# Patient Record
Sex: FEMALE | Race: BLACK OR AFRICAN AMERICAN | NOT HISPANIC OR LATINO | URBAN - METROPOLITAN AREA
[De-identification: names, ages, dates, MRNs, and addresses within clinical notes are randomized per-mention and may not be internally consistent; named-entity substitution may affect disease eponyms.]

---

## 2020-02-25 ENCOUNTER — EMERGENCY (EMERGENCY)
Facility: HOSPITAL | Age: 65
LOS: 1 days | Discharge: HOME | End: 2020-02-25
Attending: EMERGENCY MEDICINE | Admitting: EMERGENCY MEDICINE
Payer: MEDICARE

## 2020-02-25 VITALS
HEART RATE: 68 BPM | SYSTOLIC BLOOD PRESSURE: 162 MMHG | DIASTOLIC BLOOD PRESSURE: 72 MMHG | TEMPERATURE: 98 F | RESPIRATION RATE: 16 BRPM | OXYGEN SATURATION: 99 %

## 2020-02-25 VITALS
HEART RATE: 87 BPM | DIASTOLIC BLOOD PRESSURE: 78 MMHG | SYSTOLIC BLOOD PRESSURE: 148 MMHG | OXYGEN SATURATION: 100 % | RESPIRATION RATE: 18 BRPM

## 2020-02-25 LAB
ALBUMIN SERPL ELPH-MCNC: 4 G/DL — SIGNIFICANT CHANGE UP (ref 3.3–5)
ALP SERPL-CCNC: 106 U/L — SIGNIFICANT CHANGE UP (ref 40–120)
ALT FLD-CCNC: 14 U/L — SIGNIFICANT CHANGE UP (ref 4–33)
ANION GAP SERPL CALC-SCNC: 10 MMO/L — SIGNIFICANT CHANGE UP (ref 7–14)
ANION GAP SERPL CALC-SCNC: 15 MMO/L — HIGH (ref 7–14)
AST SERPL-CCNC: 15 U/L — SIGNIFICANT CHANGE UP (ref 4–32)
BASE EXCESS BLDV CALC-SCNC: 0.7 MMOL/L — SIGNIFICANT CHANGE UP
BASOPHILS # BLD AUTO: 0.02 K/UL — SIGNIFICANT CHANGE UP (ref 0–0.2)
BASOPHILS NFR BLD AUTO: 0.3 % — SIGNIFICANT CHANGE UP (ref 0–2)
BILIRUB SERPL-MCNC: 0.3 MG/DL — SIGNIFICANT CHANGE UP (ref 0.2–1.2)
BLOOD GAS VENOUS - CREATININE: 1.51 MG/DL — HIGH (ref 0.5–1.3)
BLOOD GAS VENOUS - FIO2: 21 — SIGNIFICANT CHANGE UP
BUN SERPL-MCNC: 32 MG/DL — HIGH (ref 7–23)
BUN SERPL-MCNC: 36 MG/DL — HIGH (ref 7–23)
CALCIUM SERPL-MCNC: 9 MG/DL — SIGNIFICANT CHANGE UP (ref 8.4–10.5)
CALCIUM SERPL-MCNC: 9.5 MG/DL — SIGNIFICANT CHANGE UP (ref 8.4–10.5)
CHLORIDE BLDV-SCNC: 98 MMOL/L — SIGNIFICANT CHANGE UP (ref 96–108)
CHLORIDE SERPL-SCNC: 91 MMOL/L — LOW (ref 98–107)
CHLORIDE SERPL-SCNC: 99 MMOL/L — SIGNIFICANT CHANGE UP (ref 98–107)
CO2 SERPL-SCNC: 23 MMOL/L — SIGNIFICANT CHANGE UP (ref 22–31)
CO2 SERPL-SCNC: 25 MMOL/L — SIGNIFICANT CHANGE UP (ref 22–31)
CREAT SERPL-MCNC: 1.3 MG/DL — SIGNIFICANT CHANGE UP (ref 0.5–1.3)
CREAT SERPL-MCNC: 1.45 MG/DL — HIGH (ref 0.5–1.3)
EOSINOPHIL # BLD AUTO: 0.02 K/UL — SIGNIFICANT CHANGE UP (ref 0–0.5)
EOSINOPHIL NFR BLD AUTO: 0.3 % — SIGNIFICANT CHANGE UP (ref 0–6)
GAS PNL BLDV: 132 MMOL/L — LOW (ref 136–146)
GLUCOSE BLDV-MCNC: 589 MG/DL — CRITICAL HIGH (ref 70–99)
GLUCOSE SERPL-MCNC: 496 MG/DL — CRITICAL HIGH (ref 70–99)
GLUCOSE SERPL-MCNC: 667 MG/DL — CRITICAL HIGH (ref 70–99)
HBA1C BLD-MCNC: 10.7 % — HIGH (ref 4–5.6)
HCO3 BLDV-SCNC: 24 MMOL/L — SIGNIFICANT CHANGE UP (ref 20–27)
HCT VFR BLD CALC: 37.3 % — SIGNIFICANT CHANGE UP (ref 34.5–45)
HCT VFR BLDV CALC: 38.8 % — SIGNIFICANT CHANGE UP (ref 34.5–45)
HGB BLD-MCNC: 12.3 G/DL — SIGNIFICANT CHANGE UP (ref 11.5–15.5)
HGB BLDV-MCNC: 12.6 G/DL — SIGNIFICANT CHANGE UP (ref 11.5–15.5)
IMM GRANULOCYTES NFR BLD AUTO: 0 % — SIGNIFICANT CHANGE UP (ref 0–1.5)
LACTATE BLDV-MCNC: 3.1 MMOL/L — HIGH (ref 0.5–2)
LACTATE SERPL-SCNC: 1.2 MMOL/L — SIGNIFICANT CHANGE UP (ref 0.5–2)
LYMPHOCYTES # BLD AUTO: 2.2 K/UL — SIGNIFICANT CHANGE UP (ref 1–3.3)
LYMPHOCYTES # BLD AUTO: 37.5 % — SIGNIFICANT CHANGE UP (ref 13–44)
MCHC RBC-ENTMCNC: 28.9 PG — SIGNIFICANT CHANGE UP (ref 27–34)
MCHC RBC-ENTMCNC: 33 % — SIGNIFICANT CHANGE UP (ref 32–36)
MCV RBC AUTO: 87.8 FL — SIGNIFICANT CHANGE UP (ref 80–100)
MONOCYTES # BLD AUTO: 0.5 K/UL — SIGNIFICANT CHANGE UP (ref 0–0.9)
MONOCYTES NFR BLD AUTO: 8.5 % — SIGNIFICANT CHANGE UP (ref 2–14)
NEUTROPHILS # BLD AUTO: 3.12 K/UL — SIGNIFICANT CHANGE UP (ref 1.8–7.4)
NEUTROPHILS NFR BLD AUTO: 53.4 % — SIGNIFICANT CHANGE UP (ref 43–77)
NRBC # FLD: 0 K/UL — SIGNIFICANT CHANGE UP (ref 0–0)
PCO2 BLDV: 46 MMHG — SIGNIFICANT CHANGE UP (ref 41–51)
PH BLDV: 7.36 PH — SIGNIFICANT CHANGE UP (ref 7.32–7.43)
PLATELET # BLD AUTO: 345 K/UL — SIGNIFICANT CHANGE UP (ref 150–400)
PMV BLD: 9.8 FL — SIGNIFICANT CHANGE UP (ref 7–13)
PO2 BLDV: 37 MMHG — SIGNIFICANT CHANGE UP (ref 35–40)
POTASSIUM BLDV-SCNC: 4.6 MMOL/L — HIGH (ref 3.4–4.5)
POTASSIUM SERPL-MCNC: 4.5 MMOL/L — SIGNIFICANT CHANGE UP (ref 3.5–5.3)
POTASSIUM SERPL-MCNC: 4.5 MMOL/L — SIGNIFICANT CHANGE UP (ref 3.5–5.3)
POTASSIUM SERPL-SCNC: 4.5 MMOL/L — SIGNIFICANT CHANGE UP (ref 3.5–5.3)
POTASSIUM SERPL-SCNC: 4.5 MMOL/L — SIGNIFICANT CHANGE UP (ref 3.5–5.3)
PROT SERPL-MCNC: 8.3 G/DL — SIGNIFICANT CHANGE UP (ref 6–8.3)
RBC # BLD: 4.25 M/UL — SIGNIFICANT CHANGE UP (ref 3.8–5.2)
RBC # FLD: 12.2 % — SIGNIFICANT CHANGE UP (ref 10.3–14.5)
SAO2 % BLDV: 67.7 % — SIGNIFICANT CHANGE UP (ref 60–85)
SODIUM SERPL-SCNC: 129 MMOL/L — LOW (ref 135–145)
SODIUM SERPL-SCNC: 134 MMOL/L — LOW (ref 135–145)
WBC # BLD: 5.86 K/UL — SIGNIFICANT CHANGE UP (ref 3.8–10.5)
WBC # FLD AUTO: 5.86 K/UL — SIGNIFICANT CHANGE UP (ref 3.8–10.5)

## 2020-02-25 PROCEDURE — 93971 EXTREMITY STUDY: CPT | Mod: 26,LT

## 2020-02-25 PROCEDURE — 99284 EMERGENCY DEPT VISIT MOD MDM: CPT

## 2020-02-25 PROCEDURE — 73610 X-RAY EXAM OF ANKLE: CPT | Mod: 26,LT

## 2020-02-25 RX ORDER — SODIUM CHLORIDE 9 MG/ML
2000 INJECTION INTRAMUSCULAR; INTRAVENOUS; SUBCUTANEOUS ONCE
Refills: 0 | Status: COMPLETED | OUTPATIENT
Start: 2020-02-25 | End: 2020-02-25

## 2020-02-25 RX ADMIN — SODIUM CHLORIDE 2000 MILLILITER(S): 9 INJECTION INTRAMUSCULAR; INTRAVENOUS; SUBCUTANEOUS at 16:16

## 2020-02-25 NOTE — ED PROVIDER NOTE - PROGRESS NOTE DETAILS
Pt feeling well.  All results and imaging reviewed.  States she expected her A1C to be worse.  Upon further discussion  pt states she has her insulin at home, and has been taking "my own thing".  I also called in Rx for Lantus to pt's pharmacy (30 units nightly, per prior [facility]) and theystated she should have enough for another few weeks, but would try to fill rx.  I d/w pt and she stated she takes a higher dose of Lantus than rx'd.  She insists she will be seeing her new PMD soon since he switched insurance. She declines CDU, though a bed was offered to her.  She understands she has a chronic condition which requires careful eval and expressed a desire to continue taking medications as prescribed.  Will seek f/u. brendan that we offered CDu fo endo eval in AM.  Since she is alert and oriented and appeas to possess decisional capacity, blood glucose trending down, and no AGMA, will dc for PMD f/u.

## 2020-02-25 NOTE — ED ADULT NURSE NOTE - OBJECTIVE STATEMENT
64 yr old F c DM, currently off meds d/t insurance and PMD transitions who p./w left ankle swelling and calf pain x weeks. No trauma. No fevers/chills.  No SOB or CP.  States she hasn't been able ot monitor her sugars. No recent travel. IV placed, labs sent, VS as documented, will continue to monitor.

## 2020-02-25 NOTE — ED PROVIDER NOTE - MUSCULOSKELETAL, MLM
Spine appears normal, range of motion is not limited, no muscle or joint tenderness except for mild swelling and tenbderness at proximal mnedial aspect of left ankle. Zlso with calf tenderness to plap

## 2020-02-25 NOTE — ED PROVIDER NOTE - PATIENT PORTAL LINK FT
You can access the FollowMyHealth Patient Portal offered by [facility] by registering at the following website: [email or web address]. By joining [employer]’s FollowMyHealth portal, you will also be able to view your health information using other applications (apps) compatible with our system.

## 2020-02-25 NOTE — ED PROVIDER NOTE - NSFOLLOWUPINSTRUCTIONS_ED_ALL_ED_FT
Your blood sugar was very high today and you declined Endocrinology evaluation in the Observation Unit.  Please see your doctor as soon as possible and take all of your medications as prescribed.     Return to the ER if you're feeling weak, dehydrated, or have any other concerning signs.     Be sure to discuss your medical conditions with your doctor as soon as possible.

## 2020-02-25 NOTE — ED PROVIDER NOTE - OBJECTIVE STATEMENT
64 yr old F c DM, currently off meds d/t insurance and PMD transitions who p./w left ankle swelling and calf pain x weeks. No trauma. No fevers/chills.  No SOB or CP.  States she hasn't been able ot monitor her sugars. No recent travel.

## 2020-02-25 NOTE — ED PROVIDER NOTE - CLINICAL SUMMARY MEDICAL DECISION MAKING FREE TEXT BOX
Pt c uncontrolled DM who p/w LLE swelling, calf tenderness.  Will r/o DVT.  Also given lack of medical care or access to meds will obtain A1C and assess for possible Endo eval.

## 2020-11-15 ENCOUNTER — EMERGENCY (EMERGENCY)
Facility: HOSPITAL | Age: 65
LOS: 1 days | Discharge: HOME | End: 2020-11-15
Attending: EMERGENCY MEDICINE | Admitting: EMERGENCY MEDICINE
Payer: MEDICARE

## 2020-11-15 VITALS
OXYGEN SATURATION: 100 % | TEMPERATURE: 98 F | SYSTOLIC BLOOD PRESSURE: 140 MMHG | HEART RATE: 76 BPM | DIASTOLIC BLOOD PRESSURE: 65 MMHG | RESPIRATION RATE: 15 BRPM

## 2020-11-15 VITALS
SYSTOLIC BLOOD PRESSURE: 149 MMHG | OXYGEN SATURATION: 99 % | HEIGHT: 68 IN | HEART RATE: 92 BPM | RESPIRATION RATE: 16 BRPM | DIASTOLIC BLOOD PRESSURE: 72 MMHG

## 2020-11-15 PROBLEM — E11.9 TYPE 2 DIABETES MELLITUS WITHOUT COMPLICATIONS: Chronic | Status: ACTIVE | Noted: 2020-02-25

## 2020-11-15 LAB
APPEARANCE UR: CLEAR — SIGNIFICANT CHANGE UP
BACTERIA # UR AUTO: NEGATIVE — SIGNIFICANT CHANGE UP
BILIRUB UR-MCNC: NEGATIVE — SIGNIFICANT CHANGE UP
BLOOD UR QL VISUAL: NEGATIVE — SIGNIFICANT CHANGE UP
COLOR SPEC: SIGNIFICANT CHANGE UP
GLUCOSE UR-MCNC: NEGATIVE — SIGNIFICANT CHANGE UP
HYALINE CASTS # UR AUTO: NEGATIVE — SIGNIFICANT CHANGE UP
KETONES UR-MCNC: NEGATIVE — SIGNIFICANT CHANGE UP
LEUKOCYTE ESTERASE UR-ACNC: SIGNIFICANT CHANGE UP
NITRITE UR-MCNC: NEGATIVE — SIGNIFICANT CHANGE UP
PH UR: 5.5 — SIGNIFICANT CHANGE UP (ref 5–8)
PROT UR-MCNC: NEGATIVE — SIGNIFICANT CHANGE UP
RBC CASTS # UR COMP ASSIST: SIGNIFICANT CHANGE UP (ref 0–?)
SP GR SPEC: 1.02 — SIGNIFICANT CHANGE UP (ref 1–1.04)
SQUAMOUS # UR AUTO: SIGNIFICANT CHANGE UP
UROBILINOGEN FLD QL: NORMAL — SIGNIFICANT CHANGE UP
WBC UR QL: SIGNIFICANT CHANGE UP (ref 0–?)

## 2020-11-15 PROCEDURE — 99283 EMERGENCY DEPT VISIT LOW MDM: CPT | Mod: GC

## 2020-11-15 PROCEDURE — 73562 X-RAY EXAM OF KNEE 3: CPT | Mod: 26,LT

## 2020-11-15 PROCEDURE — 99053 MED SERV 10PM-8AM 24 HR FAC: CPT

## 2020-11-15 PROCEDURE — 73590 X-RAY EXAM OF LOWER LEG: CPT | Mod: 26,LT

## 2020-11-15 RX ORDER — IBUPROFEN 200 MG
400 TABLET ORAL ONCE
Refills: 0 | Status: COMPLETED | OUTPATIENT
Start: 2020-11-15 | End: 2020-11-15

## 2020-11-15 RX ORDER — ACETAMINOPHEN 500 MG
975 TABLET ORAL ONCE
Refills: 0 | Status: COMPLETED | OUTPATIENT
Start: 2020-11-15 | End: 2020-11-15

## 2020-11-15 RX ADMIN — Medication 400 MILLIGRAM(S): at 10:20

## 2020-11-15 RX ADMIN — Medication 975 MILLIGRAM(S): at 10:20

## 2020-11-15 NOTE — ED ADULT NURSE NOTE - NSIMPLEMENTINTERV_GEN_ALL_ED
Implemented All Fall Risk Interventions:  [place] to call system. Call bell, personal items and telephone within reach. Instruct patient to call for assistance. Room bathroom lighting operational. Non-slip footwear when patient is off stretcher. Physically safe environment: no spills, clutter or unnecessary equipment. Stretcher in lowest position, wheels locked, appropriate side rails in place. Provide visual cue, wrist band, yellow gown, etc. Monitor gait and stability. Monitor for mental status changes and reorient to person, place, and time. Review medications for side effects contributing to fall risk. Reinforce activity limits and safety measures with patient and family.

## 2020-11-15 NOTE — ED PROVIDER NOTE - PMH
Diabetes mellitus type 2, noninsulin dependent    DM (diabetes mellitus)    HTN (hypertension)    Hyperlipemia

## 2020-11-15 NOTE — ED ADULT NURSE NOTE - OBJECTIVE STATEMENT
Pt presents to the ED a&ox4 and ambulatory c/o of left knee pain s/p mechanical fall this morning at work. Pt states she misstepped and tripped, landing on the right side. Pt states she did not land on her left arm, elbow, ankle or foot, just her left knee. Pt denies hitting her head. Pt denies LOC. No swelling or erythema noted to the left knee. Pt in no acute distress at this time. Pt denies headache. Pt denies previous falls. Pt denies dizziness, lightheadedness, chest pain, SOB, fevers, cough. Breathing even and unlabored. Sao2 100% on room air. Abd soft and nontender. Pt denies N/V/D. Will continue to monitor.

## 2020-11-15 NOTE — ED PROVIDER NOTE - NS ED ROS FT
CONSTITUTIONAL: No fevers, no chills, no lightheadedness, no dizziness  Eyes: no visual changes  Ears: no ear drainage, no ear pain  Nose: no nasal congestion  Mouth/Throat: no sore throat  CV: No chest pain, no palpitations  PULM: No SOB, no cough  GI: No n/v/d, no abd pain  : no dysuria, no hematuria  SKIN: no rashes.  NEURO: no headache, no focal weakness or numbness  LYMPH/VASC: no LE swelling CONSTITUTIONAL: No fevers, no chills, no lightheadedness, no dizziness  Eyes: no visual changes  Ears: no ear drainage, no ear pain  Nose: no nasal congestion  Mouth/Throat: no sore throat  CV: No chest pain, no palpitations  PULM: No SOB, no cough  GI: No n/v/d, no abd pain  : no dysuria, no hematuria  SKIN: no rashes.  MSK- pain to left anterior leg  NEURO: no headache, no focal weakness or numbness  LYMPH/VASC: no LE swelling

## 2020-11-15 NOTE — ED PROVIDER NOTE - NSFOLLOWUPINSTRUCTIONS_ED_ALL_ED_FT
You were evaluated in the Emergency Department for left knee pain.  You were evaluated and examined by a physician, and you had labs, xrays.      Based on your evaluation: leg pain    There are no signs of emergency conditions requiring admission to the hospital on today's workup.  Based on the evaluation, a presumptive diagnosis was made, however, further evaluation may be required by your primary care physician or a specialist for a more definitive diagnosis.  Therefore, please follow-up as directed or return to the Emergency Department if your symptoms change or worsen.    We recommend that you:  1. See your primary care physician within the next 72 hours for follow up.  Bring a copy of your discharge paperwork (including any test results) to your doctor.  2. Take tylenol or motrin as needed for pain.  3. Use your cane as shown here as needed.      *** Return immediately if you have worsening pain, inability to bear weight, or any other new/concerning symptoms. ***

## 2020-11-15 NOTE — ED ADULT NURSE REASSESSMENT NOTE - NS ED NURSE REASSESS COMMENT FT1
Pt given cane by MD and instructed on how to use by MD Mcarthur and MD Gordillo. Pt ambulated with cane with steady gait at this time with improved pain. Awaiting UA results. Will continue to monitor.

## 2020-11-15 NOTE — ED ADULT NURSE NOTE - CHIEF COMPLAINT QUOTE
Patient c/o mechanical fall today "tripped on air". Patient reports hitting below left knee to floor. Denies hitting head or LOC. Patient takes aspirin but denies any other blood thinners. Hx. DM Type 2, HTN. Patient refusing to take temperature in triage currently.

## 2020-11-15 NOTE — ED PROVIDER NOTE - ATTENDING CONTRIBUTION TO CARE
I performed a face to face evaluation of this patient and performed a full history and physical examination on the patient.  I agree with the resident's history, physical examination, and plan of the patient.  64 y/o F presenting s/p fall, no preceding symptoms no with left shin pain low concern for fx but will obtain plain films to evaluate. UA to eval for possible infectious etiology, EKG to eval for cardiac. Likely DC home with supportive care   Pt was working at the time and felt fine, she thinks she tripped on her shoe.  Did not hit head, head and neck and back normal, heart and lung, abdomen and neuro wnl, skin wnl  No step off left leg, full range of motion. pt declines ekg and labs, is aware sugar is slightly high and will followup.-

## 2020-11-15 NOTE — ED PROVIDER NOTE - CLINICAL SUMMARY MEDICAL DECISION MAKING FREE TEXT BOX
66 y/o F presenting s/p fall, no preceding symptoms no with left shin pain low concern for fx but will obtain plain films to eval. UA to eval for possible infectious etiology, EKG to eval for cardiac. Likely DC home with supportive care - eG Gordillo, DO PGY-2 64 y/o F presenting s/p fall, no preceding symptoms no with left shin pain low concern for fx but will obtain plain films to evaluate. UA to eval for possible infectious etiology, EKG to eval for cardiac. Likely DC home with supportive care   Pt was working at the time and felt fine, she thinks she tripped on her shoe.  Did not hit head, head and neck and back normal, heart and lung, abdomen and neuro wnl, skin wnl  No step off left leg, full range of motion. pt declines ekg and labs, is aware sugar is slightly high and will followup.- Ge Gordillo, DO PGY-2

## 2020-11-15 NOTE — ED PROVIDER NOTE - OBJECTIVE STATEMENT
64 y/o F with PMH of HTN, HLD, DM BIBEMS for mechanical fall. Patient states she fell while walking at work. Fell on left leg, denies head trauma. No preceding symptoms, chest pain, lightheadedness, syncope. Patient has been ambulatory since then although has slight pain since then. Has not taking anything for pain. No numbness, tingling, back pain, hip pain, HA, vision changes

## 2020-11-15 NOTE — ED ADULT TRIAGE NOTE - CHIEF COMPLAINT QUOTE
Patient c/o mechanical fall today "tripped on air". Patient reports hitting below left knee to floor. Denies hitting head or LOC. Patient takes aspirin but denies any other blood thinners. Hx. DM Type 2, HTN. Patient refusing to take oral temperature in triage currently. Patient c/o mechanical fall today "tripped on air". Patient reports hitting below left knee to floor. Denies hitting head or LOC. Patient takes aspirin but denies any other blood thinners. Hx. DM Type 2, HTN. Patient refusing to take temperature in triage currently.

## 2020-11-15 NOTE — ED ADULT NURSE REASSESSMENT NOTE - NS ED NURSE REASSESS COMMENT FT1
Pt denying EKG at this time, MD Mcarthur made aware. Pt refusing to give urine sample. Pt awaiting xray results at this time. Will continue to monitor.

## 2020-11-15 NOTE — ED PROVIDER NOTE - PHYSICAL EXAMINATION
gen: well appearing  Mentation: AAO x 3  psych: mood appropriate  ENT: airway patent  Eyes: conjunctivae clear bilaterally  Cardio: RRR, no m/r/g  Resp: normal BS b/l  GI: s/nt/nd  : no CVA tenderness  Neuro: sensation and motor function intact, CN 2-12 intact  Skin: No evidence of rash  MSK: normal movement of all extremities, no midline tenderness or step off deformities, no localized/point tenderness, LE neurovascularly intact, no joint effusions  Lymph/Vasc: no LE edema

## 2020-11-15 NOTE — ED PROVIDER NOTE - PATIENT PORTAL LINK FT
You can access the FollowMyHealth Patient Portal offered by [facility] by registering at the following website: [email or web address]. By joining [employer]’s FollowMyHealth portal, you will also be able to view your health information using other applications (apps) compatible with our system.

## 2020-11-16 LAB
CULTURE RESULTS: SIGNIFICANT CHANGE UP
SPECIMEN SOURCE: SIGNIFICANT CHANGE UP

## 2020-12-08 ENCOUNTER — APPOINTMENT (OUTPATIENT)
Dept: ORTHOPEDIC SURGERY | Facility: CLINIC | Age: 65
End: 2020-12-08

## 2021-05-06 ENCOUNTER — EMERGENCY (EMERGENCY)
Facility: HOSPITAL | Age: 66
LOS: 1 days | Discharge: HOME | End: 2021-05-06
Admitting: EMERGENCY MEDICINE
Payer: MEDICARE

## 2021-05-06 VITALS
RESPIRATION RATE: 16 BRPM | TEMPERATURE: 99 F | SYSTOLIC BLOOD PRESSURE: 149 MMHG | OXYGEN SATURATION: 100 % | HEART RATE: 86 BPM | DIASTOLIC BLOOD PRESSURE: 63 MMHG

## 2021-05-06 VITALS
HEIGHT: 68 IN | TEMPERATURE: 98 F | SYSTOLIC BLOOD PRESSURE: 156 MMHG | HEART RATE: 90 BPM | OXYGEN SATURATION: 99 % | RESPIRATION RATE: 16 BRPM | DIASTOLIC BLOOD PRESSURE: 58 MMHG

## 2021-05-06 PROCEDURE — 73562 X-RAY EXAM OF KNEE 3: CPT | Mod: 26,LT

## 2021-05-06 PROCEDURE — 73610 X-RAY EXAM OF ANKLE: CPT | Mod: 26,LT

## 2021-05-06 PROCEDURE — 99284 EMERGENCY DEPT VISIT MOD MDM: CPT

## 2021-05-06 RX ORDER — ACETAMINOPHEN 500 MG
975 TABLET ORAL ONCE
Refills: 0 | Status: COMPLETED | OUTPATIENT
Start: 2021-05-06 | End: 2021-05-06

## 2021-05-06 NOTE — ED PROVIDER NOTE - CLINICAL SUMMARY MEDICAL DECISION MAKING FREE TEXT BOX
65 y/o female with pmhx of DM on insulin, presents to ED c/o mechanical fall today. Pt states she had a fall in November 2020 and left knee xray in ER was negative, followed up with Ortho and MRI confirmed nondisplaced fracture of left knee. Pt states she has had chronic left knee pain since, was treated with a cast for a month, no surgery. Pt states she had knee pain today which caused her left knee to give out. Pt denies any head trauma or LOC. Pt c/o left knee pain and left ankle pain. No fever, cp, sob, dizziness, weakness, numbness, tingling. Pt has appointment with ortho this week. exam limited due to pt being uncooperative. plan to check knee and ankle XR, provide tylenol and refer to ortho outpatient. pt refusing finger stick for diabetes.

## 2021-05-06 NOTE — ED PROVIDER NOTE - MUSCULOSKELETAL NECK EXAM
left ankle diffuse swelling, no skin changes. left knee full ROM. no bony tenderness. neurovascularly intact

## 2021-05-06 NOTE — ED PROVIDER NOTE - PATIENT PORTAL LINK FT
You can access the FollowMyHealth Patient Portal offered by [facility] by registering at the following website: [email or web address]. By joining [employer]’s FollowMyHealth portal, you will also be able to view your health information using other applications (apps) compatible with our system.

## 2021-05-06 NOTE — ED PROVIDER NOTE - NSFOLLOWUPINSTRUCTIONS_ED_ALL_ED_FT
Follow up within 1 week with Dr. Waterhouse Podiatry, call office   [phone]  Partially bear weight to left heel with surgical shoe   Take Tylenol 650mg (Two 325 mg pills) every 4-6 hours as needed for pain.     Worsening, continued or new concerning symptoms return to the emergency department.

## 2021-05-06 NOTE — ED PROVIDER NOTE - OBJECTIVE STATEMENT
65 y/o female with pmhx of DM on insulin, presents to ED c/o mechanical fall today. Pt states she had a fall in November 2020 and left knee xray in ER was negative, followed up with Ortho and MRI confirmed nondisplaced fracture of left knee. Pt states she has had chronic left knee pain since, was treated with a cast for a month, no surgery. Pt states she had knee pain today which caused her left knee to give out. Pt denies any head trauma or LOC. Pt c/o left knee pain and left ankle pain. No fever, cp, sob, dizziness, weakness, numbness, tingling. Pt has appointment with ortho this week.

## 2021-05-06 NOTE — ED ADULT NURSE NOTE - OBJECTIVE STATEMENT
patient came to Er with complaints of left knee pain after a fall today at home. as per patient she had a nondisplaced  fracture of her left knee in november 2020 after a fall.  patient stated "her knee gave away today ". denies any other complaints. awaiting for xray. pt refused  to take tylenol at this time.

## 2021-05-06 NOTE — ED ADULT TRIAGE NOTE - CHIEF COMPLAINT QUOTE
Pt s/p non displaced fracture L knee Nov 2020, c/o fall at home.  Ptt stated "her knee gave way" c/o L knee and ankle pain.  Denies head injury, loc use of blood thinner.

## 2021-05-06 NOTE — ED ADULT NURSE NOTE - NSIMPLEMENTINTERV_GEN_ALL_ED
Implemented All Fall with Harm Risk Interventions:  [place] to call system. Call bell, personal items and telephone within reach. Instruct patient to call for assistance. Room bathroom lighting operational. Non-slip footwear when patient is off stretcher. Physically safe environment: no spills, clutter or unnecessary equipment. Stretcher in lowest position, wheels locked, appropriate side rails in place. Provide visual cue, wrist band, yellow gown, etc. Monitor gait and stability. Monitor for mental status changes and reorient to person, place, and time. Review medications for side effects contributing to fall risk. Reinforce activity limits and safety measures with patient and family. Provide visual clues: red socks.

## 2021-05-06 NOTE — ED ADULT NURSE NOTE - SUICIDE SCREENING QUESTION 1
fever, chills, diaphoresis, activity change, appetite change and fatigue. HENT: Negative for hearing loss, ear pain, congestion, sore throat, rhinorrhea, postnasal drip and ear discharge. Eyes: Negative for photophobia and visual disturbance. Respiratory: Negative for cough, chest tightness, shortness of breath and wheezing. Cardiovascular: Negative for chest pain and leg swelling. Gastrointestinal: Negative for nausea, vomiting, abdominal pain, diarrhea and constipation. Genitourinary: Negative for dysuria, urgency and frequency. Neurological: Negative for weakness, light-headedness and headaches. Psychiatric/Behavioral: Negative for sleep disturbance. Objective:     Vitals:    12/03/18 1309   BP: 118/72   Site: Right Upper Arm   Position: Sitting   Cuff Size: Medium Adult   Pulse: 64   Resp: 16   Temp: 97.8 °F (36.6 °C)   TempSrc: Oral     Wt Readings from Last 3 Encounters:   11/18/18 200 lb 4.8 oz (90.9 kg)   09/27/18 174 lb (78.9 kg)   03/20/18 182 lb (82.6 kg)       Physical Exam  Constitutional: Vital signs are normal. She appears well-developed and well-nourished. She is wheelchair dependent. HENT:   Head: Normocephalic and atraumatic. Right Ear: Tympanic membrane, external ear and ear canal normal. No drainage or tenderness. Left Ear: Tympanic membrane, external ear and ear canal normal. No drainage or tenderness. Nose: Nose normal. No mucosal edema or rhinorrhea. Mouth/Throat: Uvula is midline, oropharynx is clear and moist and mucous membranes are normal. Mucous membranes are not pale. Normal dentition. No posterior oropharyngeal edema or posterior oropharyngeal erythema. Eyes: Lids are normal. Right eye exhibits no chemosis and no discharge. Left eye exhibits no chemosis and no drainage. Right conjunctiva has no hemorrhage. Left conjunctiva has no hemorrhage. Right eye exhibits normal extraocular motion. Left eye exhibits normal extraocular motion.  Right pupil is round
No

## 2021-05-06 NOTE — ED PROVIDER NOTE - PHYSICAL EXAMINATION
Pt uncooperative with exam and getting undressed, aware of findings that may be missed  Exam limited

## 2021-05-07 NOTE — CONSULT NOTE ADULT - SUBJECTIVE AND OBJECTIVE BOX
Podiatry pager #: [phone] ([place])/ [phone] ([facility])    Patient is a 66y old  Female who presents with a chief complaint of left foot pain    HPI: 67 y/o female with pmhx of DM on insulin, presents to ED c/o mechanical fall today. Pt states she had a fall in November 2020 and left knee xray in ER was negative, followed up with Ortho and MRI confirmed nondisplaced fracture of left knee. Pt states she has had chronic left knee pain since, was treated with a cast for a month, no surgery. Pt states she had knee pain today which caused her left knee to give out. Pt denies any head trauma or LOC. Pt c/o left knee pain and left ankle pain. No fever, cp, sob, dizziness, weakness, numbness, tingling. Pt has appointment with ortho this week.      PAST MEDICAL & SURGICAL HISTORY:  Diabetes mellitus type 2, noninsulin dependent    HTN (hypertension)    Hyperlipemia    DM (diabetes mellitus)    S/P hysterectomy        MEDICATIONS  (STANDING):    MEDICATIONS  (PRN):      Allergies    No Known Allergies    Intolerances        VITALS:    Vital Signs Last 24 Hrs  T(C): 37 (06 May 2021 23:36), Max: 37 (06 May 2021 23:36)  T(F): 98.6 (06 May 2021 23:36), Max: 98.6 (06 May 2021 23:36)  HR: 86 (06 May 2021 23:36) (86 - 90)  BP: 149/63 (06 May 2021 23:36) (149/63 - 156/58)  BP(mean): --  RR: 16 (06 May 2021 23:36) (16 - 16)  SpO2: 100% (06 May 2021 23:36) (99% - 100%)    LABS:                CAPILLARY BLOOD GLUCOSE      POCT Blood Glucose.: 90 mg/dL (07 May 2021 00:26)          LOWER EXTREMITY PHYSICAL EXAM:    Vascular: DP/PT 1/4 B/L, CFT <3 seconds B/L, Temperature gradient warm to cool B/L, mild edema to left anterior ankle  Neuro: Epicritic sensation intact to the level of ankle B/L  Musculoskeletal/Ortho: mild tenderness to palpation to left talar head, muscle strength 5/5 all compartments  Skin: no open lesions, no open fracture, neurovascular intact, no sign of infection    RADIOLOGY & ADDITIONAL STUDIES:    < from: Xray Ankle Complete 3 Views, Left (05.06.21 @ 22:00) >    ******PRELIMINARY REPORT******    ******PRELIMINARY REPORT******            EXAM:  XR ANKLE COMP MIN 3 VIEWS LT        PROCEDURE DATE:  May  6 2021     ******PRELIMINARY REPORT******    ******PRELIMINARY REPORT******            INTERPRETATION:  Acute avulsion fracture involving the anterior superior talus.          ******PRELIMINARY REPORT******    ******PRELIMINARY REPORT******          JSOE OCHOA MD; Resident Radiology    < end of copied text >

## 2021-05-07 NOTE — CONSULT NOTE ADULT - ASSESSMENT
65 yo f with left talar head avulsion fracture  -pt seen and evaluated  -afebrile, neurovascular intact  -mild edema to left anterior ankle, mild tenderness to palpation to left talar head, muscle strength 5/5 all compartments, no open lesions, no open fracture, neurovascular intact, no sign of infection  -applied posterior splint to LLE  -instructed pt to partial weight bearing to the left heel in a surgical shoe  -pod stable for discharge, follow up within 1 week with Dr. Waterhouse office: [phone]  -discussed w attending

## 2021-05-17 ENCOUNTER — EMERGENCY (EMERGENCY)
Facility: HOSPITAL | Age: 66
LOS: 1 days | Discharge: HOME | End: 2021-05-17
Admitting: EMERGENCY MEDICINE
Payer: MEDICARE

## 2021-05-17 VITALS
OXYGEN SATURATION: 100 % | TEMPERATURE: 98 F | RESPIRATION RATE: 18 BRPM | DIASTOLIC BLOOD PRESSURE: 71 MMHG | HEART RATE: 60 BPM | SYSTOLIC BLOOD PRESSURE: 138 MMHG

## 2021-05-17 VITALS
HEIGHT: 68 IN | RESPIRATION RATE: 18 BRPM | TEMPERATURE: 97 F | OXYGEN SATURATION: 99 % | DIASTOLIC BLOOD PRESSURE: 72 MMHG | SYSTOLIC BLOOD PRESSURE: 160 MMHG | HEART RATE: 82 BPM

## 2021-05-17 PROCEDURE — 93971 EXTREMITY STUDY: CPT | Mod: 26,LT

## 2021-05-17 PROCEDURE — 99284 EMERGENCY DEPT VISIT MOD MDM: CPT

## 2021-05-17 NOTE — ED PROVIDER NOTE - OBJECTIVE STATEMENT
65 y/o female with a PMHx of DM, HLD and HTN presents to the ED with c/o left ankle pain s/p recent left ankle fx. Pt state she noticed a lump and swelling to area of calf. Pt denies any chest pain, SOB, fever, chills, or prior Hx DVT/PE.

## 2021-05-17 NOTE — ED ADULT TRIAGE NOTE - CHIEF COMPLAINT QUOTE
Pt c/o left calf swelling/pain x1 day. Pt states she had a fall 1 week ago and diagnosed with left ankle fracture. Denies having history of DVT. Hx of neuropathy, diabetes

## 2021-05-17 NOTE — ED PROVIDER NOTE - PATIENT PORTAL LINK FT
You can access the FollowMyHealth Patient Portal offered by [facility] by registering at the following website: [email or web address]. By joining [employer]’s FollowMyHealth portal, you will also be able to view your health information using other applications (apps) compatible with our system.

## 2021-05-17 NOTE — ED PROVIDER NOTE - NSFOLLOWUPINSTRUCTIONS_ED_ALL_ED_FT
Advance activity as tolerated.  Continue all previously prescribed medications as directed unless otherwise instructed.  Follow up with your primary care physician in 48-72 hours- bring copies of your results.  Return to the ER for worsening or persistent symptoms, and/or ANY NEW OR CONCERNING SYMPTOMS. If you have issues obtaining follow up, please call: [phone] to obtain a doctor or specialist who takes your insurance in your area.  You may call [phone] to make an appointment with the internal medicine clinic.     Follow up with an orthopedic doctor within one week  Take Motrin 600mg every 8 hours as needed for pain, take with food  Return to the emergency department with any worsening or concerning symptoms, swelling, numbness/tingling, inability to bear weight or any other concerns.

## 2021-07-23 NOTE — ED PROVIDER NOTE - PSH
Photo Preface (Leave Blank If You Do Not Want): Photographs were obtained today Detail Level: Zone S/P hysterectomy

## 2022-11-08 ENCOUNTER — NON-APPOINTMENT (OUTPATIENT)
Age: 67
End: 2022-11-08

## 2022-11-08 ENCOUNTER — APPOINTMENT (OUTPATIENT)
Dept: OPHTHALMOLOGY | Facility: CLINIC | Age: 67
End: 2022-11-08

## 2022-11-08 PROCEDURE — 99205 OFFICE O/P NEW HI 60 MIN: CPT

## 2022-11-08 PROCEDURE — 92020 GONIOSCOPY: CPT

## 2023-03-20 NOTE — ED ADULT NURSE NOTE - NS ED NOTE ABUSE SUSPICION NEGLECT YN
Refill request for:  Mesalamine 800 MG Tablet Enteric Coated 180 tablet 3 3/16/2022 4/15/2022    Sig - Route: Take 1 tablet by mouth 2 times daily. - Oral    Sent to pharmacy as: Mesalamine 800 MG Oral Tablet Delayed Release    Class: Eprescribe      omeprazole (PrilOSEC) 20 MG capsule 90 capsule 3 3/16/2022     Sig - Route: Take 1 capsule by mouth daily. - Oral    Sent to pharmacy as: Omeprazole 20 MG Oral Capsule Delayed Release (PrilOSEC)    Class: Eprescribe      Last office visit: 03/16/2022  Next office visit: Not scheduled     Patient overdue for follow up with Solange Galeano. Staff message sent to PSR to assist in contacting patient to schedule follow up.     No

## 2024-11-28 ENCOUNTER — EMERGENCY (EMERGENCY)
Facility: HOSPITAL | Age: 69
LOS: 1 days | Discharge: HOME | End: 2024-11-28
Attending: EMERGENCY MEDICINE | Admitting: EMERGENCY MEDICINE
Payer: MEDICARE

## 2024-11-28 VITALS
DIASTOLIC BLOOD PRESSURE: 53 MMHG | TEMPERATURE: 100 F | OXYGEN SATURATION: 98 % | SYSTOLIC BLOOD PRESSURE: 145 MMHG | RESPIRATION RATE: 18 BRPM | HEART RATE: 85 BPM

## 2024-11-28 VITALS
RESPIRATION RATE: 18 BRPM | DIASTOLIC BLOOD PRESSURE: 56 MMHG | OXYGEN SATURATION: 97 % | HEART RATE: 92 BPM | HEIGHT: 68 IN | SYSTOLIC BLOOD PRESSURE: 109 MMHG | WEIGHT: 190.04 LBS | TEMPERATURE: 100 F

## 2024-11-28 LAB
FLUAV AG NPH QL: SIGNIFICANT CHANGE UP
FLUBV AG NPH QL: SIGNIFICANT CHANGE UP
RSV RNA NPH QL NAA+NON-PROBE: SIGNIFICANT CHANGE UP
SARS-COV-2 RNA SPEC QL NAA+PROBE: SIGNIFICANT CHANGE UP

## 2024-11-28 PROCEDURE — 99284 EMERGENCY DEPT VISIT MOD MDM: CPT

## 2024-11-28 NOTE — ED PROVIDER NOTE - CLINICAL SUMMARY MEDICAL DECISION MAKING FREE TEXT BOX
- Chief Complaint: Hypoglycemic episode  - History of Presenting Illness: Blood sugar 73 at home, feeling weak and dizzy. Lives alone, had nothing to eat/drink at home. Called 911. Given glucose, sugar improved to 110. Recent nausea past few days with 2 episodes of emesis, improved after vomiting. no further episodes of n/v. no CP or SOB. no polyuria, no abd ttp.  Compliant and is able to perform all ADLs at home, is compliant and has had no issues administering her insulin and has no issues with the equipment to administer her insulin  - Past Medical History: DM on insulin    Review of Systems:  - Constitutional symptoms: Weak, dizzy  - Gastrointestinal: N/V x 2/7, resolved  - Neurological: No LOC    Objective:  - General: NAD, speaking in full sentences  - Neuro: Moving all extremities, normal  - Resp: Clear lungs, no crackles/bronchi  - Abdo: Soft, NT  - Extremities: No edema    Assessment:  - Primary Diagnosis: Symptomatic hypoglycemia, non-DKA    Plan:  - Flu/COVID testing ordered  - PO intake with sugar content  - Repeat blood sugar check  - Safe for d/c after sugar stabilization  - Transport arrangement needed (pt without phone) Angel att: Hypoglycemic episode  : Blood sugar 73 at home, feeling weak and dizzy. Lives alone, had nothing to eat/drink at home. Called 911. Given glucose, sugar improved to 110. Recent nausea past few days with 2 episodes of emesis, improved after vomiting. no further episodes of n/v. no CP or SOB. no polyuria, no abd ttp.  Compliant and is able to perform all ADLs at home, is compliant and has had no issues administering her insulin and has no issues with the equipment to administer her insulin  - Past Medical History: DM on insulin    Review of Systems:  - Constitutional symptoms: Weak, dizzy  - Gastrointestinal: N/V x 2/7, resolved  - Neurological: No LOC    Objective:  - General: NAD, speaking in full sentences  - Neuro: Moving all extremities, normal  - Resp: Clear lungs, no crackles/bronchi  - Abdo: Soft, NT  - Extremities: No edema    Assessment:  - Primary Diagnosis: Symptomatic hypoglycemia, non-DKA    Plan:  - Flu/COVID testing ordered  - PO intake with sugar content  - Repeat blood sugar check  - Safe for d/c after sugar stabilization  - Transport arrangement needed (pt without phone)

## 2024-11-28 NOTE — PROVIDER CONTACT NOTE (OTHER) - BACKGROUND
Writer outreached [facility]: [phone] and scheduled billback pending auth BLS transport for 11pm p/u. Trip # [id number]. Rep: Jodie

## 2024-11-28 NOTE — ED PROVIDER NOTE - NSICDXPASTMEDICALHX_GEN_ALL_CORE_FT
PAST MEDICAL HISTORY:  Diabetes mellitus type 2, noninsulin dependent     DM (diabetes mellitus)     HTN (hypertension)     Hyperlipemia

## 2024-11-28 NOTE — ED ADULT NURSE NOTE - NSSEPSISNEWALTERMENTAL_ED_A_ED
Render Post-Care Instructions In Note?: yes Consent: The patient's consent was obtained including but not limited to risks of crusting, scabbing, blistering, scarring, darker or lighter pigmentary change, recurrence, incomplete removal and infection. Duration Of Freeze Thaw-Cycle (Seconds): 0 Render In Bullet Format When Appropriate: No Post-Care Instructions: I reviewed with the patient in detail post-care instructions. Patient is to wear sunprotection, and avoid picking at any of the treated lesions. Pt may apply Vaseline to crusted or scabbing areas. Total Number Of Aks Treated: 7 Detail Level: Zone No

## 2024-11-28 NOTE — ED ADULT TRIAGE NOTE - TEMPERATURE IN CELSIUS (DEGREES C)
Musculoskeletal injury  Use ice for 10-15 minutes per sitting. This should ideally be used 2-3 times a day during the first 2 days of acute injury and after muscular irritation. Icing the injured area at the end of the day or before bed is very effective to decrease inflammation and allow good bedtime healing. Heat is helpful for muscle stiffness and loosening tight muscles. Use heat in the morning or after sitting for more than an hour. You may also use heat before stretching the muscles. Heat should be warm to the touch, not hot, and applied for 10-15 minutes. Prolonged application of heat will actually encourage swelling in a muscle due to increased blood flow. Increased blood flow initially helps soften the muscle for stretching however too much blood flow leads to swelling. Swelling, once cooled down actually leads to increased stiffness. Oral medications for acute symptoms include Ibuprofen 600-800 mg every 8 hours for 3-7 days. This can be obtained over-the-counter in the form of Advil liquid gels. These are 200 mg each capsule. Therefore the patient should take 3-4 capsules every 8 hours. Once again, keep the dosing to 3-7 days in length. If medication is needed past that the patient should seek attention from healthcare provider.
37.7

## 2024-11-28 NOTE — ED PROVIDER NOTE - NSFOLLOWUPINSTRUCTIONS_ED_ALL_ED_FT
Reasons to come back to the emergency room include but are not limited to chest pain shortness of breath, confusion, lethargy, persistent nausea or vomiting.    You came to the hospital because you are worried about your sugar being low, this is likely due to you not eating and is not likely due to something nefarious like you taking the wrong amount of insulin etc.  I am glad you are feeling better.      We are going to give you drinks and food with sugar in it to take home.  I would call your doctor in the morning as able. Reasons to come back to the emergency room include but are not limited to chest pain shortness of breath, confusion, lethargy, persistent nausea or vomiting.    You came to the hospital because you are worried about your sugar being low, this is likely due to you not eating and is not likely due to something nefarious like you taking the wrong amount of insulin etc.  I am glad you are feeling better.      We are going to give you drinks and food with sugar in it to take home.  I would call your doctor in the morning as able.      We have tested you for flu and COVID and RSV, these results are not back yet but you are able to call for these results tomorrow and the numbers on the front page.  Thank you.

## 2024-11-28 NOTE — ED PROVIDER NOTE - PROGRESS NOTE DETAILS
YAHIR Barrios PGY-3: Patient asymptomatic at this time.  The patient will be discharged home.  The patient is amenable to this.  We will give sugary treats for the patient to take home and patient is amenable to this.

## 2024-11-28 NOTE — ED ADULT TRIAGE NOTE - CHIEF COMPLAINT QUOTE
Pt was feeling lightheaded at home, EMS got a FSBS of 73 and patient took half the bottle of glucose with effect. Pt reports feeling nauseous the past couple of days.  phx DM, HLD, HTN    FSBS in triage 77, pt finishing glucose bottle on stretcher.

## 2024-11-28 NOTE — ED ADULT NURSE REASSESSMENT NOTE - NS ED NURSE REASSESS COMMENT FT1
Received report from JALIL mccormick. Pt received in spot 11. A&O4. ambulatory. pt remains at baseline mental status, RR even unlabored completing full sentences. pt resting in stretcher comfortably at this time, no new complaints offered. stretcher lowest position siderails up safety measures in place. Pt given food and juice. Repeat finger stick to be repeated at 9pm. Comfort and safety maintained. Call bell within reach.

## 2024-11-28 NOTE — ED PROVIDER NOTE - PATIENT PORTAL LINK FT
You can access the FollowMyHealth Patient Portal offered by [facility] by registering at the following website: [email or web address]. By joining [facility]’s FollowMyHealth portal, you will also be able to view your health information using other applications (apps) compatible with our system.

## 2024-12-03 ENCOUNTER — INPATIENT (INPATIENT)
Facility: HOSPITAL | Age: 69
LOS: 19 days | Discharge: HOME | End: 2024-12-23
Attending: SURGERY | Admitting: SURGERY
Payer: MEDICARE

## 2024-12-03 VITALS
TEMPERATURE: 99 F | HEART RATE: 98 BPM | SYSTOLIC BLOOD PRESSURE: 136 MMHG | HEIGHT: 68 IN | DIASTOLIC BLOOD PRESSURE: 68 MMHG | RESPIRATION RATE: 16 BRPM | WEIGHT: 179.9 LBS

## 2024-12-03 LAB
ALBUMIN SERPL ELPH-MCNC: 3.6 G/DL — SIGNIFICANT CHANGE UP (ref 3.3–5)
ALP SERPL-CCNC: 101 U/L — SIGNIFICANT CHANGE UP (ref 40–120)
ALT FLD-CCNC: 10 U/L — SIGNIFICANT CHANGE UP (ref 4–33)
ANION GAP SERPL CALC-SCNC: 12 MMOL/L — SIGNIFICANT CHANGE UP (ref 7–14)
APTT BLD: 29.6 SEC — SIGNIFICANT CHANGE UP (ref 24.5–35.6)
AST SERPL-CCNC: 17 U/L — SIGNIFICANT CHANGE UP (ref 4–32)
BASOPHILS # BLD AUTO: 0.02 K/UL — SIGNIFICANT CHANGE UP (ref 0–0.2)
BASOPHILS NFR BLD AUTO: 0.1 % — SIGNIFICANT CHANGE UP (ref 0–2)
BILIRUB SERPL-MCNC: 0.4 MG/DL — SIGNIFICANT CHANGE UP (ref 0.2–1.2)
BLOOD GAS VENOUS COMPREHENSIVE RESULT: SIGNIFICANT CHANGE UP
BUN SERPL-MCNC: 19 MG/DL — SIGNIFICANT CHANGE UP (ref 7–23)
CALCIUM SERPL-MCNC: 9.2 MG/DL — SIGNIFICANT CHANGE UP (ref 8.4–10.5)
CHLORIDE SERPL-SCNC: 97 MMOL/L — LOW (ref 98–107)
CO2 SERPL-SCNC: 24 MMOL/L — SIGNIFICANT CHANGE UP (ref 22–31)
CREAT SERPL-MCNC: 1.41 MG/DL — HIGH (ref 0.5–1.3)
CRP SERPL-MCNC: 88.8 MG/L — HIGH
EGFR: 40 ML/MIN/1.73M2 — LOW
EOSINOPHIL # BLD AUTO: 0.01 K/UL — SIGNIFICANT CHANGE UP (ref 0–0.5)
EOSINOPHIL NFR BLD AUTO: 0.1 % — SIGNIFICANT CHANGE UP (ref 0–6)
ERYTHROCYTE [SEDIMENTATION RATE] IN BLOOD: 106 MM/HR — HIGH (ref 4–25)
GLUCOSE SERPL-MCNC: 273 MG/DL — HIGH (ref 70–99)
HCT VFR BLD CALC: 35 % — SIGNIFICANT CHANGE UP (ref 34.5–45)
HGB BLD-MCNC: 11.2 G/DL — LOW (ref 11.5–15.5)
IANC: 10.34 K/UL — HIGH (ref 1.8–7.4)
IMM GRANULOCYTES NFR BLD AUTO: 0.3 % — SIGNIFICANT CHANGE UP (ref 0–0.9)
INR BLD: 1.15 RATIO — SIGNIFICANT CHANGE UP (ref 0.85–1.16)
LYMPHOCYTES # BLD AUTO: 16.7 % — SIGNIFICANT CHANGE UP (ref 13–44)
LYMPHOCYTES # BLD AUTO: 2.25 K/UL — SIGNIFICANT CHANGE UP (ref 1–3.3)
MCHC RBC-ENTMCNC: 28.7 PG — SIGNIFICANT CHANGE UP (ref 27–34)
MCHC RBC-ENTMCNC: 32 G/DL — SIGNIFICANT CHANGE UP (ref 32–36)
MCV RBC AUTO: 89.7 FL — SIGNIFICANT CHANGE UP (ref 80–100)
MONOCYTES # BLD AUTO: 0.81 K/UL — SIGNIFICANT CHANGE UP (ref 0–0.9)
MONOCYTES NFR BLD AUTO: 6 % — SIGNIFICANT CHANGE UP (ref 2–14)
NEUTROPHILS # BLD AUTO: 10.34 K/UL — HIGH (ref 1.8–7.4)
NEUTROPHILS NFR BLD AUTO: 76.8 % — SIGNIFICANT CHANGE UP (ref 43–77)
NRBC # BLD: 0 /100 WBCS — SIGNIFICANT CHANGE UP (ref 0–0)
NRBC # FLD: 0 K/UL — SIGNIFICANT CHANGE UP (ref 0–0)
PLATELET # BLD AUTO: 483 K/UL — HIGH (ref 150–400)
POTASSIUM SERPL-MCNC: 3.7 MMOL/L — SIGNIFICANT CHANGE UP (ref 3.5–5.3)
POTASSIUM SERPL-SCNC: 3.7 MMOL/L — SIGNIFICANT CHANGE UP (ref 3.5–5.3)
PROT SERPL-MCNC: 8.4 G/DL — HIGH (ref 6–8.3)
PROTHROM AB SERPL-ACNC: 13.3 SEC — SIGNIFICANT CHANGE UP (ref 9.9–13.4)
RBC # BLD: 3.9 M/UL — SIGNIFICANT CHANGE UP (ref 3.8–5.2)
RBC # FLD: 12 % — SIGNIFICANT CHANGE UP (ref 10.3–14.5)
SODIUM SERPL-SCNC: 133 MMOL/L — LOW (ref 135–145)
WBC # BLD: 13.47 K/UL — HIGH (ref 3.8–10.5)
WBC # FLD AUTO: 13.47 K/UL — HIGH (ref 3.8–10.5)

## 2024-12-03 PROCEDURE — 73610 X-RAY EXAM OF ANKLE: CPT | Mod: 26,LT

## 2024-12-03 PROCEDURE — 73620 X-RAY EXAM OF FOOT: CPT | Mod: 26,LT

## 2024-12-03 PROCEDURE — 99285 EMERGENCY DEPT VISIT HI MDM: CPT

## 2024-12-03 PROCEDURE — 73590 X-RAY EXAM OF LOWER LEG: CPT | Mod: 26,LT

## 2024-12-03 RX ORDER — VANCOMYCIN HYDROCHLORIDE 5 G/100ML
1000 INJECTION, POWDER, LYOPHILIZED, FOR SOLUTION INTRAVENOUS ONCE
Refills: 0 | Status: COMPLETED | OUTPATIENT
Start: 2024-12-03 | End: 2024-12-03

## 2024-12-03 RX ORDER — PIPERACILLIN AND TAZOBACTAM 3; .375 G/15ML; G/15ML
3.38 INJECTION, POWDER, LYOPHILIZED, FOR SOLUTION INTRAVENOUS ONCE
Refills: 0 | Status: COMPLETED | OUTPATIENT
Start: 2024-12-03 | End: 2024-12-03

## 2024-12-03 RX ADMIN — VANCOMYCIN HYDROCHLORIDE 250 MILLIGRAM(S): 5 INJECTION, POWDER, LYOPHILIZED, FOR SOLUTION INTRAVENOUS at 23:31

## 2024-12-03 RX ADMIN — PIPERACILLIN AND TAZOBACTAM 200 GRAM(S): 3; .375 INJECTION, POWDER, LYOPHILIZED, FOR SOLUTION INTRAVENOUS at 22:44

## 2024-12-03 NOTE — ED PROVIDER NOTE - PHYSICAL EXAMINATION
skin: + ulceration with + spontaneous drainage to left great toe, + overlying erythema extending to L shin, + warmth, compartments soft, sensation intact

## 2024-12-03 NOTE — ED ADULT TRIAGE NOTE - CHIEF COMPLAINT QUOTE
sent in by podiatrist for right foot/toe wound, worsening since October. Endorses fevers, last took tylenol 4pm. History of DM2 on insulin, HLD, HTN,

## 2024-12-03 NOTE — ED PROVIDER NOTE - PROGRESS NOTE DETAILS
X-ray concerning for first toe osteomyelitis.  Podiatry in agreement.  Will admit for IV antibiotics. NBA Kee: pt with elevated inflammatory markers, concern for osteo on XR   IV abx were given, discussed with podiatry, recommending admission, discussed with dr boone for admisson  pt aware and in agreement with plan

## 2024-12-03 NOTE — ED PROVIDER NOTE - OBJECTIVE STATEMENT
Patient is a 69-year-old female past medical history DM2, hypertension, hyperlipidemia who presented to ED with left great toe infection.  Patient reports she was clipping her nail toenails in October and accidentally cut her skin.  Patient reports this week she noticed bleeding through her sock which prompted her to check and noticed an open wound on her toe.  Patient reports she followed up with her podiatrist yesterday who instructed she come to the ED for IV antibiotics and further workup.  Patient reports she was having subjective fever at home.  Otherwise denies chest pain, palpitations, shortness of breath, nausea, vomiting.

## 2024-12-03 NOTE — ED ADULT NURSE NOTE - OBJECTIVE STATEMENT
Pt. presents to intake 14 c/o left big toe pain redness & wound. Does endorse subjective fever and chills. Pt. states she cut her toenail in october and noticed it bleeding and redness and pain went to her podiatrist 2 days ago who told her to come to ER. PMHx T2DM on insulin. Denies CP SOB abd pain n/v/d dysuria. LAC18G labs sent medicated per order. pending results.

## 2024-12-03 NOTE — ED PROVIDER NOTE - ATTENDING APP SHARED VISIT CONTRIBUTION OF CARE
69-year-old female diabetes type 2 on Lantus/NovoLog/Farxiga, hypertension, erythema with left first toe pain, swelling, purulent drainage.  Patient reports did clip her own house approximately month ago.  Started noticing pain and swelling passable days.  Saw her podiatrist who recommended coming to emergency department for antibiotics and further management.  Patient reports episode of subjective fever.  Denies chills, chest pain, shortness of breath, nausea vomiting, abdominal pain.  Patient reports pain radiates from toe up to left ankle.  Patient denies any calf pain or swelling.  No prior history of infections.  Patient does note to have relative decrease sensation in feet chronically.  Exam as above, purulent discharge from distal tip of left toe, + erythema + induration + edema throughout left first toe.  + Medial foot and ankle tenderness.  No crepitus present.  Sensation equal intact throughout bilateral lower extremities.  Normal cap refill lower extremities.  Patient with cellulitis and likely abscess of first toe.  Will assess for possible necrotizing infection, osteomyelitis.  Plan: Labs, empiric antibiotics, symptom leaf as needed, x-ray, podiatry consult, reassess.

## 2024-12-03 NOTE — ED ADULT NURSE NOTE - PAIN RATING/NUMBER SCALE (0-10): ACTIVITY
Pt called. She is stating that she has bumps on her vagina. I made her an appt with Elise Branch. She wanted Thursday.
5 (moderate pain)

## 2024-12-03 NOTE — ED PROVIDER NOTE - CLINICAL SUMMARY MEDICAL DECISION MAKING FREE TEXT BOX
Patient is a 69-year-old female past medical history DM2, hypertension, hyperlipidemia who presented to ED with left great toe infection. Reports clipping toenail and injuring skin ~ 2 months ago, noticed infection this week, sent in by her outpatient podiatrist for IV abx, further work up. Plan for labs, XR, IV abx and frequent reassessments.

## 2024-12-03 NOTE — ED ADULT NURSE NOTE - HISTORY OF COVID-19 VACCINATION
"-- DO NOT REPLY / DO NOT REPLY ALL --  -- Message is from the [facility]--    Result Request  Type of Test / Lab: Stress test David Godoy HOOKUP      Date Test / Lab: 06/17/2021  Location: [employer]   Test / Lab ordered/authorized by: Charl Gottron    Other comments:heart monitor      Preferred Delivery Method   Call back patient with results. Phone number:  [phone]    Caller Information       Type Contact Phone    06/28/2021 11:08 AM CDT Phone (Incoming) Siobhan Vera (Self) [phone] (M)          Alternative phone number: n/a    Turnaround time given to caller: ""This message will be sent to [facility] Provider's name]. The clinical team will fulfill your request as soon as they review your message. \""  "
Inform patient normal stress test    Holter monitor is still pending
Lvm to notify Nl results requested call if questions also left on vm still pending on holter
Vaccine status unknown

## 2024-12-04 ENCOUNTER — RESULT REVIEW (OUTPATIENT)
Age: 69
End: 2024-12-04

## 2024-12-04 DIAGNOSIS — Z29.9 ENCOUNTER FOR PROPHYLACTIC MEASURES, UNSPECIFIED: ICD-10-CM

## 2024-12-04 DIAGNOSIS — E11.65 TYPE 2 DIABETES MELLITUS WITH HYPERGLYCEMIA: ICD-10-CM

## 2024-12-04 DIAGNOSIS — I10 ESSENTIAL (PRIMARY) HYPERTENSION: ICD-10-CM

## 2024-12-04 DIAGNOSIS — L08.9 LOCAL INFECTION OF THE SKIN AND SUBCUTANEOUS TISSUE, UNSPECIFIED: ICD-10-CM

## 2024-12-04 DIAGNOSIS — E11.628 TYPE 2 DIABETES MELLITUS WITH OTHER SKIN COMPLICATIONS: ICD-10-CM

## 2024-12-04 DIAGNOSIS — N18.30 CHRONIC KIDNEY DISEASE, STAGE 3 UNSPECIFIED: ICD-10-CM

## 2024-12-04 LAB
A1C WITH ESTIMATED AVERAGE GLUCOSE RESULT: 7.7 % — HIGH (ref 4–5.6)
ANION GAP SERPL CALC-SCNC: 12 MMOL/L — SIGNIFICANT CHANGE UP (ref 7–14)
BASOPHILS # BLD AUTO: 0.03 K/UL — SIGNIFICANT CHANGE UP (ref 0–0.2)
BASOPHILS NFR BLD AUTO: 0.2 % — SIGNIFICANT CHANGE UP (ref 0–2)
BLD GP AB SCN SERPL QL: NEGATIVE — SIGNIFICANT CHANGE UP
BUN SERPL-MCNC: 18 MG/DL — SIGNIFICANT CHANGE UP (ref 7–23)
CALCIUM SERPL-MCNC: 9.3 MG/DL — SIGNIFICANT CHANGE UP (ref 8.4–10.5)
CHLORIDE SERPL-SCNC: 98 MMOL/L — SIGNIFICANT CHANGE UP (ref 98–107)
CO2 SERPL-SCNC: 24 MMOL/L — SIGNIFICANT CHANGE UP (ref 22–31)
CREAT SERPL-MCNC: 1.5 MG/DL — HIGH (ref 0.5–1.3)
EGFR: 37 ML/MIN/1.73M2 — LOW
EOSINOPHIL # BLD AUTO: 0.02 K/UL — SIGNIFICANT CHANGE UP (ref 0–0.5)
EOSINOPHIL NFR BLD AUTO: 0.1 % — SIGNIFICANT CHANGE UP (ref 0–6)
ESTIMATED AVERAGE GLUCOSE: 174 — SIGNIFICANT CHANGE UP
GLUCOSE BLDC GLUCOMTR-MCNC: 156 MG/DL — HIGH (ref 70–99)
GLUCOSE BLDC GLUCOMTR-MCNC: 181 MG/DL — HIGH (ref 70–99)
GLUCOSE BLDC GLUCOMTR-MCNC: 195 MG/DL — HIGH (ref 70–99)
GLUCOSE BLDC GLUCOMTR-MCNC: 197 MG/DL — HIGH (ref 70–99)
GLUCOSE BLDC GLUCOMTR-MCNC: 201 MG/DL — HIGH (ref 70–99)
GLUCOSE BLDC GLUCOMTR-MCNC: 206 MG/DL — HIGH (ref 70–99)
GLUCOSE BLDC GLUCOMTR-MCNC: 223 MG/DL — HIGH (ref 70–99)
GLUCOSE BLDC GLUCOMTR-MCNC: 255 MG/DL — HIGH (ref 70–99)
GLUCOSE SERPL-MCNC: 204 MG/DL — HIGH (ref 70–99)
GRAM STN FLD: ABNORMAL
HCT VFR BLD CALC: 35.8 % — SIGNIFICANT CHANGE UP (ref 34.5–45)
HGB BLD-MCNC: 11.6 G/DL — SIGNIFICANT CHANGE UP (ref 11.5–15.5)
IANC: 9.28 K/UL — HIGH (ref 1.8–7.4)
IMM GRANULOCYTES NFR BLD AUTO: 0.4 % — SIGNIFICANT CHANGE UP (ref 0–0.9)
LYMPHOCYTES # BLD AUTO: 2.8 K/UL — SIGNIFICANT CHANGE UP (ref 1–3.3)
LYMPHOCYTES # BLD AUTO: 20.9 % — SIGNIFICANT CHANGE UP (ref 13–44)
MAGNESIUM SERPL-MCNC: 2.6 MG/DL — SIGNIFICANT CHANGE UP (ref 1.6–2.6)
MCHC RBC-ENTMCNC: 28.8 PG — SIGNIFICANT CHANGE UP (ref 27–34)
MCHC RBC-ENTMCNC: 32.4 G/DL — SIGNIFICANT CHANGE UP (ref 32–36)
MCV RBC AUTO: 88.8 FL — SIGNIFICANT CHANGE UP (ref 80–100)
MONOCYTES # BLD AUTO: 1.23 K/UL — HIGH (ref 0–0.9)
MONOCYTES NFR BLD AUTO: 9.2 % — SIGNIFICANT CHANGE UP (ref 2–14)
NEUTROPHILS # BLD AUTO: 9.28 K/UL — HIGH (ref 1.8–7.4)
NEUTROPHILS NFR BLD AUTO: 69.2 % — SIGNIFICANT CHANGE UP (ref 43–77)
NRBC # BLD: 0 /100 WBCS — SIGNIFICANT CHANGE UP (ref 0–0)
NRBC # FLD: 0 K/UL — SIGNIFICANT CHANGE UP (ref 0–0)
PHOSPHATE SERPL-MCNC: 3.9 MG/DL — SIGNIFICANT CHANGE UP (ref 2.5–4.5)
PLATELET # BLD AUTO: 465 K/UL — HIGH (ref 150–400)
POTASSIUM SERPL-MCNC: 3.8 MMOL/L — SIGNIFICANT CHANGE UP (ref 3.5–5.3)
POTASSIUM SERPL-SCNC: 3.8 MMOL/L — SIGNIFICANT CHANGE UP (ref 3.5–5.3)
RBC # BLD: 4.03 M/UL — SIGNIFICANT CHANGE UP (ref 3.8–5.2)
RBC # FLD: 12 % — SIGNIFICANT CHANGE UP (ref 10.3–14.5)
RH IG SCN BLD-IMP: POSITIVE — SIGNIFICANT CHANGE UP
SODIUM SERPL-SCNC: 134 MMOL/L — LOW (ref 135–145)
SPECIMEN SOURCE: SIGNIFICANT CHANGE UP
TSH SERPL-MCNC: 1 UIU/ML — SIGNIFICANT CHANGE UP (ref 0.27–4.2)
WBC # BLD: 13.41 K/UL — HIGH (ref 3.8–10.5)
WBC # FLD AUTO: 13.41 K/UL — HIGH (ref 3.8–10.5)

## 2024-12-04 PROCEDURE — 93010 ELECTROCARDIOGRAM REPORT: CPT

## 2024-12-04 PROCEDURE — 99222 1ST HOSP IP/OBS MODERATE 55: CPT | Mod: GC

## 2024-12-04 PROCEDURE — 93923 UPR/LXTR ART STDY 3+ LVLS: CPT | Mod: 26

## 2024-12-04 PROCEDURE — 99223 1ST HOSP IP/OBS HIGH 75: CPT

## 2024-12-04 RX ORDER — INSULIN GLARGINE-YFGN 100 [IU]/ML
20 INJECTION, SOLUTION SUBCUTANEOUS EVERY MORNING
Refills: 0 | Status: DISCONTINUED | OUTPATIENT
Start: 2024-12-04 | End: 2024-12-11

## 2024-12-04 RX ORDER — SODIUM CHLORIDE 9 MG/ML
1000 INJECTION, SOLUTION INTRAMUSCULAR; INTRAVENOUS; SUBCUTANEOUS
Refills: 0 | Status: DISCONTINUED | OUTPATIENT
Start: 2024-12-04 | End: 2024-12-10

## 2024-12-04 RX ORDER — PIPERACILLIN AND TAZOBACTAM 3; .375 G/15ML; G/15ML
3.38 INJECTION, POWDER, LYOPHILIZED, FOR SOLUTION INTRAVENOUS EVERY 8 HOURS
Refills: 0 | Status: DISCONTINUED | OUTPATIENT
Start: 2024-12-04 | End: 2024-12-07

## 2024-12-04 RX ORDER — ASPIRIN 81 MG
1 TABLET, DELAYED RELEASE (ENTERIC COATED) ORAL
Refills: 0 | DISCHARGE

## 2024-12-04 RX ORDER — DAPAGLIFLOZIN 5 MG/1
1 TABLET, FILM COATED ORAL
Refills: 0 | DISCHARGE

## 2024-12-04 RX ORDER — LOSARTAN POTASSIUM 100 MG/1
100 TABLET, FILM COATED ORAL DAILY
Refills: 0 | Status: DISCONTINUED | OUTPATIENT
Start: 2024-12-04 | End: 2024-12-05

## 2024-12-04 RX ORDER — INFLUENZA A VIRUS A/WISCONSIN/588/2019 (H1N1) RECOMBINANT HEMAGGLUTININ ANTIGEN, INFLUENZA A VIRUS A/DARWIN/6/2021 (H3N2) RECOMBINANT HEMAGGLUTININ ANTIGEN, INFLUENZA B VIRUS B/AUSTRIA/1359417/2021 RECOMBINANT HEMAGGLUTININ ANTIGEN, AND INFLUENZA B VIRUS B/PHUKET/3073/2013 RECOMBINANT HEMAGGLUTININ ANTIGEN 45; 45; 45; 45 UG/.5ML; UG/.5ML; UG/.5ML; UG/.5ML
0.5 INJECTION INTRAMUSCULAR ONCE
Refills: 0 | Status: DISCONTINUED | OUTPATIENT
Start: 2024-12-04 | End: 2024-12-23

## 2024-12-04 RX ORDER — ACETAMINOPHEN 80 MG/.8ML
650 SOLUTION/ DROPS ORAL EVERY 6 HOURS
Refills: 0 | Status: DISCONTINUED | OUTPATIENT
Start: 2024-12-04 | End: 2024-12-18

## 2024-12-04 RX ORDER — PIPERACILLIN AND TAZOBACTAM 3; .375 G/15ML; G/15ML
3.38 INJECTION, POWDER, LYOPHILIZED, FOR SOLUTION INTRAVENOUS EVERY 8 HOURS
Refills: 0 | Status: DISCONTINUED | OUTPATIENT
Start: 2024-12-04 | End: 2024-12-04

## 2024-12-04 RX ORDER — ATORVASTATIN CALCIUM 40 MG/1
20 TABLET, FILM COATED ORAL AT BEDTIME
Refills: 0 | Status: DISCONTINUED | OUTPATIENT
Start: 2024-12-04 | End: 2024-12-23

## 2024-12-04 RX ORDER — ONDANSETRON 4 MG/1
4 TABLET ORAL EVERY 8 HOURS
Refills: 0 | Status: DISCONTINUED | OUTPATIENT
Start: 2024-12-04 | End: 2024-12-23

## 2024-12-04 RX ORDER — SODIUM CHLORIDE 9 MG/ML
1000 INJECTION, SOLUTION INTRAVENOUS
Refills: 0 | Status: DISCONTINUED | OUTPATIENT
Start: 2024-12-04 | End: 2024-12-12

## 2024-12-04 RX ORDER — DEXTROSE MONOHYDRATE 25 G/50ML
12.5 INJECTION, SOLUTION INTRAVENOUS ONCE
Refills: 0 | Status: DISCONTINUED | OUTPATIENT
Start: 2024-12-04 | End: 2024-12-12

## 2024-12-04 RX ORDER — VANCOMYCIN HYDROCHLORIDE 5 G/100ML
1250 INJECTION, POWDER, LYOPHILIZED, FOR SOLUTION INTRAVENOUS EVERY 24 HOURS
Refills: 0 | Status: DISCONTINUED | OUTPATIENT
Start: 2024-12-04 | End: 2024-12-06

## 2024-12-04 RX ORDER — HEPARIN SODIUM 1000 [USP'U]/ML
5000 INJECTION, SOLUTION INTRAVENOUS; SUBCUTANEOUS EVERY 8 HOURS
Refills: 0 | Status: DISCONTINUED | OUTPATIENT
Start: 2024-12-04 | End: 2024-12-23

## 2024-12-04 RX ORDER — DEXTROSE MONOHYDRATE 25 G/50ML
25 INJECTION, SOLUTION INTRAVENOUS ONCE
Refills: 0 | Status: DISCONTINUED | OUTPATIENT
Start: 2024-12-04 | End: 2024-12-12

## 2024-12-04 RX ORDER — INSULIN LISPRO 100/ML
VIAL (ML) SUBCUTANEOUS AT BEDTIME
Refills: 0 | Status: DISCONTINUED | OUTPATIENT
Start: 2024-12-04 | End: 2024-12-11

## 2024-12-04 RX ORDER — INSULIN LISPRO 100/ML
6 VIAL (ML) SUBCUTANEOUS
Refills: 0 | Status: DISCONTINUED | OUTPATIENT
Start: 2024-12-04 | End: 2024-12-15

## 2024-12-04 RX ORDER — GINKGO BILOBA 40 MG
3 CAPSULE ORAL AT BEDTIME
Refills: 0 | Status: DISCONTINUED | OUTPATIENT
Start: 2024-12-04 | End: 2024-12-23

## 2024-12-04 RX ORDER — DEXTROSE MONOHYDRATE 25 G/50ML
15 INJECTION, SOLUTION INTRAVENOUS ONCE
Refills: 0 | Status: DISCONTINUED | OUTPATIENT
Start: 2024-12-04 | End: 2024-12-12

## 2024-12-04 RX ORDER — INSULIN LISPRO 100/ML
VIAL (ML) SUBCUTANEOUS
Refills: 0 | Status: DISCONTINUED | OUTPATIENT
Start: 2024-12-04 | End: 2024-12-11

## 2024-12-04 RX ORDER — GLUCAGON INJECTION, SOLUTION 0.5 MG/.1ML
1 INJECTION, SOLUTION SUBCUTANEOUS ONCE
Refills: 0 | Status: DISCONTINUED | OUTPATIENT
Start: 2024-12-04 | End: 2024-12-23

## 2024-12-04 RX ORDER — ATORVASTATIN CALCIUM 40 MG/1
1 TABLET, FILM COATED ORAL
Refills: 0 | DISCHARGE

## 2024-12-04 RX ADMIN — Medication 1: at 09:20

## 2024-12-04 RX ADMIN — PIPERACILLIN AND TAZOBACTAM 25 GRAM(S): 3; .375 INJECTION, POWDER, LYOPHILIZED, FOR SOLUTION INTRAVENOUS at 17:03

## 2024-12-04 RX ADMIN — HEPARIN SODIUM 5000 UNIT(S): 1000 INJECTION, SOLUTION INTRAVENOUS; SUBCUTANEOUS at 05:49

## 2024-12-04 RX ADMIN — SODIUM CHLORIDE 75 MILLILITER(S): 9 INJECTION, SOLUTION INTRAMUSCULAR; INTRAVENOUS; SUBCUTANEOUS at 06:37

## 2024-12-04 RX ADMIN — Medication 6 UNIT(S): at 09:20

## 2024-12-04 RX ADMIN — Medication 1: at 12:33

## 2024-12-04 RX ADMIN — Medication 6 UNIT(S): at 12:33

## 2024-12-04 RX ADMIN — HEPARIN SODIUM 5000 UNIT(S): 1000 INJECTION, SOLUTION INTRAVENOUS; SUBCUTANEOUS at 21:42

## 2024-12-04 RX ADMIN — ACETAMINOPHEN 650 MILLIGRAM(S): 80 SOLUTION/ DROPS ORAL at 15:14

## 2024-12-04 RX ADMIN — VANCOMYCIN HYDROCHLORIDE 166.67 MILLIGRAM(S): 5 INJECTION, POWDER, LYOPHILIZED, FOR SOLUTION INTRAVENOUS at 21:42

## 2024-12-04 RX ADMIN — Medication 10 MILLIGRAM(S): at 05:46

## 2024-12-04 RX ADMIN — ATORVASTATIN CALCIUM 20 MILLIGRAM(S): 40 TABLET, FILM COATED ORAL at 21:41

## 2024-12-04 RX ADMIN — PIPERACILLIN AND TAZOBACTAM 25 GRAM(S): 3; .375 INJECTION, POWDER, LYOPHILIZED, FOR SOLUTION INTRAVENOUS at 02:39

## 2024-12-04 RX ADMIN — Medication 1: at 17:36

## 2024-12-04 RX ADMIN — Medication 6 UNIT(S): at 17:35

## 2024-12-04 RX ADMIN — PIPERACILLIN AND TAZOBACTAM 25 GRAM(S): 3; .375 INJECTION, POWDER, LYOPHILIZED, FOR SOLUTION INTRAVENOUS at 10:30

## 2024-12-04 RX ADMIN — INSULIN GLARGINE-YFGN 20 UNIT(S): 100 INJECTION, SOLUTION SUBCUTANEOUS at 09:21

## 2024-12-04 RX ADMIN — HEPARIN SODIUM 5000 UNIT(S): 1000 INJECTION, SOLUTION INTRAVENOUS; SUBCUTANEOUS at 13:19

## 2024-12-04 RX ADMIN — ACETAMINOPHEN 650 MILLIGRAM(S): 80 SOLUTION/ DROPS ORAL at 16:14

## 2024-12-04 NOTE — CONSULT NOTE ADULT - ASSESSMENT
69F presents for left foot hallux wound to bone  -Pt was seen and evaluated  -Afebrile, WBC 13.47, , CRP 8.88  -Left foot hallux distal tuft wound to bone with surrounding hyperkeratotic tissue, mild malodor, scant serosanguinous drainage. Right foot no open wounds, no acute signs of infection.  -Left foot X-ray read: no gas, OM of distal phalanx of the hallux(resident read)  -After obtaining verbal consent, using a sterile #15 blade and fine suturing set, incision and drainage on left foot hallux wound was performed to the level of and not beyond bone. Wound was flushed with copious amounts of sterile saline solution. Wound dressed with betadine, 4x4 gauze and alejandrina. Pt tolerated procedure well.  -Left foot wound culture obtained  -Recommend admit to medicine to Dr. Pizano  -Recommend vanco/zosyn  -Ordered JACK/PVR  -Recommend ID consult  -Pod Plan: Local wound care vs Left foot partial 1st ray resection pending JACK/PVR  -Please document medial clearance for podiatric surgery under anesthesia  -Discussed with attending

## 2024-12-04 NOTE — PROGRESS NOTE ADULT - SUBJECTIVE AND OBJECTIVE BOX
Patient is a 69y old  Female who presents with a chief complaint of Sent in by podiatrist for right foot/toe wound, worsening since October. (04 Dec 2024 03:42)       INTERVAL HPI/OVERNIGHT EVENTS:  Patient seen and evaluated at bedside.  Pt is resting comfortable in NAD. Denies N/V/F/C.      Allergies    No Known Allergies    Intolerances        Vital Signs Last 24 Hrs  T(C): 37.4 (04 Dec 2024 05:40), Max: 37.7 (04 Dec 2024 03:07)  T(F): 99.4 (04 Dec 2024 05:40), Max: 99.9 (04 Dec 2024 03:07)  HR: 81 (04 Dec 2024 05:40) (77 - 98)  BP: 119/51 (04 Dec 2024 05:40) (119/51 - 136/68)  BP(mean): --  RR: 18 (04 Dec 2024 05:40) (16 - 18)  SpO2: 98% (04 Dec 2024 05:40) (98% - 100%)    Parameters below as of 04 Dec 2024 05:40  Patient On (Oxygen Delivery Method): room air        LABS:                        11.6   13.41 )-----------( 465      ( 04 Dec 2024 06:20 )             35.8     12-04    134[L]  |  98  |  18  ----------------------------<  204[H]  3.8   |  24  |  1.50[H]    Ca    9.3      04 Dec 2024 06:20  Phos  3.9     12-04  Mg     2.60     12-04    TPro  8.4[H]  /  Alb  3.6  /  TBili  0.4  /  DBili  x   /  AST  17  /  ALT  10  /  AlkPhos  101  12-03    PT/INR - ( 03 Dec 2024 22:30 )   PT: 13.3 sec;   INR: 1.15 ratio         PTT - ( 03 Dec 2024 22:30 )  PTT:29.6 sec  Urinalysis Basic - ( 04 Dec 2024 06:20 )    Color: x / Appearance: x / SG: x / pH: x  Gluc: 204 mg/dL / Ketone: x  / Bili: x / Urobili: x   Blood: x / Protein: x / Nitrite: x   Leuk Esterase: x / RBC: x / WBC x   Sq Epi: x / Non Sq Epi: x / Bacteria: x      CAPILLARY BLOOD GLUCOSE      POCT Blood Glucose.: 201 mg/dL (04 Dec 2024 07:46)  POCT Blood Glucose.: 206 mg/dL (04 Dec 2024 05:44)  POCT Blood Glucose.: 223 mg/dL (04 Dec 2024 02:25)  POCT Blood Glucose.: 297 mg/dL (03 Dec 2024 21:23)      Lower Extremity Physical Exam:  Vascular: DP/PT 0/4, B/L, CFT <3 seconds B/L, Temperature gradient warm to cool, B/L.   Neuro: Epicritic sensation absent to the level of digits, B/L.  Musculoskeletal/Ortho: unremarkable  Skin: 12/4 s/p b/s  incision and drainage on left foot hallux wound was performed to the level of and not beyond bone,  mild malodor, scant serosanguinous drainage. Right foot no open wounds, no acute signs of infection.      RADIOLOGY & ADDITIONAL TESTS:      ACC: [id number] EXAM:  XR FOOT 2 VIEWS LT   ORDERED BY: SHONNA WELLER     ACC: [id number] EXAM:  XR TIB FIB AP LAT 2 VIEWS LT   ORDERED BY: SHONNA WELLER     ACC: [id number] EXAM:  XR ANKLE COMP MIN 3 VIEWS LT   ORDERED BY: SHONNA WELLER     PROCEDURE DATE:  12/03/2024          INTERPRETATION:  CLINICAL INDICATION: Foot wound    TECHNIQUE: 2 views of left tibia fibula, 3 views left ankle, 3 views of   the left foot.    COMPARISON: X-ray ankle 5/6/2021    FINDINGS:  There is no acute fracture or dislocation.  There is a fracture of the distal phalanx of the great toe which is   swollen and has soft tissue air around it.  No bone resorption.    IMPRESSION:  Fracture distal phalanx great toe.  Soft tissue swelling of this digit as well as subcutaneous air.    These findings represent a change from the preliminary report and were   communicated via i3 membrane on December 4 at 6:45 AM.    --- End of Report ---          FELIPA WALLER DO; Resident Radiologist  This document has been electronically signed.  DRISS RODRIGUEZ MD; Attending Radiologist  This document has been electronically signed. Dec  4 2024  6:49AM

## 2024-12-04 NOTE — H&P ADULT - MUSCULOSKELETAL
no joint swelling/no joint erythema/strength 5/5 bilateral upper extremities/strength 5/5 bilateral lower extremities details…

## 2024-12-04 NOTE — DISCHARGE NOTE PROVIDER - HOSPITAL COURSE
69 year old female with PMH type 2 DM, CKD stage 3, HTN, HLD presents with osteomyelitis at left hallux. Patient initially admitted to medicine service and later transferred to vascular surgery under the care of Dr Dougherty. Patient previously cancelled OR x2 for cath lab technical issues; rescheduled for OR 12/13, patient refused the morning of surgery. 12/16 S/p LLE angiogram and AT angioplasty with dopplerable PT and DP at end of case. 12/18 S/p L foot partial 1st ray resection, closed.     Patient tolerated operation well and there were no post-operative complications identified. Patient remained hemodynamically stable in the PACU and transferred to the surgical floor. Diet was restarted and advanced as tolerated. Pain control was transitioned from IV to PO pain meds. At this time, patient is currently ambulating, voiding, tolerating a regular diet. Patient has been deemed stable for discharge _______ with follow up as an outpatient. 69 year old female with PMH type 2 DM, CKD stage 3, HTN, HLD presents with osteomyelitis at left hallux. Patient initially admitted to medicine service and later transferred to vascular surgery under the care of Dr Dougherty. Patient previously cancelled OR x2 for cath lab technical issues; rescheduled for OR 12/13, patient refused the morning of surgery. 12/16 S/p LLE angiogram and AT angioplasty with dopplerable PT and DP at end of case. 12/18 S/p L foot partial 1st ray resection, closed.     Patient tolerated operation well and there were no post-operative complications identified. Patient remained hemodynamically stable in the PACU and transferred to the surgical floor. Diet was restarted and advanced as tolerated. Pain control was transitioned from IV to PO pain meds. At this time, patient is currently ambulating, voiding, tolerating a regular diet. Patient has been deemed stable for discharge with follow up as an outpatient. 69 year old female with PMH type 2 DM, CKD stage 3, HTN, HLD presents with osteomyelitis at left hallux. Patient initially admitted to medicine service and later transferred to vascular surgery under the care of Dr Vann.     12/4 JCAK/PVRs performed and showed  1. Right: Right JACK was not accurately assessed due to non-compressible (calcific) vessels. Right metatarsal waveforms are severely diminished in amplitude, and digit waveforms are flat. Findings suggest the presence of distal infrapopliteal and significant small vessel arterial disease in the right foot.   2. Left: Left JACK is moderately abnormal (0.76). Left metatarsal waveforms are severely diminished in amplitude, and digit waveforms are flat. Findings suggest the presence of distal infrapopliteal and significant small vessel arterial disease in the left foot.    Pt cardiac and medically optimized for OR.    Echo performed and showed  1. Left ventricular cavity is normal in size. Left ventricular wall thickness is normal. Left ventricular systolic function is normal with an ejection fraction of 74 % by Roberto's method of disks. There are no regional wall motion abnormalities seen.   2. There is mild (grade 1) left ventricular diastolic dysfunction, with normal left ventricular filling pressure.   3. Normal right ventricular cavity size, with normal wall thickness, and normal right ventricular systolic function.   4. Left atrium is normal in size.   5. No significant valvular disease.   6. No pericardial effusion seen.      OR cancelled x2 for cath lab technical issues; rescheduled for OR 12/13, patient refused the morning of surgery.     12/16 S/p LLE angiogram and AT angioplasty with dopplerable PT and DP at end of case.   Patient tolerated operation well and there were no post-operative complications identified. Patient remained hemodynamically stable in the PACU and transferred to the surgical floor. Diet was restarted and advanced as tolerated. Pain control was transitioned from IV to PO pain meds.     12/17 JACK/PVR performed and showed    Right Lower Extremity Arteries:  Resting right ankle-brachial index indicates non-compressible right lower extremity arteries. The right toe-brachial index is moderatly reduced. Right JACK was not accurately assessed due to non-compressible (calcific) vessels. Right TBI is moderately abnormal (0.41) with a toe pressure of 69 mmHg. Findings suggest the presence of distal infrapopliteal and small vessel arterial disease in the right foot.  Left Lower Extremity Arteries:  Resting left ankle-brachial index indicates non-compressible left lower extremity arteries. The left toe-brachial index is severly reduced. Left JACK is moderately abnormal (0.74). Left TBI is severely abnormal (0.21) with a toe pressure of 35 mmHg. Findings suggest the presence of distal infrapopliteal and small vessel arterial disease in the left foot.    12/18 S/p L foot partial 1st ray resection, closed.     ID consulted and pt completed full course abx per their recs.     Endocrine consulted and assisted with glucose management while in hospital. Basal bolus (dose to be determined) and would continue farxiga for ongoing renal benefits despite active foot wound. Pt declined GLp1 agonist. Requires outpatient endocrinology follow up - follow up with Dr. Hairston     Nephrology consulted for JENARO on CKD. Home losartan and htcz on hold.      69 year old female with PMH type 2 DM, CKD stage 3, HTN, HLD presents with osteomyelitis at left hallux. Patient initially admitted to medicine service and later transferred to vascular surgery under the care of Dr Vann.     12/4 JACK/PVRs performed and showed  1. Right: Right JACK was not accurately assessed due to non-compressible (calcific) vessels. Right metatarsal waveforms are severely diminished in amplitude, and digit waveforms are flat. Findings suggest the presence of distal infrapopliteal and significant small vessel arterial disease in the right foot.   2. Left: Left JACK is moderately abnormal (0.76). Left metatarsal waveforms are severely diminished in amplitude, and digit waveforms are flat. Findings suggest the presence of distal infrapopliteal and significant small vessel arterial disease in the left foot.    Pt cardiac and medically optimized for OR.    Echo performed and showed  1. Left ventricular cavity is normal in size. Left ventricular wall thickness is normal. Left ventricular systolic function is normal with an ejection fraction of 74 % by Roberto's method of disks. There are no regional wall motion abnormalities seen.   2. There is mild (grade 1) left ventricular diastolic dysfunction, with normal left ventricular filling pressure.   3. Normal right ventricular cavity size, with normal wall thickness, and normal right ventricular systolic function.   4. Left atrium is normal in size.   5. No significant valvular disease.   6. No pericardial effusion seen.      OR cancelled x2 for cath lab technical issues; rescheduled for OR 12/13, patient refused the morning of surgery.     12/16 S/p LLE angiogram and AT angioplasty with dopplerable PT and DP at end of case.   Patient tolerated operation well and there were no post-operative complications identified. Patient remained hemodynamically stable in the PACU and transferred to the surgical floor. Diet was restarted and advanced as tolerated. Pain control was transitioned from IV to PO pain meds.     12/17 JACK/PVR performed and showed    Right Lower Extremity Arteries:  Resting right ankle-brachial index indicates non-compressible right lower extremity arteries. The right toe-brachial index is moderatly reduced. Right JACK was not accurately assessed due to non-compressible (calcific) vessels. Right TBI is moderately abnormal (0.41) with a toe pressure of 69 mmHg. Findings suggest the presence of distal infrapopliteal and small vessel arterial disease in the right foot.  Left Lower Extremity Arteries:  Resting left ankle-brachial index indicates non-compressible left lower extremity arteries. The left toe-brachial index is severly reduced. Left JACK is moderately abnormal (0.74). Left TBI is severely abnormal (0.21) with a toe pressure of 35 mmHg. Findings suggest the presence of distal infrapopliteal and small vessel arterial disease in the left foot.    12/18 S/p L foot partial 1st ray resection, closed.     ID consulted and pt completed full course abx per their recs.     Endocrine consulted and assisted with glucose management while in hospital. Basal bolus (dose to be determined) and would continue farxiga for ongoing renal benefits despite active foot wound. Pt declined GLp1 agonist. Requires outpatient endocrinology follow up - follow up with Dr. Hairston     Nephrology consulted for JENARO on CKD. Home losartan and htcz on hold.     Per Attending pt now stable for discharge home. Pt hemodynamically stable and pain well controlled. Pt to follow up with surgeon, PMD, endo, and ID as an outpatient, instructed to call to schedule appointments

## 2024-12-04 NOTE — CONSULT NOTE ADULT - ATTENDING COMMENTS
She has significant PAD and a left toe wound.  She will need an angiogram and then either an angioplasty/stent or a bypass.  The results of the angiogram will determine the neck step.  There is currently not enough blood supply to heal the toe.  She will eventually need surgery on the toe.  That would be done by podiatry.

## 2024-12-04 NOTE — PROVIDER CONTACT NOTE (CRITICAL VALUE NOTIFICATION) - BACKGROUND
70 yo female h/o DM type2, HTN, hyperlipidemia a/w infected L foot wound iso Type 2 DM presents with L.foot hallux wound to bone.

## 2024-12-04 NOTE — CONSULT NOTE ADULT - ASSESSMENT
68 y/o F PMhx DM II, HTN who presented w/ left great toe wound    L hallux infection, leukocytosis, DM II  CRP 88.8,   s/p podiatry I&D L hallux wound to the level of and not beyond bone,  mild malodor, scant serosanguinous drainage. Right foot no open wounds, no acute signs of infection.  foot x-ray- fracture distal phalanx great toe. soft tissue swelling of this digit as well as subcutaneous air.    Recommendations  c/w zosyn for now  c/w vanc 1250 every 24 hours for now  MRSA PCR  vascular eval  will f/u podiatry recs    Albino Rea M.D.  [facility], Division of Infectious Diseases  [phone]  After 5pm on weekdays and all day on weekends - please call [phone]  68 y/o F PMhx DM II, HTN who presented w/ left great toe wound    L hallux infection, c/f osteo  leukocytosis, DM II  CRP 88.8,   s/p podiatry I&D L hallux wound to the level of and not beyond bone,  mild malodor, scant serosanguinous drainage. Right foot no open wounds, no acute signs of infection.  foot x-ray- fracture distal phalanx great toe. soft tissue swelling of this digit as well as subcutaneous air.    Recommendations  c/w zosyn for now  c/w vanc 1250 every 24 hours for now  f/u wound culture  vascular eval  will f/u podiatry recs    Albino Rea M.D.  [facility], Division of Infectious Diseases  [phone]  After 5pm on weekdays and all day on weekends - please call [phone]  68 y/o F PMhx DM II, HTN who presented w/ left great toe wound    L hallux infection, c/f osteo  leukocytosis, DM II  CRP 88.8,   s/p podiatry I&D L hallux wound to the level of and not beyond bone,  mild malodor, scant serosanguinous drainage. Right foot no open wounds, no acute signs of infection.  foot x-ray- fracture distal phalanx great toe. soft tissue swelling of this digit as well as subcutaneous air.    Recommendations  c/w zosyn for now  c/w vanc 1250 every 24 hours for now  - goal trough 15-20, trough prior to 3rd dose (tomorrow's dose, ordered)  f/u wound culture  vascular eval  will f/u podiatry recs    Albino Rea M.D.  [facility], Division of Infectious Diseases  [phone]  After 5pm on weekdays and all day on weekends - please call [phone]

## 2024-12-04 NOTE — DISCHARGE NOTE PROVIDER - NSDCFUADDAPPT_GEN_ALL_CORE_FT
Podiatry Discharge Instructions:  Follow up: Please follow up with Dr. Waterhouse within 1 week of discharge from the hospital, please call [phone] for appointment and discuss that you recently were seen in the hospital.  Wound Care: Please apply betadine, 4x4 gauze and alejandrina to left foot wound.  Weight bearing: Please weight bear as tolerated in a surgical shoe on left foot.  Antibiotics: Please continue as instructed. Podiatry Discharge Instructions:  Follow up: Please follow up with Dr. Waterhouse within 1 week of discharge from the hospital, please call [phone] for appointment and discuss that you recently were seen in the hospital.  Wound Care: Please leave dressing clean, dry, and intact until follow-up appointment  Weight bearing: Please weight bear as tolerated in a surgical shoe on left foot.  Antibiotics: Please continue as instructed. Podiatry Discharge Instructions:  Follow up: Please follow up with Dr. Waterhouse within 1 week of discharge from the hospital, please call [phone] for appointment and discuss that you recently were seen in the hospital.  Wound Care: Please leave dressing clean, dry, and intact until follow-up appointment  Weight bearing: Please weight bear as tolerated in a surgical shoe on left foot.  Antibiotics: Please continue as instructed.    Infectious Disease:   Please  follow up with [facility] ID office next week to review bone path. Please call  [phone] for appointment.  Podiatry Discharge Instructions:  Follow up: Please follow up with Dr. Waterhouse within 1 week of discharge from the hospital, please call [phone] for appointment and discuss that you recently were seen in the hospital.  Wound Care: Please leave dressing clean, dry, and intact until follow-up appointment  Weight bearing: Please weight bear as tolerated in a surgical shoe on left foot.  Antibiotics: Please continue as instructed.    Infectious Disease:   Please  follow up with [facility] ID office next week to review bone path. Please call  [phone] for appointment.     Endocrine:  Please follow up with Dr Hairston as an outpatient, please call to schedule appointment

## 2024-12-04 NOTE — H&P ADULT - PROBLEM SELECTOR PLAN 4
On Lantus 30-30u at home qhs and Novolog 10-15u TID premeal   BG = 297    -c/w conservative dose fo Lantus 20u in AM   -c/w Admelog 6u TID pre-meal   -ISS and QHS   -A1c  -Hold ASA ppx pending potential OR

## 2024-12-04 NOTE — H&P ADULT - PROBLEM SELECTOR PLAN 1
Evaluated by Pod Sx  -Pod Plan: Local wound care vs Left foot partial 1st ray resection pending JACK/PVR  -Left foot wound culture obtained  -Vancomycin IV renally dosed and Zosyn  -f/u JACK/PVR  -Formal ID consult  -Pod Plan: Local wound care; Pending decision regarding Left foot partial 1st ray resection pending JACK/PVR  -May need medial clearance for podiatric surgery under anesthesia, deferred to primary attg Dr. Pizano Elevated ESR, CRP  Evaluated by Pod Sx  -Pod Plan: Local wound care vs Left foot partial 1st ray resection pending JACK/PVR  -f/u formal report X-ray L foot  (prelim: no radiopaque foreign body is identified. no soft tissue tracking gas.)  -Left foot wound culture obtained  -Vancomycin IV renally dosed and Zosyn  -f/u JACK/PVR  -Formal ID consult  -Pod Plan: Local wound care; Pending decision regarding Left foot partial 1st ray resection pending JACK/PVR  -May need medial clearance for podiatric surgery under anesthesia, deferred to primary attg Dr. Pizano  -Screening EKG ordered.

## 2024-12-04 NOTE — DISCHARGE NOTE PROVIDER - NSDCMRMEDTOKEN_GEN_ALL_CORE_FT
glucometer: Apply topically to affected area once a day   lancets: Apply topically to affected area once a day    aspirin 81 mg oral delayed release tablet: 1 tab(s) orally once a day  atorvastatin 20 mg oral tablet: 1 tab(s) orally once a day (at bedtime)  Farxiga 5 mg oral tablet: 1 tab(s) orally once a day  glucometer: Apply topically to affected area once a day   hydroCHLOROthiazide 25 mg oral tablet: 1 tab(s) orally once a day  lancets: Apply topically to affected area once a day   Lantus Solostar Pen 100 units/mL subcutaneous solution: 30 unit(s) subcutaneous once a day (at bedtime)  losartan 100 mg oral tablet: 1 tab(s) orally once a day  Norvasc 10 mg oral tablet: 1 tab(s) orally once a day  NovoLOG 100 units/mL subcutaneous solution: 10 unit(s) subcutaneous 3 times a day (before meals)   aspirin 81 mg oral delayed release tablet: 1 tab(s) orally once a day  atorvastatin 20 mg oral tablet: 1 tab(s) orally once a day (at bedtime)  Farxiga 5 mg oral tablet: 1 tab(s) orally once a day  glucometer: Apply topically to affected area once a day   hydroCHLOROthiazide 25 mg oral tablet: 1 tab(s) orally once a day  lancets: Apply topically to affected area once a day   Lantus Solostar Pen 100 units/mL subcutaneous solution: 30 unit(s) subcutaneous once a day (at bedtime)  losartan 100 mg oral tablet: 1 tab(s) orally once a day  Norvasc 10 mg oral tablet: 1 tab(s) orally once a day  NovoLOG 100 units/mL subcutaneous solution: 10 unit(s) subcutaneous 3 times a day (before meals)  Physical Therapy: 2-3x/ week  Rolling Walker: for ERICKSONs   acetaminophen 325 mg oral tablet: 3 tab(s) orally every 6 hours As needed Temp greater or equal to 38C (100.4F), Moderate Pain (4 - 6)  amoxicillin-clavulanate 875 mg-125 mg oral tablet: 1 tab(s) orally once a day (at bedtime)  aspirin 81 mg oral delayed release tablet: 1 tab(s) orally once a day  atorvastatin 20 mg oral tablet: 1 tab(s) orally once a day (at bedtime)  clopidogrel 75 mg oral tablet: 1 tab(s) orally once a day  Farxiga 5 mg oral tablet: 1 tab(s) orally once a day  gabapentin 100 mg oral capsule: 1 cap(s) orally 3 times a day  glucometer: Apply topically to affected area once a day   lancets: Apply topically to affected area once a day   Lantus Solostar Pen 100 units/mL subcutaneous solution: 30 unit(s) subcutaneous once a day (at bedtime)  Norvasc 10 mg oral tablet: 1 tab(s) orally once a day  NovoLOG 100 units/mL subcutaneous solution: 10 unit(s) subcutaneous 3 times a day (before meals)  Physical Therapy: 2-3x/ week  polyethylene glycol 3350 oral powder for reconstitution: 17 gram(s) orally once a day  Andrew Bowen: for MRADLs   acetaminophen 325 mg oral tablet: 3 tab(s) orally every 6 hours As needed Temp greater or equal to 38C (100.4F), Moderate Pain (4 - 6)  amoxicillin-clavulanate 875 mg-125 mg oral tablet: 1 tab(s) orally once a day (at bedtime)  aspirin 81 mg oral delayed release tablet: 1 tab(s) orally once a day  atorvastatin 20 mg oral tablet: 1 tab(s) orally once a day (at bedtime)  clopidogrel 75 mg oral tablet: 1 tab(s) orally once a day  Farxiga 5 mg oral tablet: 1 tab(s) orally once a day  gabapentin 100 mg oral capsule: 1 cap(s) orally 3 times a day  Lantus Solostar Pen 100 units/mL subcutaneous solution: 24 unit(s) subcutaneous once a day (at bedtime) You received a dose of lantus today, 12/23, please resume tomorrow night 12/24  Norvasc 10 mg oral tablet: 1 tab(s) orally once a day  NovoLOG 100 units/mL subcutaneous solution: 7 unit(s) subcutaneous 3 times a day (before meals)  Physical Therapy: 2-3x/ week  polyethylene glycol 3350 oral powder for reconstitution: 17 gram(s) orally once a day  Andrew Bowen: for BALDOMERODLs

## 2024-12-04 NOTE — CONSULT NOTE ADULT - SUBJECTIVE AND OBJECTIVE BOX
Consult received. Full note to follow.    Albino Rea M.D.  [facility], Division of Infectious Diseases  [phone]  After 5pm on weekdays and all day on weekends - please call [phone]  [facility], Division of Infectious Diseases  TOBY Szymanski S. Shah, Y. Patel, G. Álvaro  [phone]  ([phone] - weekdays after 5pm and weekends)    REJI MEADE  69y, Female  [id number]    HPI--  HPI:  70 y/o F PMhx DM II, HTN who presented w/ left great toe wound. Reports she was clipping her nail toenails in October and accidentally cut her skin. This week she noticed bleeding through her sock which prompted her to check and noticed an open wound on her toe. She followed up with her podiatrist who instructed her to present to the ED. Endorsed subjective fever and chills at home. Did not check her temperature. Has limited sensation in her foot.  Denies chest pain, SOB, abd pain, n/v, diarrhea, dysuria.     ROS: 10 point review of systems completed, pertinent positives and negatives as per HPI.    Allergies: No Known Allergies    PMH -- Diabetes mellitus type 2, noninsulin dependent    HTN (hypertension)    Hyperlipemia    DM (diabetes mellitus)      PSH -- S/P hysterectomy      FH -- No pertinent family history in first degree relatives    FH: diabetes mellitus (Mother)      Social History -- denies tobacco use    Physical Exam--  Vital Signs Last 24 Hrs  T(F): 98.2 (04 Dec 2024 11:39), Max: 99.9 (04 Dec 2024 03:07)  HR: 92 (04 Dec 2024 11:39) (77 - 98)  BP: 131/59 (04 Dec 2024 11:39) (114/44 - 136/68)  RR: 17 (04 Dec 2024 11:39) (16 - 18)  SpO2: 100% (04 Dec 2024 11:39) (98% - 100%)  General: nontoxic-appearing, no acute distress  HEENT: anicteric  Lungs: Clear bilaterally without rales  Heart: S1, S2, normal rate.   Abdomen: Soft. Nondistended. Nontender.   Neuro: AAOx3, no obvious focal deficits   Extremities: L foot wrapped w/ dressing  Skin: Warm. Dry.   Psychiatric: Appropriate affect and mood for situation.     Laboratory & Imaging Data--  CBC:                       11.6   13.41 )-----------( 465      ( 04 Dec 2024 06:20 )             35.8     WBC Count: 13.41 K/uL (12-04-24 @ 06:20)  WBC Count: 13.47 K/uL (12-03-24 @ 22:30)    CMP: 12-04    134[L]  |  98  |  18  ----------------------------<  204[H]  3.8   |  24  |  1.50[H]    Ca    9.3      04 Dec 2024 06:20  Phos  3.9     12-04  Mg     2.60     12-04    TPro  8.4[H]  /  Alb  3.6  /  TBili  0.4  /  DBili  x   /  AST  17  /  ALT  10  /  AlkPhos  101  12-03    LIVER FUNCTIONS - ( 03 Dec 2024 22:30 )  Alb: 3.6 g/dL / Pro: 8.4 g/dL / ALK PHOS: 101 U/L / ALT: 10 U/L / AST: 17 U/L / GGT: x           Urinalysis Basic - ( 04 Dec 2024 06:20 )    Color: x / Appearance: x / SG: x / pH: x  Gluc: 204 mg/dL / Ketone: x  / Bili: x / Urobili: x   Blood: x / Protein: x / Nitrite: x   Leuk Esterase: x / RBC: x / WBC x   Sq Epi: x / Non Sq Epi: x / Bacteria: x      Microbiology:       Radiology--  ***  Active Medications--  acetaminophen     Tablet .. 650 milliGRAM(s) Oral every 6 hours PRN  amLODIPine   Tablet 10 milliGRAM(s) Oral daily  atorvastatin 20 milliGRAM(s) Oral at bedtime  dextrose 5%. 1000 milliLiter(s) IV Continuous <Continuous>  dextrose 5%. 1000 milliLiter(s) IV Continuous <Continuous>  dextrose 50% Injectable 25 Gram(s) IV Push once  dextrose 50% Injectable 12.5 Gram(s) IV Push once  dextrose 50% Injectable 25 Gram(s) IV Push once  dextrose Oral Gel 15 Gram(s) Oral once PRN  glucagon  Injectable 1 milliGRAM(s) IntraMuscular once  heparin   Injectable 5000 Unit(s) SubCutaneous every 8 hours  hydrochlorothiazide 25 milliGRAM(s) Oral daily  influenza  Vaccine (HIGH DOSE) 0.5 milliLiter(s) IntraMuscular once  insulin glargine Injectable (LANTUS) 20 Unit(s) SubCutaneous every morning  insulin lispro (ADMELOG) corrective regimen sliding scale   SubCutaneous three times a day before meals  insulin lispro (ADMELOG) corrective regimen sliding scale   SubCutaneous at bedtime  insulin lispro Injectable (ADMELOG) 6 Unit(s) SubCutaneous three times a day before meals  losartan 100 milliGRAM(s) Oral daily  melatonin 3 milliGRAM(s) Oral at bedtime PRN  ondansetron Injectable 4 milliGRAM(s) IV Push every 8 hours PRN  piperacillin/tazobactam IVPB.. 3.375 Gram(s) IV Intermittent every 8 hours  sodium chloride 0.9%. 1000 milliLiter(s) IV Continuous <Continuous>  vancomycin  IVPB 1250 milliGRAM(s) IV Intermittent every 24 hours    Antimicrobials:   piperacillin/tazobactam IVPB.. 3.375 Gram(s) IV Intermittent every 8 hours  vancomycin  IVPB 1250 milliGRAM(s) IV Intermittent every 24 hours    Immunologic: influenza  Vaccine (HIGH DOSE) 0.5 milliLiter(s) IntraMuscular once

## 2024-12-04 NOTE — H&P ADULT - ADDITIONAL PE
Exam per Pod Sx; Left foot hallux distal tuft wound to bone with surrounding hyperkeratotic tissue, mild malodor, scant serosanguinous drainage   Right foot no open wounds, no acute signs of infection.

## 2024-12-04 NOTE — DISCHARGE NOTE PROVIDER - NSDCCPCAREPLAN_GEN_ALL_CORE_FT
PRINCIPAL DISCHARGE DIAGNOSIS  Diagnosis: Left foot infection  Assessment and Plan of Treatment:      PRINCIPAL DISCHARGE DIAGNOSIS  Diagnosis: Left foot infection  Assessment and Plan of Treatment: WOUND CARE:  Please keep incisions clean and dry. Please do not Scrub or rub incisions. Do not use lotion or powder on incisions.   BATHING: You may shower and/or sponge bathe. You may use warm soapy water in the shower and rinse, pat dry.  ACTIVITY: No heavy lifting or straining. Otherwise, you may return to your usual level of physical activity. If you are taking narcotic pain medication DO NOT drive a car, operate machinery or make important decisions.  DIET: Return to your usual diet.  NOTIFY YOUR SURGEON IF YOU HAVE: any bleeding that does not stop, any pus draining from your wound(s), any fever (over 100.4 F) persistent nausea/vomiting, or if your pain is not controlled on your discharge pain medications, unable to urinate.  FOLLOW UP:  1. Please follow up with your primary care physician in one week regarding your hospitalization, bring copies of your discharge paperwork.  2. Please follow up with your surgeon, Dr. Kennedy Erwin as an outpatient, please call to schedule appointment. Please continue to take asppirin and plavix (prescription sent)  While in the hospital you were treated with antibiotics, your course of Augmentin will be completed tonight, you were prescribed x1 dose for this evening. Please take last dose and follow up with ID as an outpatient in 1 week        SECONDARY DISCHARGE DIAGNOSES  Diagnosis: HTN (hypertension)  Assessment and Plan of Treatment: while in the hospital your home doses of losartan and hydroCHLOROthiazide  were stopped, please follow up with your PMD to discuss further management of your blood pressure    Diagnosis: Type 2 diabetes mellitus with diabetic foot infection  Assessment and Plan of Treatment: Please follow up with endocrinologist Dr Hairston as an outpatient, please call to schedule appointment     PRINCIPAL DISCHARGE DIAGNOSIS  Diagnosis: Left foot infection  Assessment and Plan of Treatment: WOUND CARE:  Please keep incisions clean and dry. Please do not Scrub or rub incisions. Do not use lotion or powder on incisions.   BATHING: You may shower and/or sponge bathe. You may use warm soapy water in the shower and rinse, pat dry.  ACTIVITY: No heavy lifting or straining. Otherwise, you may return to your usual level of physical activity. If you are taking narcotic pain medication DO NOT drive a car, operate machinery or make important decisions.  DIET: Return to your usual diet.  NOTIFY YOUR SURGEON IF YOU HAVE: any bleeding that does not stop, any pus draining from your wound(s), any fever (over 100.4 F) persistent nausea/vomiting, or if your pain is not controlled on your discharge pain medications, unable to urinate.  FOLLOW UP:  1. Please follow up with your primary care physician in one week regarding your hospitalization, bring copies of your discharge paperwork.  2. Please follow up with your surgeon, Dr. Kennedy Erwin as an outpatient, please call to schedule appointment. Please continue to take aspirin and plavix (prescription sent) and your statin  While in the hospital you were treated with antibiotics, your course of Augmentin will be completed tonight, you were prescribed x1 dose for this evening. Please take last dose and follow up with ID as an outpatient in 1 week        SECONDARY DISCHARGE DIAGNOSES  Diagnosis: HTN (hypertension)  Assessment and Plan of Treatment: while in the hospital your home doses of losartan and hydroCHLOROthiazide  were stopped, please follow up with your PMD to discuss further management of your blood pressure    Diagnosis: Type 2 diabetes mellitus with diabetic foot infection  Assessment and Plan of Treatment: Please follow up with endocrinologist Dr Hairston as an outpatient, please call to schedule appointment. While in the hospital your doses of lantus and novolog were adjusted. Please continue the current regimen of 24 units lantus, 7 units novolg three times a day before meals, and resume your farxiga. Please closely monitor your finger sticks. If fingersticks consistently greater than 200 prior to meals then go back to your home doses of 30 units lantus and 10 units novolog. You took a dose of lantus today 12/23, please resume lantus tomorrrow night 12/24

## 2024-12-04 NOTE — CONSULT NOTE ADULT - SUBJECTIVE AND OBJECTIVE BOX
Patient is a 69y old  Female who presents with a chief complaint of left hallux wound    HPI: Patient is a 69-year-old female past medical history DM2, hypertension, hyperlipidemia who presented to ED with left great toe infection.  Patient reports she was clipping her nail toenails in October and accidentally cut her skin.  Patient reports this week she noticed bleeding through her sock which prompted her to check and noticed an open wound on her toe.  Patient reports she followed up with her podiatrist yesterday who instructed she come to the ED for IV antibiotics and further workup.  Patient reports she was having subjective fever at home.  Otherwise denies chest pain, palpitations, shortness of breath, nausea, vomiting.      PAST MEDICAL & SURGICAL HISTORY:  Diabetes mellitus type 2, noninsulin dependent      HTN (hypertension)      Hyperlipemia      DM (diabetes mellitus)      S/P hysterectomy          MEDICATIONS  (STANDING):    MEDICATIONS  (PRN):      Allergies    No Known Allergies    Intolerances        VITALS:    Vital Signs Last 24 Hrs  T(C): 37.1 (03 Dec 2024 21:22), Max: 37.1 (03 Dec 2024 21:22)  T(F): 98.8 (03 Dec 2024 21:22), Max: 98.8 (03 Dec 2024 21:22)  HR: 98 (03 Dec 2024 21:22) (98 - 98)  BP: 136/68 (03 Dec 2024 21:22) (136/68 - 136/68)  BP(mean): --  RR: 16 (03 Dec 2024 21:22) (16 - 16)  SpO2: --        LABS:                          11.2   13.47 )-----------( 483      ( 03 Dec 2024 22:30 )             35.0       12-03    133[L]  |  97[L]  |  19  ----------------------------<  273[H]  3.7   |  24  |  1.41[H]    Ca    9.2      03 Dec 2024 22:30    TPro  8.4[H]  /  Alb  3.6  /  TBili  0.4  /  DBili  x   /  AST  17  /  ALT  10  /  AlkPhos  101  12-03      CAPILLARY BLOOD GLUCOSE      POCT Blood Glucose.: 297 mg/dL (03 Dec 2024 21:23)      PT/INR - ( 03 Dec 2024 22:30 )   PT: 13.3 sec;   INR: 1.15 ratio         PTT - ( 03 Dec 2024 22:30 )  PTT:29.6 sec    LOWER EXTREMITY PHYSICAL EXAM:    Vascular: DP/PT 0/4, B/L, CFT <3 seconds B/L, Temperature gradient warm to cool, B/L.   Neuro: Epicritic sensation absent to the level of digits, B/L.  Musculoskeletal/Ortho: unremarkable  Skin: Left foot hallux distal tuft wound to bone with surrounding hyperkeratotic tissue, mild malodor, scant serosanguinous drainage. Right foot no open wounds, no acute signs of infection.      RADIOLOGY & ADDITIONAL STUDIES:  < from: Xray Foot AP + Lateral, Left (12.03.24 @ 23:27) >    ******PRELIMINARY REPORT******      ******PRELIMINARY REPORT******         ACC: [id number] EXAM:  XR FOOT 2 VIEWS LT   ORDERED BY: SHONNA WELLER     ACC: [id number] EXAM:  XR TIB FIB AP LAT 2 VIEWS LT   ORDERED BY: SHONNA WELLER     ACC: [id number] EXAM:  XR ANKLE COMP MIN 3 VIEWS LT   ORDERED BY: SHONNA WELLER     PROCEDURE DATE:  12/03/2024    ******PRELIMINARY REPORT******      ******PRELIMINARY REPORT******           INTERPRETATION:  CLINICAL INDICATION: Foot wound    TECHNIQUE: 2 views ofleft tibia fibula, 3 views left ankle, 3 views of   the left foot.    COMPARISON: X-ray ankle 5/6/2021    FINDINGS:  There is no acute fracture or dislocation. The soft tissues are normal.   No radiopaque foreign body is identified. No soft tissue tracking gas.    IMPRESSION:  No soft tissue tracking gas.        ******PRELIMINARY REPORT******      ******PRELIMINARY REPORT******       FELIPA WALLER DO; Resident Radiologist  This document is a PRELIMINARY interpretation and is pending final   attending approval. Dec  4 2024 12:52AM    < end of copied text >

## 2024-12-04 NOTE — CONSULT NOTE ADULT - ASSESSMENT
68 y/o F PMhx DM II, HTN who presented w/ left great toe wound. Pt reports she was clipping her toenails in October and accidentally cut her skin. She noticed some bleeding in her skin, which is what prompted her to present to the ED based on her podiatrist's recommendation. She underwent a bedside debridement with podiatry on 12/4/24. Vascular surgery consulted given LLE toe wound and JACK/PVR findings.     Recommendations   - Diminished JACK/PVR on LLE with L toe wound and diminished waveforms  - pt would benefit from LLE diagnostic angiogram given L toe wound  - please document medicine/cardiac optimization   - OR plan pending clearances  - appreciate podiatry recs    Plan discussed with fellow on behalf of attending    Surgery C Team  [id number]

## 2024-12-04 NOTE — PROGRESS NOTE ADULT - ASSESSMENT
69F presents for left foot hallux wound to bone  -Pt was seen and evaluated  -Afebrile, WBC 13.47, , CRP 8.88  -12/4 s/p b/s  incision and drainage on left foot hallux wound was performed to the level of and not beyond bone,  mild malodor, scant serosanguinous drainage. Right foot no open wounds, no acute signs of infection.  -Left foot X-ray read: Fracture distal phalanx great toe. Soft tissue swelling of this digit as well as subcutaneous air.  -Left foot wound culture pending  -JACK/PVR: RAB NC, LABI 0.76  -Continue vanco/zosyn  -Recommend ID consult  -Pod Plan: Local wound care vs Left foot partial 1st ray resection pending pt decision  -Please document medial clearance for podiatric surgery under anesthesia  -Discussed with attending   69F presents for left foot hallux wound to bone  -Pt was seen and evaluated  -Afebrile, WBC 13.47, , CRP 8.88  -12/4 s/p b/s  incision and drainage on left foot hallux wound was performed to the level of and not beyond bone,  mild malodor, scant serosanguinous drainage. Right foot no open wounds, no acute signs of infection.  -Left foot X-ray read: Fracture distal phalanx great toe. Soft tissue swelling of this digit as well as subcutaneous air.  -Left foot wound culture pending  -JACK/PVR: RAB NC, LABI 0.76  -Continue vanco/zosyn  -Recommend ID consult  -Recommend vasc consult  -Pod Plan: Local wound care vs Left foot partial 1st ray resection pending vascular recs  -Please document medial clearance for podiatric surgery under anesthesia  -Seen with attending

## 2024-12-04 NOTE — H&P ADULT - PROBLEM SELECTOR PLAN 2
Baseline Src= 1.3-1.4 in Feb, 2020  Currently 1.4, at baseline     -Monitor renal function daily  -Renally dose medications   -Avoid nephrotoxins

## 2024-12-04 NOTE — DISCHARGE NOTE PROVIDER - CARE PROVIDER_API CALL
Manvar-Singh, Pallavi Buddhadev  Vascular Surgery  [address], [address]  [place] [address]  Phone: [phone]  Fax: [phone]  Follow Up Time: 1 week   Manvar-Singh, Pallavi Buddhadev  Vascular Surgery  [address], [address]  [place] [address]  Phone: [phone]  Fax: [phone]  Follow Up Time: 1 week    Krishna Hairston  Endocrinology/Metab/Diabetes  [address]  [place] [address]  Phone: [phone]  Fax: [phone]  Follow Up Time:     Waterhouse, Joseph Cameron  Podiatric Medicine and Surgery  [address] [place] [address]  Phone: [phone]  Fax: [phone]  Follow Up Time:     Albino Rea  Infectious Disease  [address], [address]  [place] [address]  Phone: [phone]  Fax: [phone]  Follow Up Time:

## 2024-12-04 NOTE — H&P ADULT - HISTORY OF PRESENT ILLNESS
57yo female h/o DM type2, HTN, hyperlipidemia; Pt c/o left great toe infection.  Patient reports she was clipping her nail toenails in October and accidentally cut her skin.  Patient reports this week she noticed bleeding through her sock which prompted her to check and noticed an open wound on her toe.  Patient reports she followed up with her podiatrist yesterday who instructed she come to the ED for IV antibiotics and further workup.  Patient reports she was having subjective fever at home.  Otherwise reports no chest pain, palpitations, shortness of breath, nausea, vomiting.    ED course: Abx, Pod Sx c/s

## 2024-12-04 NOTE — DISCHARGE NOTE PROVIDER - PROVIDER TOKENS
PROVIDER:[TOKEN:[[id number]],FOLLOWUP:[1 week]] PROVIDER:[TOKEN:[[id number]],FOLLOWUP:[1 week]],PROVIDER:[TOKEN:[[id number]]],PROVIDER:[TOKEN:[[id number]]],PROVIDER:[TOKEN:[[id number]]]

## 2024-12-04 NOTE — H&P ADULT - NSHPPHYSICALEXAM_GEN_ALL_CORE
Vital Signs Last 24 Hrs  T(C): 37.7 (04 Dec 2024 03:07), Max: 37.7 (04 Dec 2024 03:07)  T(F): 99.9 (04 Dec 2024 03:07), Max: 99.9 (04 Dec 2024 03:07)  HR: 80 (04 Dec 2024 03:07) (77 - 98)  BP: 126/60 (04 Dec 2024 03:07) (126/60 - 136/68)  BP(mean): --  RR: 17 (04 Dec 2024 03:07) (16 - 17)  SpO2: 100% (04 Dec 2024 03:07) (99% - 100%)    Parameters below as of 04 Dec 2024 03:07  Patient On (Oxygen Delivery Method): room air

## 2024-12-04 NOTE — DISCHARGE NOTE PROVIDER - NSDCFUADDINST_GEN_ALL_CORE_FT
WOUND CARE:  Please keep incisions clean and dry. Please do not Scrub or rub incisions. Do not use lotion or powder on incisions.   BATHING: You may shower and/or sponge bathe. You may use warm soapy water in the shower and rinse, pat dry.  ACTIVITY: No heavy lifting or straining. Otherwise, you may return to your usual level of physical activity. If you are taking narcotic pain medication DO NOT drive a car, operate machinery or make important decisions.  DIET: Return to your usual diet.  NOTIFY YOUR SURGEON IF YOU HAVE: any bleeding that does not stop, any pus draining from your wound(s), any fever (over 100.4 F) persistent nausea/vomiting, or if your pain is not controlled on your discharge pain medications, unable to urinate.  Please follow up with your primary care physician in one week regarding your hospitalization, bring copies of your discharge paperwork.  Please follow up with your surgeon, Dr. Sy WOUND CARE:  Please keep incisions clean and dry. Please do not Scrub or rub incisions. Do not use lotion or powder on incisions.   BATHING: You may shower and/or sponge bathe. You may use warm soapy water in the shower and rinse, pat dry.  ACTIVITY: No heavy lifting or straining. Otherwise, you may return to your usual level of physical activity. If you are taking narcotic pain medication DO NOT drive a car, operate machinery or make important decisions.  DIET: Return to your usual diet.  NOTIFY YOUR SURGEON IF YOU HAVE: any bleeding that does not stop, any pus draining from your wound(s), any fever (over 100.4 F) persistent nausea/vomiting, or if your pain is not controlled on your discharge pain medications, unable to urinate.  Please follow up with your primary care physician in one week regarding your hospitalization, bring copies of your discharge paperwork.  Please follow up with your surgeon, Dr. Kennedy Erwin

## 2024-12-04 NOTE — PROGRESS NOTE ADULT - SUBJECTIVE AND OBJECTIVE BOX
SUBJECTIVE/ OVERNIGHT EVENTS:  --- Coverage for Dr. Pizano ---  Pt seen and examined at bedside.   No overnight event.  Feeling better.  no cp, no sob, no n/v/d.   pain controlled.  -------------------------------------------------------------    REVIEW OF SYSTEMS:  CONSTITUTIONAL: No weakness, fevers or chills  EYES/ENT: No visual changes;  No vertigo or throat pain   NECK: No pain or stiffness  RESPIRATORY: No cough, wheezing, hemoptysis; No shortness of breath  CARDIOVASCULAR: No chest pain or palpitations  GASTROINTESTINAL: No abdominal or epigastric pain. No nausea, vomiting, or hematemesis; No diarrhea or constipation. No melena or hematochezia.  GENITOURINARY: No dysuria, frequency or hematuria  NEUROLOGICAL: No numbness or weakness  SKIN: No itching, burning, rashes, or lesions   All other review of systems is negative unless indicated above.    LABS:                        11.6   13.41 )-----------( 465      ( 04 Dec 2024 06:20 )             35.8     12-04    134[L]  |  98  |  18  ----------------------------<  204[H]  3.8   |  24  |  1.50[H]    Ca    9.3      04 Dec 2024 06:20  Phos  3.9     12-04  Mg     2.60     12-04    TPro  8.4[H]  /  Alb  3.6  /  TBili  0.4  /  DBili  x   /  AST  17  /  ALT  10  /  AlkPhos  101  12-03    PT/INR - ( 03 Dec 2024 22:30 )   PT: 13.3 sec;   INR: 1.15 ratio         PTT - ( 03 Dec 2024 22:30 )  PTT:29.6 sec  CAPILLARY BLOOD GLUCOSE      POCT Blood Glucose.: 181 mg/dL (04 Dec 2024 17:28)  POCT Blood Glucose.: 195 mg/dL (04 Dec 2024 12:27)  POCT Blood Glucose.: 255 mg/dL (04 Dec 2024 11:03)  POCT Blood Glucose.: 197 mg/dL (04 Dec 2024 09:18)  POCT Blood Glucose.: 201 mg/dL (04 Dec 2024 07:46)  POCT Blood Glucose.: 206 mg/dL (04 Dec 2024 05:44)  POCT Blood Glucose.: 223 mg/dL (04 Dec 2024 02:25)  POCT Blood Glucose.: 297 mg/dL (03 Dec 2024 21:23)        Urinalysis Basic - ( 04 Dec 2024 06:20 )    Color: x / Appearance: x / SG: x / pH: x  Gluc: 204 mg/dL / Ketone: x  / Bili: x / Urobili: x   Blood: x / Protein: x / Nitrite: x   Leuk Esterase: x / RBC: x / WBC x   Sq Epi: x / Non Sq Epi: x / Bacteria: x        RADIOLOGY & ADDITIONAL TESTS:    Imaging Personally Reviewed:  [x] YES  [ ] NO    Consultant(s) Notes Reviewed:  [x] YES  [ ] NO    MEDICATIONS  (STANDING):  amLODIPine   Tablet 10 milliGRAM(s) Oral daily  atorvastatin 20 milliGRAM(s) Oral at bedtime  dextrose 5%. 1000 milliLiter(s) (50 mL/Hr) IV Continuous <Continuous>  dextrose 5%. 1000 milliLiter(s) (100 mL/Hr) IV Continuous <Continuous>  dextrose 50% Injectable 25 Gram(s) IV Push once  dextrose 50% Injectable 12.5 Gram(s) IV Push once  dextrose 50% Injectable 25 Gram(s) IV Push once  glucagon  Injectable 1 milliGRAM(s) IntraMuscular once  heparin   Injectable 5000 Unit(s) SubCutaneous every 8 hours  hydrochlorothiazide 25 milliGRAM(s) Oral daily  influenza  Vaccine (HIGH DOSE) 0.5 milliLiter(s) IntraMuscular once  insulin glargine Injectable (LANTUS) 20 Unit(s) SubCutaneous every morning  insulin lispro (ADMELOG) corrective regimen sliding scale   SubCutaneous three times a day before meals  insulin lispro (ADMELOG) corrective regimen sliding scale   SubCutaneous at bedtime  insulin lispro Injectable (ADMELOG) 6 Unit(s) SubCutaneous three times a day before meals  losartan 100 milliGRAM(s) Oral daily  piperacillin/tazobactam IVPB.. 3.375 Gram(s) IV Intermittent every 8 hours  sodium chloride 0.9%. 1000 milliLiter(s) (75 mL/Hr) IV Continuous <Continuous>  vancomycin  IVPB 1250 milliGRAM(s) IV Intermittent every 24 hours    MEDICATIONS  (PRN):  acetaminophen     Tablet .. 650 milliGRAM(s) Oral every 6 hours PRN Temp greater or equal to 38C (100.4F), Mild Pain (1 - 3)  dextrose Oral Gel 15 Gram(s) Oral once PRN Blood Glucose LESS THAN 70 milliGRAM(s)/deciliter  melatonin 3 milliGRAM(s) Oral at bedtime PRN Insomnia  ondansetron Injectable 4 milliGRAM(s) IV Push every 8 hours PRN Nausea and/or Vomiting      Care Discussed with Consultants/Other Providers [x] YES  [ ] NO    Vital Signs Last 24 Hrs  T(C): 36.8 (04 Dec 2024 11:39), Max: 37.7 (04 Dec 2024 03:07)  T(F): 98.2 (04 Dec 2024 11:39), Max: 99.9 (04 Dec 2024 03:07)  HR: 92 (04 Dec 2024 11:39) (77 - 98)  BP: 131/59 (04 Dec 2024 11:39) (114/44 - 136/68)  BP(mean): --  RR: 17 (04 Dec 2024 11:39) (16 - 18)  SpO2: 100% (04 Dec 2024 11:39) (98% - 100%)    Parameters below as of 04 Dec 2024 11:39  Patient On (Oxygen Delivery Method): room air      I&O's Summary      PHYSICAL EXAM:  GENERAL: NAD, well-developed, comfortable  HEAD:  Atraumatic, Normocephalic  EYES: EOMI, PERRLA, conjunctiva and sclera clear  NECK: Supple, No JVD  CHEST/LUNG: Clear to auscultation bilaterally; No wheeze  HEART: Regular rate and rhythm; No murmurs, rubs, or gallops  ABDOMEN: Soft, Nontender, Nondistended; Bowel sounds present  Neuro: AAOx3, no focal weakness, 5/5 b/l extremity strength  EXTREMITIES:  2+ Peripheral Pulses, No clubbing, cyanosis, or edema, left foot dressing d/c/i  SKIN: No rashes or lesions

## 2024-12-04 NOTE — H&P ADULT - NSICDXPASTMEDICALHX_GEN_ALL_CORE_FT
PAST MEDICAL HISTORY:  Diabetes mellitus type 2, noninsulin dependent     HTN (hypertension)     Hyperlipemia

## 2024-12-04 NOTE — DISCHARGE NOTE PROVIDER - NSDCCPTREATMENT_GEN_ALL_CORE_FT
PRINCIPAL PROCEDURE  Procedure: Angiogram, lower extremity, left  Findings and Treatment:       SECONDARY PROCEDURE  Procedure: Partial amputation of first ray of left foot by open approach  Findings and Treatment:

## 2024-12-04 NOTE — DISCHARGE NOTE PROVIDER - CARE PROVIDERS DIRECT ADDRESSES
,[email or web address],DirectAddress_Unknown,DirectAddress_Unknown,DirectAddress_Unknown

## 2024-12-04 NOTE — PATIENT PROFILE ADULT - FALL HARM RISK - HARM RISK INTERVENTIONS
Assistance with ambulation/Assistance OOB with selected safe patient handling equipment/Communicate Risk of Fall with Harm to all staff/Discuss with provider need for PT consult/Monitor gait and stability/Reinforce activity limits and safety measures with patient and family/Tailored Fall Risk Interventions/Visual Cue: Yellow wristband and red socks/Bed in lowest position, wheels locked, appropriate side rails in place/Call bell, personal items and telephone in reach/Instruct patient to call for assistance before getting out of bed or chair/Non-slip footwear when patient is out of bed/[place] to call system/Physically safe environment - no spills, clutter or unnecessary equipment/Purposeful Proactive Rounding/Room/bathroom lighting operational, light cord in reach

## 2024-12-04 NOTE — CONSULT NOTE ADULT - SUBJECTIVE AND OBJECTIVE BOX
Surgery Consult Note  Attending: Soo  Service: Vascular Surgery    HPI:   70 y/o F PMhx DM II, HTN who presented w/ left great toe wound. Pt reports she was clipping her toenails in October and accidentally cut her skin. She noticed some bleeding in her skin, which is what prompted her to present to the ED based on her podiatrist's recommendation. She underwent a bedside debridement with podiatry on 12/4/24. Vascular surgery consulted for further recommendations.    PAST MEDICAL HISTORY:  PAST MEDICAL & SURGICAL HISTORY:  Diabetes mellitus type 2, noninsulin dependent      HTN (hypertension)      Hyperlipemia      S/P hysterectomy          ALLERGIES:  Allergies    No Known Allergies    Intolerances        SOCIAL HISTORY: Negative for tobacco, etoh, or drug use    FAMILY HISTORY:  FAMILY HISTORY:  FH: diabetes mellitus (Mother)        PHYSICAL EXAM:  General: NAD, resting comfortably  Pulmonary: nonlabored respirations   Cardiovascular: NSR, no murmurs  Extremities: appears warm on well perfused, LLE big toe wound strikethrough, b/l femoral, popliteal pulses, b/l DP PT signals    VITAL SIGNS:  Vital Signs Last 24 Hrs  T(C): 36.8 (04 Dec 2024 11:39), Max: 37.7 (04 Dec 2024 03:07)  T(F): 98.2 (04 Dec 2024 11:39), Max: 99.9 (04 Dec 2024 03:07)  HR: 86 (04 Dec 2024 18:09) (77 - 98)  BP: 120/65 (04 Dec 2024 18:09) (114/44 - 136/68)  RR: 18 (04 Dec 2024 18:09) (16 - 18)  SpO2: 100% (04 Dec 2024 18:09) (98% - 100%)    Parameters below as of 04 Dec 2024 18:09  Patient On (Oxygen Delivery Method): room air        I&O's Summary      LABS:                        11.6   13.41 )-----------( 465      ( 04 Dec 2024 06:20 )             35.8     12-04    134[L]  |  98  |  18  ----------------------------<  204[H]  3.8   |  24  |  1.50[H]    Ca    9.3      04 Dec 2024 06:20  Phos  3.9     12-04  Mg     2.60     12-04    TPro  8.4[H]  /  Alb  3.6  /  TBili  0.4  /  DBili  x   /  AST  17  /  ALT  10  /  AlkPhos  101  12-03    PT/INR - ( 03 Dec 2024 22:30 )   PT: 13.3 sec;   INR: 1.15 ratio         PTT - ( 03 Dec 2024 22:30 )  PTT:29.6 sec  Urinalysis Basic - ( 04 Dec 2024 06:20 )    Color: x / Appearance: x / SG: x / pH: x  Gluc: 204 mg/dL / Ketone: x  / Bili: x / Urobili: x   Blood: x / Protein: x / Nitrite: x   Leuk Esterase: x / RBC: x / WBC x   Sq Epi: x / Non Sq Epi: x / Bacteria: x      CAPILLARY BLOOD GLUCOSE      POCT Blood Glucose.: 181 mg/dL (04 Dec 2024 17:28)  POCT Blood Glucose.: 195 mg/dL (04 Dec 2024 12:27)  POCT Blood Glucose.: 255 mg/dL (04 Dec 2024 11:03)  POCT Blood Glucose.: 197 mg/dL (04 Dec 2024 09:18)  POCT Blood Glucose.: 201 mg/dL (04 Dec 2024 07:46)  POCT Blood Glucose.: 206 mg/dL (04 Dec 2024 05:44)  POCT Blood Glucose.: 223 mg/dL (04 Dec 2024 02:25)  POCT Blood Glucose.: 297 mg/dL (03 Dec 2024 21:23)    LIVER FUNCTIONS - ( 03 Dec 2024 22:30 )  Alb: 3.6 g/dL / Pro: 8.4 g/dL / ALK PHOS: 101 U/L / ALT: 10 U/L / AST: 17 U/L / GGT: x             CULTURES:      RADIOLOGY & ADDITIONAL STUDIES:    JACK/PVR  1. Right: Right JACK was not accurately assessed due to non-compressible (calcific) vessels. Right metatarsal waveforms are severely diminished in amplitude, and digit waveforms are flat. Findings suggest the presence of distal infrapopliteal and significant small vessel arterial disease in the right foot.   2. Left: Left JACK is moderately abnormal (0.76). Left metatarsal waveforms are severely diminished in amplitude, and digit waveforms are flat. Findings suggest the presence of distal infrapopliteal and significant small vessel arterial disease in the left foot.

## 2024-12-04 NOTE — H&P ADULT - NSHPLABSRESULTS_GEN_ALL_CORE
.    -Personally INTERPRETED EKG:     -Personally reviewed the following labs below:                        11.2   13.47 )-----------( 483      ( 03 Dec 2024 22:30 )             35.0   12-03    133[L]  |  97[L]  |  19  ----------------------------<  273[H]  3.7   |  24  |  1.41[H]    Ca    9.2      03 Dec 2024 22:30    TPro  8.4[H]  /  Alb  3.6  /  TBili  0.4  /  DBili  x   /  AST  17  /  ALT  10  /  AlkPhos  101  12-03    22:30 - VBG - pH: 7.37  | pCO2: 50    | pO2: 29    | Lactate: 1.4 .    -Personally INTERPRETED EKG: pending    XR FOOT 2 VIEWS LT   ORDERED BY: SHONNA WELLER   XR TIB FIB AP LAT 2 VIEWS LT   ORDERED BY: SHONNA WELLER   XR ANKLE COMP MIN 3 VIEWS LT   ORDERED BY: SHONNA WELLER   PROCEDURE DATE:  12/03/2024    ******PRELIMINARY REPORT******    INTERPRETATION:  CLINICAL INDICATION: Foot wound  ==========================================================================================================================================  TECHNIQUE: 2 views of left tibia fibula, 3 views left ankle, 3 views of   the left foot.    COMPARISON: X-ray ankle 5/6/2021  FINDINGS:  There is no acute fracture or dislocation. The soft tissues are normal.   No radiopaque foreign body is identified. No soft tissue tracking gas.  IMPRESSION:  No soft tissue tracking gas.      -Personally reviewed the following labs below:                        11.2   13.47 )-----------( 483      ( 03 Dec 2024 22:30 )             35.0   12-03    133[L]  |  97[L]  |  19  ----------------------------<  273[H]  3.7   |  24  |  1.41[H]    Ca    9.2      03 Dec 2024 22:30    TPro  8.4[H]  /  Alb  3.6  /  TBili  0.4  /  DBili  x   /  AST  17  /  ALT  10  /  AlkPhos  101  12-03    22:30 - VBG - pH: 7.37  | pCO2: 50    | pO2: 29    | Lactate: 1.4

## 2024-12-04 NOTE — DISCHARGE NOTE PROVIDER - REASON FOR ADMISSION
Sent in by podiatrist for right foot/toe wound, worsening since October. Abnormal VS & WBC Abnormal VS & WBC/Abnormal Lactate

## 2024-12-04 NOTE — H&P ADULT - TIME BILLING
Reviewed admission imaging reports, and admission labs prior to the encounter with  the patient   Reviewed Triage and ED course/ documentation   Reviewed consultants notes, including::: Pod SX  Performed full physical exam and ROS  Obtained list of home medications and performed home medications reconciliation on EMR  Discussed home insulin regimen   Discussed prognosis, treatment and further potential OR plan with the patient   Ordered or reviewed multiple new admission medications and placed or reviewed all hospitalization admission orders  Managed (continued, held or adjusted doses) of home medications   Ordered  or reviewed orders of  new imaging and new lab w/up

## 2024-12-05 LAB
A1C WITH ESTIMATED AVERAGE GLUCOSE RESULT: 7.8 % — HIGH (ref 4–5.6)
ANION GAP SERPL CALC-SCNC: 14 MMOL/L — SIGNIFICANT CHANGE UP (ref 7–14)
APPEARANCE UR: CLEAR — SIGNIFICANT CHANGE UP
BASOPHILS # BLD AUTO: 0.02 K/UL — SIGNIFICANT CHANGE UP (ref 0–0.2)
BASOPHILS NFR BLD AUTO: 0.2 % — SIGNIFICANT CHANGE UP (ref 0–2)
BILIRUB UR-MCNC: NEGATIVE — SIGNIFICANT CHANGE UP
BUN SERPL-MCNC: 18 MG/DL — SIGNIFICANT CHANGE UP (ref 7–23)
CALCIUM SERPL-MCNC: 9 MG/DL — SIGNIFICANT CHANGE UP (ref 8.4–10.5)
CHLORIDE SERPL-SCNC: 99 MMOL/L — SIGNIFICANT CHANGE UP (ref 98–107)
CO2 SERPL-SCNC: 24 MMOL/L — SIGNIFICANT CHANGE UP (ref 22–31)
COLOR SPEC: YELLOW — SIGNIFICANT CHANGE UP
CREAT ?TM UR-MCNC: 134 MG/DL — SIGNIFICANT CHANGE UP
CREAT SERPL-MCNC: 1.6 MG/DL — HIGH (ref 0.5–1.3)
DIFF PNL FLD: NEGATIVE — SIGNIFICANT CHANGE UP
EGFR: 34 ML/MIN/1.73M2 — LOW
EOSINOPHIL # BLD AUTO: 0.04 K/UL — SIGNIFICANT CHANGE UP (ref 0–0.5)
EOSINOPHIL NFR BLD AUTO: 0.4 % — SIGNIFICANT CHANGE UP (ref 0–6)
ESTIMATED AVERAGE GLUCOSE: 177 — SIGNIFICANT CHANGE UP
GLUCOSE BLDC GLUCOMTR-MCNC: 147 MG/DL — HIGH (ref 70–99)
GLUCOSE BLDC GLUCOMTR-MCNC: 154 MG/DL — HIGH (ref 70–99)
GLUCOSE BLDC GLUCOMTR-MCNC: 163 MG/DL — HIGH (ref 70–99)
GLUCOSE BLDC GLUCOMTR-MCNC: 207 MG/DL — HIGH (ref 70–99)
GLUCOSE SERPL-MCNC: 152 MG/DL — HIGH (ref 70–99)
GLUCOSE UR QL: >=1000 MG/DL
HCT VFR BLD CALC: 30.8 % — LOW (ref 34.5–45)
HGB BLD-MCNC: 10 G/DL — LOW (ref 11.5–15.5)
IANC: 6.92 K/UL — SIGNIFICANT CHANGE UP (ref 1.8–7.4)
IMM GRANULOCYTES NFR BLD AUTO: 0.4 % — SIGNIFICANT CHANGE UP (ref 0–0.9)
KETONES UR-MCNC: ABNORMAL MG/DL
LEUKOCYTE ESTERASE UR-ACNC: ABNORMAL
LYMPHOCYTES # BLD AUTO: 28.5 % — SIGNIFICANT CHANGE UP (ref 13–44)
LYMPHOCYTES # BLD AUTO: 3.18 K/UL — SIGNIFICANT CHANGE UP (ref 1–3.3)
MAGNESIUM SERPL-MCNC: 2.5 MG/DL — SIGNIFICANT CHANGE UP (ref 1.6–2.6)
MCHC RBC-ENTMCNC: 28.9 PG — SIGNIFICANT CHANGE UP (ref 27–34)
MCHC RBC-ENTMCNC: 32.5 G/DL — SIGNIFICANT CHANGE UP (ref 32–36)
MCV RBC AUTO: 89 FL — SIGNIFICANT CHANGE UP (ref 80–100)
MONOCYTES # BLD AUTO: 0.97 K/UL — HIGH (ref 0–0.9)
MONOCYTES NFR BLD AUTO: 8.7 % — SIGNIFICANT CHANGE UP (ref 2–14)
NEUTROPHILS # BLD AUTO: 6.92 K/UL — SIGNIFICANT CHANGE UP (ref 1.8–7.4)
NEUTROPHILS NFR BLD AUTO: 61.8 % — SIGNIFICANT CHANGE UP (ref 43–77)
NITRITE UR-MCNC: NEGATIVE — SIGNIFICANT CHANGE UP
NRBC # BLD: 0 /100 WBCS — SIGNIFICANT CHANGE UP (ref 0–0)
NRBC # FLD: 0 K/UL — SIGNIFICANT CHANGE UP (ref 0–0)
PH UR: 6 — SIGNIFICANT CHANGE UP (ref 5–8)
PHOSPHATE SERPL-MCNC: 3.1 MG/DL — SIGNIFICANT CHANGE UP (ref 2.5–4.5)
PLATELET # BLD AUTO: 448 K/UL — HIGH (ref 150–400)
POTASSIUM SERPL-MCNC: 3.9 MMOL/L — SIGNIFICANT CHANGE UP (ref 3.5–5.3)
POTASSIUM SERPL-SCNC: 3.9 MMOL/L — SIGNIFICANT CHANGE UP (ref 3.5–5.3)
PROT ?TM UR-MCNC: 26 MG/DL — SIGNIFICANT CHANGE UP
PROT UR-MCNC: SIGNIFICANT CHANGE UP MG/DL
PROT/CREAT UR-RTO: 0.2 RATIO — SIGNIFICANT CHANGE UP (ref 0–0.2)
RBC # BLD: 3.46 M/UL — LOW (ref 3.8–5.2)
RBC # FLD: 12.1 % — SIGNIFICANT CHANGE UP (ref 10.3–14.5)
SODIUM SERPL-SCNC: 137 MMOL/L — SIGNIFICANT CHANGE UP (ref 135–145)
SP GR SPEC: 1.03 — HIGH (ref 1–1.03)
UROBILINOGEN FLD QL: 0.2 MG/DL — SIGNIFICANT CHANGE UP (ref 0.2–1)
UUN UR-MCNC: 541.6 MG/DL — SIGNIFICANT CHANGE UP
WBC # BLD: 11.17 K/UL — HIGH (ref 3.8–10.5)
WBC # FLD AUTO: 11.17 K/UL — HIGH (ref 3.8–10.5)

## 2024-12-05 PROCEDURE — 76770 US EXAM ABDO BACK WALL COMP: CPT | Mod: 26

## 2024-12-05 RX ORDER — POLYETHYLENE GLYCOL 3350 17 G/DOSE
17 POWDER (GRAM) ORAL ONCE
Refills: 0 | Status: COMPLETED | OUTPATIENT
Start: 2024-12-05 | End: 2024-12-05

## 2024-12-05 RX ADMIN — HEPARIN SODIUM 5000 UNIT(S): 1000 INJECTION, SOLUTION INTRAVENOUS; SUBCUTANEOUS at 13:03

## 2024-12-05 RX ADMIN — ATORVASTATIN CALCIUM 20 MILLIGRAM(S): 40 TABLET, FILM COATED ORAL at 22:07

## 2024-12-05 RX ADMIN — PIPERACILLIN AND TAZOBACTAM 25 GRAM(S): 3; .375 INJECTION, POWDER, LYOPHILIZED, FOR SOLUTION INTRAVENOUS at 02:00

## 2024-12-05 RX ADMIN — Medication 6 UNIT(S): at 12:45

## 2024-12-05 RX ADMIN — Medication 6 UNIT(S): at 08:49

## 2024-12-05 RX ADMIN — VANCOMYCIN HYDROCHLORIDE 166.67 MILLIGRAM(S): 5 INJECTION, POWDER, LYOPHILIZED, FOR SOLUTION INTRAVENOUS at 22:48

## 2024-12-05 RX ADMIN — Medication 10 MILLIGRAM(S): at 10:12

## 2024-12-05 RX ADMIN — Medication 6 UNIT(S): at 17:42

## 2024-12-05 RX ADMIN — PIPERACILLIN AND TAZOBACTAM 25 GRAM(S): 3; .375 INJECTION, POWDER, LYOPHILIZED, FOR SOLUTION INTRAVENOUS at 17:45

## 2024-12-05 RX ADMIN — PIPERACILLIN AND TAZOBACTAM 25 GRAM(S): 3; .375 INJECTION, POWDER, LYOPHILIZED, FOR SOLUTION INTRAVENOUS at 10:11

## 2024-12-05 RX ADMIN — LOSARTAN POTASSIUM 100 MILLIGRAM(S): 100 TABLET, FILM COATED ORAL at 05:21

## 2024-12-05 RX ADMIN — Medication 17 GRAM(S): at 18:10

## 2024-12-05 RX ADMIN — Medication 1: at 17:42

## 2024-12-05 RX ADMIN — INSULIN GLARGINE-YFGN 20 UNIT(S): 100 INJECTION, SOLUTION SUBCUTANEOUS at 08:48

## 2024-12-05 RX ADMIN — Medication 2: at 12:45

## 2024-12-05 RX ADMIN — HEPARIN SODIUM 5000 UNIT(S): 1000 INJECTION, SOLUTION INTRAVENOUS; SUBCUTANEOUS at 05:21

## 2024-12-05 RX ADMIN — HEPARIN SODIUM 5000 UNIT(S): 1000 INJECTION, SOLUTION INTRAVENOUS; SUBCUTANEOUS at 22:07

## 2024-12-05 NOTE — CONSULT NOTE ADULT - ASSESSMENT
57yo female h/o DM type2, HTN, hyperlipidemia; Pt c/o left great toe infection.    #diabetic foot infection.   -sp bedside debridement with podiatry on 12/4/24  -VA JACK WITH PVR noted   -vascular planning  possible LE angio   -ecg with no ischemic changes, no chf or significant valvular disease on exam   -would check echo at minimum   -otherwise CV stable and proceed to LE angio from a cv standpoint     #HTN   -c/w meds    #barbie   -outpt hctz, arb on hold   -med fu     dvt ppx

## 2024-12-05 NOTE — PROGRESS NOTE ADULT - ASSESSMENT
68 y/o F PMhx DM II, HTN who presented w/ left great toe wound    L hallux infection, c/f osteo  leukocytosis, DM II  CRP 88.8,   s/p podiatry I&D L hallux wound to the level of and not beyond bone,  mild malodor, scant serosanguinous drainage. Right foot no open wounds, no acute signs of infection.  foot x-ray- fracture distal phalanx great toe. soft tissue swelling of this digit as well as subcutaneous air.  per vascular possible plans for angiogram    Recommendations  c/w zosyn for now  c/w vanc 1250 every 24 hours for now  - goal trough 15-20, trough prior to 3rd dose (tonight's dose, ordered)  f/u wound culture- gram stain w/ gram negative rods thus far    Albino Rea M.D.  [facility], Division of Infectious Diseases  [phone]  After 5pm on weekdays and all day on weekends - please call [phone]

## 2024-12-05 NOTE — CONSULT NOTE ADULT - SUBJECTIVE AND OBJECTIVE BOX
CARDIOLOGY CONSULT - Dr. Oliva         HPI:  59yo female h/o DM type2, HTN, hyperlipidemia; Pt c/o left great toe infection.  Patient reports she was clipping her nail toenails in October and accidentally cut her skin.  Patient reports this week she noticed bleeding through her sock which prompted her to check and noticed an open wound on her toe.  Patient reports she followed up with her podiatrist who instructed she come to the ED for IV antibiotics and further workup.  Patient reports she was having subjective fever at home.  Otherwise reports no chest pain, palpitations, shortness of breath, nausea, vomiting.  cards eval for clearance for potential  possible angio planning per vascular  pt denies any cardaic hx , no recent testing, no cp or sob   ROS otherwise negative           PAST MEDICAL & SURGICAL HISTORY:  Diabetes mellitus type 2, noninsulin dependent      HTN (hypertension)      Hyperlipemia      S/P hysterectomy              PREVIOUS DIAGNOSTIC TESTING:    [ ] Echocardiogram:  [ ]  Catheterization:  [ ] Stress Test:  	    MEDICATIONS:  Home Medications:  aspirin 81 mg oral delayed release tablet: 1 tab(s) orally once a day (04 Dec 2024 04:28)  atorvastatin 20 mg oral tablet: 1 tab(s) orally once a day (at bedtime) (04 Dec 2024 04:28)  Farxiga 5 mg oral tablet: 1 tab(s) orally once a day (04 Dec 2024 04:32)  hydroCHLOROthiazide 25 mg oral tablet: 1 tab(s) orally once a day (04 Dec 2024 04:28)  Lantus Solostar Pen 100 units/mL subcutaneous solution: 30 unit(s) subcutaneous once a day (at bedtime) (04 Dec 2024 04:28)  losartan 100 mg oral tablet: 1 tab(s) orally once a day (04 Dec 2024 04:28)  Norvasc 10 mg oral tablet: 1 tab(s) orally once a day (04 Dec 2024 04:28)  NovoLOG 100 units/mL subcutaneous solution: 10 unit(s) subcutaneous 3 times a day (before meals) (04 Dec 2024 04:28)      MEDICATIONS  (STANDING):  amLODIPine   Tablet 10 milliGRAM(s) Oral daily  atorvastatin 20 milliGRAM(s) Oral at bedtime  dextrose 5%. 1000 milliLiter(s) (50 mL/Hr) IV Continuous <Continuous>  dextrose 5%. 1000 milliLiter(s) (100 mL/Hr) IV Continuous <Continuous>  dextrose 50% Injectable 25 Gram(s) IV Push once  dextrose 50% Injectable 12.5 Gram(s) IV Push once  dextrose 50% Injectable 25 Gram(s) IV Push once  glucagon  Injectable 1 milliGRAM(s) IntraMuscular once  heparin   Injectable 5000 Unit(s) SubCutaneous every 8 hours  influenza  Vaccine (HIGH DOSE) 0.5 milliLiter(s) IntraMuscular once  insulin glargine Injectable (LANTUS) 20 Unit(s) SubCutaneous every morning  insulin lispro (ADMELOG) corrective regimen sliding scale   SubCutaneous three times a day before meals  insulin lispro (ADMELOG) corrective regimen sliding scale   SubCutaneous at bedtime  insulin lispro Injectable (ADMELOG) 6 Unit(s) SubCutaneous three times a day before meals  piperacillin/tazobactam IVPB.. 3.375 Gram(s) IV Intermittent every 8 hours  sodium chloride 0.9%. 1000 milliLiter(s) (75 mL/Hr) IV Continuous <Continuous>  vancomycin  IVPB 1250 milliGRAM(s) IV Intermittent every 24 hours      FAMILY HISTORY:  FH: diabetes mellitus (Mother)        SOCIAL HISTORY:    [ x] Non-smoker  [ ] Smoker  [ ] Alcohol    Allergies    No Known Allergies    Intolerances    	    REVIEW OF SYSTEMS:  CONSTITUTIONAL: No fever, weight loss, or fatigue  EYES: No eye pain, visual disturbances, or discharge  ENMT:  No difficulty hearing, tinnitus, vertigo; No sinus or throat pain  NECK: No pain or stiffness  RESPIRATORY: No cough, wheezing, chills or hemoptysis; No Shortness of Breath  CARDIOVASCULAR: No chest pain, palpitations, passing out, dizziness, or leg swelling  GASTROINTESTINAL: No abdominal or epigastric pain. No nausea, vomiting, or hematemesis; No diarrhea or constipation. No melena or hematochezia.  GENITOURINARY: No dysuria, frequency, hematuria, or incontinence  NEUROLOGICAL: No headaches, memory loss, loss of strength, numbness, or tremors  SKIN: No itching, burning, rashes, or lesions   	    [x ] All others negative	  [ ] Unable to obtain    PHYSICAL EXAM:  T(C): 37.4 (12-05-24 @ 10:10), Max: 37.4 (12-05-24 @ 10:10)  HR: 88 (12-05-24 @ 10:10) (86 - 92)  BP: 130/50 (12-05-24 @ 10:10) (117/60 - 131/59)  RR: 18 (12-05-24 @ 10:10) (17 - 18)  SpO2: 99% (12-05-24 @ 10:10) (99% - 100%)  Wt(kg): --  I&O's Summary      Appearance: Normal	  Psychiatry: A & O x 3, Mood & affect appropriate  HEENT:   Normal oral mucosa, PERRL, EOMI	  Lymphatic: No lymphadenopathy  Cardiovascular: Normal S1 S2,RRR, No JVD, No murmurs  Respiratory: Lungs clear to auscultation	  Gastrointestinal:  Soft, Non-tender, + BS	  Skin: No rashes, No ecchymoses, No cyanosis	  Neurologic: Non-focal  Extremities: Normal range of motion, No clubbing, cyanosis or edema  Vascular: Peripheral pulses palpable 2+ bilaterally    TELEMETRY: 	    ECG:  nsr 	  RADIOLOGY:  OTHER: < from: VA JACK WITH PVR (12.04.24 @ 08:00) >  CONCLUSIONS:      1. Right: Right JACK was not accurately assessed due to non-compressible (calcific) vessels. Right metatarsal waveforms are severely diminished in amplitude, and digit waveforms are flat. Findings suggest the presence of distal infrapopliteal and significant small vessel arterial disease in the right foot.   2. Left: Left JACK is moderately abnormal (0.76). Left metatarsal waveforms are severely diminished in amplitude, and digit waveforms are flat. Findings suggest the presenceof distal infrapopliteal and significant small vessel arterial disease in the left foot.    < end of copied text >  	  	  LABS:	 	    CARDIAC MARKERS:                                  10.0   11.17 )-----------( 448      ( 05 Dec 2024 06:02 )             30.8     12-05    137  |  99  |  18  ----------------------------<  152[H]  3.9   |  24  |  1.60[H]    Ca    9.0      05 Dec 2024 06:02  Phos  3.1     12-05  Mg     2.50     12-05    TPro  8.4[H]  /  Alb  3.6  /  TBili  0.4  /  DBili  x   /  AST  17  /  ALT  10  /  AlkPhos  101  12-03    PT/INR - ( 03 Dec 2024 22:30 )   PT: 13.3 sec;   INR: 1.15 ratio         PTT - ( 03 Dec 2024 22:30 )  PTT:29.6 sec  proBNP:   Lipid Profile:   HgA1c:   TSH:

## 2024-12-05 NOTE — PROGRESS NOTE ADULT - SUBJECTIVE AND OBJECTIVE BOX
[facility], Division of Infectious Diseases  TOBY Szymanski Y. Patel, S. Shah, G. Álvaro  [phone]  ([phone] - weekdays after 5pm and weekends)    Name: REJI MEADE  Age/Gender: 69y Female  MRN: [id number]    Interval History:  Notes reviewed.   No concerning overnight events.  Afebrile.   denies diarrhea    Allergies: No Known Allergies      Objective:  Vitals:   T(F): 99.4 (24 @ 10:10), Max: 99.4 (24 @ 10:10)  HR: 88 (24 @ 10:10) (86 - 89)  BP: 130/50 (24 @ 10:10) (117/60 - 130/50)  RR: 18 (24 @ 10:10) (18 - 18)  SpO2: 99% (24 @ 10:10) (99% - 100%)  Physical Examination:  General: no acute distress  HEENT: anicteric  Cardio: S1, S2, normal rate  Resp: clear to auscultation bilaterally  Abd: soft, NT, ND  Ext: L foot wrapped w/ dressing  Skin: warm, dry    Laboratory Studies:  CBC:                       10.0   11.17 )-----------( 448      ( 05 Dec 2024 06:02 )             30.8     WBC Trend:  11.17 24 @ 06:02  13.41 24 @ 06:20  13.47 24 @ 22:30    CMP:     137  |  99  |  18  ----------------------------<  152[H]  3.9   |  24  |  1.60[H]    Ca    9.0      05 Dec 2024 06:02  Phos  3.1       Mg     2.50         TPro  8.4[H]  /  Alb  3.6  /  TBili  0.4  /  DBili  x   /  AST  17  /  ALT  10  /  AlkPhos  101        LIVER FUNCTIONS - ( 03 Dec 2024 22:30 )  Alb: 3.6 g/dL / Pro: 8.4 g/dL / ALK PHOS: 101 U/L / ALT: 10 U/L / AST: 17 U/L / GGT: x             Urinalysis Basic - ( 05 Dec 2024 11:49 )    Color: Yellow / Appearance: Clear / S.031 / pH: x  Gluc: x / Ketone: Trace mg/dL  / Bili: Negative / Urobili: 0.2 mg/dL   Blood: x / Protein: Trace mg/dL / Nitrite: Negative   Leuk Esterase: Small / RBC: 3 /HPF / WBC 15 /HPF   Sq Epi: x / Non Sq Epi: 2 /HPF / Bacteria: Negative /HPF      Microbiology: reviewed     Culture - Abscess with Gram Stain (collected 24 @ 01:07)  Source: .Abscess  Gram Stain (24 @ 16:17):    No polymorphonuclear cells seen per low power field    Few Gram Negative Rods per oil power field    Culture - Blood (collected 24 @ 22:30)  Source: .Blood BLOOD  Preliminary Report (24 @ 03:01):    No growth at 24 hours    Culture - Blood (collected 24 @ 22:20)  Source: .Blood BLOOD  Preliminary Report (24 @ 03:01):    No growth at 24 hours        Radiology: reviewed     Medications:  acetaminophen     Tablet .. 650 milliGRAM(s) Oral every 6 hours PRN  amLODIPine   Tablet 10 milliGRAM(s) Oral daily  atorvastatin 20 milliGRAM(s) Oral at bedtime  dextrose 5%. 1000 milliLiter(s) IV Continuous <Continuous>  dextrose 5%. 1000 milliLiter(s) IV Continuous <Continuous>  dextrose 50% Injectable 25 Gram(s) IV Push once  dextrose 50% Injectable 12.5 Gram(s) IV Push once  dextrose 50% Injectable 25 Gram(s) IV Push once  dextrose Oral Gel 15 Gram(s) Oral once PRN  glucagon  Injectable 1 milliGRAM(s) IntraMuscular once  heparin   Injectable 5000 Unit(s) SubCutaneous every 8 hours  influenza  Vaccine (HIGH DOSE) 0.5 milliLiter(s) IntraMuscular once  insulin glargine Injectable (LANTUS) 20 Unit(s) SubCutaneous every morning  insulin lispro (ADMELOG) corrective regimen sliding scale   SubCutaneous three times a day before meals  insulin lispro (ADMELOG) corrective regimen sliding scale   SubCutaneous at bedtime  insulin lispro Injectable (ADMELOG) 6 Unit(s) SubCutaneous three times a day before meals  melatonin 3 milliGRAM(s) Oral at bedtime PRN  ondansetron Injectable 4 milliGRAM(s) IV Push every 8 hours PRN  piperacillin/tazobactam IVPB.. 3.375 Gram(s) IV Intermittent every 8 hours  sodium chloride 0.9%. 1000 milliLiter(s) IV Continuous <Continuous>  vancomycin  IVPB 1250 milliGRAM(s) IV Intermittent every 24 hours    Antimicrobials:  piperacillin/tazobactam IVPB.. 3.375 Gram(s) IV Intermittent every 8 hours  vancomycin  IVPB 1250 milliGRAM(s) IV Intermittent every 24 hours

## 2024-12-05 NOTE — PROGRESS NOTE ADULT - SUBJECTIVE AND OBJECTIVE BOX
SUBJECTIVE/ OVERNIGHT EVENTS:  --- Coverage for Dr. Pizano ---  No events.  Feel okay  no complaints.   no cp, no sob, no n/v/d.  no abd pain.   pain stable   -------------------------------------------------------------    REVIEW OF SYSTEMS:  CONSTITUTIONAL: No weakness, fevers or chills  EYES/ENT: No visual changes;  No vertigo or throat pain   NECK: No pain or stiffness  RESPIRATORY: No cough, wheezing, hemoptysis; No shortness of breath  CARDIOVASCULAR: No chest pain or palpitations  GASTROINTESTINAL: No abdominal or epigastric pain. No nausea, vomiting, or hematemesis; No diarrhea or constipation. No melena or hematochezia.  GENITOURINARY: No dysuria, frequency or hematuria  NEUROLOGICAL: No numbness or weakness  SKIN: No itching, burning, rashes, or lesions   All other review of systems is negative unless indicated above.    LABS:                        10.0   11.17 )-----------( 448      ( 05 Dec 2024 06:02 )             30.8     12-    137  |  99  |  18  ----------------------------<  152[H]  3.9   |  24  |  1.60[H]    Ca    9.0      05 Dec 2024 06:02  Phos  3.1     12-  Mg     2.50     12-    TPro  8.4[H]  /  Alb  3.6  /  TBili  0.4  /  DBili  x   /  AST  17  /  ALT  10  /  AlkPhos  101  12-03    PT/INR - ( 03 Dec 2024 22:30 )   PT: 13.3 sec;   INR: 1.15 ratio         PTT - ( 03 Dec 2024 22:30 )  PTT:29.6 sec  CAPILLARY BLOOD GLUCOSE      POCT Blood Glucose.: 207 mg/dL (05 Dec 2024 12:01)  POCT Blood Glucose.: 147 mg/dL (05 Dec 2024 08:14)  POCT Blood Glucose.: 156 mg/dL (04 Dec 2024 22:09)  POCT Blood Glucose.: 181 mg/dL (04 Dec 2024 17:28)        Urinalysis Basic - ( 05 Dec 2024 11:49 )    Color: Yellow / Appearance: Clear / S.031 / pH: x  Gluc: x / Ketone: Trace mg/dL  / Bili: Negative / Urobili: 0.2 mg/dL   Blood: x / Protein: Trace mg/dL / Nitrite: Negative   Leuk Esterase: Small / RBC: 3 /HPF / WBC 15 /HPF   Sq Epi: x / Non Sq Epi: 2 /HPF / Bacteria: Negative /HPF        RADIOLOGY & ADDITIONAL TESTS:    Imaging Personally Reviewed:  [x] YES  [ ] NO    Consultant(s) Notes Reviewed:  [x] YES  [ ] NO    MEDICATIONS  (STANDING):  amLODIPine   Tablet 10 milliGRAM(s) Oral daily  atorvastatin 20 milliGRAM(s) Oral at bedtime  dextrose 5%. 1000 milliLiter(s) (50 mL/Hr) IV Continuous <Continuous>  dextrose 5%. 1000 milliLiter(s) (100 mL/Hr) IV Continuous <Continuous>  dextrose 50% Injectable 25 Gram(s) IV Push once  dextrose 50% Injectable 12.5 Gram(s) IV Push once  dextrose 50% Injectable 25 Gram(s) IV Push once  glucagon  Injectable 1 milliGRAM(s) IntraMuscular once  heparin   Injectable 5000 Unit(s) SubCutaneous every 8 hours  influenza  Vaccine (HIGH DOSE) 0.5 milliLiter(s) IntraMuscular once  insulin glargine Injectable (LANTUS) 20 Unit(s) SubCutaneous every morning  insulin lispro (ADMELOG) corrective regimen sliding scale   SubCutaneous three times a day before meals  insulin lispro (ADMELOG) corrective regimen sliding scale   SubCutaneous at bedtime  insulin lispro Injectable (ADMELOG) 6 Unit(s) SubCutaneous three times a day before meals  piperacillin/tazobactam IVPB.. 3.375 Gram(s) IV Intermittent every 8 hours  sodium chloride 0.9%. 1000 milliLiter(s) (75 mL/Hr) IV Continuous <Continuous>  vancomycin  IVPB 1250 milliGRAM(s) IV Intermittent every 24 hours    MEDICATIONS  (PRN):  acetaminophen     Tablet .. 650 milliGRAM(s) Oral every 6 hours PRN Temp greater or equal to 38C (100.4F), Mild Pain (1 - 3)  dextrose Oral Gel 15 Gram(s) Oral once PRN Blood Glucose LESS THAN 70 milliGRAM(s)/deciliter  melatonin 3 milliGRAM(s) Oral at bedtime PRN Insomnia  ondansetron Injectable 4 milliGRAM(s) IV Push every 8 hours PRN Nausea and/or Vomiting      Care Discussed with Consultants/Other Providers [x] YES  [ ] NO    Vital Signs Last 24 Hrs  T(C): 37.4 (05 Dec 2024 10:10), Max: 37.4 (05 Dec 2024 10:10)  T(F): 99.4 (05 Dec 2024 10:10), Max: 99.4 (05 Dec 2024 10:10)  HR: 88 (05 Dec 2024 10:10) (86 - 89)  BP: 130/50 (05 Dec 2024 10:10) (117/60 - 130/50)  BP(mean): --  RR: 18 (05 Dec 2024 10:10) (18 - 18)  SpO2: 99% (05 Dec 2024 10:10) (99% - 100%)    Parameters below as of 05 Dec 2024 05:00  Patient On (Oxygen Delivery Method): room air      I&O's Summary      PHYSICAL EXAM:  GENERAL: NAD, well-developed, comfortable  HEAD:  Atraumatic, Normocephalic  EYES: EOMI, PERRLA, conjunctiva and sclera clear  NECK: Supple, No JVD  CHEST/LUNG: Clear to auscultation bilaterally; No wheeze  HEART: Regular rate and rhythm; No murmurs, rubs, or gallops  ABDOMEN: Soft, Nontender, Nondistended; Bowel sounds present  Neuro: AAOx3, no focal weakness, 5/5 b/l extremity strength  EXTREMITIES:  2+ Peripheral Pulses, No clubbing, cyanosis, or edema, left foot dressing d/c/i  SKIN: No rashes or lesions

## 2024-12-05 NOTE — CONSULT NOTE ADULT - ASSESSMENT
59yo female h/o DM type2, HTN, hyperlipidemia; Pt c/o left great toe infection. vascular on board planning for angiogram. nephrology consulted for elevated scr    renal insuffiencey  unclear baseline pt does have referral to see nephrologist   long standing hx of dm and htn  admitted with scr  now up trending  barbie vs acute on ckd vs ckd  on losartan and htcz. will hold for now given worsen scr (last dose 12/5)  check UA, urine cr, urine urea  check renal us  will give a trial of gentle hydration  plans for angiogram with vascular. ok to proceed once scr better  recommending hydration with ns @ 75cc/hr 6 hrs before and 6 hrs after angio    htn  controlled  holding losartan and hctz sec to worsen scr  start hydralazine 25 tid if sbp>150  monitor    toe wound  f/u vascular  angiogram once scr stable  risk vs benefit explained to patient she expressed understanding 59yo female h/o DM type2, HTN, hyperlipidemia; Pt c/o left great toe infection. vascular on board planning for angiogram. nephrology consulted for elevated scr    renal insuffiencey  unclear baseline pt does have referral to see nephrologist   long standing hx of dm and htn  admitted with scr  now up trending  barbie vs acute on ckd vs ckd  on losartan and htcz. will hold for now given worsen scr (last dose 12/5)  check UA, urine cr, urine urea  check renal us  will give a trial of gentle hydration  plans for angiogram with vascular. ok to proceed once scr better  recommending hydration with ns @ 75cc/hr 6 hrs before and 6 hrs after angio    htn  controlled  holding losartan and hctz sec to worsen scr  start hydralazine 25 tid if sbp>150  monitor    LE  wound  f/u vascular  angiogram once scr stable  risk vs benefit explained to patient she expressed understanding

## 2024-12-05 NOTE — PROGRESS NOTE ADULT - SUBJECTIVE AND OBJECTIVE BOX
Vascular Progress Note    S: Patient seen and examined. No acute events overnight. Reports some pain at left first toe. Denies numbness, tingling.    O:  Physical Exam:  Gen: Laying in bed, NAD  Resp: Unlabored breathing  Vascular: Left first toe wound at distal tip to bone, no drainage, no purulence, no foul odor; Bilateral lower extremities with DP/PT signals, palpable popliteal and femoral pulses      Vital Signs Last 24 Hrs  T(C): 36.6 (05 Dec 2024 05:00), Max: 37.3 (04 Dec 2024 10:30)  T(F): 97.8 (05 Dec 2024 05:00), Max: 99.2 (04 Dec 2024 10:30)  HR: 86 (05 Dec 2024 05:00) (85 - 92)  BP: 123/65 (05 Dec 2024 05:00) (114/44 - 131/59)  BP(mean): --  RR: 18 (05 Dec 2024 05:00) (17 - 18)  SpO2: 100% (05 Dec 2024 05:00) (98% - 100%)    Parameters below as of 05 Dec 2024 05:00  Patient On (Oxygen Delivery Method): room air        I&O's Detail                            11.6   13.41 )-----------( 465      ( 04 Dec 2024 06:20 )             35.8       12-04    134[L]  |  98  |  18  ----------------------------<  204[H]  3.8   |  24  |  1.50[H]    Ca    9.3      04 Dec 2024 06:20  Phos  3.9     12-04  Mg     2.60     12-04    TPro  8.4[H]  /  Alb  3.6  /  TBili  0.4  /  DBili  x   /  AST  17  /  ALT  10  /  AlkPhos  101  12-03

## 2024-12-05 NOTE — CONSULT NOTE ADULT - SUBJECTIVE AND OBJECTIVE BOX
[facility]   MD LI WADE MD ANGELA WONG, PA        TEL:  OFFICE: [phone]  From 5pm-7am answering service [phone]    --- INITIAL RENAL CONSULT NOTE ---date of service 12-05-24 @ 09:50    HPI:  59yo female h/o DM type2, HTN, hyperlipidemia; Pt c/o left great toe infection.  Patient reports she was clipping her nail toenails in October and accidentally cut her skin.  Patient reports this week she noticed bleeding through her sock which prompted her to check and noticed an open wound on her toe.  Patient reports she followed up with her podiatrist yesterday who instructed she come to the ED for IV antibiotics and further workup.  Patient reports she was having subjective fever at home.  Otherwise reports no chest pain, palpitations, shortness of breath, nausea, vomiting.  pt does not know baseline renal function however had been referred by pcp to see nephrologist     Allergies:  No Known Allergies      PAST MEDICAL & SURGICAL HISTORY:  Diabetes mellitus type 2, noninsulin dependent      HTN (hypertension)      Hyperlipemia      S/P hysterectomy          Home Medications Reviewed    Hospital Medications:   MEDICATIONS  (STANDING):  amLODIPine   Tablet 10 milliGRAM(s) Oral daily  atorvastatin 20 milliGRAM(s) Oral at bedtime  dextrose 5%. 1000 milliLiter(s) (50 mL/Hr) IV Continuous <Continuous>  dextrose 5%. 1000 milliLiter(s) (100 mL/Hr) IV Continuous <Continuous>  dextrose 50% Injectable 25 Gram(s) IV Push once  dextrose 50% Injectable 12.5 Gram(s) IV Push once  dextrose 50% Injectable 25 Gram(s) IV Push once  glucagon  Injectable 1 milliGRAM(s) IntraMuscular once  heparin   Injectable 5000 Unit(s) SubCutaneous every 8 hours  hydrochlorothiazide 25 milliGRAM(s) Oral daily  influenza  Vaccine (HIGH DOSE) 0.5 milliLiter(s) IntraMuscular once  insulin glargine Injectable (LANTUS) 20 Unit(s) SubCutaneous every morning  insulin lispro (ADMELOG) corrective regimen sliding scale   SubCutaneous three times a day before meals  insulin lispro (ADMELOG) corrective regimen sliding scale   SubCutaneous at bedtime  insulin lispro Injectable (ADMELOG) 6 Unit(s) SubCutaneous three times a day before meals  losartan 100 milliGRAM(s) Oral daily  piperacillin/tazobactam IVPB.. 3.375 Gram(s) IV Intermittent every 8 hours  sodium chloride 0.9%. 1000 milliLiter(s) (75 mL/Hr) IV Continuous <Continuous>  vancomycin  IVPB 1250 milliGRAM(s) IV Intermittent every 24 hours      SOCIAL HISTORY:  Denies ETOh, Smoking,     FAMILY HISTORY:  FH: diabetes mellitus (Mother)        REVIEW OF SYSTEMS:  CONSTITUTIONAL: No weakness, fevers or chills  EYES/ENT: No visual changes;  No vertigo or throat pain   NECK: No pain or stiffness  RESPIRATORY: No cough, wheezing, hemoptysis; No shortness of breath  CARDIOVASCULAR: No chest pain or palpitations.  GASTROINTESTINAL: No abdominal or epigastric pain. No nausea, vomiting, or hematemesis; No diarrhea or constipation. No melena or hematochezia.  GENITOURINARY: No dysuria, frequency, foamy urine, urinary urgency, incontinence or hematuria  NEUROLOGICAL: No numbness or weakness  SKIN: see hpi  VASCULAR: see hpi  All other review of systems is negative unless indicated above.    VITALS:  T(F): 97.8 (12-05-24 @ 05:00), Max: 99.2 (12-04-24 @ 10:30)  HR: 86 (12-05-24 @ 05:00)  BP: 123/65 (12-05-24 @ 05:00)  RR: 18 (12-05-24 @ 05:00)  SpO2: 100% (12-05-24 @ 05:00)  Wt(kg): --        PHYSICAL EXAM:  General: NAD  HEENT: anicteric sclera, oropharynx clear, MMM  Neck: No JVD  Respiratory: CTAB, no wheezes, rales or rhonchi  Cardiovascular: S1, S2, RRR  Gastrointestinal: BS+, soft, NT/ND  Extremities: left toe dressing d/c/i. No peripheral edema  Neurological: A/O x 3, no focal deficits  Psychiatric: Normal mood, normal affect  : No CVA tenderness. No miranda.   Skin: No rashes  Vascular Access: none    LABS:  12-05    137  |  99  |  18  ----------------------------<  152[H]  3.9   |  24  |  1.60[H]    Ca    9.0      05 Dec 2024 06:02  Phos  3.1     12-05  Mg     2.50     12-05    TPro  8.4[H]  /  Alb  3.6  /  TBili  0.4  /  DBili      /  AST  17  /  ALT  10  /  AlkPhos  101  12-03    Creatinine Trend: 1.60 <--, 1.50 <--, 1.41 <--                        10.0   11.17 )-----------( 448      ( 05 Dec 2024 06:02 )             30.8     Urine Studies:  Urinalysis Basic - ( 05 Dec 2024 06:02 )    Color:  / Appearance:  / SG:  / pH:   Gluc: 152 mg/dL / Ketone:   / Bili:  / Urobili:    Blood:  / Protein:  / Nitrite:    Leuk Esterase:  / RBC:  / WBC    Sq Epi:  / Non Sq Epi:  / Bacteria:           RADIOLOGY & ADDITIONAL STUDIES:

## 2024-12-05 NOTE — PROGRESS NOTE ADULT - ASSESSMENT
70 y/o F PMhx DM II, HTN who presented w/ left great toe wound. Pt reports she was clipping her toenails in October and accidentally cut her skin. She noticed some bleeding in her skin, which is what prompted her to present to the ED based on her podiatrist's recommendation. She underwent a bedside debridement with podiatry on 12/4/24. Vascular surgery consulted given LLE toe wound and JCAK/PVR findings.     Recommendations   - Please document medicine and cardiac risk stratification/optimization for possible angio planning  - JACK/PVRs reviewed  - Appreciate podiatry recommendations  - Remainder of care per primary    Surgery C Team  [id number]

## 2024-12-05 NOTE — CONSULT NOTE ADULT - TIME BILLING
Agree with above ACP note.  57yo female h/o DM type2, HTN, hyperlipidemia; Pt c/o left great toe infection.    #diabetic foot infection.   -s/p debridement with podiatry on 12/4/24  -vascular planning  possible LE angio   -ecg with no ischemic changes, no chf or significant valvular disease on exam   -f/u echo  -pending echo, remains CV stable to proceed for LE angio wioth acceptable cv risk   #HTN   -c/w meds  dvt ppx

## 2024-12-05 NOTE — PROGRESS NOTE ADULT - ASSESSMENT
69F presents for left foot hallux wound to bone  -Pt was seen and evaluated  -Afebrile, WBC 13.47, , CRP 8.88  -12/4 s/p b/s  incision and drainage on left foot hallux wound was performed to the level of and not beyond bone,  mild malodor, scant serosanguinous drainage. Right foot no open wounds, no acute signs of infection.  -Left foot X-ray read: Fracture distal phalanx great toe. Soft tissue swelling of this digit as well as subcutaneous air.  -Left foot wound culture pending  -JACK/PVR: RAB NC, LABI 0.76  -Continue vanco/zosyn  -ID recs, appreciated  -Vasc recs, appreciated  -Pod Plan: Local wound care vs Left foot partial 1st ray resection pending vascular recs  -Please document medial clearance for podiatric surgery under anesthesia  -Discussed with attending   69F presents for left foot hallux wound to bone  -Pt was seen and evaluated  -Afebrile, WBC 13.47, , CRP 8.88  -12/4 s/p b/s  incision and drainage on left foot hallux wound was performed to the level of and not beyond bone,  mild malodor, scant serosanguinous drainage. Right foot no open wounds, no acute signs of infection.  -Left foot X-ray read: Fracture distal phalanx great toe. Soft tissue swelling of this digit as well as subcutaneous air.  -Left foot wound culture pending  -JACK/PVR: RABI NC, LABI 0.76  -ID recs, appreciated  -Vasc recs, appreciated  -Pod Plan: Local wound care vs Left foot partial 1st ray resection pending vascular recs  -Please document medial clearance for podiatric surgery under anesthesia  -Discussed with attending

## 2024-12-05 NOTE — PROGRESS NOTE ADULT - SUBJECTIVE AND OBJECTIVE BOX
Patient is a 69y old  Female who presents with a chief complaint of Sent in by podiatrist for right foot/toe wound, worsening since October. (05 Dec 2024 13:06)       INTERVAL HPI/OVERNIGHT EVENTS:  Patient seen and evaluated at bedside.  Pt is resting comfortable in NAD. Denies N/V/F/C.      Allergies    No Known Allergies    Intolerances        Vital Signs Last 24 Hrs  T(C): 37.4 (05 Dec 2024 10:10), Max: 37.4 (05 Dec 2024 10:10)  T(F): 99.4 (05 Dec 2024 10:10), Max: 99.4 (05 Dec 2024 10:10)  HR: 88 (05 Dec 2024 10:10) (86 - 89)  BP: 130/50 (05 Dec 2024 10:10) (117/60 - 130/50)  BP(mean): --  RR: 18 (05 Dec 2024 10:10) (18 - 18)  SpO2: 99% (05 Dec 2024 10:10) (99% - 100%)    Parameters below as of 05 Dec 2024 05:00  Patient On (Oxygen Delivery Method): room air        LABS:                        10.0   11.17 )-----------( 448      ( 05 Dec 2024 06:02 )             30.8     12-05    137  |  99  |  18  ----------------------------<  152[H]  3.9   |  24  |  1.60[H]    Ca    9.0      05 Dec 2024 06:02  Phos  3.1     12-05  Mg     2.50     12-05    TPro  8.4[H]  /  Alb  3.6  /  TBili  0.4  /  DBili  x   /  AST  17  /  ALT  10  /  AlkPhos  101  12-03    PT/INR - ( 03 Dec 2024 22:30 )   PT: 13.3 sec;   INR: 1.15 ratio         PTT - ( 03 Dec 2024 22:30 )  PTT:29.6 sec  Urinalysis Basic - ( 05 Dec 2024 11:49 )    Color: Yellow / Appearance: Clear / S.031 / pH: x  Gluc: x / Ketone: Trace mg/dL  / Bili: Negative / Urobili: 0.2 mg/dL   Blood: x / Protein: Trace mg/dL / Nitrite: Negative   Leuk Esterase: Small / RBC: 3 /HPF / WBC 15 /HPF   Sq Epi: x / Non Sq Epi: 2 /HPF / Bacteria: Negative /HPF      CAPILLARY BLOOD GLUCOSE      POCT Blood Glucose.: 207 mg/dL (05 Dec 2024 12:01)  POCT Blood Glucose.: 147 mg/dL (05 Dec 2024 08:14)  POCT Blood Glucose.: 156 mg/dL (04 Dec 2024 22:09)  POCT Blood Glucose.: 181 mg/dL (04 Dec 2024 17:28)      Lower Extremity Physical Exam:  Vascular: DP/PT 0/4, B/L, CFT <3 seconds B/L, Temperature gradient warm to cool, B/L.   Neuro: Epicritic sensation absent to the level of digits, B/L.  Musculoskeletal/Ortho: unremarkable  Skin: 12/ s/p b/s  incision and drainage on left foot hallux wound was performed to the level of and not beyond bone,  mild malodor, scant serosanguinous drainage. Right foot no open wounds, no acute signs of infection.      RADIOLOGY & ADDITIONAL TESTS:

## 2024-12-06 ENCOUNTER — RESULT REVIEW (OUTPATIENT)
Age: 69
End: 2024-12-06

## 2024-12-06 LAB
-  AMOXICILLIN/CLAVULANIC ACID: SIGNIFICANT CHANGE UP
-  AMOXICILLIN/CLAVULANIC ACID: SIGNIFICANT CHANGE UP
-  AMPICILLIN/SULBACTAM: SIGNIFICANT CHANGE UP
-  AMPICILLIN/SULBACTAM: SIGNIFICANT CHANGE UP
-  AMPICILLIN: SIGNIFICANT CHANGE UP
-  AMPICILLIN: SIGNIFICANT CHANGE UP
-  AZTREONAM: SIGNIFICANT CHANGE UP
-  AZTREONAM: SIGNIFICANT CHANGE UP
-  CEFAZOLIN: SIGNIFICANT CHANGE UP
-  CEFAZOLIN: SIGNIFICANT CHANGE UP
-  CEFEPIME: SIGNIFICANT CHANGE UP
-  CEFEPIME: SIGNIFICANT CHANGE UP
-  CEFOXITIN: SIGNIFICANT CHANGE UP
-  CEFOXITIN: SIGNIFICANT CHANGE UP
-  CEFTRIAXONE: SIGNIFICANT CHANGE UP
-  CEFTRIAXONE: SIGNIFICANT CHANGE UP
-  CIPROFLOXACIN: SIGNIFICANT CHANGE UP
-  CIPROFLOXACIN: SIGNIFICANT CHANGE UP
-  ERTAPENEM: SIGNIFICANT CHANGE UP
-  ERTAPENEM: SIGNIFICANT CHANGE UP
-  GENTAMICIN: SIGNIFICANT CHANGE UP
-  GENTAMICIN: SIGNIFICANT CHANGE UP
-  IMIPENEM: SIGNIFICANT CHANGE UP
-  LEVOFLOXACIN: SIGNIFICANT CHANGE UP
-  LEVOFLOXACIN: SIGNIFICANT CHANGE UP
-  MEROPENEM: SIGNIFICANT CHANGE UP
-  MEROPENEM: SIGNIFICANT CHANGE UP
-  PIPERACILLIN/TAZOBACTAM: SIGNIFICANT CHANGE UP
-  PIPERACILLIN/TAZOBACTAM: SIGNIFICANT CHANGE UP
-  TOBRAMYCIN: SIGNIFICANT CHANGE UP
-  TOBRAMYCIN: SIGNIFICANT CHANGE UP
-  TRIMETHOPRIM/SULFAMETHOXAZOLE: SIGNIFICANT CHANGE UP
-  TRIMETHOPRIM/SULFAMETHOXAZOLE: SIGNIFICANT CHANGE UP
ANION GAP SERPL CALC-SCNC: 13 MMOL/L — SIGNIFICANT CHANGE UP (ref 7–14)
BUN SERPL-MCNC: 18 MG/DL — SIGNIFICANT CHANGE UP (ref 7–23)
CALCIUM SERPL-MCNC: 8.7 MG/DL — SIGNIFICANT CHANGE UP (ref 8.4–10.5)
CHLORIDE SERPL-SCNC: 99 MMOL/L — SIGNIFICANT CHANGE UP (ref 98–107)
CO2 SERPL-SCNC: 22 MMOL/L — SIGNIFICANT CHANGE UP (ref 22–31)
CREAT SERPL-MCNC: 1.52 MG/DL — HIGH (ref 0.5–1.3)
EGFR: 36 ML/MIN/1.73M2 — LOW
GLUCOSE BLDC GLUCOMTR-MCNC: 147 MG/DL — HIGH (ref 70–99)
GLUCOSE BLDC GLUCOMTR-MCNC: 163 MG/DL — HIGH (ref 70–99)
GLUCOSE SERPL-MCNC: 146 MG/DL — HIGH (ref 70–99)
MAGNESIUM SERPL-MCNC: 2.4 MG/DL — SIGNIFICANT CHANGE UP (ref 1.6–2.6)
METHOD TYPE: SIGNIFICANT CHANGE UP
METHOD TYPE: SIGNIFICANT CHANGE UP
PHOSPHATE SERPL-MCNC: 3.4 MG/DL — SIGNIFICANT CHANGE UP (ref 2.5–4.5)
POTASSIUM SERPL-MCNC: 3.9 MMOL/L — SIGNIFICANT CHANGE UP (ref 3.5–5.3)
POTASSIUM SERPL-SCNC: 3.9 MMOL/L — SIGNIFICANT CHANGE UP (ref 3.5–5.3)
SODIUM SERPL-SCNC: 134 MMOL/L — LOW (ref 135–145)

## 2024-12-06 PROCEDURE — 93306 TTE W/DOPPLER COMPLETE: CPT | Mod: 26

## 2024-12-06 RX ORDER — GABAPENTIN 300 MG/1
100 CAPSULE ORAL THREE TIMES A DAY
Refills: 0 | Status: DISCONTINUED | OUTPATIENT
Start: 2024-12-06 | End: 2024-12-23

## 2024-12-06 RX ORDER — OXYCODONE HCL 15 MG
5 TABLET ORAL ONCE
Refills: 0 | Status: DISCONTINUED | OUTPATIENT
Start: 2024-12-06 | End: 2024-12-06

## 2024-12-06 RX ORDER — POLYETHYLENE GLYCOL 3350 17 G/DOSE
17 POWDER (GRAM) ORAL DAILY
Refills: 0 | Status: DISCONTINUED | OUTPATIENT
Start: 2024-12-06 | End: 2024-12-23

## 2024-12-06 RX ADMIN — Medication 17 GRAM(S): at 12:38

## 2024-12-06 RX ADMIN — Medication 6 UNIT(S): at 17:45

## 2024-12-06 RX ADMIN — Medication 5 MILLIGRAM(S): at 23:28

## 2024-12-06 RX ADMIN — PIPERACILLIN AND TAZOBACTAM 25 GRAM(S): 3; .375 INJECTION, POWDER, LYOPHILIZED, FOR SOLUTION INTRAVENOUS at 17:46

## 2024-12-06 RX ADMIN — GABAPENTIN 100 MILLIGRAM(S): 300 CAPSULE ORAL at 21:05

## 2024-12-06 RX ADMIN — INSULIN GLARGINE-YFGN 20 UNIT(S): 100 INJECTION, SOLUTION SUBCUTANEOUS at 08:51

## 2024-12-06 RX ADMIN — Medication 6 UNIT(S): at 12:37

## 2024-12-06 RX ADMIN — Medication 10 MILLIGRAM(S): at 05:36

## 2024-12-06 RX ADMIN — Medication 1: at 08:51

## 2024-12-06 RX ADMIN — Medication 5 MILLIGRAM(S): at 15:38

## 2024-12-06 RX ADMIN — HEPARIN SODIUM 5000 UNIT(S): 1000 INJECTION, SOLUTION INTRAVENOUS; SUBCUTANEOUS at 05:12

## 2024-12-06 RX ADMIN — HEPARIN SODIUM 5000 UNIT(S): 1000 INJECTION, SOLUTION INTRAVENOUS; SUBCUTANEOUS at 21:05

## 2024-12-06 RX ADMIN — Medication 5 MILLIGRAM(S): at 22:57

## 2024-12-06 RX ADMIN — ATORVASTATIN CALCIUM 20 MILLIGRAM(S): 40 TABLET, FILM COATED ORAL at 21:05

## 2024-12-06 RX ADMIN — ACETAMINOPHEN 650 MILLIGRAM(S): 80 SOLUTION/ DROPS ORAL at 14:19

## 2024-12-06 RX ADMIN — ACETAMINOPHEN 650 MILLIGRAM(S): 80 SOLUTION/ DROPS ORAL at 13:19

## 2024-12-06 RX ADMIN — PIPERACILLIN AND TAZOBACTAM 25 GRAM(S): 3; .375 INJECTION, POWDER, LYOPHILIZED, FOR SOLUTION INTRAVENOUS at 10:55

## 2024-12-06 RX ADMIN — PIPERACILLIN AND TAZOBACTAM 25 GRAM(S): 3; .375 INJECTION, POWDER, LYOPHILIZED, FOR SOLUTION INTRAVENOUS at 01:04

## 2024-12-06 RX ADMIN — HEPARIN SODIUM 5000 UNIT(S): 1000 INJECTION, SOLUTION INTRAVENOUS; SUBCUTANEOUS at 13:20

## 2024-12-06 RX ADMIN — Medication 5 MILLIGRAM(S): at 16:37

## 2024-12-06 RX ADMIN — Medication 6 UNIT(S): at 08:50

## 2024-12-06 NOTE — PROGRESS NOTE ADULT - SUBJECTIVE AND OBJECTIVE BOX
SUBJECTIVE/ OVERNIGHT EVENTS:  --- Coverage for Dr. Pizano ---  TTE today  no cp, no sob, no n/v/d. no abdominal pain.  no headache, no dizziness.   pain controlled   -------------------------------------------------------------    REVIEW OF SYSTEMS:  CONSTITUTIONAL: No weakness, fevers or chills  EYES/ENT: No visual changes;  No vertigo or throat pain   NECK: No pain or stiffness  RESPIRATORY: No cough, wheezing, hemoptysis; No shortness of breath  CARDIOVASCULAR: No chest pain or palpitations  GASTROINTESTINAL: No abdominal or epigastric pain. No nausea, vomiting, or hematemesis; No diarrhea or constipation. No melena or hematochezia.  GENITOURINARY: No dysuria, frequency or hematuria  NEUROLOGICAL: No numbness or weakness  SKIN: No itching, burning, rashes, or lesions   All other review of systems is negative unless indicated above.    LABS:                        10.0   11.17 )-----------( 448      ( 05 Dec 2024 06:02 )             30.8     12-06    134[L]  |  99  |  18  ----------------------------<  146[H]  3.9   |  22  |  1.52[H]    Ca    8.7      06 Dec 2024 07:05  Phos  3.4     12-06  Mg     2.40     12-06        CAPILLARY BLOOD GLUCOSE      POCT Blood Glucose.: 163 mg/dL (06 Dec 2024 08:20)  POCT Blood Glucose.: 154 mg/dL (05 Dec 2024 22:05)  POCT Blood Glucose.: 163 mg/dL (05 Dec 2024 17:14)  POCT Blood Glucose.: 207 mg/dL (05 Dec 2024 12:01)        Urinalysis Basic - ( 06 Dec 2024 07:05 )    Color: x / Appearance: x / SG: x / pH: x  Gluc: 146 mg/dL / Ketone: x  / Bili: x / Urobili: x   Blood: x / Protein: x / Nitrite: x   Leuk Esterase: x / RBC: x / WBC x   Sq Epi: x / Non Sq Epi: x / Bacteria: x        RADIOLOGY & ADDITIONAL TESTS:    Imaging Personally Reviewed:  [x] YES  [ ] NO    Consultant(s) Notes Reviewed:  [x] YES  [ ] NO    MEDICATIONS  (STANDING):  amLODIPine   Tablet 10 milliGRAM(s) Oral daily  atorvastatin 20 milliGRAM(s) Oral at bedtime  dextrose 5%. 1000 milliLiter(s) (50 mL/Hr) IV Continuous <Continuous>  dextrose 5%. 1000 milliLiter(s) (100 mL/Hr) IV Continuous <Continuous>  dextrose 50% Injectable 25 Gram(s) IV Push once  dextrose 50% Injectable 12.5 Gram(s) IV Push once  dextrose 50% Injectable 25 Gram(s) IV Push once  glucagon  Injectable 1 milliGRAM(s) IntraMuscular once  heparin   Injectable 5000 Unit(s) SubCutaneous every 8 hours  influenza  Vaccine (HIGH DOSE) 0.5 milliLiter(s) IntraMuscular once  insulin glargine Injectable (LANTUS) 20 Unit(s) SubCutaneous every morning  insulin lispro (ADMELOG) corrective regimen sliding scale   SubCutaneous three times a day before meals  insulin lispro (ADMELOG) corrective regimen sliding scale   SubCutaneous at bedtime  insulin lispro Injectable (ADMELOG) 6 Unit(s) SubCutaneous three times a day before meals  piperacillin/tazobactam IVPB.. 3.375 Gram(s) IV Intermittent every 8 hours  polyethylene glycol 3350 17 Gram(s) Oral daily  sodium chloride 0.9%. 1000 milliLiter(s) (75 mL/Hr) IV Continuous <Continuous>    MEDICATIONS  (PRN):  acetaminophen     Tablet .. 650 milliGRAM(s) Oral every 6 hours PRN Temp greater or equal to 38C (100.4F), Mild Pain (1 - 3)  dextrose Oral Gel 15 Gram(s) Oral once PRN Blood Glucose LESS THAN 70 milliGRAM(s)/deciliter  melatonin 3 milliGRAM(s) Oral at bedtime PRN Insomnia  ondansetron Injectable 4 milliGRAM(s) IV Push every 8 hours PRN Nausea and/or Vomiting      Care Discussed with Consultants/Other Providers [x] YES  [ ] NO    Vital Signs Last 24 Hrs  T(C): 36.3 (06 Dec 2024 05:00), Max: 37.6 (05 Dec 2024 18:22)  T(F): 97.4 (06 Dec 2024 05:00), Max: 99.7 (05 Dec 2024 18:22)  HR: 67 (06 Dec 2024 05:00) (67 - 89)  BP: 112/51 (06 Dec 2024 05:00) (112/51 - 120/52)  BP(mean): --  RR: 16 (06 Dec 2024 05:00) (16 - 18)  SpO2: 97% (06 Dec 2024 05:00) (97% - 99%)    Parameters below as of 06 Dec 2024 05:00  Patient On (Oxygen Delivery Method): room air      I&O's Summary      PHYSICAL EXAM:  GENERAL: NAD, well-developed, comfortable  HEAD:  Atraumatic, Normocephalic  EYES: EOMI, PERRLA, conjunctiva and sclera clear  NECK: Supple, No JVD  CHEST/LUNG: Clear to auscultation bilaterally; No wheeze  HEART: Regular rate and rhythm; No murmurs, rubs, or gallops  ABDOMEN: Soft, Nontender, Nondistended; Bowel sounds present  Neuro: AAOx3, no focal weakness, 5/5 b/l extremity strength  EXTREMITIES:  2+ Peripheral Pulses, No clubbing, cyanosis, or edema, left foot dressing d/c/i  SKIN: No rashes or lesions

## 2024-12-06 NOTE — PROGRESS NOTE ADULT - ASSESSMENT
59yo female h/o DM type2, HTN, hyperlipidemia; Pt c/o left great toe infection. vascular on board planning for angiogram. nephrology consulted for elevated scr    renal insuffiencey  unclear baseline pt does have referral to see nephrologist   long standing hx of dm and htn  admitted with scr  now up trending  barbie vs acute on ckd vs ckd  on losartan and htcz. will hold for now given worsen scr (last dose 12/5)  FEurea 35%  renal us no hydro   s/p  trial of gentle hydration 12/5  renal function relatively stable  plans for angiogram with vascular likely monday  recommending hydration with ns @ 75cc/hr 6 hrs before and 6 hrs after angio    htn  controlled  holding losartan and hctz sec to worsen scr  monitor    LE  wound  f/u vascular  angiogram once scr stable  risk vs benefit explained to patient she expressed understanding

## 2024-12-06 NOTE — PROGRESS NOTE ADULT - SUBJECTIVE AND OBJECTIVE BOX
Patient is a 69y old  Female who presents with a chief complaint of Sent in by podiatrist for right foot/toe wound, worsening since October. (06 Dec 2024 09:25)       INTERVAL HPI/OVERNIGHT EVENTS:  Patient seen and evaluated at bedside.  Pt is resting comfortable in NAD. Denies N/V/F/C.  Pain rated at X/10    Allergies    No Known Allergies    Intolerances        Vital Signs Last 24 Hrs  T(C): 36.3 (06 Dec 2024 05:00), Max: 37.6 (05 Dec 2024 18:22)  T(F): 97.4 (06 Dec 2024 05:00), Max: 99.7 (05 Dec 2024 18:22)  HR: 67 (06 Dec 2024 05:00) (67 - 89)  BP: 112/51 (06 Dec 2024 05:00) (112/51 - 120/52)  BP(mean): --  RR: 16 (06 Dec 2024 05:00) (16 - 18)  SpO2: 97% (06 Dec 2024 05:00) (97% - 99%)    Parameters below as of 06 Dec 2024 05:00  Patient On (Oxygen Delivery Method): room air        LABS:                        10.0   11.17 )-----------( 448      ( 05 Dec 2024 06:02 )             30.8     12-06    134[L]  |  99  |  18  ----------------------------<  146[H]  3.9   |  22  |  1.52[H]    Ca    8.7      06 Dec 2024 07:05  Phos  3.4     12-06  Mg     2.40     12-06        Urinalysis Basic - ( 06 Dec 2024 07:05 )    Color: x / Appearance: x / SG: x / pH: x  Gluc: 146 mg/dL / Ketone: x  / Bili: x / Urobili: x   Blood: x / Protein: x / Nitrite: x   Leuk Esterase: x / RBC: x / WBC x   Sq Epi: x / Non Sq Epi: x / Bacteria: x      CAPILLARY BLOOD GLUCOSE      POCT Blood Glucose.: 163 mg/dL (06 Dec 2024 08:20)  POCT Blood Glucose.: 154 mg/dL (05 Dec 2024 22:05)  POCT Blood Glucose.: 163 mg/dL (05 Dec 2024 17:14)  POCT Blood Glucose.: 207 mg/dL (05 Dec 2024 12:01)      Lower Extremity Physical Exam:  Vascular: DP/PT 0/4, B/L, CFT <3 seconds B/L, Temperature gradient warm to cool, B/L.   Neuro: Epicritic sensation absent to the level of digits, B/L.  Musculoskeletal/Ortho: unremarkable  Skin: 12/4 s/p b/s  incision and drainage on left foot hallux wound was performed to the level of and not beyond bone,  mild malodor, scant serosanguinous drainage. Right foot no open wounds, no acute signs of infection.    RADIOLOGY & ADDITIONAL TESTS:

## 2024-12-06 NOTE — PROGRESS NOTE ADULT - ASSESSMENT
69F presents for left foot hallux wound to bone  -Pt was seen and evaluated  -Afebrile, WBC 11.1788  -12/4 s/p b/s  incision and drainage on left foot hallux wound was performed to the level of and not beyond bone,  no malodor, scant serosanguinous drainage. Right foot no open wounds, no acute signs of infection.  -Left foot X-ray read: Fracture distal phalanx great toe. Soft tissue swelling of this digit as well as subcutaneous air.  -Left foot wound culture: Gram negative rods, Strep constellatus  -JACK/PVR: RABI NC, LABI 0.76  -ID recs, appreciated  -Vasc recs, appreciated - planning LLE angio Monday  -Pod Plan: Local wound care vs Left foot partial 1st ray resection pending vascular recs  -Please document medial clearance for podiatric surgery under anesthesia  -Discussed with attending     69F presents for left foot hallux wound to bone  -Pt was seen and evaluated  -Afebrile, WBC 11.1788  -12/4 s/p b/s  incision and drainage on left foot hallux wound was performed to the level of and not beyond bone,  no malodor, scant serosanguinous drainage. Right foot no open wounds, no acute signs of infection.  -Left foot X-ray read: Fracture distal phalanx great toe. Soft tissue swelling of this digit as well as subcutaneous air.  -Left foot wound culture: Gram negative rods, Strep constellatus  -JACK/PVR: RABI NC, LABI 0.76  -ID recs, appreciated  -Vasc recs, appreciated - planning LLE angio Monday  -Pod Plan: Local wound care vs Left foot partial 1st ray resection pending vascular recs  -Please document medial and cardiac clearance for podiatric surgery under anesthesia  -Discussed with attending

## 2024-12-06 NOTE — PROGRESS NOTE ADULT - SUBJECTIVE AND OBJECTIVE BOX
CARDIOLOGY FOLLOW UP - Dr. Oliva  DATE OF SERVICE: 12/6/24    CC no CP or SOB      REVIEW OF SYSTEMS:  CONSTITUTIONAL: No fever, weight loss, or fatigue  RESPIRATORY: No cough, wheezing, chills or hemoptysis; No Shortness of Breath  CARDIOVASCULAR: No chest pain, palpitations, passing out, dizziness  GASTROINTESTINAL: No abdominal or epigastric pain. No nausea, vomiting, or hematemesis; No diarrhea or constipation. No melena or hematochezia.      PHYSICAL EXAM:  T(C): 36.3 (12-06-24 @ 05:00), Max: 37.6 (12-05-24 @ 18:22)  HR: 67 (12-06-24 @ 05:00) (67 - 89)  BP: 112/51 (12-06-24 @ 05:00) (112/51 - 130/50)  RR: 16 (12-06-24 @ 05:00) (16 - 18)  SpO2: 97% (12-06-24 @ 05:00) (97% - 99%)  Wt(kg): --  I&O's Summary      Appearance: Normal	  Cardiovascular: Normal S1 S2,RRR, No JVD, No murmurs  Respiratory: Lungs clear to auscultation b/l  Gastrointestinal:  Soft, Non-tender, + BS	  Extremities: Normal range of motion, No clubbing, cyanosis or edema      Home Medications:  aspirin 81 mg oral delayed release tablet: 1 tab(s) orally once a day (04 Dec 2024 04:28)  atorvastatin 20 mg oral tablet: 1 tab(s) orally once a day (at bedtime) (04 Dec 2024 04:28)  Farxiga 5 mg oral tablet: 1 tab(s) orally once a day (04 Dec 2024 04:32)  hydroCHLOROthiazide 25 mg oral tablet: 1 tab(s) orally once a day (04 Dec 2024 04:28)  Lantus Solostar Pen 100 units/mL subcutaneous solution: 30 unit(s) subcutaneous once a day (at bedtime) (04 Dec 2024 04:28)  losartan 100 mg oral tablet: 1 tab(s) orally once a day (04 Dec 2024 04:28)  Norvasc 10 mg oral tablet: 1 tab(s) orally once a day (04 Dec 2024 04:28)  NovoLOG 100 units/mL subcutaneous solution: 10 unit(s) subcutaneous 3 times a day (before meals) (04 Dec 2024 04:28)      MEDICATIONS  (STANDING):  amLODIPine   Tablet 10 milliGRAM(s) Oral daily  atorvastatin 20 milliGRAM(s) Oral at bedtime  dextrose 5%. 1000 milliLiter(s) (50 mL/Hr) IV Continuous <Continuous>  dextrose 5%. 1000 milliLiter(s) (100 mL/Hr) IV Continuous <Continuous>  dextrose 50% Injectable 25 Gram(s) IV Push once  dextrose 50% Injectable 12.5 Gram(s) IV Push once  dextrose 50% Injectable 25 Gram(s) IV Push once  glucagon  Injectable 1 milliGRAM(s) IntraMuscular once  heparin   Injectable 5000 Unit(s) SubCutaneous every 8 hours  influenza  Vaccine (HIGH DOSE) 0.5 milliLiter(s) IntraMuscular once  insulin glargine Injectable (LANTUS) 20 Unit(s) SubCutaneous every morning  insulin lispro (ADMELOG) corrective regimen sliding scale   SubCutaneous three times a day before meals  insulin lispro (ADMELOG) corrective regimen sliding scale   SubCutaneous at bedtime  insulin lispro Injectable (ADMELOG) 6 Unit(s) SubCutaneous three times a day before meals  piperacillin/tazobactam IVPB.. 3.375 Gram(s) IV Intermittent every 8 hours  sodium chloride 0.9%. 1000 milliLiter(s) (75 mL/Hr) IV Continuous <Continuous>      TELEMETRY: 	    ECG:  	  RADIOLOGY:   DIAGNOSTIC TESTING:  [ ] Echocardiogram:  [ ]  Catheterization:  [ ] Stress Test:    OTHER: 	  < from: US Kidney and Bladder (12.05.24 @ 14:44) >  IMPRESSION:  No hydronephrosis.      < end of copied text >    LABS:	 	                            10.0   11.17 )-----------( 448      ( 05 Dec 2024 06:02 )             30.8     12-05    137  |  99  |  18  ----------------------------<  152[H]  3.9   |  24  |  1.60[H]    Ca    9.0      05 Dec 2024 06:02  Phos  3.1     12-05  Mg     2.50     12-05

## 2024-12-06 NOTE — PROGRESS NOTE ADULT - SUBJECTIVE AND OBJECTIVE BOX
[facility] [place] [facility]   MD LI WADE MD ANGELA WONG, PA    TEL:  OFFICE: [phone]  From 5pm-7am Answering Service [phone]    -- RENAL FOLLOW UP NOTE ---Date of Service 12-06-24 @ 13:40    Patient is a 69y old  Female who presents with a chief complaint of Sent in by podiatrist for right foot/toe wound, worsening since October. (06 Dec 2024 11:36)      Patient seen and examined at bedside. No chest pain/sob    VITALS:  T(F): 97.2 (12-06-24 @ 10:15), Max: 99.7 (12-05-24 @ 18:22)  HR: 82 (12-06-24 @ 10:15)  BP: 115/67 (12-06-24 @ 10:15)  RR: 18 (12-06-24 @ 10:15)  SpO2: 98% (12-06-24 @ 10:15)  Wt(kg): --        PHYSICAL EXAM:  General: NAD  Neck: No JVD  Respiratory: CTAB, no wheezes, rales or rhonchi  Cardiovascular: S1, S2, RRR  Gastrointestinal: BS+, soft, NT/ND  Extremities: No peripheral edema    Hospital Medications:   MEDICATIONS  (STANDING):  amLODIPine   Tablet 10 milliGRAM(s) Oral daily  atorvastatin 20 milliGRAM(s) Oral at bedtime  dextrose 5%. 1000 milliLiter(s) (50 mL/Hr) IV Continuous <Continuous>  dextrose 5%. 1000 milliLiter(s) (100 mL/Hr) IV Continuous <Continuous>  dextrose 50% Injectable 25 Gram(s) IV Push once  dextrose 50% Injectable 12.5 Gram(s) IV Push once  dextrose 50% Injectable 25 Gram(s) IV Push once  glucagon  Injectable 1 milliGRAM(s) IntraMuscular once  heparin   Injectable 5000 Unit(s) SubCutaneous every 8 hours  influenza  Vaccine (HIGH DOSE) 0.5 milliLiter(s) IntraMuscular once  insulin glargine Injectable (LANTUS) 20 Unit(s) SubCutaneous every morning  insulin lispro (ADMELOG) corrective regimen sliding scale   SubCutaneous three times a day before meals  insulin lispro (ADMELOG) corrective regimen sliding scale   SubCutaneous at bedtime  insulin lispro Injectable (ADMELOG) 6 Unit(s) SubCutaneous three times a day before meals  piperacillin/tazobactam IVPB.. 3.375 Gram(s) IV Intermittent every 8 hours  polyethylene glycol 3350 17 Gram(s) Oral daily  sodium chloride 0.9%. 1000 milliLiter(s) (75 mL/Hr) IV Continuous <Continuous>      LABS:  12-06    134[L]  |  99  |  18  ----------------------------<  146[H]  3.9   |  22  |  1.52[H]    Ca    8.7      06 Dec 2024 07:05  Phos  3.4     12-06  Mg     2.40     12-06      Creatinine Trend: 1.52 <--, 1.60 <--, 1.50 <--, 1.41 <--    Phosphorus: 3.4 mg/dL (12-06 @ 07:05)                              10.0   11.17 )-----------( 448      ( 05 Dec 2024 06:02 )             30.8     Urine Studies:  Urinalysis - [12-06-24 @ 07:05]      Color  / Appearance  / SG  / pH       Gluc 146 / Ketone   / Bili  / Urobili        Blood  / Protein  / Leuk Est  / Nitrite       RBC  / WBC  / Hyaline  / Gran  / Sq Epi  / Non Sq Epi  / Bacteria     Urine Creatinine 134      [12-05-24 @ 11:49]  Urine Protein 26      [12-05-24 @ 11:49]  Urine Urea Nitrogen 541.6      [12-05-24 @ 11:49]    HbA1c 10.7      [02-25-20 @ 14:10]  TSH 1.00      [12-04-24 @ 06:20]        RADIOLOGY & ADDITIONAL STUDIES:

## 2024-12-06 NOTE — PROGRESS NOTE ADULT - SUBJECTIVE AND OBJECTIVE BOX
[facility], Division of Infectious Diseases  TOBY Szymanski Y. Patel, S. Shah, G. Álvaro  [phone]  ([phone] - weekdays after 5pm and weekends)    Name: REJI MEADE  Age/Gender: 69y Female  MRN: [id number]    Interval History:  Notes reviewed.   No concerning overnight events.  Afebrile.   denies diarrhea    Allergies: No Known Allergies      Objective:  Vitals:   T(F): 97.4 (24 @ 05:00), Max: 99.7 (24 @ 18:22)  HR: 67 (24 @ 05:00) (67 - 89)  BP: 112/51 (24 @ 05:00) (112/51 - 130/50)  RR: 16 (24 @ 05:00) (16 - 18)  SpO2: 97% (24 @ 05:00) (97% - 99%)  Physical Examination:  General: no acute distress  HEENT: anicteric  Cardio: S1, S2, normal rate  Resp: breathing comfortably on RA   Abd: soft, NT, ND  Ext: L foot wrapped w/ dressing  Skin: warm, dry    Laboratory Studies:  CBC:                       10.0   11.17 )-----------( 448      ( 05 Dec 2024 06:02 )             30.8     WBC Trend:  11.17 24 @ 06:02  13.41 24 @ 06:20  13.47 24 @ 22:30    CMP:     137  |  99  |  18  ----------------------------<  152[H]  3.9   |  24  |  1.60[H]    Ca    9.0      05 Dec 2024 06:02  Phos  3.1       Mg     2.50                 Urinalysis Basic - ( 05 Dec 2024 11:49 )    Color: Yellow / Appearance: Clear / S.031 / pH: x  Gluc: x / Ketone: Trace mg/dL  / Bili: Negative / Urobili: 0.2 mg/dL   Blood: x / Protein: Trace mg/dL / Nitrite: Negative   Leuk Esterase: Small / RBC: 3 /HPF / WBC 15 /HPF   Sq Epi: x / Non Sq Epi: 2 /HPF / Bacteria: Negative /HPF      Microbiology: reviewed     Culture - Abscess with Gram Stain (collected 24 @ 01:07)  Source: .Abscess  Gram Stain (24 @ 16:17):    No polymorphonuclear cells seen per low power field    Few Gram Negative Rods per oil power field  Preliminary Report (24 @ 14:49):    Numerous Gram Negative Rods Identification to follow.    Few Streptococcus constellatus "Susceptibilities not performed"    Culture - Blood (collected 24 @ 22:30)  Source: .Blood BLOOD  Preliminary Report (24 @ 03:01):    No growth at 48 Hours    Culture - Blood (collected 24 @ 22:20)  Source: .Blood BLOOD  Preliminary Report (24 @ 03:01):    No growth at 48 Hours        Radiology: reviewed     Medications:  acetaminophen     Tablet .. 650 milliGRAM(s) Oral every 6 hours PRN  amLODIPine   Tablet 10 milliGRAM(s) Oral daily  atorvastatin 20 milliGRAM(s) Oral at bedtime  dextrose 5%. 1000 milliLiter(s) IV Continuous <Continuous>  dextrose 5%. 1000 milliLiter(s) IV Continuous <Continuous>  dextrose 50% Injectable 25 Gram(s) IV Push once  dextrose 50% Injectable 12.5 Gram(s) IV Push once  dextrose 50% Injectable 25 Gram(s) IV Push once  dextrose Oral Gel 15 Gram(s) Oral once PRN  glucagon  Injectable 1 milliGRAM(s) IntraMuscular once  heparin   Injectable 5000 Unit(s) SubCutaneous every 8 hours  influenza  Vaccine (HIGH DOSE) 0.5 milliLiter(s) IntraMuscular once  insulin glargine Injectable (LANTUS) 20 Unit(s) SubCutaneous every morning  insulin lispro (ADMELOG) corrective regimen sliding scale   SubCutaneous three times a day before meals  insulin lispro (ADMELOG) corrective regimen sliding scale   SubCutaneous at bedtime  insulin lispro Injectable (ADMELOG) 6 Unit(s) SubCutaneous three times a day before meals  melatonin 3 milliGRAM(s) Oral at bedtime PRN  ondansetron Injectable 4 milliGRAM(s) IV Push every 8 hours PRN  piperacillin/tazobactam IVPB.. 3.375 Gram(s) IV Intermittent every 8 hours  sodium chloride 0.9%. 1000 milliLiter(s) IV Continuous <Continuous>    Antimicrobials:  piperacillin/tazobactam IVPB.. 3.375 Gram(s) IV Intermittent every 8 hours

## 2024-12-06 NOTE — CHART NOTE - NSCHARTNOTEFT_GEN_A_CORE
Left lower extremity angiogram planned for Monday 12/9.  Appreciate medical risk stratification/optimization documented 12/4.  Please obtain TTE today to complete cardiac risk stratification/optimization.    NPO at midnight on Sunday, 1AM pre-op labs.  Consent to be obtained.    Vascular Surgery [id number]

## 2024-12-06 NOTE — PROGRESS NOTE ADULT - ASSESSMENT
A/P  59yo female h/o DM type2, HTN, hyperlipidemia; Pt c/o left great toe infection.    #diabetic foot infection.   -sp bedside debridement with podiatry on 12/4/24  -VA JACK WITH PVR noted   -vascular planning for LLE angio 12/9  -ecg with no ischemic changes, no chf or significant valvular disease on exam   -f/u echo  -pending echo, remains CV stable to proceed for LE angio with acceptable cv risk     #HTN   -c/w meds    #JENARO  -outpt hctz, arb on hold   -US Kidney and Bladder 12/5 shows No hydronephrosis  -renal fu     dvt ppx

## 2024-12-06 NOTE — PROGRESS NOTE ADULT - ASSESSMENT
70 y/o F PMhx DM II, HTN who presented w/ left great toe wound    L hallux infection, c/f osteo  leukocytosis, DM II  CRP 88.8,   s/p podiatry I&D L hallux wound to the level of and not beyond bone,  mild malodor, scant serosanguinous drainage. Right foot no open wounds, no acute signs of infection.  foot x-ray- fracture distal phalanx great toe. soft tissue swelling of this digit as well as subcutaneous air.  per vascular possible plans for angiogram  wound cx w/ strep constellatus and GNR    Recommendations  c/w zosyn   discontinue vancomycin  f/u wound culture GNR ID and sensitivities    Albino Rea M.D.  [facility], Division of Infectious Diseases  [phone]  After 5pm on weekdays and all day on weekends - please call [phone]

## 2024-12-07 LAB
-  CLINDAMYCIN: SIGNIFICANT CHANGE UP
-  ERYTHROMYCIN: SIGNIFICANT CHANGE UP
-  GENTAMICIN: SIGNIFICANT CHANGE UP
-  OXACILLIN: SIGNIFICANT CHANGE UP
-  PENICILLIN: SIGNIFICANT CHANGE UP
-  RIFAMPIN: SIGNIFICANT CHANGE UP
-  TETRACYCLINE: SIGNIFICANT CHANGE UP
-  TRIMETHOPRIM/SULFAMETHOXAZOLE: SIGNIFICANT CHANGE UP
-  VANCOMYCIN: SIGNIFICANT CHANGE UP
ANION GAP SERPL CALC-SCNC: 13 MMOL/L — SIGNIFICANT CHANGE UP (ref 7–14)
APPEARANCE UR: CLEAR — SIGNIFICANT CHANGE UP
BACTERIA # UR AUTO: NEGATIVE /HPF — SIGNIFICANT CHANGE UP
BILIRUB UR-MCNC: NEGATIVE — SIGNIFICANT CHANGE UP
BUN SERPL-MCNC: 18 MG/DL — SIGNIFICANT CHANGE UP (ref 7–23)
CALCIUM SERPL-MCNC: 9.2 MG/DL — SIGNIFICANT CHANGE UP (ref 8.4–10.5)
CAST: 2 /LPF — SIGNIFICANT CHANGE UP (ref 0–4)
CHLORIDE SERPL-SCNC: 100 MMOL/L — SIGNIFICANT CHANGE UP (ref 98–107)
CO2 SERPL-SCNC: 22 MMOL/L — SIGNIFICANT CHANGE UP (ref 22–31)
COLOR SPEC: YELLOW — SIGNIFICANT CHANGE UP
CREAT SERPL-MCNC: 1.42 MG/DL — HIGH (ref 0.5–1.3)
DIFF PNL FLD: NEGATIVE — SIGNIFICANT CHANGE UP
EGFR: 40 ML/MIN/1.73M2 — LOW
FLUAV AG NPH QL: SIGNIFICANT CHANGE UP
FLUBV AG NPH QL: SIGNIFICANT CHANGE UP
GLUCOSE SERPL-MCNC: 141 MG/DL — HIGH (ref 70–99)
GLUCOSE UR QL: >=1000 MG/DL
HCT VFR BLD CALC: 31.1 % — LOW (ref 34.5–45)
HGB BLD-MCNC: 9.7 G/DL — LOW (ref 11.5–15.5)
KETONES UR-MCNC: NEGATIVE MG/DL — SIGNIFICANT CHANGE UP
LACTATE SERPL-SCNC: 1.2 MMOL/L — SIGNIFICANT CHANGE UP (ref 0.5–2)
LEUKOCYTE ESTERASE UR-ACNC: NEGATIVE — SIGNIFICANT CHANGE UP
MAGNESIUM SERPL-MCNC: 2.3 MG/DL — SIGNIFICANT CHANGE UP (ref 1.6–2.6)
MCHC RBC-ENTMCNC: 27.6 PG — SIGNIFICANT CHANGE UP (ref 27–34)
MCHC RBC-ENTMCNC: 31.2 G/DL — LOW (ref 32–36)
MCV RBC AUTO: 88.6 FL — SIGNIFICANT CHANGE UP (ref 80–100)
METHOD TYPE: SIGNIFICANT CHANGE UP
NITRITE UR-MCNC: NEGATIVE — SIGNIFICANT CHANGE UP
NRBC # BLD: 0 /100 WBCS — SIGNIFICANT CHANGE UP (ref 0–0)
NRBC # FLD: 0 K/UL — SIGNIFICANT CHANGE UP (ref 0–0)
PH UR: 6 — SIGNIFICANT CHANGE UP (ref 5–8)
PHOSPHATE SERPL-MCNC: 2.9 MG/DL — SIGNIFICANT CHANGE UP (ref 2.5–4.5)
PLATELET # BLD AUTO: 560 K/UL — HIGH (ref 150–400)
POTASSIUM SERPL-MCNC: 3.6 MMOL/L — SIGNIFICANT CHANGE UP (ref 3.5–5.3)
POTASSIUM SERPL-SCNC: 3.6 MMOL/L — SIGNIFICANT CHANGE UP (ref 3.5–5.3)
PROT UR-MCNC: 30 MG/DL
RBC # BLD: 3.51 M/UL — LOW (ref 3.8–5.2)
RBC # FLD: 12.2 % — SIGNIFICANT CHANGE UP (ref 10.3–14.5)
RBC CASTS # UR COMP ASSIST: 2 /HPF — SIGNIFICANT CHANGE UP (ref 0–4)
RSV RNA NPH QL NAA+NON-PROBE: SIGNIFICANT CHANGE UP
SARS-COV-2 RNA SPEC QL NAA+PROBE: SIGNIFICANT CHANGE UP
SODIUM SERPL-SCNC: 135 MMOL/L — SIGNIFICANT CHANGE UP (ref 135–145)
SP GR SPEC: 1.02 — SIGNIFICANT CHANGE UP (ref 1–1.03)
SQUAMOUS # UR AUTO: 2 /HPF — SIGNIFICANT CHANGE UP (ref 0–5)
UROBILINOGEN FLD QL: 0.2 MG/DL — SIGNIFICANT CHANGE UP (ref 0.2–1)
WBC # BLD: 13.53 K/UL — HIGH (ref 3.8–10.5)
WBC # FLD AUTO: 13.53 K/UL — HIGH (ref 3.8–10.5)
WBC UR QL: 6 /HPF — HIGH (ref 0–5)

## 2024-12-07 PROCEDURE — 71045 X-RAY EXAM CHEST 1 VIEW: CPT | Mod: 26

## 2024-12-07 RX ORDER — CEFTRIAXONE SODIUM 1 G/1
2000 INJECTION, POWDER, FOR SOLUTION INTRAMUSCULAR; INTRAVENOUS EVERY 24 HOURS
Refills: 0 | Status: DISCONTINUED | OUTPATIENT
Start: 2024-12-07 | End: 2024-12-08

## 2024-12-07 RX ORDER — OXYCODONE HCL 15 MG
5 TABLET ORAL ONCE
Refills: 0 | Status: DISCONTINUED | OUTPATIENT
Start: 2024-12-07 | End: 2024-12-07

## 2024-12-07 RX ORDER — IBUPROFEN 200 MG
400 TABLET ORAL ONCE
Refills: 0 | Status: COMPLETED | OUTPATIENT
Start: 2024-12-07 | End: 2024-12-07

## 2024-12-07 RX ADMIN — Medication 10 MILLIGRAM(S): at 05:00

## 2024-12-07 RX ADMIN — INSULIN GLARGINE-YFGN 20 UNIT(S): 100 INJECTION, SOLUTION SUBCUTANEOUS at 08:42

## 2024-12-07 RX ADMIN — GABAPENTIN 100 MILLIGRAM(S): 300 CAPSULE ORAL at 21:44

## 2024-12-07 RX ADMIN — Medication 6 UNIT(S): at 17:05

## 2024-12-07 RX ADMIN — Medication 5 MILLIGRAM(S): at 19:30

## 2024-12-07 RX ADMIN — Medication 400 MILLIGRAM(S): at 18:44

## 2024-12-07 RX ADMIN — CEFTRIAXONE SODIUM 100 MILLIGRAM(S): 1 INJECTION, POWDER, FOR SOLUTION INTRAMUSCULAR; INTRAVENOUS at 09:24

## 2024-12-07 RX ADMIN — HEPARIN SODIUM 5000 UNIT(S): 1000 INJECTION, SOLUTION INTRAVENOUS; SUBCUTANEOUS at 05:02

## 2024-12-07 RX ADMIN — Medication 5 MILLIGRAM(S): at 18:44

## 2024-12-07 RX ADMIN — ATORVASTATIN CALCIUM 20 MILLIGRAM(S): 40 TABLET, FILM COATED ORAL at 21:45

## 2024-12-07 RX ADMIN — HEPARIN SODIUM 5000 UNIT(S): 1000 INJECTION, SOLUTION INTRAVENOUS; SUBCUTANEOUS at 13:24

## 2024-12-07 RX ADMIN — Medication 1: at 08:43

## 2024-12-07 RX ADMIN — PIPERACILLIN AND TAZOBACTAM 25 GRAM(S): 3; .375 INJECTION, POWDER, LYOPHILIZED, FOR SOLUTION INTRAVENOUS at 01:49

## 2024-12-07 RX ADMIN — Medication 6 UNIT(S): at 12:34

## 2024-12-07 RX ADMIN — HEPARIN SODIUM 5000 UNIT(S): 1000 INJECTION, SOLUTION INTRAVENOUS; SUBCUTANEOUS at 21:43

## 2024-12-07 RX ADMIN — Medication 0: at 12:35

## 2024-12-07 RX ADMIN — Medication 6 UNIT(S): at 08:43

## 2024-12-07 RX ADMIN — GABAPENTIN 100 MILLIGRAM(S): 300 CAPSULE ORAL at 13:23

## 2024-12-07 RX ADMIN — Medication 17 GRAM(S): at 12:34

## 2024-12-07 RX ADMIN — Medication 400 MILLIGRAM(S): at 19:42

## 2024-12-07 RX ADMIN — GABAPENTIN 100 MILLIGRAM(S): 300 CAPSULE ORAL at 05:00

## 2024-12-07 NOTE — PROGRESS NOTE ADULT - ASSESSMENT
57yo female h/o DM type2, HTN, hyperlipidemia; Pt c/o left great toe infection. vascular on board planning for angiogram. nephrology consulted for elevated S.Cr.    renal insuffiencey  unclear baseline pt does have referral to see nephrologist   long standing hx of dm and htn  admitted with scr now up trending  barbie vs acute on ckd vs ckd  on losartan and htcz.  will hold for now given worsen scr (last dose 12/5)  FEurea 35%  renal us no hydro   s/p  trial of gentle hydration 12/5  renal function relatively stable  plans for angiogram with vascular likely monday  recommending hydration with ns @ 75cc/hr 6 hrs before and 6 hrs after angio  Monitor BMP and UO - no labs today.  Avoid further nephrotoxins if possible.    htn  controlled  holding losartan and hctz sec to worsen scr  C/W amlodipine at current dose.  Low salt diet.  monitor BP.    Hyponatremia  Marginal.  Optimize glycemic control.  Monitor Na.    LE  wound  f/u vascular  angiogram once scr stable  risk vs benefit explained to patient; she expressed understanding

## 2024-12-07 NOTE — PROGRESS NOTE ADULT - SUBJECTIVE AND OBJECTIVE BOX
[facility] [place] [facility]   MD LI WADE MD ANGELA WONG, PA QIAN CHEN, SHAYY    TEL:  OFFICE: [phone]  From 5pm-7am Answering Service [phone]    -- RENAL FOLLOW UP NOTE ---Date of Service 12-07-24 @ 16:21    Patient is a 69y old  Female who presents with a chief complaint of Sent in by podiatrist for right foot/toe wound, worsening since October.    Patient seen and examined at bedside. No chest pain/SOB.    VITALS:  T(F): 98.4 (12-07-24 @ 14:05), Max: 98.9 (12-06-24 @ 21:00)  HR: 84 (12-07-24 @ 14:05)  BP: 135/49 (12-07-24 @ 14:05)  RR: 16 (12-07-24 @ 14:05)  SpO2: 100% (12-07-24 @ 14:05)  Wt(kg): --      PHYSICAL EXAM:  General: NAD  Neck: No JVD  Respiratory: CTAB, no wheezes, rales or rhonchi  Cardiovascular: S1, S2, RRR  Gastrointestinal: BS+, soft, NT/ND  Extremities: No peripheral edema    Hospital Medications:   MEDICATIONS  (STANDING):  amLODIPine   Tablet 10 milliGRAM(s) Oral daily  atorvastatin 20 milliGRAM(s) Oral at bedtime  cefTRIAXone   IVPB 2000 milliGRAM(s) IV Intermittent every 24 hours  dextrose 5%. 1000 milliLiter(s) (50 mL/Hr) IV Continuous <Continuous>  dextrose 5%. 1000 milliLiter(s) (100 mL/Hr) IV Continuous <Continuous>  dextrose 50% Injectable 25 Gram(s) IV Push once  dextrose 50% Injectable 12.5 Gram(s) IV Push once  dextrose 50% Injectable 25 Gram(s) IV Push once  gabapentin 100 milliGRAM(s) Oral three times a day  glucagon  Injectable 1 milliGRAM(s) IntraMuscular once  heparin   Injectable 5000 Unit(s) SubCutaneous every 8 hours  influenza  Vaccine (HIGH DOSE) 0.5 milliLiter(s) IntraMuscular once  insulin glargine Injectable (LANTUS) 20 Unit(s) SubCutaneous every morning  insulin lispro (ADMELOG) corrective regimen sliding scale   SubCutaneous three times a day before meals  insulin lispro (ADMELOG) corrective regimen sliding scale   SubCutaneous at bedtime  insulin lispro Injectable (ADMELOG) 6 Unit(s) SubCutaneous three times a day before meals  polyethylene glycol 3350 17 Gram(s) Oral daily  sodium chloride 0.9%. 1000 milliLiter(s) (75 mL/Hr) IV Continuous <Continuous>      LABS:  12-06    134[L]  |  99  |  18  ----------------------------<  146[H]  3.9   |  22  |  1.52[H]    Ca    8.7      06 Dec 2024 07:05  Phos  3.4     12-06  Mg     2.40     12-06      Creatinine Trend: 1.52 <--, 1.60 <--, 1.50 <--, 1.41 <--        Urine Studies:  Urinalysis - [12-06-24 @ 07:05]      Color  / Appearance  / SG  / pH       Gluc 146 / Ketone   / Bili  / Urobili        Blood  / Protein  / Leuk Est  / Nitrite       RBC  / WBC  / Hyaline  / Gran  / Sq Epi  / Non Sq Epi  / Bacteria     Urine Creatinine 134      [12-05-24 @ 11:49]  Urine Protein 26      [12-05-24 @ 11:49]  Urine Urea Nitrogen 541.6      [12-05-24 @ 11:49]    HbA1c 10.7      [02-25-20 @ 14:10]  TSH 1.00      [12-04-24 @ 06:20]

## 2024-12-07 NOTE — PROGRESS NOTE ADULT - ASSESSMENT
68 y/o F PMhx DM II, HTN who presented w/ left great toe wound    L hallux infection, c/f osteo  leukocytosis, DM II  CRP 88.8,   s/p podiatry I&D L hallux wound to the level of and not beyond bone,  mild malodor, scant serosanguinous drainage. Right foot no open wounds, no acute signs of infection.  foot x-ray- fracture distal phalanx great toe. soft tissue swelling of this digit as well as subcutaneous air.  per vascular possible plans for angiogram  wound cx w/ strep constellatus, citrobacter & morganella    Recommendations  switch zosyn to ceftriaxone 2g daily  f/u vascular and podiatry recs    Albino Rea M.D.  [facility], Division of Infectious Diseases  [phone]  After 5pm on weekdays and all day on weekends - please call [phone]

## 2024-12-07 NOTE — PROGRESS NOTE ADULT - SUBJECTIVE AND OBJECTIVE BOX
[facility], Division of Infectious Diseases  TOBY Szymanski Y. Patel, S. Shah, G. Álvaro  [phone]  ([phone] - weekdays after 5pm and weekends)    Name: REJI MEADE  Age/Gender: 69y Female  MRN: [id number]    Interval History:  Notes reviewed.   No concerning overnight events.  Afebrile.     Allergies: No Known Allergies      Objective:  Vitals:   T(F): 98.8 (12-07-24 @ 05:00), Max: 98.9 (12-06-24 @ 21:00)  HR: 79 (12-07-24 @ 05:00) (76 - 82)  BP: 117/52 (12-07-24 @ 05:00) (115/67 - 131/55)  RR: 18 (12-07-24 @ 05:00) (16 - 18)  SpO2: 98% (12-07-24 @ 05:00) (96% - 100%)  Physical Examination:  General: no acute distress  HEENT: anicteric  Cardio: S1, S2, normal rate  Resp: breathing comfortably on RA   Abd: soft, NT, ND  Ext: L foot wrapped w/ dressing  Skin: warm, dry    Laboratory Studies:  CBC:   WBC Trend:  11.17 12-05-24 @ 06:02  13.41 12-04-24 @ 06:20  13.47 12-03-24 @ 22:30    CMP: 12-06    134[L]  |  99  |  18  ----------------------------<  146[H]  3.9   |  22  |  1.52[H]    Ca    8.7      06 Dec 2024 07:05  Phos  3.4     12-06  Mg     2.40     12-06            Urinalysis Basic - ( 06 Dec 2024 07:05 )    Color: x / Appearance: x / SG: x / pH: x  Gluc: 146 mg/dL / Ketone: x  / Bili: x / Urobili: x   Blood: x / Protein: x / Nitrite: x   Leuk Esterase: x / RBC: x / WBC x   Sq Epi: x / Non Sq Epi: x / Bacteria: x      Microbiology: reviewed     Culture - Abscess with Gram Stain (collected 12-04-24 @ 01:07)  Source: .Abscess  Gram Stain (12-04-24 @ 16:17):    No polymorphonuclear cells seen per low power field    Few Gram Negative Rods per oil power field  Preliminary Report (12-06-24 @ 16:10):    Numerous Morganella morganii    Moderate Citrobacter amalonaticus complex    Commensal aura consistent with body site  Organism: Morganella morganii  Citrobacter amalonaticus complex (12-06-24 @ 16:09)  Organism: Morganella morganii (12-06-24 @ 16:09)      Method Type: MARIE      -  Amoxicillin/Clavulanic Acid: R >16/8      -  Ampicillin: R >16 These ampicillin results predict results for amoxicillin      -  Ampicillin/Sulbactam: R >16/8      -  Aztreonam: R >16      -  Cefazolin: R >16      -  Cefepime: S <=2      -  Cefoxitin: S <=8      -  Ceftriaxone: S <=1      -  Ciprofloxacin: S <=0.25      -  Ertapenem: S <=0.5      -  Gentamicin: S <=2      -  Levofloxacin: S <=0.5      -  Meropenem: S <=1      -  Piperacillin/Tazobactam: S <=8      -  Tobramycin: S <=2      -  Trimethoprim/Sulfamethoxazole: S <=0.5/9.5  Organism: Citrobacter amalonaticus complex (12-06-24 @ 16:06)      Method Type: MARIE      -  Amoxicillin/Clavulanic Acid: S <=8/4      -  Ampicillin: R <=8 These ampicillin results predict results for amoxicillin      -  Ampicillin/Sulbactam: S <=4/2      -  Aztreonam: S <=4      -  Cefazolin: R >16      -  Cefepime: S <=2      -  Cefoxitin: S <=8      -  Ceftriaxone: S <=1      -  Ciprofloxacin: S <=0.25      -  Ertapenem: S <=0.5      -  Gentamicin: S <=2      -  Imipenem: S <=1      -  Levofloxacin: S <=0.5      -  Meropenem: S <=1      -  Piperacillin/Tazobactam: S <=8      -  Tobramycin: S <=2      -  Trimethoprim/Sulfamethoxazole: S <=0.5/9.5    Culture - Blood (collected 12-03-24 @ 22:30)  Source: .Blood BLOOD  Preliminary Report (12-07-24 @ 03:01):    No growth at 72 Hours    Culture - Blood (collected 12-03-24 @ 22:20)  Source: .Blood BLOOD  Preliminary Report (12-07-24 @ 03:01):    No growth at 72 Hours        Radiology: reviewed     Medications:  acetaminophen     Tablet .. 650 milliGRAM(s) Oral every 6 hours PRN  amLODIPine   Tablet 10 milliGRAM(s) Oral daily  atorvastatin 20 milliGRAM(s) Oral at bedtime  dextrose 5%. 1000 milliLiter(s) IV Continuous <Continuous>  dextrose 5%. 1000 milliLiter(s) IV Continuous <Continuous>  dextrose 50% Injectable 25 Gram(s) IV Push once  dextrose 50% Injectable 12.5 Gram(s) IV Push once  dextrose 50% Injectable 25 Gram(s) IV Push once  dextrose Oral Gel 15 Gram(s) Oral once PRN  gabapentin 100 milliGRAM(s) Oral three times a day  glucagon  Injectable 1 milliGRAM(s) IntraMuscular once  heparin   Injectable 5000 Unit(s) SubCutaneous every 8 hours  influenza  Vaccine (HIGH DOSE) 0.5 milliLiter(s) IntraMuscular once  insulin glargine Injectable (LANTUS) 20 Unit(s) SubCutaneous every morning  insulin lispro (ADMELOG) corrective regimen sliding scale   SubCutaneous three times a day before meals  insulin lispro (ADMELOG) corrective regimen sliding scale   SubCutaneous at bedtime  insulin lispro Injectable (ADMELOG) 6 Unit(s) SubCutaneous three times a day before meals  melatonin 3 milliGRAM(s) Oral at bedtime PRN  ondansetron Injectable 4 milliGRAM(s) IV Push every 8 hours PRN  piperacillin/tazobactam IVPB.. 3.375 Gram(s) IV Intermittent every 8 hours  polyethylene glycol 3350 17 Gram(s) Oral daily  sodium chloride 0.9%. 1000 milliLiter(s) IV Continuous <Continuous>    Antimicrobials:  piperacillin/tazobactam IVPB.. 3.375 Gram(s) IV Intermittent every 8 hours

## 2024-12-07 NOTE — PROGRESS NOTE ADULT - SUBJECTIVE AND OBJECTIVE BOX
Subjective:       Patient ID: Kiara Johnson is a 54 y.o. female.    Chief Complaint: Asthma    HPI  Recheck.  Pt stopped Norvasc due to edema.  Stress test pending.  Still c/o anxiety, insomnia.  Now also with coryza, facial pain, cough, wheezing.  No f/C, sl SOB.  Review of Systems   All other systems reviewed and are negative.      Objective:      Physical Exam   Constitutional: She appears well-developed. No distress.   obese   HENT:   Head: Normocephalic.   Mouth/Throat: Oropharynx is clear and moist.   Eyes: EOM are normal.   Neck: Normal range of motion. No tracheal deviation present.   Cardiovascular: Normal rate, regular rhythm, normal heart sounds and intact distal pulses.   Pulmonary/Chest: Effort normal. No respiratory distress. She has wheezes (scattered).   Abdominal: She exhibits no distension.   Musculoskeletal: Normal range of motion. She exhibits no edema.   Neurological: She is alert. No cranial nerve deficit. She exhibits normal muscle tone. Coordination normal.   Skin: Skin is warm and dry. No rash noted. She is not diaphoretic. No erythema.   Psychiatric: She has a normal mood and affect. Her behavior is normal.   Vitals reviewed.      Assessment:       1. Upper respiratory tract infection, unspecified type    2. Essential hypertension    3. Mild intermittent asthma, unspecified whether complicated    4. Anxiety        Plan:       Kiara was seen today for asthma.    Diagnoses and all orders for this visit:    Upper respiratory tract infection, unspecified type  -     methylPREDNISolone (MEDROL DOSEPACK) 4 mg tablet; use as directed  -     benzonatate (TESSALON) 200 MG capsule; Take 1 capsule (200 mg total) by mouth 3 (three) times daily as needed for Cough.    Essential hypertension   Well-cont    Mild intermittent asthma, unspecified whether complicated  -     albuterol (PROVENTIL) 2.5 mg /3 mL (0.083 %) nebulizer solution; Take 3 mLs (2.5 mg total) by nebulization every 6 (six) hours as  needed for Wheezing.    Anxiety  -     busPIRone (BUSPAR) 10 MG tablet; Take 1 tablet (10 mg total) by mouth 3 (three) times daily.      Follow-up in about 6 months (around 8/20/2019).       SUBJECTIVE/ OVERNIGHT EVENTS:  --- Coverage for Dr. Pizano ---  +foot neuropathic pain improving   states that gabapentin helps  no cp, no sob, no n/v/d. no abdominal pain.  no headache, no dizziness.     -------------------------------------------------------------    REVIEW OF SYSTEMS:  CONSTITUTIONAL: No weakness, fevers or chills  EYES/ENT: No visual changes;  No vertigo or throat pain   NECK: No pain or stiffness  RESPIRATORY: No cough, wheezing, hemoptysis; No shortness of breath  CARDIOVASCULAR: No chest pain or palpitations  GASTROINTESTINAL: No abdominal or epigastric pain. No nausea, vomiting, or hematemesis; No diarrhea or constipation. No melena or hematochezia.  GENITOURINARY: No dysuria, frequency or hematuria  NEUROLOGICAL: No numbness or weakness  SKIN: No itching, burning, rashes, or lesions   All other review of systems is negative unless indicated above.    LABS:    12-06    134[L]  |  99  |  18  ----------------------------<  146[H]  3.9   |  22  |  1.52[H]    Ca    8.7      06 Dec 2024 07:05  Phos  3.4     12-06  Mg     2.40     12-06        CAPILLARY BLOOD GLUCOSE      POCT Blood Glucose.: 155 mg/dL (07 Dec 2024 08:18)  POCT Blood Glucose.: 155 mg/dL (06 Dec 2024 21:33)  POCT Blood Glucose.: 105 mg/dL (06 Dec 2024 17:18)  POCT Blood Glucose.: 147 mg/dL (06 Dec 2024 12:03)        Urinalysis Basic - ( 06 Dec 2024 07:05 )    Color: x / Appearance: x / SG: x / pH: x  Gluc: 146 mg/dL / Ketone: x  / Bili: x / Urobili: x   Blood: x / Protein: x / Nitrite: x   Leuk Esterase: x / RBC: x / WBC x   Sq Epi: x / Non Sq Epi: x / Bacteria: x        RADIOLOGY & ADDITIONAL TESTS:    Imaging Personally Reviewed:  [x] YES  [ ] NO    Consultant(s) Notes Reviewed:  [x] YES  [ ] NO    MEDICATIONS  (STANDING):  amLODIPine   Tablet 10 milliGRAM(s) Oral daily  atorvastatin 20 milliGRAM(s) Oral at bedtime  cefTRIAXone   IVPB 2000 milliGRAM(s) IV Intermittent every 24 hours  dextrose 5%. 1000 milliLiter(s) (50 mL/Hr) IV Continuous <Continuous>  dextrose 5%. 1000 milliLiter(s) (100 mL/Hr) IV Continuous <Continuous>  dextrose 50% Injectable 25 Gram(s) IV Push once  dextrose 50% Injectable 12.5 Gram(s) IV Push once  dextrose 50% Injectable 25 Gram(s) IV Push once  gabapentin 100 milliGRAM(s) Oral three times a day  glucagon  Injectable 1 milliGRAM(s) IntraMuscular once  heparin   Injectable 5000 Unit(s) SubCutaneous every 8 hours  influenza  Vaccine (HIGH DOSE) 0.5 milliLiter(s) IntraMuscular once  insulin glargine Injectable (LANTUS) 20 Unit(s) SubCutaneous every morning  insulin lispro (ADMELOG) corrective regimen sliding scale   SubCutaneous three times a day before meals  insulin lispro (ADMELOG) corrective regimen sliding scale   SubCutaneous at bedtime  insulin lispro Injectable (ADMELOG) 6 Unit(s) SubCutaneous three times a day before meals  polyethylene glycol 3350 17 Gram(s) Oral daily  sodium chloride 0.9%. 1000 milliLiter(s) (75 mL/Hr) IV Continuous <Continuous>    MEDICATIONS  (PRN):  acetaminophen     Tablet .. 650 milliGRAM(s) Oral every 6 hours PRN Temp greater or equal to 38C (100.4F), Mild Pain (1 - 3)  dextrose Oral Gel 15 Gram(s) Oral once PRN Blood Glucose LESS THAN 70 milliGRAM(s)/deciliter  melatonin 3 milliGRAM(s) Oral at bedtime PRN Insomnia  ondansetron Injectable 4 milliGRAM(s) IV Push every 8 hours PRN Nausea and/or Vomiting      Care Discussed with Consultants/Other Providers [x] YES  [ ] NO    Vital Signs Last 24 Hrs  T(C): 37.1 (07 Dec 2024 05:00), Max: 37.2 (06 Dec 2024 21:00)  T(F): 98.8 (07 Dec 2024 05:00), Max: 98.9 (06 Dec 2024 21:00)  HR: 79 (07 Dec 2024 05:00) (76 - 82)  BP: 117/52 (07 Dec 2024 05:00) (115/67 - 131/55)  BP(mean): --  RR: 18 (07 Dec 2024 05:00) (16 - 18)  SpO2: 98% (07 Dec 2024 05:00) (96% - 100%)    Parameters below as of 07 Dec 2024 05:00  Patient On (Oxygen Delivery Method): room air      I&O's Summary                PHYSICAL EXAM:  GENERAL: NAD, well-developed, comfortable  HEAD:  Atraumatic, Normocephalic  EYES: EOMI, PERRLA, conjunctiva and sclera clear  NECK: Supple, No JVD  CHEST/LUNG: Clear to auscultation bilaterally; No wheeze  HEART: Regular rate and rhythm; No murmurs, rubs, or gallops  ABDOMEN: Soft, Nontender, Nondistended; Bowel sounds present  Neuro: AAOx3, no focal weakness, 5/5 b/l extremity strength  EXTREMITIES:  2+ Peripheral Pulses, No clubbing, cyanosis, or edema, left foot dressing d/c/i  SKIN: No rashes or lesions

## 2024-12-07 NOTE — PROVIDER CONTACT NOTE (OTHER) - ACTION/TREATMENT ORDERED:
Repeat temperature rectally, Pt refused. Pain medication, blood cultures, urine cultures, FLU/COVID, MRSA. Chest x-ray, Doppler of left foot ordered.

## 2024-12-07 NOTE — PROGRESS NOTE ADULT - SUBJECTIVE AND OBJECTIVE BOX
CARDIOLOGY FOLLOW UP - Dr. Oliva  DATE OF SERVICE: 12/7/24    CC no cp or sob      REVIEW OF SYSTEMS:  CONSTITUTIONAL: No fever, weight loss, or fatigue  RESPIRATORY: No cough, wheezing, chills or hemoptysis; No Shortness of Breath  CARDIOVASCULAR: No chest pain, palpitations, passing out, dizziness  GASTROINTESTINAL: No abdominal or epigastric pain. No nausea, vomiting, or hematemesis; No diarrhea or constipation. No melena or hematochezia.      PHYSICAL EXAM:  T(C): 36.7 (12-07-24 @ 10:00), Max: 37.2 (12-06-24 @ 21:00)  HR: 61 (12-07-24 @ 10:00) (61 - 81)  BP: 125/55 (12-07-24 @ 10:00) (117/52 - 131/55)  RR: 18 (12-07-24 @ 10:00) (16 - 18)  SpO2: 96% (12-07-24 @ 10:00) (96% - 100%)  Wt(kg): --  I&O's Summary      Appearance: Normal	  Cardiovascular: Normal S1 S2,RRR, No JVD, No murmurs  Respiratory: Lungs clear to auscultation	  Gastrointestinal:  Soft, Non-tender, + BS	  Extremities: Normal range of motion, No clubbing, cyanosis or edema      Home Medications:  aspirin 81 mg oral delayed release tablet: 1 tab(s) orally once a day (04 Dec 2024 04:28)  atorvastatin 20 mg oral tablet: 1 tab(s) orally once a day (at bedtime) (04 Dec 2024 04:28)  Farxiga 5 mg oral tablet: 1 tab(s) orally once a day (04 Dec 2024 04:32)  hydroCHLOROthiazide 25 mg oral tablet: 1 tab(s) orally once a day (04 Dec 2024 04:28)  Lantus Solostar Pen 100 units/mL subcutaneous solution: 30 unit(s) subcutaneous once a day (at bedtime) (04 Dec 2024 04:28)  losartan 100 mg oral tablet: 1 tab(s) orally once a day (04 Dec 2024 04:28)  Norvasc 10 mg oral tablet: 1 tab(s) orally once a day (04 Dec 2024 04:28)  NovoLOG 100 units/mL subcutaneous solution: 10 unit(s) subcutaneous 3 times a day (before meals) (04 Dec 2024 04:28)      MEDICATIONS  (STANDING):  amLODIPine   Tablet 10 milliGRAM(s) Oral daily  atorvastatin 20 milliGRAM(s) Oral at bedtime  cefTRIAXone   IVPB 2000 milliGRAM(s) IV Intermittent every 24 hours  dextrose 5%. 1000 milliLiter(s) (50 mL/Hr) IV Continuous <Continuous>  dextrose 5%. 1000 milliLiter(s) (100 mL/Hr) IV Continuous <Continuous>  dextrose 50% Injectable 25 Gram(s) IV Push once  dextrose 50% Injectable 12.5 Gram(s) IV Push once  dextrose 50% Injectable 25 Gram(s) IV Push once  gabapentin 100 milliGRAM(s) Oral three times a day  glucagon  Injectable 1 milliGRAM(s) IntraMuscular once  heparin   Injectable 5000 Unit(s) SubCutaneous every 8 hours  influenza  Vaccine (HIGH DOSE) 0.5 milliLiter(s) IntraMuscular once  insulin glargine Injectable (LANTUS) 20 Unit(s) SubCutaneous every morning  insulin lispro (ADMELOG) corrective regimen sliding scale   SubCutaneous three times a day before meals  insulin lispro (ADMELOG) corrective regimen sliding scale   SubCutaneous at bedtime  insulin lispro Injectable (ADMELOG) 6 Unit(s) SubCutaneous three times a day before meals  polyethylene glycol 3350 17 Gram(s) Oral daily  sodium chloride 0.9%. 1000 milliLiter(s) (75 mL/Hr) IV Continuous <Continuous>      TELEMETRY: 	    ECG:  	  RADIOLOGY:   DIAGNOSTIC TESTING:  [ ] Echocardiogram: < from: TTE W or WO Ultrasound Enhancing Agent (12.06.24 @ 09:47) >  _______________________________________________________________________________________     CONCLUSIONS:      1. Left ventricular cavity is normal in size. Left ventricular wall thickness is normal. Left ventricular systolic function is normal with an ejection fraction of 74 % by Roberto's method of disks. There are no regional wall motion abnormalities seen.   2. There is mild (grade 1) left ventricular diastolic dysfunction, with normal left ventricular filling pressure.   3. Normal right ventricular cavity size, with normal wall thickness, and normal right ventricular systolic function.   4. Left atrium is normal in size.   5. No significant valvular disease.   6. No pericardial effusion seen.    ________________________________________________________________________________________    < end of copied text >    [ ]  Catheterization:  [ ] Stress Test:    OTHER: 	    LABS:	 	        12-06    134[L]  |  99  |  18  ----------------------------<  146[H]  3.9   |  22  |  1.52[H]    Ca    8.7      06 Dec 2024 07:05  Phos  3.4     12-06  Mg     2.40     12-06

## 2024-12-07 NOTE — PROGRESS NOTE ADULT - ASSESSMENT
A/P  59yo female h/o DM type2, HTN, hyperlipidemia; Pt c/o left great toe infection.    #diabetic foot infection.   -sp bedside debridement with podiatry on 12/4/24  -VA JACK WITH PVR noted   -vascular planning for LLE angio 12/9  -ecg with no ischemic changes, no chf or significant valvular disease on exam   -echo with nl LV sys FX No significant valvular disease.  -remains CV stable to proceed for LE angio with acceptable cv risk     #HTN   -c/w meds    #JENARO  -outpt hctz, arb on hold   -US Kidney and Bladder 12/5 shows No hydronephrosis  -renal fu     dvt ppx

## 2024-12-08 LAB
ANION GAP SERPL CALC-SCNC: 12 MMOL/L — SIGNIFICANT CHANGE UP (ref 7–14)
BUN SERPL-MCNC: 16 MG/DL — SIGNIFICANT CHANGE UP (ref 7–23)
CALCIUM SERPL-MCNC: 9.2 MG/DL — SIGNIFICANT CHANGE UP (ref 8.4–10.5)
CHLORIDE SERPL-SCNC: 101 MMOL/L — SIGNIFICANT CHANGE UP (ref 98–107)
CO2 SERPL-SCNC: 23 MMOL/L — SIGNIFICANT CHANGE UP (ref 22–31)
CREAT SERPL-MCNC: 1.21 MG/DL — SIGNIFICANT CHANGE UP (ref 0.5–1.3)
EGFR: 48 ML/MIN/1.73M2 — LOW
GLUCOSE SERPL-MCNC: 127 MG/DL — HIGH (ref 70–99)
MAGNESIUM SERPL-MCNC: 2.4 MG/DL — SIGNIFICANT CHANGE UP (ref 1.6–2.6)
MRSA PCR RESULT.: SIGNIFICANT CHANGE UP
PHOSPHATE SERPL-MCNC: 3.4 MG/DL — SIGNIFICANT CHANGE UP (ref 2.5–4.5)
POTASSIUM SERPL-MCNC: 4 MMOL/L — SIGNIFICANT CHANGE UP (ref 3.5–5.3)
POTASSIUM SERPL-SCNC: 4 MMOL/L — SIGNIFICANT CHANGE UP (ref 3.5–5.3)
S AUREUS DNA NOSE QL NAA+PROBE: SIGNIFICANT CHANGE UP
SODIUM SERPL-SCNC: 136 MMOL/L — SIGNIFICANT CHANGE UP (ref 135–145)

## 2024-12-08 RX ORDER — PIPERACILLIN AND TAZOBACTAM 3; .375 G/15ML; G/15ML
3.38 INJECTION, POWDER, LYOPHILIZED, FOR SOLUTION INTRAVENOUS EVERY 8 HOURS
Refills: 0 | Status: DISCONTINUED | OUTPATIENT
Start: 2024-12-08 | End: 2024-12-22

## 2024-12-08 RX ADMIN — HEPARIN SODIUM 5000 UNIT(S): 1000 INJECTION, SOLUTION INTRAVENOUS; SUBCUTANEOUS at 05:29

## 2024-12-08 RX ADMIN — Medication 17 GRAM(S): at 12:43

## 2024-12-08 RX ADMIN — PIPERACILLIN AND TAZOBACTAM 25 GRAM(S): 3; .375 INJECTION, POWDER, LYOPHILIZED, FOR SOLUTION INTRAVENOUS at 22:37

## 2024-12-08 RX ADMIN — HEPARIN SODIUM 5000 UNIT(S): 1000 INJECTION, SOLUTION INTRAVENOUS; SUBCUTANEOUS at 22:03

## 2024-12-08 RX ADMIN — HEPARIN SODIUM 5000 UNIT(S): 1000 INJECTION, SOLUTION INTRAVENOUS; SUBCUTANEOUS at 14:13

## 2024-12-08 RX ADMIN — Medication 6 UNIT(S): at 17:54

## 2024-12-08 RX ADMIN — PIPERACILLIN AND TAZOBACTAM 25 GRAM(S): 3; .375 INJECTION, POWDER, LYOPHILIZED, FOR SOLUTION INTRAVENOUS at 06:14

## 2024-12-08 RX ADMIN — Medication 10 MILLIGRAM(S): at 05:30

## 2024-12-08 RX ADMIN — Medication 6 UNIT(S): at 08:50

## 2024-12-08 RX ADMIN — GABAPENTIN 100 MILLIGRAM(S): 300 CAPSULE ORAL at 05:30

## 2024-12-08 RX ADMIN — PIPERACILLIN AND TAZOBACTAM 25 GRAM(S): 3; .375 INJECTION, POWDER, LYOPHILIZED, FOR SOLUTION INTRAVENOUS at 14:12

## 2024-12-08 RX ADMIN — INSULIN GLARGINE-YFGN 20 UNIT(S): 100 INJECTION, SOLUTION SUBCUTANEOUS at 08:50

## 2024-12-08 RX ADMIN — Medication 1: at 17:54

## 2024-12-08 RX ADMIN — GABAPENTIN 100 MILLIGRAM(S): 300 CAPSULE ORAL at 14:12

## 2024-12-08 RX ADMIN — Medication 6 UNIT(S): at 12:49

## 2024-12-08 RX ADMIN — GABAPENTIN 100 MILLIGRAM(S): 300 CAPSULE ORAL at 22:00

## 2024-12-08 RX ADMIN — ATORVASTATIN CALCIUM 20 MILLIGRAM(S): 40 TABLET, FILM COATED ORAL at 22:00

## 2024-12-08 NOTE — PROGRESS NOTE ADULT - SUBJECTIVE AND OBJECTIVE BOX
CARDIOLOGY FOLLOW UP - Dr. Oliva  DATE OF SERVICE: 12/8/24    CC no cp or sob   events noted- cp dizziness last night after getting up to go the bathroom  bp stable, no dizziness today after walking     REVIEW OF SYSTEMS:  CONSTITUTIONAL: No fever, weight loss, or fatigue  RESPIRATORY: No cough, wheezing, chills or hemoptysis; No Shortness of Breath  CARDIOVASCULAR: No chest pain, palpitations, passing out, dizziness  GASTROINTESTINAL: No abdominal or epigastric pain. No nausea, vomiting, or hematemesis; No diarrhea or constipation. No melena or hematochezia.      PHYSICAL EXAM:  T(C): 36.8 (12-08-24 @ 05:26), Max: 38.4 (12-07-24 @ 18:12)  HR: 76 (12-08-24 @ 05:26) (71 - 84)  BP: 127/53 (12-08-24 @ 05:26) (105/47 - 135/49)  RR: 17 (12-08-24 @ 05:26) (16 - 17)  SpO2: 100% (12-08-24 @ 05:26) (96% - 100%)  Wt(kg): --  I&O's Summary      Appearance: Normal	  Cardiovascular: Normal S1 S2,RRR, No JVD, No murmurs  Respiratory: Lungs clear to auscultation	  Gastrointestinal:  Soft, Non-tender, + BS	  Extremities: Normal range of motion, No clubbing, cyanosis or edema      Home Medications:  aspirin 81 mg oral delayed release tablet: 1 tab(s) orally once a day (04 Dec 2024 04:28)  atorvastatin 20 mg oral tablet: 1 tab(s) orally once a day (at bedtime) (04 Dec 2024 04:28)  Farxiga 5 mg oral tablet: 1 tab(s) orally once a day (04 Dec 2024 04:32)  hydroCHLOROthiazide 25 mg oral tablet: 1 tab(s) orally once a day (04 Dec 2024 04:28)  Lantus Solostar Pen 100 units/mL subcutaneous solution: 30 unit(s) subcutaneous once a day (at bedtime) (04 Dec 2024 04:28)  losartan 100 mg oral tablet: 1 tab(s) orally once a day (04 Dec 2024 04:28)  Norvasc 10 mg oral tablet: 1 tab(s) orally once a day (04 Dec 2024 04:28)  NovoLOG 100 units/mL subcutaneous solution: 10 unit(s) subcutaneous 3 times a day (before meals) (04 Dec 2024 04:28)      MEDICATIONS  (STANDING):  amLODIPine   Tablet 10 milliGRAM(s) Oral daily  atorvastatin 20 milliGRAM(s) Oral at bedtime  dextrose 5%. 1000 milliLiter(s) (100 mL/Hr) IV Continuous <Continuous>  dextrose 5%. 1000 milliLiter(s) (50 mL/Hr) IV Continuous <Continuous>  dextrose 50% Injectable 25 Gram(s) IV Push once  dextrose 50% Injectable 12.5 Gram(s) IV Push once  dextrose 50% Injectable 25 Gram(s) IV Push once  gabapentin 100 milliGRAM(s) Oral three times a day  glucagon  Injectable 1 milliGRAM(s) IntraMuscular once  heparin   Injectable 5000 Unit(s) SubCutaneous every 8 hours  influenza  Vaccine (HIGH DOSE) 0.5 milliLiter(s) IntraMuscular once  insulin glargine Injectable (LANTUS) 20 Unit(s) SubCutaneous every morning  insulin lispro (ADMELOG) corrective regimen sliding scale   SubCutaneous three times a day before meals  insulin lispro (ADMELOG) corrective regimen sliding scale   SubCutaneous at bedtime  insulin lispro Injectable (ADMELOG) 6 Unit(s) SubCutaneous three times a day before meals  piperacillin/tazobactam IVPB.. 3.375 Gram(s) IV Intermittent every 8 hours  polyethylene glycol 3350 17 Gram(s) Oral daily  sodium chloride 0.9%. 1000 milliLiter(s) (75 mL/Hr) IV Continuous <Continuous>      TELEMETRY: 	    ECG:  	  RADIOLOGY:   DIAGNOSTIC TESTING:  [ ] Echocardiogram:  [ ]  Catheterization:  [ ] Stress Test:    OTHER: 	    LABS:	 	                            9.7    13.53 )-----------( 560      ( 07 Dec 2024 18:46 )             31.1     12-08    136  |  101  |  16  ----------------------------<  127[H]  4.0   |  23  |  1.21    Ca    9.2      08 Dec 2024 07:16  Phos  3.4     12-08  Mg     2.40     12-08

## 2024-12-08 NOTE — PROVIDER CONTACT NOTE (OTHER) - ACTION/TREATMENT ORDERED:
provider made aware, suggested a carbohydrate and reassess blood sugar in 15 minutes and orthostatic vitals ordered

## 2024-12-08 NOTE — PROGRESS NOTE ADULT - SUBJECTIVE AND OBJECTIVE BOX
[facility], Division of Infectious Diseases  [facility] [address]zym[address] Y. Patel, S. Shah, G. Álvaro  [phone]  ([phone] - weekdays after 5pm and weekends)    Name: REJI MEADE  Age/Gender: 69y Female  MRN: [id number]    Interval History:  Notes reviewed.   No concerning overnight events.  fever yesterday evening     Allergies: No Known Allergies      Objective:  Vitals:   T(F): 98.3 (24 @ 05:26), Max: 101.2 (24 @ 18:12)  HR: 76 (24 @ 05:26) (61 - 84)  BP: 127/53 (24 @ 05:26) (105/47 - 135/49)  RR: 17 (24 @ 05:26) (16 - 18)  SpO2: 100% (24 @ 05:26) (96% - 100%)  Physical Examination:  General: no acute distress  HEENT: anicteric  Cardio: S1, S2, normal rate  Resp: breathing comfortably on RA   Abd: soft, NT, ND  Ext: L foot wrapped w/ dressing  Skin: warm, dry    Laboratory Studies:  CBC:                       9.7    13.53 )-----------( 560      ( 07 Dec 2024 18:46 )             31.1     WBC Trend:  13.53 24 @ 18:46  11.17 24 @ 06:02  13.41 24 @ 06:20  13.47 24 @ 22:30    CMP:     135  |  100  |  18  ----------------------------<  141[H]  3.6   |  22  |  1.42[H]    Ca    9.2      07 Dec 2024 18:46  Phos  2.9       Mg     2.30                 Urinalysis Basic - ( 07 Dec 2024 21:46 )    Color: Yellow / Appearance: Clear / S.022 / pH: x  Gluc: x / Ketone: Negative mg/dL  / Bili: Negative / Urobili: 0.2 mg/dL   Blood: x / Protein: 30 mg/dL / Nitrite: Negative   Leuk Esterase: Negative / RBC: 2 /HPF / WBC 6 /HPF   Sq Epi: x / Non Sq Epi: 2 /HPF / Bacteria: Negative /HPF      Microbiology: reviewed     Culture - Abscess with Gram Stain (collected 24 @ 01:07)  Source: .Abscess  Gram Stain (24 @ 16:17):    No polymorphonuclear cells seen per low power field    Few Gram Negative Rods per oil power field  Preliminary Report (24 @ 14:13):    Numerous Morganella morganii    Moderate Citrobacter amalonaticus complex    Few Staphylococcus lugdunensis    Commensal aura consistent with body site  Organism: Morganella morganii  Citrobacter amalonaticus complex  Staphylococcus lugdunensis (24 @ 14:13)  Organism: Staphylococcus lugdunensis (24 @ 14:13)      Method Type: MARIE      -  Clindamycin: S <=0.25      -  Erythromycin: S <=0.25      -  Gentamicin: S <=4 Should not be used as monotherapy      -  Oxacillin: S 0.5      -  Penicillin: R >2      -  Rifampin: S <=1 Should not be used as monotherapy      -  Tetracycline: S <=4      -  Trimethoprim/Sulfamethoxazole: S <=0.5/9.5      -  Vancomycin: S 1  Organism: Morganella morganii (24 @ 16:09)      Method Type: MARIE      -  Amoxicillin/Clavulanic Acid: R >16/8      -  Ampicillin: R >16 These ampicillin results predict results for amoxicillin      -  Ampicillin/Sulbactam: R >16/8      -  Aztreonam: R >16      -  Cefazolin: R >16      -  Cefepime: S <=2      -  Cefoxitin: S <=8      -  Ceftriaxone: S <=1      -  Ciprofloxacin: S <=0.25      -  Ertapenem: S <=0.5      -  Gentamicin: S <=2      -  Levofloxacin: S <=0.5      -  Meropenem: S <=1      -  Piperacillin/Tazobactam: S <=8      -  Tobramycin: S <=2      -  Trimethoprim/Sulfamethoxazole: S <=0.5/9.5  Organism: Citrobacter amalonaticus complex (24 @ 16:06)      Method Type: MARIE      -  Amoxicillin/Clavulanic Acid: S <=8/4      -  Ampicillin: R <=8 These ampicillin results predict results for amoxicillin      -  Ampicillin/Sulbactam: S <=4/2      -  Aztreonam: S <=4      -  Cefazolin: R >16      -  Cefepime: S <=2      -  Cefoxitin: S <=8      -  Ceftriaxone: S <=1      -  Ciprofloxacin: S <=0.25      -  Ertapenem: S <=0.5      -  Gentamicin: S <=2      -  Imipenem: S <=1      -  Levofloxacin: S <=0.5      -  Meropenem: S <=1      -  Piperacillin/Tazobactam: S <=8      -  Tobramycin: S <=2      -  Trimethoprim/Sulfamethoxazole: S <=0.5/9.5    Culture - Blood (collected 24 @ 22:30)  Source: .Blood BLOOD  Preliminary Report (24 @ 03:01):    No growth at 4 days    Culture - Blood (collected 24 @ 22:20)  Source: .Blood BLOOD  Preliminary Report (24 @ 03:01):    No growth at 4 days        Radiology: reviewed     Medications:  acetaminophen     Tablet .. 650 milliGRAM(s) Oral every 6 hours PRN  amLODIPine   Tablet 10 milliGRAM(s) Oral daily  atorvastatin 20 milliGRAM(s) Oral at bedtime  dextrose 5%. 1000 milliLiter(s) IV Continuous <Continuous>  dextrose 5%. 1000 milliLiter(s) IV Continuous <Continuous>  dextrose 50% Injectable 25 Gram(s) IV Push once  dextrose 50% Injectable 12.5 Gram(s) IV Push once  dextrose 50% Injectable 25 Gram(s) IV Push once  dextrose Oral Gel 15 Gram(s) Oral once PRN  gabapentin 100 milliGRAM(s) Oral three times a day  glucagon  Injectable 1 milliGRAM(s) IntraMuscular once  heparin   Injectable 5000 Unit(s) SubCutaneous every 8 hours  influenza  Vaccine (HIGH DOSE) 0.5 milliLiter(s) IntraMuscular once  insulin glargine Injectable (LANTUS) 20 Unit(s) SubCutaneous every morning  insulin lispro (ADMELOG) corrective regimen sliding scale   SubCutaneous three times a day before meals  insulin lispro (ADMELOG) corrective regimen sliding scale   SubCutaneous at bedtime  insulin lispro Injectable (ADMELOG) 6 Unit(s) SubCutaneous three times a day before meals  melatonin 3 milliGRAM(s) Oral at bedtime PRN  ondansetron Injectable 4 milliGRAM(s) IV Push every 8 hours PRN  piperacillin/tazobactam IVPB.. 3.375 Gram(s) IV Intermittent every 8 hours  polyethylene glycol 3350 17 Gram(s) Oral daily  sodium chloride 0.9%. 1000 milliLiter(s) IV Continuous <Continuous>    Antimicrobials:  piperacillin/tazobactam IVPB.. 3.375 Gram(s) IV Intermittent every 8 hours

## 2024-12-08 NOTE — PROGRESS NOTE ADULT - ASSESSMENT
68 y/o F PMhx DM II, HTN who presented w/ left great toe wound    L hallux infection, c/f osteo  leukocytosis, DM II  CRP 88.8,   s/p podiatry I&D L hallux wound to the level of and not beyond bone,  mild malodor, scant serosanguinous drainage. Right foot no open wounds, no acute signs of infection.  foot x-ray- fracture distal phalanx great toe. soft tissue swelling of this digit as well as subcutaneous air.  per vascular possible plans for angiogram  wound cx w/ strep constellatus, citrobacter, morganella, staph lugdunensis (oxacillin sensitive)    fever yesterday evening and increased leukocytosis today    Recommendations  will switch back to zosyn today  f/u vascular and podiatry recs    Albino Rea M.D.  [facility], Division of Infectious Diseases  [phone]  After 5pm on weekdays and all day on weekends - please call [phone]

## 2024-12-08 NOTE — PROGRESS NOTE ADULT - ASSESSMENT
A/P  57yo female h/o DM type2, HTN, hyperlipidemia; Pt c/o left great toe infection.    #diabetic foot infection.   -sp bedside debridement with podiatry on 12/4/24  -VA JACK WITH PVR noted   -vascular planning for LLE angio 12/9  -ecg with no ischemic changes, no chf or significant valvular disease on exam   -echo with nl LV sys FX No significant valvular disease.  -remains CV stable to proceed for LE angio with acceptable cv risk     #HTN   -c/w meds    #JENARO  -outpt hctz, arb on hold   -US Kidney and Bladder 12/5 shows No hydronephrosis  -renal fu     #dizziness   -after getting up to use the bathroom last night   -orthostatic bp neg   -bp stable, continue to monitor     dvt ppx

## 2024-12-08 NOTE — PROGRESS NOTE ADULT - SUBJECTIVE AND OBJECTIVE BOX
SUBJECTIVE/ OVERNIGHT EVENTS:  --- Coverage for Dr. Pizano ---  febrile so abx switched back to Zosyn  no cp, no sob, no n/v/d. no abdominal pain.  no headache, no dizziness.   pain is better.    --------------------------------------------------------------------------------------------  LABS:                        9.7    13.53 )-----------( 560      ( 07 Dec 2024 18:46 )             31.1     12-08    136  |  101  |  16  ----------------------------<  127[H]  4.0   |  23  |  1.21    Ca    9.2      08 Dec 2024 07:16  Phos  3.4     12-08  Mg     2.40     12-08        CAPILLARY BLOOD GLUCOSE      POCT Blood Glucose.: 146 mg/dL (08 Dec 2024 12:15)  POCT Blood Glucose.: 118 mg/dL (08 Dec 2024 08:29)  POCT Blood Glucose.: 116 mg/dL (08 Dec 2024 03:25)  POCT Blood Glucose.: 114 mg/dL (08 Dec 2024 02:40)  POCT Blood Glucose.: 114 mg/dL (07 Dec 2024 22:56)  POCT Blood Glucose.: 150 mg/dL (07 Dec 2024 17:04)        Urinalysis Basic - ( 08 Dec 2024 07:16 )    Color: x / Appearance: x / SG: x / pH: x  Gluc: 127 mg/dL / Ketone: x  / Bili: x / Urobili: x   Blood: x / Protein: x / Nitrite: x   Leuk Esterase: x / RBC: x / WBC x   Sq Epi: x / Non Sq Epi: x / Bacteria: x        RADIOLOGY & ADDITIONAL TESTS:    Imaging Personally Reviewed:  [x] YES  [ ] NO    Consultant(s) Notes Reviewed:  [x] YES  [ ] NO    MEDICATIONS  (STANDING):  amLODIPine   Tablet 10 milliGRAM(s) Oral daily  atorvastatin 20 milliGRAM(s) Oral at bedtime  dextrose 5%. 1000 milliLiter(s) (100 mL/Hr) IV Continuous <Continuous>  dextrose 5%. 1000 milliLiter(s) (50 mL/Hr) IV Continuous <Continuous>  dextrose 50% Injectable 25 Gram(s) IV Push once  dextrose 50% Injectable 12.5 Gram(s) IV Push once  dextrose 50% Injectable 25 Gram(s) IV Push once  gabapentin 100 milliGRAM(s) Oral three times a day  glucagon  Injectable 1 milliGRAM(s) IntraMuscular once  heparin   Injectable 5000 Unit(s) SubCutaneous every 8 hours  influenza  Vaccine (HIGH DOSE) 0.5 milliLiter(s) IntraMuscular once  insulin glargine Injectable (LANTUS) 20 Unit(s) SubCutaneous every morning  insulin lispro (ADMELOG) corrective regimen sliding scale   SubCutaneous three times a day before meals  insulin lispro (ADMELOG) corrective regimen sliding scale   SubCutaneous at bedtime  insulin lispro Injectable (ADMELOG) 6 Unit(s) SubCutaneous three times a day before meals  piperacillin/tazobactam IVPB.. 3.375 Gram(s) IV Intermittent every 8 hours  polyethylene glycol 3350 17 Gram(s) Oral daily  sodium chloride 0.9%. 1000 milliLiter(s) (75 mL/Hr) IV Continuous <Continuous>    MEDICATIONS  (PRN):  acetaminophen     Tablet .. 650 milliGRAM(s) Oral every 6 hours PRN Temp greater or equal to 38C (100.4F), Mild Pain (1 - 3)  dextrose Oral Gel 15 Gram(s) Oral once PRN Blood Glucose LESS THAN 70 milliGRAM(s)/deciliter  melatonin 3 milliGRAM(s) Oral at bedtime PRN Insomnia  ondansetron Injectable 4 milliGRAM(s) IV Push every 8 hours PRN Nausea and/or Vomiting      Care Discussed with Consultants/Other Providers [x] YES  [ ] NO    Vital Signs Last 24 Hrs  T(C): 36.8 (08 Dec 2024 13:00), Max: 38.4 (07 Dec 2024 18:12)  T(F): 98.2 (08 Dec 2024 13:00), Max: 101.2 (07 Dec 2024 18:12)  HR: 89 (08 Dec 2024 13:00) (71 - 89)  BP: 146/84 (08 Dec 2024 13:00) (105/47 - 146/84)  BP(mean): --  RR: 18 (08 Dec 2024 13:00) (16 - 18)  SpO2: 100% (08 Dec 2024 13:00) (96% - 100%)    Parameters below as of 08 Dec 2024 13:00  Patient On (Oxygen Delivery Method): room air      I&O's Summary        PHYSICAL EXAM:  GENERAL: NAD, well-developed, comfortable  HEAD:  Atraumatic, Normocephalic  EYES: EOMI, PERRLA, conjunctiva and sclera clear  NECK: Supple, No JVD  CHEST/LUNG: Clear to auscultation bilaterally; No wheeze  HEART: Regular rate and rhythm; No murmurs, rubs, or gallops  ABDOMEN: Soft, Nontender, Nondistended; Bowel sounds present  Neuro: AAOx3, no focal weakness, 5/5 b/l extremity strength  EXTREMITIES:  2+ Peripheral Pulses, No clubbing, cyanosis, or edema, left foot dressing d/c/i  SKIN: No rashes or lesions

## 2024-12-08 NOTE — PROGRESS NOTE ADULT - SUBJECTIVE AND OBJECTIVE BOX
[facility] [place] [facility]   MD LI WADE MD ANGELA WONG, PA QIAN CHEN, SHAYY    TEL:  OFFICE: [phone]  From 5pm-7am Answering Service [phone]    -- RENAL FOLLOW UP NOTE ---Date of Service 12-08-24 @ 16:56    Patient is a 69y old  Female who presents with a chief complaint of Sent in by podiatrist for right foot/toe wound, worsening since October.     Patient seen and examined at bedside. No chest pain/SOB.    VITALS:  T(F): 98.2 (12-08-24 @ 13:00), Max: 101.2 (12-07-24 @ 18:12)  HR: 89 (12-08-24 @ 13:00)  BP: 146/84 (12-08-24 @ 13:00)  RR: 18 (12-08-24 @ 13:00)  SpO2: 100% (12-08-24 @ 13:00)  Wt(kg): --      PHYSICAL EXAM:  General: NAD  Neck: No JVD  Respiratory: CTAB, no wheezes, rales or rhonchi  Cardiovascular: S1, S2, RRR  Gastrointestinal: BS+, soft, NT/ND  Extremities: No peripheral edema    Hospital Medications:   MEDICATIONS  (STANDING):  amLODIPine   Tablet 10 milliGRAM(s) Oral daily  atorvastatin 20 milliGRAM(s) Oral at bedtime  dextrose 5%. 1000 milliLiter(s) (100 mL/Hr) IV Continuous <Continuous>  dextrose 5%. 1000 milliLiter(s) (50 mL/Hr) IV Continuous <Continuous>  dextrose 50% Injectable 25 Gram(s) IV Push once  dextrose 50% Injectable 12.5 Gram(s) IV Push once  dextrose 50% Injectable 25 Gram(s) IV Push once  gabapentin 100 milliGRAM(s) Oral three times a day  glucagon  Injectable 1 milliGRAM(s) IntraMuscular once  heparin   Injectable 5000 Unit(s) SubCutaneous every 8 hours  influenza  Vaccine (HIGH DOSE) 0.5 milliLiter(s) IntraMuscular once  insulin glargine Injectable (LANTUS) 20 Unit(s) SubCutaneous every morning  insulin lispro (ADMELOG) corrective regimen sliding scale   SubCutaneous three times a day before meals  insulin lispro (ADMELOG) corrective regimen sliding scale   SubCutaneous at bedtime  insulin lispro Injectable (ADMELOG) 6 Unit(s) SubCutaneous three times a day before meals  piperacillin/tazobactam IVPB.. 3.375 Gram(s) IV Intermittent every 8 hours  polyethylene glycol 3350 17 Gram(s) Oral daily  sodium chloride 0.9%. 1000 milliLiter(s) (75 mL/Hr) IV Continuous <Continuous>      LABS:  12-08    136  |  101  |  16  ----------------------------<  127[H]  4.0   |  23  |  1.21    Ca    9.2      08 Dec 2024 07:16  Phos  3.4     12-08  Mg     2.40     12-08      Creatinine Trend: 1.21 <--, 1.42 <--, 1.52 <--, 1.60 <--, 1.50 <--, 1.41 <--    Phosphorus: 3.4 mg/dL (12-08 @ 07:16)  Phosphorus: 2.9 mg/dL (12-07 @ 18:46)                          9.7    13.53 )-----------( 560      ( 07 Dec 2024 18:46 )             31.1     Urine Studies:  Urinalysis - [12-08-24 @ 07:16]      Color  / Appearance  / SG  / pH       Gluc 127 / Ketone   / Bili  / Urobili        Blood  / Protein  / Leuk Est  / Nitrite       RBC  / WBC  / Hyaline  / Gran  / Sq Epi  / Non Sq Epi  / Bacteria     Urine Creatinine 134      [12-05-24 @ 11:49]  Urine Protein 26      [12-05-24 @ 11:49]  Urine Urea Nitrogen 541.6      [12-05-24 @ 11:49]    HbA1c 10.7      [02-25-20 @ 14:10]  TSH 1.00      [12-04-24 @ 06:20]

## 2024-12-08 NOTE — CHART NOTE - NSCHARTNOTEFT_GEN_A_CORE
Vascular Surgery Team Update    Patient febrile overnight with fever workup sent.   Vascular surgery still planning for LLE angiogram planning this week, potentially Wednesday 12/11. Medical and cardiology clearances documented. Will follow Bcxs.     Vascular Surgery  #[id number]

## 2024-12-08 NOTE — PROVIDER CONTACT NOTE (OTHER) - ASSESSMENT
pt reports being dizzy after standing up to use the bathroom. reports she think it is her blood sugar, blood sugar was taken with a result of 114, vital signs 97.2 temp, 72 HR, 123/51 BP, 98 o2 sat

## 2024-12-08 NOTE — PROGRESS NOTE ADULT - ASSESSMENT
57yo female h/o DM type2, HTN, hyperlipidemia; Pt c/o left great toe infection. vascular on board planning for angiogram. nephrology consulted for elevated S.Cr.    renal insufficiency  unclear baseline pt does have referral to see nephrologist   long standing hx of dm and htn  admitted with scr now up trending  barbie vs acute on ckd vs ckd  on losartan and htcz.  will hold for now given worsen scr (last dose 12/5)  FEurea 35%  renal us no hydro   s/p  trial of gentle hydration 12/5  Renal function improving.  plans for angiogram with vascular likely monday  recommending hydration with ns @ 75cc/hr 6 hrs before and 6 hrs after angio  Monitor BMP and UO.  Avoid further nephrotoxins if possible.    htn  controlled  holding losartan and hctz sec to worsen scr  C/W amlodipine at current dose.  Low salt diet.  monitor BP.    Hyponatremia  Marginal.  Optimize glycemic control.  Monitor Na.    LE  wound  f/u vascular  angiogram once scr stable  risk vs benefit explained to patient; she expressed understanding

## 2024-12-09 LAB
ANION GAP SERPL CALC-SCNC: 10 MMOL/L — SIGNIFICANT CHANGE UP (ref 7–14)
APTT BLD: 36.1 SEC — HIGH (ref 24.5–35.6)
BLD GP AB SCN SERPL QL: NEGATIVE — SIGNIFICANT CHANGE UP
BUN SERPL-MCNC: 16 MG/DL — SIGNIFICANT CHANGE UP (ref 7–23)
CALCIUM SERPL-MCNC: 9.2 MG/DL — SIGNIFICANT CHANGE UP (ref 8.4–10.5)
CHLORIDE SERPL-SCNC: 100 MMOL/L — SIGNIFICANT CHANGE UP (ref 98–107)
CO2 SERPL-SCNC: 24 MMOL/L — SIGNIFICANT CHANGE UP (ref 22–31)
CREAT SERPL-MCNC: 1.42 MG/DL — HIGH (ref 0.5–1.3)
CULTURE RESULTS: ABNORMAL
CULTURE RESULTS: SIGNIFICANT CHANGE UP
CULTURE RESULTS: SIGNIFICANT CHANGE UP
EGFR: 40 ML/MIN/1.73M2 — LOW
GLUCOSE BLDC GLUCOMTR-MCNC: 157 MG/DL — HIGH (ref 70–99)
GLUCOSE SERPL-MCNC: 159 MG/DL — HIGH (ref 70–99)
HCT VFR BLD CALC: 29.3 % — LOW (ref 34.5–45)
HGB BLD-MCNC: 9.5 G/DL — LOW (ref 11.5–15.5)
INR BLD: 1.07 RATIO — SIGNIFICANT CHANGE UP (ref 0.85–1.16)
MAGNESIUM SERPL-MCNC: 2.3 MG/DL — SIGNIFICANT CHANGE UP (ref 1.6–2.6)
MCHC RBC-ENTMCNC: 28.8 PG — SIGNIFICANT CHANGE UP (ref 27–34)
MCHC RBC-ENTMCNC: 32.4 G/DL — SIGNIFICANT CHANGE UP (ref 32–36)
MCV RBC AUTO: 88.8 FL — SIGNIFICANT CHANGE UP (ref 80–100)
NRBC # BLD: 0 /100 WBCS — SIGNIFICANT CHANGE UP (ref 0–0)
NRBC # FLD: 0 K/UL — SIGNIFICANT CHANGE UP (ref 0–0)
ORGANISM # SPEC MICROSCOPIC CNT: ABNORMAL
PHOSPHATE SERPL-MCNC: 3 MG/DL — SIGNIFICANT CHANGE UP (ref 2.5–4.5)
PLATELET # BLD AUTO: 515 K/UL — HIGH (ref 150–400)
POTASSIUM SERPL-MCNC: 4.2 MMOL/L — SIGNIFICANT CHANGE UP (ref 3.5–5.3)
POTASSIUM SERPL-SCNC: 4.2 MMOL/L — SIGNIFICANT CHANGE UP (ref 3.5–5.3)
PROTHROM AB SERPL-ACNC: 12.4 SEC — SIGNIFICANT CHANGE UP (ref 9.9–13.4)
RBC # BLD: 3.3 M/UL — LOW (ref 3.8–5.2)
RBC # FLD: 12.1 % — SIGNIFICANT CHANGE UP (ref 10.3–14.5)
RH IG SCN BLD-IMP: POSITIVE — SIGNIFICANT CHANGE UP
SODIUM SERPL-SCNC: 134 MMOL/L — LOW (ref 135–145)
SPECIMEN SOURCE: SIGNIFICANT CHANGE UP
WBC # BLD: 11.6 K/UL — HIGH (ref 3.8–10.5)
WBC # FLD AUTO: 11.6 K/UL — HIGH (ref 3.8–10.5)

## 2024-12-09 RX ADMIN — Medication 6 UNIT(S): at 12:28

## 2024-12-09 RX ADMIN — ATORVASTATIN CALCIUM 20 MILLIGRAM(S): 40 TABLET, FILM COATED ORAL at 23:15

## 2024-12-09 RX ADMIN — Medication 10 MILLIGRAM(S): at 06:11

## 2024-12-09 RX ADMIN — GABAPENTIN 100 MILLIGRAM(S): 300 CAPSULE ORAL at 23:15

## 2024-12-09 RX ADMIN — Medication 6 UNIT(S): at 17:42

## 2024-12-09 RX ADMIN — INSULIN GLARGINE-YFGN 20 UNIT(S): 100 INJECTION, SOLUTION SUBCUTANEOUS at 09:51

## 2024-12-09 RX ADMIN — Medication 6 UNIT(S): at 09:51

## 2024-12-09 RX ADMIN — PIPERACILLIN AND TAZOBACTAM 25 GRAM(S): 3; .375 INJECTION, POWDER, LYOPHILIZED, FOR SOLUTION INTRAVENOUS at 06:14

## 2024-12-09 RX ADMIN — GABAPENTIN 100 MILLIGRAM(S): 300 CAPSULE ORAL at 06:10

## 2024-12-09 RX ADMIN — HEPARIN SODIUM 5000 UNIT(S): 1000 INJECTION, SOLUTION INTRAVENOUS; SUBCUTANEOUS at 14:23

## 2024-12-09 RX ADMIN — HEPARIN SODIUM 5000 UNIT(S): 1000 INJECTION, SOLUTION INTRAVENOUS; SUBCUTANEOUS at 23:15

## 2024-12-09 RX ADMIN — HEPARIN SODIUM 5000 UNIT(S): 1000 INJECTION, SOLUTION INTRAVENOUS; SUBCUTANEOUS at 06:11

## 2024-12-09 RX ADMIN — PIPERACILLIN AND TAZOBACTAM 25 GRAM(S): 3; .375 INJECTION, POWDER, LYOPHILIZED, FOR SOLUTION INTRAVENOUS at 23:16

## 2024-12-09 RX ADMIN — Medication 3: at 09:51

## 2024-12-09 RX ADMIN — GABAPENTIN 100 MILLIGRAM(S): 300 CAPSULE ORAL at 14:23

## 2024-12-09 RX ADMIN — PIPERACILLIN AND TAZOBACTAM 25 GRAM(S): 3; .375 INJECTION, POWDER, LYOPHILIZED, FOR SOLUTION INTRAVENOUS at 14:23

## 2024-12-09 NOTE — PROGRESS NOTE ADULT - ASSESSMENT
69F 12/4 s/p L foot I&D to bone  -Pt was seen and evaluated  -Afebrile, WBC 11.6  -12/4 s/p b/s  incision and drainage on left foot hallux wound was performed to the level of and not beyond bone,  no malodor, scant Purulence drainage. Right foot no open wounds, no acute signs of infection.  -Left foot X-ray read: Fracture distal phalanx great toe. Soft tissue swelling of this digit as well as subcutaneous air.  -Left foot wound culture: Gram negative rods, Strep constellatus  -JACK/PVR: RABI NC, LABI 0.76  -ID recs, appreciated  -Vasc recs, appreciated - planning LLE angio Wed  -Pod Plan: Local wound care vs Left foot partial 1st ray resection pending vascular recs  -Please document medial and cardiac clearance for podiatric surgery under anesthesia  -Discussed with attending

## 2024-12-09 NOTE — PROGRESS NOTE ADULT - SUBJECTIVE AND OBJECTIVE BOX
[facility], Division of Infectious Diseases  TO[facility] Szymanski Y. Patel, S. Shah, G. Álvaro  [phone]  ([phone] - weekdays after 5pm and weekends)    Name: REJI MEADE  Age/Gender: 69y Female  MRN: [id number]    Interval History:  Notes reviewed.   No concerning overnight events.  Afebrile.   denies diarrhea    Allergies: No Known Allergies      Objective:  Vitals:   T(F): 99 (12-09-24 @ 06:05), Max: 99 (12-08-24 @ 22:00)  HR: 73 (12-09-24 @ 06:05) (73 - 89)  BP: 139/61 (12-09-24 @ 06:05) (125/57 - 146/84)  RR: 17 (12-09-24 @ 06:05) (17 - 18)  SpO2: 99% (12-09-24 @ 06:05) (97% - 100%)  Physical Examination:  General: no acute distress  HEENT: anicteric  Cardio: S1, S2, normal rate  Resp: breathing comfortably on RA   Abd: soft, NT, ND  Ext: L foot wrapped w/ dressing, distal aspect of hallux w/ necrotic changes  Skin: warm, dry    Laboratory Studies:  CBC:                       9.5    11.60 )-----------( 515      ( 09 Dec 2024 07:10 )             29.3     WBC Trend:  11.60 12-09-24 @ 07:10  13.53 12-07-24 @ 18:46  11.17 12-05-24 @ 06:02  13.41 12-04-24 @ 06:20  13.47 12-03-24 @ 22:30    CMP: 12-09    134[L]  |  100  |  16  ----------------------------<  159[H]  4.2   |  24  |  1.42[H]    Ca    9.2      09 Dec 2024 07:10  Phos  3.0     12-09  Mg     2.30     12-09            Urinalysis Basic - ( 09 Dec 2024 07:10 )    Color: x / Appearance: x / SG: x / pH: x  Gluc: 159 mg/dL / Ketone: x  / Bili: x / Urobili: x   Blood: x / Protein: x / Nitrite: x   Leuk Esterase: x / RBC: x / WBC x   Sq Epi: x / Non Sq Epi: x / Bacteria: x      Microbiology: reviewed     Culture - Blood (collected 12-07-24 @ 22:31)  Source: .Blood BLOOD  Preliminary Report (12-09-24 @ 01:02):    No growth at 24 hours    Culture - Blood (collected 12-07-24 @ 18:46)  Source: .Blood BLOOD  Preliminary Report (12-09-24 @ 01:02):    No growth at 24 hours    Culture - Abscess with Gram Stain (collected 12-04-24 @ 01:07)  Source: .Abscess  Gram Stain (12-04-24 @ 16:17):    No polymorphonuclear cells seen per low power field    Few Gram Negative Rods per oil power field  Final Report (12-09-24 @ 07:31):    Numerous Morganella morganii    Moderate Citrobacter amalonaticus complex    Few Staphylococcus lugdunensis    Commensal aura consistent with body site  Organism: Morganella morganii  Citrobacter amalonaticus complex  Staphylococcus lugdunensis (12-09-24 @ 07:31)  Organism: Staphylococcus lugdunensis (12-09-24 @ 07:31)      Method Type: MARIE      -  Clindamycin: S <=0.25      -  Erythromycin: S <=0.25      -  Gentamicin: S <=4 Should not be used as monotherapy      -  Oxacillin: S 0.5      -  Penicillin: R >2      -  Rifampin: S <=1 Should not be used as monotherapy      -  Tetracycline: S <=4      -  Trimethoprim/Sulfamethoxazole: S <=0.5/9.5      -  Vancomycin: S 1  Organism: Citrobacter amalonaticus complex (12-09-24 @ 07:31)      Method Type: MARIE      -  Amoxicillin/Clavulanic Acid: S <=8/4      -  Ampicillin: R <=8 These ampicillin results predict results for amoxicillin      -  Ampicillin/Sulbactam: S <=4/2      -  Aztreonam: S <=4      -  Cefazolin: R >16      -  Cefepime: S <=2      -  Cefoxitin: S <=8      -  Ceftriaxone: S <=1      -  Ciprofloxacin: S <=0.25      -  Ertapenem: S <=0.5      -  Gentamicin: S <=2      -  Imipenem: S <=1      -  Levofloxacin: S <=0.5      -  Meropenem: S <=1      -  Piperacillin/Tazobactam: S <=8      -  Tobramycin: S <=2      -  Trimethoprim/Sulfamethoxazole: S <=0.5/9.5  Organism: Mirna chenii (12-09-24 @ 07:31)      Method Type: MARIE      -  Amoxicillin/Clavulanic Acid: R >16/8      -  Ampicillin: R >16 These ampicillin results predict results for amoxicillin      -  Ampicillin/Sulbactam: R >16/8      -  Aztreonam: R >16      -  Cefazolin: R >16      -  Cefepime: S <=2      -  Cefoxitin: S <=8      -  Ceftriaxone: S <=1      -  Ciprofloxacin: S <=0.25      -  Ertapenem: S <=0.5      -  Gentamicin: S <=2      -  Levofloxacin: S <=0.5      -  Meropenem: S <=1      -  Piperacillin/Tazobactam: S <=8      -  Tobramycin: S <=2      -  Trimethoprim/Sulfamethoxazole: S <=0.5/9.5    Culture - Blood (collected 12-03-24 @ 22:30)  Source: .Blood BLOOD  Final Report (12-09-24 @ 03:00):    No growth at 5 days    Culture - Blood (collected 12-03-24 @ 22:20)  Source: .Blood BLOOD  Final Report (12-09-24 @ 03:00):    No growth at 5 days        Radiology: reviewed     Medications:  acetaminophen     Tablet .. 650 milliGRAM(s) Oral every 6 hours PRN  amLODIPine   Tablet 10 milliGRAM(s) Oral daily  atorvastatin 20 milliGRAM(s) Oral at bedtime  dextrose 5%. 1000 milliLiter(s) IV Continuous <Continuous>  dextrose 5%. 1000 milliLiter(s) IV Continuous <Continuous>  dextrose 50% Injectable 25 Gram(s) IV Push once  dextrose 50% Injectable 12.5 Gram(s) IV Push once  dextrose 50% Injectable 25 Gram(s) IV Push once  dextrose Oral Gel 15 Gram(s) Oral once PRN  gabapentin 100 milliGRAM(s) Oral three times a day  glucagon  Injectable 1 milliGRAM(s) IntraMuscular once  heparin   Injectable 5000 Unit(s) SubCutaneous every 8 hours  influenza  Vaccine (HIGH DOSE) 0.5 milliLiter(s) IntraMuscular once  insulin glargine Injectable (LANTUS) 20 Unit(s) SubCutaneous every morning  insulin lispro (ADMELOG) corrective regimen sliding scale   SubCutaneous three times a day before meals  insulin lispro (ADMELOG) corrective regimen sliding scale   SubCutaneous at bedtime  insulin lispro Injectable (ADMELOG) 6 Unit(s) SubCutaneous three times a day before meals  melatonin 3 milliGRAM(s) Oral at bedtime PRN  ondansetron Injectable 4 milliGRAM(s) IV Push every 8 hours PRN  piperacillin/tazobactam IVPB.. 3.375 Gram(s) IV Intermittent every 8 hours  polyethylene glycol 3350 17 Gram(s) Oral daily  sodium chloride 0.9%. 1000 milliLiter(s) IV Continuous <Continuous>    Antimicrobials:  piperacillin/tazobactam IVPB.. 3.375 Gram(s) IV Intermittent every 8 hours

## 2024-12-09 NOTE — PROGRESS NOTE ADULT - PROBLEM SELECTOR PLAN 5
Heparin sq    Dispo: febrile so abx switched back to Zosyn  no longer febrile.   vascular angiogram pending tentatively Monday. Heparin sq    Dispo: febrile so abx switched back to Zosyn  no longer febrile.   vascular angiogram pending tentatively Monday.   (noted vascular postponed procedure to Wednesday.  From medical perspective, pt no longer febrile, can proceed. no need to postpone)

## 2024-12-09 NOTE — PROGRESS NOTE ADULT - ASSESSMENT
A/P  57yo female h/o DM type2, HTN, hyperlipidemia; Pt c/o left great toe infection.    #diabetic foot infection.   -sp bedside debridement with podiatry on 12/4/24  -VA JACK WITH PVR noted   -vascular planning for LLE angio potentially Wednesday 12/11 after fever w/u  -ecg with no ischemic changes, no chf or significant valvular disease on exam   -echo with nl LV sys FX No significant valvular disease.  -remains CV stable to proceed for LE angio with acceptable cv risk     #HTN   -c/w meds    #JENARO  -outpt hctz, arb on hold   -US Kidney and Bladder 12/5 shows No hydronephrosis  -renal fu     #dizziness   -after getting up to use the bathroom overnight 12/8  -resolved per pt  -orthostatic bp neg   -bp stable, continue to monitor     dvt ppx

## 2024-12-09 NOTE — PROGRESS NOTE ADULT - SUBJECTIVE AND OBJECTIVE BOX
SUBJECTIVE/ OVERNIGHT EVENTS:  --- Coverage for Dr. Pizano ---  febrile so abx switched back to Zosyn  no longer febrile.  vascular angiogram pending  no cp, no sob, no n/v/d. no abdominal pain.  no headache, no dizziness.   pain is better    --------------------------------------------------------------------------------------------  LABS:                        9.5    11.60 )-----------( 515      ( 09 Dec 2024 07:10 )             29.3     12-09    134[L]  |  100  |  16  ----------------------------<  159[H]  4.2   |  24  |  1.42[H]    Ca    9.2      09 Dec 2024 07:10  Phos  3.0     12-09  Mg     2.30     12-09      PT/INR - ( 09 Dec 2024 07:10 )   PT: 12.4 sec;   INR: 1.07 ratio         PTT - ( 09 Dec 2024 07:10 )  PTT:36.1 sec  CAPILLARY BLOOD GLUCOSE      POCT Blood Glucose.: 126 mg/dL (09 Dec 2024 17:20)  POCT Blood Glucose.: 119 mg/dL (09 Dec 2024 12:06)  POCT Blood Glucose.: 282 mg/dL (09 Dec 2024 09:49)  POCT Blood Glucose.: 174 mg/dL (09 Dec 2024 08:17)  POCT Blood Glucose.: 200 mg/dL (08 Dec 2024 22:10)        Urinalysis Basic - ( 09 Dec 2024 07:10 )    Color: x / Appearance: x / SG: x / pH: x  Gluc: 159 mg/dL / Ketone: x  / Bili: x / Urobili: x   Blood: x / Protein: x / Nitrite: x   Leuk Esterase: x / RBC: x / WBC x   Sq Epi: x / Non Sq Epi: x / Bacteria: x        RADIOLOGY & ADDITIONAL TESTS:    Imaging Personally Reviewed:  [x] YES  [ ] NO    Consultant(s) Notes Reviewed:  [x] YES  [ ] NO    MEDICATIONS  (STANDING):  amLODIPine   Tablet 10 milliGRAM(s) Oral daily  atorvastatin 20 milliGRAM(s) Oral at bedtime  dextrose 5%. 1000 milliLiter(s) (50 mL/Hr) IV Continuous <Continuous>  dextrose 5%. 1000 milliLiter(s) (100 mL/Hr) IV Continuous <Continuous>  dextrose 50% Injectable 25 Gram(s) IV Push once  dextrose 50% Injectable 12.5 Gram(s) IV Push once  dextrose 50% Injectable 25 Gram(s) IV Push once  gabapentin 100 milliGRAM(s) Oral three times a day  glucagon  Injectable 1 milliGRAM(s) IntraMuscular once  heparin   Injectable 5000 Unit(s) SubCutaneous every 8 hours  influenza  Vaccine (HIGH DOSE) 0.5 milliLiter(s) IntraMuscular once  insulin glargine Injectable (LANTUS) 20 Unit(s) SubCutaneous every morning  insulin lispro (ADMELOG) corrective regimen sliding scale   SubCutaneous three times a day before meals  insulin lispro (ADMELOG) corrective regimen sliding scale   SubCutaneous at bedtime  insulin lispro Injectable (ADMELOG) 6 Unit(s) SubCutaneous three times a day before meals  piperacillin/tazobactam IVPB.. 3.375 Gram(s) IV Intermittent every 8 hours  polyethylene glycol 3350 17 Gram(s) Oral daily  sodium chloride 0.9%. 1000 milliLiter(s) (75 mL/Hr) IV Continuous <Continuous>    MEDICATIONS  (PRN):  acetaminophen     Tablet .. 650 milliGRAM(s) Oral every 6 hours PRN Temp greater or equal to 38C (100.4F), Mild Pain (1 - 3)  dextrose Oral Gel 15 Gram(s) Oral once PRN Blood Glucose LESS THAN 70 milliGRAM(s)/deciliter  melatonin 3 milliGRAM(s) Oral at bedtime PRN Insomnia  ondansetron Injectable 4 milliGRAM(s) IV Push every 8 hours PRN Nausea and/or Vomiting      Care Discussed with Consultants/Other Providers [x] YES  [ ] NO    Vital Signs Last 24 Hrs  T(C): 36.6 (09 Dec 2024 10:00), Max: 37.2 (08 Dec 2024 22:00)  T(F): 97.9 (09 Dec 2024 10:00), Max: 99 (08 Dec 2024 22:00)  HR: 79 (09 Dec 2024 10:00) (73 - 79)  BP: 121/52 (09 Dec 2024 10:00) (121/52 - 139/61)  BP(mean): --  RR: 18 (09 Dec 2024 10:00) (17 - 18)  SpO2: 96% (09 Dec 2024 10:00) (96% - 99%)    Parameters below as of 09 Dec 2024 10:00  Patient On (Oxygen Delivery Method): room air      I&O's Summary            PHYSICAL EXAM:  GENERAL: NAD, well-developed, comfortable  HEAD:  Atraumatic, Normocephalic  EYES: EOMI, PERRLA, conjunctiva and sclera clear  NECK: Supple, No JVD  CHEST/LUNG: Clear to auscultation bilaterally; No wheeze  HEART: Regular rate and rhythm; No murmurs, rubs, or gallops  ABDOMEN: Soft, Nontender, Nondistended; Bowel sounds present  Neuro: AAOx3, no focal weakness, 5/5 b/l extremity strength  EXTREMITIES:  2+ Peripheral Pulses, No clubbing, cyanosis, or edema, left foot dressing d/c/i  SKIN: No rashes or lesions

## 2024-12-09 NOTE — PROGRESS NOTE ADULT - SUBJECTIVE AND OBJECTIVE BOX
CARDIOLOGY FOLLOW UP - Dr. Oliva  DATE OF SERVICE: 12/9/24    CC no CP, dizziness, or SOB      REVIEW OF SYSTEMS:  CONSTITUTIONAL: No fever, weight loss, or fatigue  RESPIRATORY: No cough, wheezing, chills or hemoptysis; No Shortness of Breath  CARDIOVASCULAR: No chest pain, palpitations, passing out, dizziness  GASTROINTESTINAL: No abdominal or epigastric pain. No nausea, vomiting, or hematemesis; No diarrhea or constipation. No melena or hematochezia.      PHYSICAL EXAM:  T(C): 37.2 (12-09-24 @ 06:05), Max: 37.2 (12-08-24 @ 22:00)  HR: 73 (12-09-24 @ 06:05) (73 - 89)  BP: 139/61 (12-09-24 @ 06:05) (125/57 - 146/84)  RR: 17 (12-09-24 @ 06:05) (17 - 18)  SpO2: 99% (12-09-24 @ 06:05) (97% - 100%)  Wt(kg): --  I&O's Summary      Appearance: Normal	  Cardiovascular: Normal S1 S2,RRR, No JVD, No murmurs  Respiratory: Lungs clear to auscultation	  Gastrointestinal:  Soft, Non-tender, + BS	  Extremities: Normal range of motion, No clubbing, cyanosis or edema      Home Medications:  aspirin 81 mg oral delayed release tablet: 1 tab(s) orally once a day (04 Dec 2024 04:28)  atorvastatin 20 mg oral tablet: 1 tab(s) orally once a day (at bedtime) (04 Dec 2024 04:28)  Farxiga 5 mg oral tablet: 1 tab(s) orally once a day (04 Dec 2024 04:32)  hydroCHLOROthiazide 25 mg oral tablet: 1 tab(s) orally once a day (04 Dec 2024 04:28)  Lantus Solostar Pen 100 units/mL subcutaneous solution: 30 unit(s) subcutaneous once a day (at bedtime) (04 Dec 2024 04:28)  losartan 100 mg oral tablet: 1 tab(s) orally once a day (04 Dec 2024 04:28)  Norvasc 10 mg oral tablet: 1 tab(s) orally once a day (04 Dec 2024 04:28)  NovoLOG 100 units/mL subcutaneous solution: 10 unit(s) subcutaneous 3 times a day (before meals) (04 Dec 2024 04:28)      MEDICATIONS  (STANDING):  amLODIPine   Tablet 10 milliGRAM(s) Oral daily  atorvastatin 20 milliGRAM(s) Oral at bedtime  dextrose 5%. 1000 milliLiter(s) (50 mL/Hr) IV Continuous <Continuous>  dextrose 5%. 1000 milliLiter(s) (100 mL/Hr) IV Continuous <Continuous>  dextrose 50% Injectable 25 Gram(s) IV Push once  dextrose 50% Injectable 12.5 Gram(s) IV Push once  dextrose 50% Injectable 25 Gram(s) IV Push once  gabapentin 100 milliGRAM(s) Oral three times a day  glucagon  Injectable 1 milliGRAM(s) IntraMuscular once  heparin   Injectable 5000 Unit(s) SubCutaneous every 8 hours  influenza  Vaccine (HIGH DOSE) 0.5 milliLiter(s) IntraMuscular once  insulin glargine Injectable (LANTUS) 20 Unit(s) SubCutaneous every morning  insulin lispro (ADMELOG) corrective regimen sliding scale   SubCutaneous three times a day before meals  insulin lispro (ADMELOG) corrective regimen sliding scale   SubCutaneous at bedtime  insulin lispro Injectable (ADMELOG) 6 Unit(s) SubCutaneous three times a day before meals  piperacillin/tazobactam IVPB.. 3.375 Gram(s) IV Intermittent every 8 hours  polyethylene glycol 3350 17 Gram(s) Oral daily  sodium chloride 0.9%. 1000 milliLiter(s) (75 mL/Hr) IV Continuous <Continuous>      TELEMETRY: 	    ECG:  	  RADIOLOGY:   DIAGNOSTIC TESTING:  [ ] Echocardiogram:  [ ]  Catheterization:  [ ] Stress Test:    OTHER: 	    LABS:	 	                            9.7    13.53 )-----------( 560      ( 07 Dec 2024 18:46 )             31.1     12-08    136  |  101  |  16  ----------------------------<  127[H]  4.0   |  23  |  1.21    Ca    9.2      08 Dec 2024 07:16  Phos  3.4     12-08  Mg     2.40     12-08

## 2024-12-09 NOTE — PROGRESS NOTE ADULT - ASSESSMENT
68 y/o F PMhx DM II, HTN who presented w/ left great toe wound    L hallux infection, c/f osteo  leukocytosis, DM II  CRP 88.8,   s/p podiatry I&D L hallux wound to the level of and not beyond bone,  mild malodor, scant serosanguinous drainage. Right foot no open wounds, no acute signs of infection.  foot x-ray- fracture distal phalanx great toe. soft tissue swelling of this digit as well as subcutaneous air.  per vascular possible plans for angiogram  wound cx w/ strep constellatus, citrobacter, morganella, staph lugdunensis (oxacillin sensitive)  febrile 12/7, ceftriaxone switched to zosyn    Recommendations  c/w zosyn for now  f/u vascular and podiatry recs    Albino Rea M.D.  [facility], Division of Infectious Diseases  [phone]  After 5pm on weekdays and all day on weekends - please call [phone]

## 2024-12-09 NOTE — PROGRESS NOTE ADULT - SUBJECTIVE AND OBJECTIVE BOX
[facility] [place] [facility]   MD LI WADE MD ANGELA WONG, PA    TEL:  OFFICE: [phone]  From 5pm-7am Answering Service [phone]    -- RENAL FOLLOW UP NOTE ---Date of Service 12-09-24 @ 14:09    Patient is a 69y old  Female who presents with a chief complaint of Sent in by podiatrist for right foot/toe wound, worsening since October. (09 Dec 2024 11:03)      Patient seen and examined at bedside. No chest pain/sob    VITALS:  T(F): 97.9 (12-09-24 @ 10:00), Max: 99 (12-08-24 @ 22:00)  HR: 79 (12-09-24 @ 10:00)  BP: 121/52 (12-09-24 @ 10:00)  RR: 18 (12-09-24 @ 10:00)  SpO2: 96% (12-09-24 @ 10:00)  Wt(kg): --        PHYSICAL EXAM:  General: NAD  Neck: No JVD  Respiratory: CTAB, no wheezes, rales or rhonchi  Cardiovascular: S1, S2, RRR  Gastrointestinal: BS+, soft, NT/ND  Extremities: No peripheral edema    Hospital Medications:   MEDICATIONS  (STANDING):  amLODIPine   Tablet 10 milliGRAM(s) Oral daily  atorvastatin 20 milliGRAM(s) Oral at bedtime  dextrose 5%. 1000 milliLiter(s) (50 mL/Hr) IV Continuous <Continuous>  dextrose 5%. 1000 milliLiter(s) (100 mL/Hr) IV Continuous <Continuous>  dextrose 50% Injectable 25 Gram(s) IV Push once  dextrose 50% Injectable 12.5 Gram(s) IV Push once  dextrose 50% Injectable 25 Gram(s) IV Push once  gabapentin 100 milliGRAM(s) Oral three times a day  glucagon  Injectable 1 milliGRAM(s) IntraMuscular once  heparin   Injectable 5000 Unit(s) SubCutaneous every 8 hours  influenza  Vaccine (HIGH DOSE) 0.5 milliLiter(s) IntraMuscular once  insulin glargine Injectable (LANTUS) 20 Unit(s) SubCutaneous every morning  insulin lispro (ADMELOG) corrective regimen sliding scale   SubCutaneous three times a day before meals  insulin lispro (ADMELOG) corrective regimen sliding scale   SubCutaneous at bedtime  insulin lispro Injectable (ADMELOG) 6 Unit(s) SubCutaneous three times a day before meals  piperacillin/tazobactam IVPB.. 3.375 Gram(s) IV Intermittent every 8 hours  polyethylene glycol 3350 17 Gram(s) Oral daily  sodium chloride 0.9%. 1000 milliLiter(s) (75 mL/Hr) IV Continuous <Continuous>      LABS:  12-09    134[L]  |  100  |  16  ----------------------------<  159[H]  4.2   |  24  |  1.42[H]    Ca    9.2      09 Dec 2024 07:10  Phos  3.0     12-09  Mg     2.30     12-09      Creatinine Trend: 1.42 <--, 1.21 <--, 1.42 <--, 1.52 <--, 1.60 <--, 1.50 <--, 1.41 <--    Phosphorus: 3.0 mg/dL (12-09 @ 07:10)                              9.5    11.60 )-----------( 515      ( 09 Dec 2024 07:10 )             29.3     Urine Studies:  Urinalysis - [12-09-24 @ 07:10]      Color  / Appearance  / SG  / pH       Gluc 159 / Ketone   / Bili  / Urobili        Blood  / Protein  / Leuk Est  / Nitrite       RBC  / WBC  / Hyaline  / Gran  / Sq Epi  / Non Sq Epi  / Bacteria     Urine Creatinine 134      [12-05-24 @ 11:49]  Urine Protein 26      [12-05-24 @ 11:49]  Urine Urea Nitrogen 541.6      [12-05-24 @ 11:49]    HbA1c 10.7      [02-25-20 @ 14:10]  TSH 1.00      [12-04-24 @ 06:20]        RADIOLOGY & ADDITIONAL STUDIES:

## 2024-12-09 NOTE — PROGRESS NOTE ADULT - ASSESSMENT
57yo female h/o DM type2, HTN, hyperlipidemia; Pt c/o left great toe infection. vascular on board planning for angiogram. nephrology consulted for elevated S.Cr.    renal insufficiency  baseline ~ 1.4 per team  long standing hx of dm and htn  admitted with scr now up trending  scr relatively stable  on losartan and htcz.  will hold for now given worsen scr (last dose 12/5)  FEurea 35%  renal us no hydro   s/p  trial of gentle hydration 12/5  Renal function stable  plans for angiogram with vascular   recommending hydration with ns @ 75cc/hr 6 hrs before and 6 hrs after angio  Monitor BMP and UO.  Avoid further nephrotoxins if possible.    htn  controlled  holding losartan and hctz sec to worsen scr  C/W amlodipine at current dose.  Low salt diet.  monitor BP.    Hyponatremia  Marginal.  Optimize glycemic control.  Monitor Na.    LE  wound  f/u vascular  angiogram once scr stable  risk vs benefit explained to patient; she expressed understanding

## 2024-12-10 LAB
ANION GAP SERPL CALC-SCNC: 10 MMOL/L — SIGNIFICANT CHANGE UP (ref 7–14)
APTT BLD: 30.7 SEC — SIGNIFICANT CHANGE UP (ref 24.5–35.6)
BLD GP AB SCN SERPL QL: NEGATIVE — SIGNIFICANT CHANGE UP
BUN SERPL-MCNC: 16 MG/DL — SIGNIFICANT CHANGE UP (ref 7–23)
CALCIUM SERPL-MCNC: 9.3 MG/DL — SIGNIFICANT CHANGE UP (ref 8.4–10.5)
CHLORIDE SERPL-SCNC: 104 MMOL/L — SIGNIFICANT CHANGE UP (ref 98–107)
CO2 SERPL-SCNC: 23 MMOL/L — SIGNIFICANT CHANGE UP (ref 22–31)
CREAT SERPL-MCNC: 1.46 MG/DL — HIGH (ref 0.5–1.3)
EGFR: 38 ML/MIN/1.73M2 — LOW
GLUCOSE BLDC GLUCOMTR-MCNC: 122 MG/DL — HIGH (ref 70–99)
GLUCOSE BLDC GLUCOMTR-MCNC: 139 MG/DL — HIGH (ref 70–99)
GLUCOSE BLDC GLUCOMTR-MCNC: 140 MG/DL — HIGH (ref 70–99)
GLUCOSE BLDC GLUCOMTR-MCNC: 143 MG/DL — HIGH (ref 70–99)
GLUCOSE SERPL-MCNC: 128 MG/DL — HIGH (ref 70–99)
HCT VFR BLD CALC: 29 % — LOW (ref 34.5–45)
HGB BLD-MCNC: 9.3 G/DL — LOW (ref 11.5–15.5)
INR BLD: 1.05 RATIO — SIGNIFICANT CHANGE UP (ref 0.85–1.16)
MAGNESIUM SERPL-MCNC: 2.4 MG/DL — SIGNIFICANT CHANGE UP (ref 1.6–2.6)
MCHC RBC-ENTMCNC: 28.5 PG — SIGNIFICANT CHANGE UP (ref 27–34)
MCHC RBC-ENTMCNC: 32.1 G/DL — SIGNIFICANT CHANGE UP (ref 32–36)
MCV RBC AUTO: 89 FL — SIGNIFICANT CHANGE UP (ref 80–100)
NRBC # BLD: 0 /100 WBCS — SIGNIFICANT CHANGE UP (ref 0–0)
NRBC # FLD: 0 K/UL — SIGNIFICANT CHANGE UP (ref 0–0)
PHOSPHATE SERPL-MCNC: 3.1 MG/DL — SIGNIFICANT CHANGE UP (ref 2.5–4.5)
PLATELET # BLD AUTO: 548 K/UL — HIGH (ref 150–400)
POTASSIUM SERPL-MCNC: 4.3 MMOL/L — SIGNIFICANT CHANGE UP (ref 3.5–5.3)
POTASSIUM SERPL-SCNC: 4.3 MMOL/L — SIGNIFICANT CHANGE UP (ref 3.5–5.3)
PROTHROM AB SERPL-ACNC: 12.5 SEC — SIGNIFICANT CHANGE UP (ref 9.9–13.4)
RBC # BLD: 3.26 M/UL — LOW (ref 3.8–5.2)
RBC # FLD: 12.3 % — SIGNIFICANT CHANGE UP (ref 10.3–14.5)
RH IG SCN BLD-IMP: POSITIVE — SIGNIFICANT CHANGE UP
SODIUM SERPL-SCNC: 137 MMOL/L — SIGNIFICANT CHANGE UP (ref 135–145)
WBC # BLD: 13.14 K/UL — HIGH (ref 3.8–10.5)
WBC # FLD AUTO: 13.14 K/UL — HIGH (ref 3.8–10.5)

## 2024-12-10 RX ORDER — ASPIRIN 81 MG
81 TABLET, DELAYED RELEASE (ENTERIC COATED) ORAL DAILY
Refills: 0 | Status: DISCONTINUED | OUTPATIENT
Start: 2024-12-10 | End: 2024-12-23

## 2024-12-10 RX ORDER — SODIUM CHLORIDE 9 MG/ML
1000 INJECTION, SOLUTION INTRAMUSCULAR; INTRAVENOUS; SUBCUTANEOUS
Refills: 0 | Status: DISCONTINUED | OUTPATIENT
Start: 2024-12-11 | End: 2024-12-11

## 2024-12-10 RX ADMIN — HEPARIN SODIUM 5000 UNIT(S): 1000 INJECTION, SOLUTION INTRAVENOUS; SUBCUTANEOUS at 14:44

## 2024-12-10 RX ADMIN — PIPERACILLIN AND TAZOBACTAM 25 GRAM(S): 3; .375 INJECTION, POWDER, LYOPHILIZED, FOR SOLUTION INTRAVENOUS at 22:36

## 2024-12-10 RX ADMIN — INSULIN GLARGINE-YFGN 20 UNIT(S): 100 INJECTION, SOLUTION SUBCUTANEOUS at 08:56

## 2024-12-10 RX ADMIN — HEPARIN SODIUM 5000 UNIT(S): 1000 INJECTION, SOLUTION INTRAVENOUS; SUBCUTANEOUS at 22:37

## 2024-12-10 RX ADMIN — GABAPENTIN 100 MILLIGRAM(S): 300 CAPSULE ORAL at 14:43

## 2024-12-10 RX ADMIN — GABAPENTIN 100 MILLIGRAM(S): 300 CAPSULE ORAL at 22:36

## 2024-12-10 RX ADMIN — Medication 6 UNIT(S): at 08:54

## 2024-12-10 RX ADMIN — Medication 3 MILLIGRAM(S): at 22:37

## 2024-12-10 RX ADMIN — Medication 6 UNIT(S): at 12:18

## 2024-12-10 RX ADMIN — ACETAMINOPHEN 650 MILLIGRAM(S): 80 SOLUTION/ DROPS ORAL at 18:40

## 2024-12-10 RX ADMIN — Medication 81 MILLIGRAM(S): at 12:21

## 2024-12-10 RX ADMIN — ATORVASTATIN CALCIUM 20 MILLIGRAM(S): 40 TABLET, FILM COATED ORAL at 22:37

## 2024-12-10 RX ADMIN — ACETAMINOPHEN 650 MILLIGRAM(S): 80 SOLUTION/ DROPS ORAL at 18:10

## 2024-12-10 RX ADMIN — PIPERACILLIN AND TAZOBACTAM 25 GRAM(S): 3; .375 INJECTION, POWDER, LYOPHILIZED, FOR SOLUTION INTRAVENOUS at 08:05

## 2024-12-10 RX ADMIN — Medication 6 UNIT(S): at 18:02

## 2024-12-10 RX ADMIN — HEPARIN SODIUM 5000 UNIT(S): 1000 INJECTION, SOLUTION INTRAVENOUS; SUBCUTANEOUS at 08:06

## 2024-12-10 RX ADMIN — Medication 10 MILLIGRAM(S): at 08:03

## 2024-12-10 RX ADMIN — PIPERACILLIN AND TAZOBACTAM 25 GRAM(S): 3; .375 INJECTION, POWDER, LYOPHILIZED, FOR SOLUTION INTRAVENOUS at 14:44

## 2024-12-10 RX ADMIN — GABAPENTIN 100 MILLIGRAM(S): 300 CAPSULE ORAL at 08:03

## 2024-12-10 NOTE — PROGRESS NOTE ADULT - SUBJECTIVE AND OBJECTIVE BOX
CARDIOLOGY FOLLOW UP - Dr. Oliva  DATE OF SERVICE: 12/10/24    CC no cp or sob       REVIEW OF SYSTEMS:  CONSTITUTIONAL: No fever, weight loss, or fatigue  RESPIRATORY: No cough, wheezing, chills or hemoptysis; No Shortness of Breath  CARDIOVASCULAR: No chest pain, palpitations, passing out, dizziness  GASTROINTESTINAL: No abdominal or epigastric pain. No nausea, vomiting, or hematemesis; No diarrhea or constipation. No melena or hematochezia.      PHYSICAL EXAM:  T(C): 37 (12-10-24 @ 08:00), Max: 37 (12-10-24 @ 08:00)  HR: 77 (12-10-24 @ 08:00) (77 - 77)  BP: 115/69 (12-10-24 @ 08:00) (115/69 - 115/69)  RR: 18 (12-10-24 @ 08:00) (18 - 18)  SpO2: 98% (12-10-24 @ 08:00) (98% - 98%)  Wt(kg): --  I&O's Summary      Appearance: Normal	  Cardiovascular: Normal S1 S2,RRR, No JVD, No murmurs  Respiratory: Lungs clear to auscultation	  Gastrointestinal:  Soft, Non-tender, + BS	  Extremities: Normal range of motion, No clubbing, cyanosis or edema      Home Medications:  aspirin 81 mg oral delayed release tablet: 1 tab(s) orally once a day (04 Dec 2024 04:28)  atorvastatin 20 mg oral tablet: 1 tab(s) orally once a day (at bedtime) (04 Dec 2024 04:28)  Farxiga 5 mg oral tablet: 1 tab(s) orally once a day (04 Dec 2024 04:32)  hydroCHLOROthiazide 25 mg oral tablet: 1 tab(s) orally once a day (04 Dec 2024 04:28)  Lantus Solostar Pen 100 units/mL subcutaneous solution: 30 unit(s) subcutaneous once a day (at bedtime) (04 Dec 2024 04:28)  losartan 100 mg oral tablet: 1 tab(s) orally once a day (04 Dec 2024 04:28)  Norvasc 10 mg oral tablet: 1 tab(s) orally once a day (04 Dec 2024 04:28)  NovoLOG 100 units/mL subcutaneous solution: 10 unit(s) subcutaneous 3 times a day (before meals) (04 Dec 2024 04:28)      MEDICATIONS  (STANDING):  amLODIPine   Tablet 10 milliGRAM(s) Oral daily  atorvastatin 20 milliGRAM(s) Oral at bedtime  dextrose 5%. 1000 milliLiter(s) (50 mL/Hr) IV Continuous <Continuous>  dextrose 5%. 1000 milliLiter(s) (100 mL/Hr) IV Continuous <Continuous>  dextrose 50% Injectable 25 Gram(s) IV Push once  dextrose 50% Injectable 12.5 Gram(s) IV Push once  dextrose 50% Injectable 25 Gram(s) IV Push once  gabapentin 100 milliGRAM(s) Oral three times a day  glucagon  Injectable 1 milliGRAM(s) IntraMuscular once  heparin   Injectable 5000 Unit(s) SubCutaneous every 8 hours  influenza  Vaccine (HIGH DOSE) 0.5 milliLiter(s) IntraMuscular once  insulin glargine Injectable (LANTUS) 20 Unit(s) SubCutaneous every morning  insulin lispro (ADMELOG) corrective regimen sliding scale   SubCutaneous three times a day before meals  insulin lispro (ADMELOG) corrective regimen sliding scale   SubCutaneous at bedtime  insulin lispro Injectable (ADMELOG) 6 Unit(s) SubCutaneous three times a day before meals  piperacillin/tazobactam IVPB.. 3.375 Gram(s) IV Intermittent every 8 hours  polyethylene glycol 3350 17 Gram(s) Oral daily  sodium chloride 0.9%. 1000 milliLiter(s) (75 mL/Hr) IV Continuous <Continuous>      TELEMETRY: 	    ECG:  	  RADIOLOGY:   DIAGNOSTIC TESTING:  [ ] Echocardiogram:  [ ]  Catheterization:  [ ] Stress Test:    OTHER: 	    LABS:	 	                            9.3    13.14 )-----------( 548      ( 10 Dec 2024 08:20 )             29.0     12-10    137  |  104  |  16  ----------------------------<  128[H]  4.3   |  23  |  1.46[H]    Ca    9.3      10 Dec 2024 08:20  Phos  3.1     12-10  Mg     2.40     12-10      PT/INR - ( 10 Dec 2024 08:20 )   PT: 12.5 sec;   INR: 1.05 ratio         PTT - ( 10 Dec 2024 08:20 )  PTT:30.7 sec

## 2024-12-10 NOTE — PROGRESS NOTE ADULT - ASSESSMENT
69F 12/4 s/p L foot I&D to bone  -Pt was seen and evaluated  -Afebrile, WBC 11.6  -12/4 s/p b/s  incision and drainage on left foot hallux wound was performed to the level of and not beyond bone,  no malodor, scant Purulence drainage. Right foot no open wounds, no acute signs of infection.  -Left foot X-ray read: Fracture distal phalanx great toe. Soft tissue swelling of this digit as well as subcutaneous air.  -Left foot wound culture: Gram negative rods, Strep constellatus  -JACK/PVR: RABI NC, LABI 0.76  -ID recs, appreciated  -Vasc recs, appreciated - planning LLE angio Wed  -Pod Plan: Local wound care vs Left foot partial 1st ray resection pending vascular recs  -Please document medial and cardiac clearance for podiatric surgery under anesthesia

## 2024-12-10 NOTE — PROGRESS NOTE ADULT - SUBJECTIVE AND OBJECTIVE BOX
Patient is a 69y old  Female who presents with a chief complaint of Sent in by podiatrist for right foot/toe wound, worsening since October. (10 Dec 2024 13:00)       INTERVAL HPI/OVERNIGHT EVENTS:  Patient seen and evaluated at bedside.  Pt is resting comfortable in NAD. Denies N/V/F/C.  Pain rated at X/10    Allergies    No Known Allergies    Intolerances        Vital Signs Last 24 Hrs  T(C): 37.2 (10 Dec 2024 10:45), Max: 37.2 (10 Dec 2024 10:45)  T(F): 99 (10 Dec 2024 10:45), Max: 99 (10 Dec 2024 10:45)  HR: 72 (10 Dec 2024 10:45) (72 - 77)  BP: 115/52 (10 Dec 2024 10:45) (115/52 - 115/69)  BP(mean): --  RR: 18 (10 Dec 2024 10:45) (18 - 18)  SpO2: 97% (10 Dec 2024 10:45) (97% - 98%)    Parameters below as of 10 Dec 2024 10:45  Patient On (Oxygen Delivery Method): room air        LABS:                        9.3    13.14 )-----------( 548      ( 10 Dec 2024 08:20 )             29.0     12-10    137  |  104  |  16  ----------------------------<  128[H]  4.3   |  23  |  1.46[H]    Ca    9.3      10 Dec 2024 08:20  Phos  3.1     12-10  Mg     2.40     12-10      PT/INR - ( 10 Dec 2024 08:20 )   PT: 12.5 sec;   INR: 1.05 ratio         PTT - ( 10 Dec 2024 08:20 )  PTT:30.7 sec  Urinalysis Basic - ( 10 Dec 2024 08:20 )    Color: x / Appearance: x / SG: x / pH: x  Gluc: 128 mg/dL / Ketone: x  / Bili: x / Urobili: x   Blood: x / Protein: x / Nitrite: x   Leuk Esterase: x / RBC: x / WBC x   Sq Epi: x / Non Sq Epi: x / Bacteria: x      CAPILLARY BLOOD GLUCOSE      POCT Blood Glucose.: 140 mg/dL (10 Dec 2024 12:11)  POCT Blood Glucose.: 143 mg/dL (10 Dec 2024 08:38)  POCT Blood Glucose.: 157 mg/dL (09 Dec 2024 22:16)  POCT Blood Glucose.: 126 mg/dL (09 Dec 2024 17:20)      Lower Extremity Physical Exam:  Vascular: DP/PT 0/4, B/L, CFT <3 seconds B/L, Temperature gradient warm to cool, B/L.   Neuro: Epicritic sensation absent to the level of digits, B/L.  Musculoskeletal/Ortho: unremarkable  Skin: 12/4 s/p b/s  incision and drainage on left foot hallux wound was performed to the level of and not beyond bone,  mild malodor, scant purulence drainage. Right foot no open wounds, no acute signs of infection.    RADIOLOGY & ADDITIONAL TESTS:

## 2024-12-10 NOTE — PROGRESS NOTE ADULT - SUBJECTIVE AND OBJECTIVE BOX
Name of Patient : REJI MEADE  MRN: [id number]  Date of visit: 12-10-24     Subjective: Patient seen and examined. No new events except as noted.       REVIEW OF SYSTEMS:    CONSTITUTIONAL: No weakness, fevers or chills  EYES/ENT: No visual changes;  No vertigo or throat pain   NECK: No pain or stiffness  RESPIRATORY: No cough, wheezing, hemoptysis; No shortness of breath  CARDIOVASCULAR: No chest pain or palpitations  GASTROINTESTINAL: No abdominal or epigastric pain. No nausea, vomiting, or hematemesis; No diarrhea or constipation. No melena or hematochezia.  GENITOURINARY: No dysuria, frequency or hematuria  NEUROLOGICAL: No numbness or weakness  SKIN: No itching, burning, rashes, or lesions   All other review of systems is negative unless indicated above.    MEDICATIONS:  MEDICATIONS  (STANDING):  amLODIPine   Tablet 10 milliGRAM(s) Oral daily  aspirin  chewable 81 milliGRAM(s) Oral daily  atorvastatin 20 milliGRAM(s) Oral at bedtime  dextrose 5%. 1000 milliLiter(s) (50 mL/Hr) IV Continuous <Continuous>  dextrose 5%. 1000 milliLiter(s) (100 mL/Hr) IV Continuous <Continuous>  dextrose 50% Injectable 25 Gram(s) IV Push once  dextrose 50% Injectable 12.5 Gram(s) IV Push once  dextrose 50% Injectable 25 Gram(s) IV Push once  gabapentin 100 milliGRAM(s) Oral three times a day  glucagon  Injectable 1 milliGRAM(s) IntraMuscular once  heparin   Injectable 5000 Unit(s) SubCutaneous every 8 hours  influenza  Vaccine (HIGH DOSE) 0.5 milliLiter(s) IntraMuscular once  insulin glargine Injectable (LANTUS) 20 Unit(s) SubCutaneous every morning  insulin lispro (ADMELOG) corrective regimen sliding scale   SubCutaneous three times a day before meals  insulin lispro (ADMELOG) corrective regimen sliding scale   SubCutaneous at bedtime  insulin lispro Injectable (ADMELOG) 6 Unit(s) SubCutaneous three times a day before meals  piperacillin/tazobactam IVPB.. 3.375 Gram(s) IV Intermittent every 8 hours  polyethylene glycol 3350 17 Gram(s) Oral daily      PHYSICAL EXAM:  T(C): 37.3 (12-10-24 @ 18:40), Max: 37.7 (12-10-24 @ 18:05)  HR: 85 (12-10-24 @ 18:05) (72 - 85)  BP: 122/65 (12-10-24 @ 18:05) (115/52 - 135/60)  RR: 18 (12-10-24 @ 18:05) (18 - 18)  SpO2: 98% (12-10-24 @ 18:05) (97% - 98%)  Wt(kg): --  I&O's Summary        Appearance: Normal	  HEENT:  PERRLA   Lymphatic: No lymphadenopathy   Cardiovascular: Normal S1 S2, no JVD  Respiratory: normal effort , clear  Gastrointestinal:  Soft, Non-tender  Skin: No rashes,  warm to touch  Psychiatry:  Mood & affect appropriate  Musculuskeletal: No edema    recent labs, Imaging and EKGs personally reviewed   CODE status discussed with the patient in detail                          9.3    13.14 )-----------( 548      ( 10 Dec 2024 08:20 )             29.0               12-10    137  |  104  |  16  ----------------------------<  128[H]  4.3   |  23  |  1.46[H]    Ca    9.3      10 Dec 2024 08:20  Phos  3.1     12-10  Mg     2.40     12-10      PT/INR - ( 10 Dec 2024 08:20 )   PT: 12.5 sec;   INR: 1.05 ratio         PTT - ( 10 Dec 2024 08:20 )  PTT:30.7 sec                   Urinalysis Basic - ( 10 Dec 2024 08:20 )    Color: x / Appearance: x / SG: x / pH: x  Gluc: 128 mg/dL / Ketone: x  / Bili: x / Urobili: x   Blood: x / Protein: x / Nitrite: x   Leuk Esterase: x / RBC: x / WBC x   Sq Epi: x / Non Sq Epi: x / Bacteria: x

## 2024-12-10 NOTE — PROGRESS NOTE ADULT - SUBJECTIVE AND OBJECTIVE BOX
[facility], Division of Infectious Diseases  TOBY Szymanski Y. Patel, S. Shah, G. Álvaro  [phone]  ([phone] - weekdays after 5pm and weekends)    Name: REJI MEADE  Age/Gender: 69y Female  MRN: [id number]    Interval History:  Notes reviewed.   No concerning overnight events.  Afebrile.   denies diarrhea    Allergies: No Known Allergies      Objective:  Vitals:   T(F): 98.6 (12-10-24 @ 08:00), Max: 98.6 (12-10-24 @ 08:00)  HR: 77 (12-10-24 @ 08:00) (77 - 77)  BP: 115/69 (12-10-24 @ 08:00) (115/69 - 115/69)  RR: 18 (12-10-24 @ 08:00) (18 - 18)  SpO2: 98% (12-10-24 @ 08:00) (98% - 98%)  Physical Examination:  General: no acute distress  HEENT: anicteric  Cardio: S1, S2, normal rate  Resp: breathing comfortably on RA   Abd: soft, NT, ND  Ext: L foot wrapped w/ dressing  Skin: warm, dry    Laboratory Studies:  CBC:                       9.3    13.14 )-----------( 548      ( 10 Dec 2024 08:20 )             29.0     WBC Trend:  13.14 12-10-24 @ 08:20  11.60 12-09-24 @ 07:10  13.53 12-07-24 @ 18:46  11.17 12-05-24 @ 06:02  13.41 12-04-24 @ 06:20  13.47 12-03-24 @ 22:30    CMP: 12-10    137  |  104  |  16  ----------------------------<  128[H]  4.3   |  23  |  1.46[H]    Ca    9.3      10 Dec 2024 08:20  Phos  3.1     12-10  Mg     2.40     12-10            Urinalysis Basic - ( 10 Dec 2024 08:20 )    Color: x / Appearance: x / SG: x / pH: x  Gluc: 128 mg/dL / Ketone: x  / Bili: x / Urobili: x   Blood: x / Protein: x / Nitrite: x   Leuk Esterase: x / RBC: x / WBC x   Sq Epi: x / Non Sq Epi: x / Bacteria: x      Microbiology: reviewed     Culture - Blood (collected 12-07-24 @ 22:31)  Source: .Blood BLOOD  Preliminary Report (12-10-24 @ 01:02):    No growth at 48 Hours    Culture - Blood (collected 12-07-24 @ 18:46)  Source: .Blood BLOOD  Preliminary Report (12-10-24 @ 01:02):    No growth at 48 Hours    Culture - Abscess with Gram Stain (collected 12-04-24 @ 01:07)  Source: .Abscess  Gram Stain (12-04-24 @ 16:17):    No polymorphonuclear cells seen per low power field    Few Gram Negative Rods per oil power field  Final Report (12-09-24 @ 07:31):    Numerous Morganella morganii    Moderate Citrobacter amalonaticus complex    Few Staphylococcus lugdunensis    Commensal aura consistent with body site  Organism: Morganella morganii  Citrobacter amalonaticus complex  Staphylococcus lugdunensis (12-09-24 @ 07:31)  Organism: Staphylococcus lugdunensis (12-09-24 @ 07:31)      Method Type: MARIE      -  Clindamycin: S <=0.25      -  Erythromycin: S <=0.25      -  Gentamicin: S <=4 Should not be used as monotherapy      -  Oxacillin: S 0.5      -  Penicillin: R >2      -  Rifampin: S <=1 Should not be used as monotherapy      -  Tetracycline: S <=4      -  Trimethoprim/Sulfamethoxazole: S <=0.5/9.5      -  Vancomycin: S 1  Organism: Citrobacter amalonaticus complex (12-09-24 @ 07:31)      Method Type: MARIE      -  Amoxicillin/Clavulanic Acid: S <=8/4      -  Ampicillin: R <=8 These ampicillin results predict results for amoxicillin      -  Ampicillin/Sulbactam: S <=4/2      -  Aztreonam: S <=4      -  Cefazolin: R >16      -  Cefepime: S <=2      -  Cefoxitin: S <=8      -  Ceftriaxone: S <=1      -  Ciprofloxacin: S <=0.25      -  Ertapenem: S <=0.5      -  Gentamicin: S <=2      -  Imipenem: S <=1      -  Levofloxacin: S <=0.5      -  Meropenem: S <=1      -  Piperacillin/Tazobactam: S <=8      -  Tobramycin: S <=2      -  Trimethoprim/Sulfamethoxazole: S <=0.5/9.5  Organism: Mirna chenii (12-09-24 @ 07:31)      Method Type: MARIE      -  Amoxicillin/Clavulanic Acid: R >16/8      -  Ampicillin: R >16 These ampicillin results predict results for amoxicillin      -  Ampicillin/Sulbactam: R >16/8      -  Aztreonam: R >16      -  Cefazolin: R >16      -  Cefepime: S <=2      -  Cefoxitin: S <=8      -  Ceftriaxone: S <=1      -  Ciprofloxacin: S <=0.25      -  Ertapenem: S <=0.5      -  Gentamicin: S <=2      -  Levofloxacin: S <=0.5      -  Meropenem: S <=1      -  Piperacillin/Tazobactam: S <=8      -  Tobramycin: S <=2      -  Trimethoprim/Sulfamethoxazole: S <=0.5/9.5    Culture - Blood (collected 12-03-24 @ 22:30)  Source: .Blood BLOOD  Final Report (12-09-24 @ 03:00):    No growth at 5 days    Culture - Blood (collected 12-03-24 @ 22:20)  Source: .Blood BLOOD  Final Report (12-09-24 @ 03:00):    No growth at 5 days        Radiology: reviewed     Medications:  acetaminophen     Tablet .. 650 milliGRAM(s) Oral every 6 hours PRN  amLODIPine   Tablet 10 milliGRAM(s) Oral daily  atorvastatin 20 milliGRAM(s) Oral at bedtime  dextrose 5%. 1000 milliLiter(s) IV Continuous <Continuous>  dextrose 5%. 1000 milliLiter(s) IV Continuous <Continuous>  dextrose 50% Injectable 25 Gram(s) IV Push once  dextrose 50% Injectable 12.5 Gram(s) IV Push once  dextrose 50% Injectable 25 Gram(s) IV Push once  dextrose Oral Gel 15 Gram(s) Oral once PRN  gabapentin 100 milliGRAM(s) Oral three times a day  glucagon  Injectable 1 milliGRAM(s) IntraMuscular once  heparin   Injectable 5000 Unit(s) SubCutaneous every 8 hours  influenza  Vaccine (HIGH DOSE) 0.5 milliLiter(s) IntraMuscular once  insulin glargine Injectable (LANTUS) 20 Unit(s) SubCutaneous every morning  insulin lispro (ADMELOG) corrective regimen sliding scale   SubCutaneous three times a day before meals  insulin lispro (ADMELOG) corrective regimen sliding scale   SubCutaneous at bedtime  insulin lispro Injectable (ADMELOG) 6 Unit(s) SubCutaneous three times a day before meals  melatonin 3 milliGRAM(s) Oral at bedtime PRN  ondansetron Injectable 4 milliGRAM(s) IV Push every 8 hours PRN  piperacillin/tazobactam IVPB.. 3.375 Gram(s) IV Intermittent every 8 hours  polyethylene glycol 3350 17 Gram(s) Oral daily  sodium chloride 0.9%. 1000 milliLiter(s) IV Continuous <Continuous>    Antimicrobials:  piperacillin/tazobactam IVPB.. 3.375 Gram(s) IV Intermittent every 8 hours

## 2024-12-10 NOTE — PROGRESS NOTE ADULT - ASSESSMENT
68 y/o F PMhx DM II, HTN who presented w/ left great toe wound    L hallux infection, c/f osteo  leukocytosis, DM II  CRP 88.8,   s/p podiatry I&D L hallux wound to the level of and not beyond bone,  mild malodor, scant serosanguinous drainage. Right foot no open wounds, no acute signs of infection.  foot x-ray- fracture distal phalanx great toe. soft tissue swelling of this digit as well as subcutaneous air.  per vascular possible plans for angiogram  wound cx w/ strep constellatus, citrobacter, morganella, staph lugdunensis (oxacillin sensitive)  febrile 12/7, ceftriaxone switched to zosyn  noted plans possible plans for LLE angio on Wed    Recommendations  c/w zosyn for now  f/u vascular and podiatry recs    Albino Rea M.D.  [facility], Division of Infectious Diseases  [phone]  After 5pm on weekdays and all day on weekends - please call [phone]

## 2024-12-10 NOTE — PROGRESS NOTE ADULT - SUBJECTIVE AND OBJECTIVE BOX
[facility]   MD LI WADE MD ANGELA WONG, PA    TEL:  OFFICE: [phone]  From 5pm-7am Answering Service [phone]    -- RENAL FOLLOW UP NOTE ---Date of Service 12-10-24 @ 13:00    Patient is a 69y old  Female who presents with a chief complaint of Sent in by podiatrist for right foot/toe wound, worsening since October. (10 Dec 2024 10:52)      Patient seen and examined at bedside. No chest pain/sob    VITALS:  T(F): 99 (12-10-24 @ 10:45), Max: 99 (12-10-24 @ 10:45)  HR: 72 (12-10-24 @ 10:45)  BP: 115/52 (12-10-24 @ 10:45)  RR: 18 (12-10-24 @ 10:45)  SpO2: 97% (12-10-24 @ 10:45)  Wt(kg): --        PHYSICAL EXAM:  General: NAD  Neck: No JVD  Respiratory: CTAB, no wheezes, rales or rhonchi  Cardiovascular: S1, S2, RRR  Gastrointestinal: BS+, soft, NT/ND  Extremities: No peripheral edema    Hospital Medications:   MEDICATIONS  (STANDING):  amLODIPine   Tablet 10 milliGRAM(s) Oral daily  aspirin  chewable 81 milliGRAM(s) Oral daily  atorvastatin 20 milliGRAM(s) Oral at bedtime  dextrose 5%. 1000 milliLiter(s) (50 mL/Hr) IV Continuous <Continuous>  dextrose 5%. 1000 milliLiter(s) (100 mL/Hr) IV Continuous <Continuous>  dextrose 50% Injectable 25 Gram(s) IV Push once  dextrose 50% Injectable 12.5 Gram(s) IV Push once  dextrose 50% Injectable 25 Gram(s) IV Push once  gabapentin 100 milliGRAM(s) Oral three times a day  glucagon  Injectable 1 milliGRAM(s) IntraMuscular once  heparin   Injectable 5000 Unit(s) SubCutaneous every 8 hours  influenza  Vaccine (HIGH DOSE) 0.5 milliLiter(s) IntraMuscular once  insulin glargine Injectable (LANTUS) 20 Unit(s) SubCutaneous every morning  insulin lispro (ADMELOG) corrective regimen sliding scale   SubCutaneous three times a day before meals  insulin lispro (ADMELOG) corrective regimen sliding scale   SubCutaneous at bedtime  insulin lispro Injectable (ADMELOG) 6 Unit(s) SubCutaneous three times a day before meals  piperacillin/tazobactam IVPB.. 3.375 Gram(s) IV Intermittent every 8 hours  polyethylene glycol 3350 17 Gram(s) Oral daily  sodium chloride 0.9%. 1000 milliLiter(s) (75 mL/Hr) IV Continuous <Continuous>      LABS:  12-10    137  |  104  |  16  ----------------------------<  128[H]  4.3   |  23  |  1.46[H]    Ca    9.3      10 Dec 2024 08:20  Phos  3.1     12-10  Mg     2.40     12-10      Creatinine Trend: 1.46 <--, 1.42 <--, 1.21 <--, 1.42 <--, 1.52 <--, 1.60 <--, 1.50 <--, 1.41 <--    Phosphorus: 3.1 mg/dL (12-10 @ 08:20)                              9.3    13.14 )-----------( 548      ( 10 Dec 2024 08:20 )             29.0     Urine Studies:  Urinalysis - [12-10-24 @ 08:20]      Color  / Appearance  / SG  / pH       Gluc 128 / Ketone   / Bili  / Urobili        Blood  / Protein  / Leuk Est  / Nitrite       RBC  / WBC  / Hyaline  / Gran  / Sq Epi  / Non Sq Epi  / Bacteria     Urine Creatinine 134      [12-05-24 @ 11:49]  Urine Protein 26      [12-05-24 @ 11:49]  Urine Urea Nitrogen 541.6      [12-05-24 @ 11:49]    HbA1c 10.7      [02-25-20 @ 14:10]  TSH 1.00      [12-04-24 @ 06:20]        RADIOLOGY & ADDITIONAL STUDIES:

## 2024-12-10 NOTE — PROGRESS NOTE ADULT - PROBLEM SELECTOR PLAN 5
Heparin sq    Dispo: febrile so abx switched back to Zosyn  no longer febrile.   vascular angiogram pending tentatively Monday.   (noted vascular postponed procedure to Wednesday.  From medical perspective, pt no longer febrile, can proceed. no need to postpone) Heparin sq    Dispo: febrile so abx switched back to Zosyn  no longer febrile.   vascular angiogram pending tentatively Monday.   (noted vascular postponed procedure to Wednesday.  From medical perspective, pt no longer febrile, can proceed. no need to postpone)  plan fro tomorrow  resume ASA

## 2024-12-10 NOTE — PROGRESS NOTE ADULT - ASSESSMENT
59yo female h/o DM type2, HTN, hyperlipidemia; Pt c/o left great toe infection. vascular on board planning for angiogram. nephrology consulted for elevated S.Cr.    renal insufficiency  baseline ~ 1.4 per team  long standing hx of dm and htn  admitted with scr now up trending  scr relatively stable  on losartan and htcz.  will hold for now given worsen scr (last dose 12/5)  FEurea 35%  renal us no hydro   s/p  trial of gentle hydration 12/5  Renal function stable  plans for angiogram with vascular   recommending hydration with ns @ 75cc/hr 6 hrs before and 6 hrs after angio  Monitor BMP and UO.  Avoid further nephrotoxins if possible.    htn  controlled  holding losartan and hctz sec to worsen scr  C/W amlodipine at current dose.  Low salt diet.  monitor BP.    Hyponatremia  Marginal.  Optimize glycemic control.  Monitor Na.    LE  wound  f/u vascular  angiogram likely wed   risk vs benefit explained to patient; she expressed understanding

## 2024-12-11 ENCOUNTER — APPOINTMENT (OUTPATIENT)
Dept: VASCULAR SURGERY | Facility: HOSPITAL | Age: 69
End: 2024-12-11

## 2024-12-11 LAB
ANION GAP SERPL CALC-SCNC: 14 MMOL/L — SIGNIFICANT CHANGE UP (ref 7–14)
APTT BLD: 30.9 SEC — SIGNIFICANT CHANGE UP (ref 24.5–35.6)
BLD GP AB SCN SERPL QL: NEGATIVE — SIGNIFICANT CHANGE UP
BUN SERPL-MCNC: 18 MG/DL — SIGNIFICANT CHANGE UP (ref 7–23)
CALCIUM SERPL-MCNC: 9.2 MG/DL — SIGNIFICANT CHANGE UP (ref 8.4–10.5)
CHLORIDE SERPL-SCNC: 102 MMOL/L — SIGNIFICANT CHANGE UP (ref 98–107)
CO2 SERPL-SCNC: 21 MMOL/L — LOW (ref 22–31)
CREAT SERPL-MCNC: 1.41 MG/DL — HIGH (ref 0.5–1.3)
EGFR: 40 ML/MIN/1.73M2 — LOW
GLUCOSE BLDC GLUCOMTR-MCNC: 129 MG/DL — HIGH (ref 70–99)
GLUCOSE BLDC GLUCOMTR-MCNC: 159 MG/DL — HIGH (ref 70–99)
GLUCOSE BLDC GLUCOMTR-MCNC: 165 MG/DL — HIGH (ref 70–99)
GLUCOSE BLDC GLUCOMTR-MCNC: 183 MG/DL — HIGH (ref 70–99)
GLUCOSE SERPL-MCNC: 138 MG/DL — HIGH (ref 70–99)
HCT VFR BLD CALC: 30.1 % — LOW (ref 34.5–45)
HGB BLD-MCNC: 9.4 G/DL — LOW (ref 11.5–15.5)
INR BLD: 1.08 RATIO — SIGNIFICANT CHANGE UP (ref 0.85–1.16)
MAGNESIUM SERPL-MCNC: 2.2 MG/DL — SIGNIFICANT CHANGE UP (ref 1.6–2.6)
MCHC RBC-ENTMCNC: 28.3 PG — SIGNIFICANT CHANGE UP (ref 27–34)
MCHC RBC-ENTMCNC: 31.2 G/DL — LOW (ref 32–36)
MCV RBC AUTO: 90.7 FL — SIGNIFICANT CHANGE UP (ref 80–100)
NRBC # BLD: 0 /100 WBCS — SIGNIFICANT CHANGE UP (ref 0–0)
NRBC # FLD: 0 K/UL — SIGNIFICANT CHANGE UP (ref 0–0)
PHOSPHATE SERPL-MCNC: 3 MG/DL — SIGNIFICANT CHANGE UP (ref 2.5–4.5)
PLATELET # BLD AUTO: 593 K/UL — HIGH (ref 150–400)
POTASSIUM SERPL-MCNC: 4.2 MMOL/L — SIGNIFICANT CHANGE UP (ref 3.5–5.3)
POTASSIUM SERPL-SCNC: 4.2 MMOL/L — SIGNIFICANT CHANGE UP (ref 3.5–5.3)
PROTHROM AB SERPL-ACNC: 12.5 SEC — SIGNIFICANT CHANGE UP (ref 9.9–13.4)
RBC # BLD: 3.32 M/UL — LOW (ref 3.8–5.2)
RBC # FLD: 12.4 % — SIGNIFICANT CHANGE UP (ref 10.3–14.5)
RH IG SCN BLD-IMP: POSITIVE — SIGNIFICANT CHANGE UP
SODIUM SERPL-SCNC: 137 MMOL/L — SIGNIFICANT CHANGE UP (ref 135–145)
WBC # BLD: 12.43 K/UL — HIGH (ref 3.8–10.5)
WBC # FLD AUTO: 12.43 K/UL — HIGH (ref 3.8–10.5)

## 2024-12-11 RX ORDER — BENZOCAINE AND MENTHOL 15; 3.6 MG/1; MG/1
1 LOZENGE ORAL ONCE
Refills: 0 | Status: COMPLETED | OUTPATIENT
Start: 2024-12-11 | End: 2024-12-11

## 2024-12-11 RX ORDER — SODIUM CHLORIDE 9 MG/ML
1000 INJECTION, SOLUTION INTRAVENOUS
Refills: 0 | Status: DISCONTINUED | OUTPATIENT
Start: 2024-12-11 | End: 2024-12-12

## 2024-12-11 RX ORDER — GLUCAGON INJECTION, SOLUTION 0.5 MG/.1ML
1 INJECTION, SOLUTION SUBCUTANEOUS ONCE
Refills: 0 | Status: DISCONTINUED | OUTPATIENT
Start: 2024-12-11 | End: 2024-12-23

## 2024-12-11 RX ORDER — DEXTROSE MONOHYDRATE 25 G/50ML
15 INJECTION, SOLUTION INTRAVENOUS ONCE
Refills: 0 | Status: DISCONTINUED | OUTPATIENT
Start: 2024-12-11 | End: 2024-12-12

## 2024-12-11 RX ORDER — LIDOCAINE 50 MG/G
1 OINTMENT TOPICAL ONCE
Refills: 0 | Status: DISCONTINUED | OUTPATIENT
Start: 2024-12-11 | End: 2024-12-12

## 2024-12-11 RX ORDER — INSULIN LISPRO 100/ML
VIAL (ML) SUBCUTANEOUS EVERY 6 HOURS
Refills: 0 | Status: DISCONTINUED | OUTPATIENT
Start: 2024-12-11 | End: 2024-12-12

## 2024-12-11 RX ORDER — DEXTROSE MONOHYDRATE 25 G/50ML
12.5 INJECTION, SOLUTION INTRAVENOUS ONCE
Refills: 0 | Status: DISCONTINUED | OUTPATIENT
Start: 2024-12-11 | End: 2024-12-12

## 2024-12-11 RX ORDER — INSULIN GLARGINE-YFGN 100 [IU]/ML
12 INJECTION, SOLUTION SUBCUTANEOUS ONCE
Refills: 0 | Status: COMPLETED | OUTPATIENT
Start: 2024-12-11 | End: 2024-12-11

## 2024-12-11 RX ORDER — DEXTROSE MONOHYDRATE 25 G/50ML
25 INJECTION, SOLUTION INTRAVENOUS ONCE
Refills: 0 | Status: DISCONTINUED | OUTPATIENT
Start: 2024-12-11 | End: 2024-12-12

## 2024-12-11 RX ORDER — INSULIN GLARGINE-YFGN 100 [IU]/ML
20 INJECTION, SOLUTION SUBCUTANEOUS EVERY MORNING
Refills: 0 | Status: DISCONTINUED | OUTPATIENT
Start: 2024-12-12 | End: 2024-12-12

## 2024-12-11 RX ORDER — SODIUM CHLORIDE 9 MG/ML
1000 INJECTION, SOLUTION INTRAMUSCULAR; INTRAVENOUS; SUBCUTANEOUS
Refills: 0 | Status: DISCONTINUED | OUTPATIENT
Start: 2024-12-11 | End: 2024-12-12

## 2024-12-11 RX ADMIN — SODIUM CHLORIDE 75 MILLILITER(S): 9 INJECTION, SOLUTION INTRAMUSCULAR; INTRAVENOUS; SUBCUTANEOUS at 05:14

## 2024-12-11 RX ADMIN — Medication 10 MILLIGRAM(S): at 05:15

## 2024-12-11 RX ADMIN — Medication 6 UNIT(S): at 17:45

## 2024-12-11 RX ADMIN — ACETAMINOPHEN 650 MILLIGRAM(S): 80 SOLUTION/ DROPS ORAL at 23:53

## 2024-12-11 RX ADMIN — GABAPENTIN 100 MILLIGRAM(S): 300 CAPSULE ORAL at 05:14

## 2024-12-11 RX ADMIN — Medication 1: at 17:46

## 2024-12-11 RX ADMIN — Medication 81 MILLIGRAM(S): at 14:30

## 2024-12-11 RX ADMIN — PIPERACILLIN AND TAZOBACTAM 25 GRAM(S): 3; .375 INJECTION, POWDER, LYOPHILIZED, FOR SOLUTION INTRAVENOUS at 05:14

## 2024-12-11 RX ADMIN — Medication 1: at 22:39

## 2024-12-11 RX ADMIN — ATORVASTATIN CALCIUM 20 MILLIGRAM(S): 40 TABLET, FILM COATED ORAL at 22:38

## 2024-12-11 RX ADMIN — GABAPENTIN 100 MILLIGRAM(S): 300 CAPSULE ORAL at 14:30

## 2024-12-11 RX ADMIN — Medication 1: at 05:55

## 2024-12-11 RX ADMIN — PIPERACILLIN AND TAZOBACTAM 25 GRAM(S): 3; .375 INJECTION, POWDER, LYOPHILIZED, FOR SOLUTION INTRAVENOUS at 22:38

## 2024-12-11 RX ADMIN — ACETAMINOPHEN 650 MILLIGRAM(S): 80 SOLUTION/ DROPS ORAL at 22:38

## 2024-12-11 RX ADMIN — BENZOCAINE AND MENTHOL 1 LOZENGE: 15; 3.6 LOZENGE ORAL at 02:23

## 2024-12-11 RX ADMIN — PIPERACILLIN AND TAZOBACTAM 25 GRAM(S): 3; .375 INJECTION, POWDER, LYOPHILIZED, FOR SOLUTION INTRAVENOUS at 14:31

## 2024-12-11 RX ADMIN — INSULIN GLARGINE-YFGN 12 UNIT(S): 100 INJECTION, SOLUTION SUBCUTANEOUS at 11:54

## 2024-12-11 RX ADMIN — HEPARIN SODIUM 5000 UNIT(S): 1000 INJECTION, SOLUTION INTRAVENOUS; SUBCUTANEOUS at 22:38

## 2024-12-11 RX ADMIN — GABAPENTIN 100 MILLIGRAM(S): 300 CAPSULE ORAL at 22:38

## 2024-12-11 NOTE — PROGRESS NOTE ADULT - SUBJECTIVE AND OBJECTIVE BOX
[facility]   MD LI WADE MD ANGELA WONG, PA    TEL:  OFFICE: [phone]  From 5pm-7am Answering Service [phone]    -- RENAL FOLLOW UP NOTE ---Date of Service 12-11-24 @ 14:11    Patient is a 69y old  Female who presents with a chief complaint of Sent in by podiatrist for right foot/toe wound, worsening since October. (11 Dec 2024 12:50)      Patient seen and examined at bedside. No chest pain/sob    VITALS:  T(F): 98.9 (12-11-24 @ 10:20), Max: 99.9 (12-10-24 @ 18:05)  HR: 74 (12-11-24 @ 10:20)  BP: 118/58 (12-11-24 @ 10:20)  RR: 18 (12-11-24 @ 10:20)  SpO2: 95% (12-11-24 @ 10:20)  Wt(kg): --        PHYSICAL EXAM:  General: NAD  Neck: No JVD  Respiratory: CTAB, no wheezes, rales or rhonchi  Cardiovascular: S1, S2, RRR  Gastrointestinal: BS+, soft, NT/ND  Extremities: No peripheral edema    Hospital Medications:   MEDICATIONS  (STANDING):  amLODIPine   Tablet 10 milliGRAM(s) Oral daily  aspirin  chewable 81 milliGRAM(s) Oral daily  atorvastatin 20 milliGRAM(s) Oral at bedtime  dextrose 5%. 1000 milliLiter(s) (50 mL/Hr) IV Continuous <Continuous>  dextrose 5%. 1000 milliLiter(s) (100 mL/Hr) IV Continuous <Continuous>  dextrose 5%. 1000 milliLiter(s) (50 mL/Hr) IV Continuous <Continuous>  dextrose 5%. 1000 milliLiter(s) (100 mL/Hr) IV Continuous <Continuous>  dextrose 50% Injectable 25 Gram(s) IV Push once  dextrose 50% Injectable 12.5 Gram(s) IV Push once  dextrose 50% Injectable 25 Gram(s) IV Push once  dextrose 50% Injectable 25 Gram(s) IV Push once  dextrose 50% Injectable 12.5 Gram(s) IV Push once  dextrose 50% Injectable 25 Gram(s) IV Push once  gabapentin 100 milliGRAM(s) Oral three times a day  glucagon  Injectable 1 milliGRAM(s) IntraMuscular once  glucagon  Injectable 1 milliGRAM(s) IntraMuscular once  heparin   Injectable 5000 Unit(s) SubCutaneous every 8 hours  influenza  Vaccine (HIGH DOSE) 0.5 milliLiter(s) IntraMuscular once  insulin lispro (ADMELOG) corrective regimen sliding scale   SubCutaneous every 6 hours  insulin lispro Injectable (ADMELOG) 6 Unit(s) SubCutaneous three times a day before meals  piperacillin/tazobactam IVPB.. 3.375 Gram(s) IV Intermittent every 8 hours  polyethylene glycol 3350 17 Gram(s) Oral daily  sodium chloride 0.9%. 1000 milliLiter(s) (75 mL/Hr) IV Continuous <Continuous>      LABS:  12-11    137  |  102  |  18  ----------------------------<  138[H]  4.2   |  21[L]  |  1.41[H]    Ca    9.2      11 Dec 2024 04:15  Phos  3.0     12-11  Mg     2.20     12-11      Creatinine Trend: 1.41 <--, 1.46 <--, 1.42 <--, 1.21 <--, 1.42 <--, 1.52 <--, 1.60 <--    Phosphorus: 3.0 mg/dL (12-11 @ 04:15)                              9.4    12.43 )-----------( 593      ( 11 Dec 2024 04:15 )             30.1     Urine Studies:  Urinalysis - [12-11-24 @ 04:15]      Color  / Appearance  / SG  / pH       Gluc 138 / Ketone   / Bili  / Urobili        Blood  / Protein  / Leuk Est  / Nitrite       RBC  / WBC  / Hyaline  / Gran  / Sq Epi  / Non Sq Epi  / Bacteria     Urine Creatinine 134      [12-05-24 @ 11:49]  Urine Protein 26      [12-05-24 @ 11:49]  Urine Urea Nitrogen 541.6      [12-05-24 @ 11:49]    HbA1c 10.7      [02-25-20 @ 14:10]  TSH 1.00      [12-04-24 @ 06:20]        RADIOLOGY & ADDITIONAL STUDIES:

## 2024-12-11 NOTE — PROGRESS NOTE ADULT - PROBLEM SELECTOR PLAN 5
Heparin sq    Dispo: febrile so abx switched back to Zosyn  no longer febrile.   vascular angiogram pending tentatively Monday.   (noted vascular postponed procedure to Wednesday.  From medical perspective, pt no longer febrile, can proceed. no need to postpone)  angio today   resume ASA

## 2024-12-11 NOTE — DIETITIAN INITIAL EVALUATION ADULT - PROBLEM SELECTOR PLAN 1
Elevated ESR, CRP  Evaluated by Pod Sx  -Pod Plan: Local wound care vs Left foot partial 1st ray resection pending JACK/PVR  -f/u formal report X-ray L foot  (prelim: no radiopaque foreign body is identified. no soft tissue tracking gas.)  -Left foot wound culture obtained  -Vancomycin IV renally dosed and Zosyn  -f/u JACK/PVR  -Formal ID consult  -Pod Plan: Local wound care; Pending decision regarding Left foot partial 1st ray resection pending JACK/PVR  -May need medial clearance for podiatric surgery under anesthesia, deferred to primary attg Dr. Pizano  -Screening EKG ordered.

## 2024-12-11 NOTE — DIETITIAN INITIAL EVALUATION ADULT - NS FNS DIET ORDER
Diet, NPO after Midnight:      NPO Start Date: 11-Dec-2024,   NPO Start Time: 23:59 (12-11-24 @ 15:27)

## 2024-12-11 NOTE — PROGRESS NOTE ADULT - ASSESSMENT
70 y/o F PMhx DM II, HTN who presented w/ left great toe wound    L hallux infection, c/f osteo  leukocytosis, DM II  CRP 88.8,   s/p podiatry I&D L hallux wound to the level of and not beyond bone,  mild malodor, scant serosanguinous drainage. Right foot no open wounds, no acute signs of infection.  foot x-ray- fracture distal phalanx great toe. soft tissue swelling of this digit as well as subcutaneous air.  per vascular possible plans for angiogram  wound cx w/ strep constellatus, citrobacter, morganella, staph lugdunensis (oxacillin sensitive)  febrile 12/7, ceftriaxone switched to zosyn    Recommendations  c/w zosyn for now   tentative plan for LLE angiogram today 12/11    Albino Rea M.D.  [facility], Division of Infectious Diseases  [phone]  After 5pm on weekdays and all day on weekends - please call [phone]

## 2024-12-11 NOTE — DIETITIAN INITIAL EVALUATION ADULT - ORAL INTAKE PTA/DIET HISTORY
Patient reports generally good appetite/PO intake PTA, consumes a regular "no sugar diet" at baseline. Pt confirms NKFA, food intolerance. Pt reported UBW 180lbs 4 months ago. Pt denies any recent weight changes. Multivitamin taken PTA. Pt reported vomiting before admission.

## 2024-12-11 NOTE — DIETITIAN INITIAL EVALUATION ADULT - REASON FOR ADMISSION
Per chart, Pt is 57yo female h/o DM type2, HTN, hyperlipidemia a/w infected L foot wound iso Type 2 DM. c/f OM.

## 2024-12-11 NOTE — DIETITIAN INITIAL EVALUATION ADULT - OTHER INFO
Patient seen at bedside. Pt reported good PO intake in house. Per RN flowsheet, patient with variable PO intake % noted. No food preferences verbalized at this time. Pt amenable to nutritional supplement to optimize macronutrient intake. Patient denies difficulty chewing or swallowing at present. Pt denies any nausea, vomiting, diarrhea, or constipation at this time. Per Pt, last BM 12/9. Ordered for bowel regimen.

## 2024-12-11 NOTE — PROGRESS NOTE ADULT - ASSESSMENT
59yo female h/o DM type2, HTN, hyperlipidemia; Pt c/o left great toe infection. vascular on board planning for angiogram. nephrology consulted for elevated S.Cr.    renal insufficiency  baseline ~ 1.4 per team  long standing hx of dm and htn  admitted with scr now up trending  scr relatively stable  on losartan and htcz.  will hold for now given worsen scr (last dose 12/5)  FEurea 35%  renal us no hydro   s/p  trial of gentle hydration 12/5  Renal function stable  plans for angiogram with vascular today. hydration ordered as recommended.   Monitor BMP and UO. monitor for YASHIRA  Avoid further nephrotoxins if possible.    htn  controlled  holding losartan and hctz sec to worsen scr  C/W amlodipine at current dose.  Low salt diet.  monitor BP.    Hyponatremia  Marginal.  Optimize glycemic control.  Monitor Na.    LE  wound  f/u vascular  angiogram likely wed   risk vs benefit explained to patient; she expressed understanding

## 2024-12-11 NOTE — DIETITIAN INITIAL EVALUATION ADULT - ADD RECOMMEND
- Continue current diet order, which remains appropriate at this time.   - Consider Glucerna 1x daily (provides 220 kcal, 10 gm protein per 8oz serving).  - Consider multivitamin once daily for micronutrient support.   - Monitor weights, diet tolerance, skin integrity, BMs, pertinent labs.   - RDN services remain available as needed.

## 2024-12-11 NOTE — CHART NOTE - NSCHARTNOTEFT_GEN_A_CORE
LLE angiogram cancelled today due to technical issues with the cath lab room.  Rescheduled for tomorrow 12/12.  NPO at midnight/1AM labs.    Vascular Surgery [id number]

## 2024-12-11 NOTE — PROGRESS NOTE ADULT - SUBJECTIVE AND OBJECTIVE BOX
CARDIOLOGY FOLLOW UP - Dr. Oliva  Date of Service: 12-11-24 @ 13:51    CC: no events    Review of Systems:  Constitutional: No fever, weight loss, or fatigue  Respiratory: No cough, wheezing, or hemoptysis, no shortness of breath  Cardiovascular: No chest pain, palpitations, passing out, dizziness, or leg swelling  Gastrointestinal: No abd or epigastric pain. No nausea, vomiting, or hematemesis; no diarrhea or consiptaiton, no melena or hematochezia  Vascular: No edema     TELEMETRY:    PHYSICAL EXAM:  T(C): 37.2 (12-11-24 @ 10:20), Max: 37.7 (12-10-24 @ 18:05)  HR: 74 (12-11-24 @ 10:20) (74 - 85)  BP: 118/58 (12-11-24 @ 10:20) (118/58 - 141/54)  RR: 18 (12-11-24 @ 10:20) (18 - 18)  SpO2: 95% (12-11-24 @ 10:20) (95% - 98%)  Wt(kg): --  I&O's Summary      Appearance: Normal	  Cardiovascular: Normal S1 S2,RRR, No JVD, No murmurs  Respiratory: Lungs clear to auscultation	  Gastrointestinal:  Soft, Non-tender, + BS	  Extremities: Normal range of motion, No clubbing, cyanosis or edema  Vascular: Peripheral pulses palpable 2+ bilaterally       Home Medications:  aspirin 81 mg oral delayed release tablet: 1 tab(s) orally once a day (04 Dec 2024 04:28)  atorvastatin 20 mg oral tablet: 1 tab(s) orally once a day (at bedtime) (04 Dec 2024 04:28)  Farxiga 5 mg oral tablet: 1 tab(s) orally once a day (04 Dec 2024 04:32)  hydroCHLOROthiazide 25 mg oral tablet: 1 tab(s) orally once a day (04 Dec 2024 04:28)  Lantus Solostar Pen 100 units/mL subcutaneous solution: 30 unit(s) subcutaneous once a day (at bedtime) (04 Dec 2024 04:28)  losartan 100 mg oral tablet: 1 tab(s) orally once a day (04 Dec 2024 04:28)  Norvasc 10 mg oral tablet: 1 tab(s) orally once a day (04 Dec 2024 04:28)  NovoLOG 100 units/mL subcutaneous solution: 10 unit(s) subcutaneous 3 times a day (before meals) (04 Dec 2024 04:28)        MEDICATIONS  (STANDING):  amLODIPine   Tablet 10 milliGRAM(s) Oral daily  aspirin  chewable 81 milliGRAM(s) Oral daily  atorvastatin 20 milliGRAM(s) Oral at bedtime  dextrose 5%. 1000 milliLiter(s) (50 mL/Hr) IV Continuous <Continuous>  dextrose 5%. 1000 milliLiter(s) (100 mL/Hr) IV Continuous <Continuous>  dextrose 5%. 1000 milliLiter(s) (50 mL/Hr) IV Continuous <Continuous>  dextrose 5%. 1000 milliLiter(s) (100 mL/Hr) IV Continuous <Continuous>  dextrose 50% Injectable 25 Gram(s) IV Push once  dextrose 50% Injectable 12.5 Gram(s) IV Push once  dextrose 50% Injectable 25 Gram(s) IV Push once  dextrose 50% Injectable 25 Gram(s) IV Push once  dextrose 50% Injectable 12.5 Gram(s) IV Push once  dextrose 50% Injectable 25 Gram(s) IV Push once  gabapentin 100 milliGRAM(s) Oral three times a day  glucagon  Injectable 1 milliGRAM(s) IntraMuscular once  glucagon  Injectable 1 milliGRAM(s) IntraMuscular once  heparin   Injectable 5000 Unit(s) SubCutaneous every 8 hours  influenza  Vaccine (HIGH DOSE) 0.5 milliLiter(s) IntraMuscular once  insulin lispro (ADMELOG) corrective regimen sliding scale   SubCutaneous every 6 hours  insulin lispro Injectable (ADMELOG) 6 Unit(s) SubCutaneous three times a day before meals  piperacillin/tazobactam IVPB.. 3.375 Gram(s) IV Intermittent every 8 hours  polyethylene glycol 3350 17 Gram(s) Oral daily  sodium chloride 0.9%. 1000 milliLiter(s) (75 mL/Hr) IV Continuous <Continuous>        EKG:  RADIOLOGY:  DIAGNOSTIC TESTING:  [ ] Echocardiogram:  [ ] Catherterization:  [ ] Stress Test:  OTHER:     LABS:	 	                          9.4    12.43 )-----------( 593      ( 11 Dec 2024 04:15 )             30.1     12-11    137  |  102  |  18  ----------------------------<  138[H]  4.2   |  21[L]  |  1.41[H]    Ca    9.2      11 Dec 2024 04:15  Phos  3.0     12-11  Mg     2.20     12-11        PT/INR - ( 11 Dec 2024 04:15 )   PT: 12.5 sec;   INR: 1.08 ratio         PTT - ( 11 Dec 2024 04:15 )  PTT:30.9 sec    CARDIAC MARKERS:

## 2024-12-11 NOTE — DIETITIAN INITIAL EVALUATION ADULT - PERSON TAUGHT/METHOD
Pt provided with verbal nutrition education on type 2 DM diet. Topics discussed include: MyPlate healthy eating guidelines, avoidance of sugar sweetened beverages and recommended alternatives, label reading, sources of carbohydrates, inclusion of whole grains and fiber, mixed meals (pairing protein with carbohydrate for optimal blood glucose response), hypoglycemia prevention/management and timing of meals/snacks. Discussed importance of self monitoring blood glucose and encouraged outpatient endocrinology follow up. Pt verbalized understanding of nutrition education./verbal instruction/patient instructed

## 2024-12-11 NOTE — PROGRESS NOTE ADULT - ASSESSMENT
A/P  57yo female h/o DM type2, HTN, hyperlipidemia; Pt c/o left great toe infection.    #diabetic foot infection.   -sp bedside debridement with podiatry on 12/4/24  -VA JACK WITH PVR noted   -vascular planning for LLE angio potentially Wednesday 12/11 after fever w/u  -ecg with no ischemic changes, no chf or significant valvular disease on exam   -echo with nl LV sys FX No significant valvular disease.  -remains CV stable to proceed for LE angio with acceptable cv risk     #HTN   -c/w meds    #JENARO  -outpt hctz, arb on hold   -US Kidney and Bladder 12/5 shows No hydronephrosis  -renal fu     #dizziness   -after getting up to use the bathroom overnight 12/8  -resolved per pt  -orthostatic bp neg   -bp stable, continue to monitor     dvt ppx     35 minutes spent on total encounter; more than 50% of the visit was spent counseling and/or coordinating care by the attending physician.

## 2024-12-11 NOTE — PROGRESS NOTE ADULT - SUBJECTIVE AND OBJECTIVE BOX
[facility], Division of Infectious Diseases  TOBY Szymanski Y. Patel, S. Shah, G. Álvaro  [phone]  ([phone] - weekdays after 5pm and weekends)    Name: REJI MEADE  Age/Gender: 69y Female  MRN: [id number]    Interval History:  Notes reviewed.   No concerning overnight events.  Afebrile.   denies diarrhea  awaiting angio today    Allergies: No Known Allergies      Objective:  Vitals:   T(F): 98.9 (12-11-24 @ 05:00), Max: 99.9 (12-10-24 @ 18:05)  HR: 77 (12-11-24 @ 05:00) (72 - 85)  BP: 131/61 (12-11-24 @ 05:00) (115/52 - 141/54)  RR: 18 (12-11-24 @ 05:00) (18 - 18)  SpO2: 98% (12-11-24 @ 05:00) (97% - 98%)  Physical Examination:  General: no acute distress  HEENT: anicteric  Cardio: S1, S2, normal rate  Resp: breathing comfortably on RA   Abd: soft, NT, ND  Ext: L foot wrapped w/ dressing  Skin: warm, dry    Laboratory Studies:  CBC:                       9.4    12.43 )-----------( 593      ( 11 Dec 2024 04:15 )             30.1     WBC Trend:  12.43 12-11-24 @ 04:15  13.14 12-10-24 @ 08:20  11.60 12-09-24 @ 07:10  13.53 12-07-24 @ 18:46  11.17 12-05-24 @ 06:02    CMP: 12-11    137  |  102  |  18  ----------------------------<  138[H]  4.2   |  21[L]  |  1.41[H]    Ca    9.2      11 Dec 2024 04:15  Phos  3.0     12-11  Mg     2.20     12-11            Urinalysis Basic - ( 11 Dec 2024 04:15 )    Color: x / Appearance: x / SG: x / pH: x  Gluc: 138 mg/dL / Ketone: x  / Bili: x / Urobili: x   Blood: x / Protein: x / Nitrite: x   Leuk Esterase: x / RBC: x / WBC x   Sq Epi: x / Non Sq Epi: x / Bacteria: x      Microbiology: reviewed     Culture - Blood (collected 12-07-24 @ 22:31)  Source: .Blood BLOOD  Preliminary Report (12-11-24 @ 01:01):    No growth at 72 Hours    Culture - Blood (collected 12-07-24 @ 18:46)  Source: .Blood BLOOD  Preliminary Report (12-11-24 @ 01:01):    No growth at 72 Hours    Culture - Abscess with Gram Stain (collected 12-04-24 @ 01:07)  Source: .Abscess  Gram Stain (12-04-24 @ 16:17):    No polymorphonuclear cells seen per low power field    Few Gram Negative Rods per oil power field  Final Report (12-09-24 @ 07:31):    Numerous Morganella morganii    Moderate Citrobacter amalonaticus complex    Few Staphylococcus lugdunensis    Commensal aura consistent with body site  Organism: Morganella morganii  Citrobacter amalonaticus complex  Staphylococcus lugdunensis (12-09-24 @ 07:31)  Organism: Staphylococcus lugdunensis (12-09-24 @ 07:31)      Method Type: MARIE      -  Clindamycin: S <=0.25      -  Erythromycin: S <=0.25      -  Gentamicin: S <=4 Should not be used as monotherapy      -  Oxacillin: S 0.5      -  Penicillin: R >2      -  Rifampin: S <=1 Should not be used as monotherapy      -  Tetracycline: S <=4      -  Trimethoprim/Sulfamethoxazole: S <=0.5/9.5      -  Vancomycin: S 1  Organism: Citrobacter amalonaticus complex (12-09-24 @ 07:31)      Method Type: MARIE      -  Amoxicillin/Clavulanic Acid: S <=8/4      -  Ampicillin: R <=8 These ampicillin results predict results for amoxicillin      -  Ampicillin/Sulbactam: S <=4/2      -  Aztreonam: S <=4      -  Cefazolin: R >16      -  Cefepime: S <=2      -  Cefoxitin: S <=8      -  Ceftriaxone: S <=1      -  Ciprofloxacin: S <=0.25      -  Ertapenem: S <=0.5      -  Gentamicin: S <=2      -  Imipenem: S <=1      -  Levofloxacin: S <=0.5      -  Meropenem: S <=1      -  Piperacillin/Tazobactam: S <=8      -  Tobramycin: S <=2      -  Trimethoprim/Sulfamethoxazole: S <=0.5/9.5  Organism: Morganeduarda morganii (12-09-24 @ 07:31)      Method Type: MARIE      -  Amoxicillin/Clavulanic Acid: R >16/8      -  Ampicillin: R >16 These ampicillin results predict results for amoxicillin      -  Ampicillin/Sulbactam: R >16/8      -  Aztreonam: R >16      -  Cefazolin: R >16      -  Cefepime: S <=2      -  Cefoxitin: S <=8      -  Ceftriaxone: S <=1      -  Ciprofloxacin: S <=0.25      -  Ertapenem: S <=0.5      -  Gentamicin: S <=2      -  Levofloxacin: S <=0.5      -  Meropenem: S <=1      -  Piperacillin/Tazobactam: S <=8      -  Tobramycin: S <=2      -  Trimethoprim/Sulfamethoxazole: S <=0.5/9.5    Culture - Blood (collected 12-03-24 @ 22:30)  Source: .Blood BLOOD  Final Report (12-09-24 @ 03:00):    No growth at 5 days    Culture - Blood (collected 12-03-24 @ 22:20)  Source: .Blood BLOOD  Final Report (12-09-24 @ 03:00):    No growth at 5 days        Radiology: reviewed     Medications:  acetaminophen     Tablet .. 650 milliGRAM(s) Oral every 6 hours PRN  amLODIPine   Tablet 10 milliGRAM(s) Oral daily  aspirin  chewable 81 milliGRAM(s) Oral daily  atorvastatin 20 milliGRAM(s) Oral at bedtime  dextrose 5%. 1000 milliLiter(s) IV Continuous <Continuous>  dextrose 5%. 1000 milliLiter(s) IV Continuous <Continuous>  dextrose 5%. 1000 milliLiter(s) IV Continuous <Continuous>  dextrose 5%. 1000 milliLiter(s) IV Continuous <Continuous>  dextrose 50% Injectable 25 Gram(s) IV Push once  dextrose 50% Injectable 12.5 Gram(s) IV Push once  dextrose 50% Injectable 25 Gram(s) IV Push once  dextrose 50% Injectable 25 Gram(s) IV Push once  dextrose 50% Injectable 12.5 Gram(s) IV Push once  dextrose 50% Injectable 25 Gram(s) IV Push once  dextrose Oral Gel 15 Gram(s) Oral once PRN  dextrose Oral Gel 15 Gram(s) Oral once PRN  gabapentin 100 milliGRAM(s) Oral three times a day  glucagon  Injectable 1 milliGRAM(s) IntraMuscular once  glucagon  Injectable 1 milliGRAM(s) IntraMuscular once  heparin   Injectable 5000 Unit(s) SubCutaneous every 8 hours  influenza  Vaccine (HIGH DOSE) 0.5 milliLiter(s) IntraMuscular once  insulin glargine Injectable (LANTUS) 20 Unit(s) SubCutaneous every morning  insulin lispro (ADMELOG) corrective regimen sliding scale   SubCutaneous every 6 hours  insulin lispro Injectable (ADMELOG) 6 Unit(s) SubCutaneous three times a day before meals  melatonin 3 milliGRAM(s) Oral at bedtime PRN  ondansetron Injectable 4 milliGRAM(s) IV Push every 8 hours PRN  piperacillin/tazobactam IVPB.. 3.375 Gram(s) IV Intermittent every 8 hours  polyethylene glycol 3350 17 Gram(s) Oral daily  sodium chloride 0.9%. 1000 milliLiter(s) IV Continuous <Continuous>    Antimicrobials:  piperacillin/tazobactam IVPB.. 3.375 Gram(s) IV Intermittent every 8 hours

## 2024-12-11 NOTE — DIETITIAN INITIAL EVALUATION ADULT - PERTINENT MEDS FT
MEDICATIONS  (STANDING):  amLODIPine   Tablet 10 milliGRAM(s) Oral daily  aspirin  chewable 81 milliGRAM(s) Oral daily  atorvastatin 20 milliGRAM(s) Oral at bedtime  dextrose 5%. 1000 milliLiter(s) (50 mL/Hr) IV Continuous <Continuous>  dextrose 5%. 1000 milliLiter(s) (100 mL/Hr) IV Continuous <Continuous>  dextrose 5%. 1000 milliLiter(s) (50 mL/Hr) IV Continuous <Continuous>  dextrose 5%. 1000 milliLiter(s) (100 mL/Hr) IV Continuous <Continuous>  dextrose 50% Injectable 25 Gram(s) IV Push once  dextrose 50% Injectable 12.5 Gram(s) IV Push once  dextrose 50% Injectable 25 Gram(s) IV Push once  dextrose 50% Injectable 25 Gram(s) IV Push once  dextrose 50% Injectable 12.5 Gram(s) IV Push once  dextrose 50% Injectable 25 Gram(s) IV Push once  gabapentin 100 milliGRAM(s) Oral three times a day  glucagon  Injectable 1 milliGRAM(s) IntraMuscular once  glucagon  Injectable 1 milliGRAM(s) IntraMuscular once  heparin   Injectable 5000 Unit(s) SubCutaneous every 8 hours  influenza  Vaccine (HIGH DOSE) 0.5 milliLiter(s) IntraMuscular once  insulin lispro (ADMELOG) corrective regimen sliding scale   SubCutaneous every 6 hours  insulin lispro Injectable (ADMELOG) 6 Unit(s) SubCutaneous three times a day before meals  piperacillin/tazobactam IVPB.. 3.375 Gram(s) IV Intermittent every 8 hours  polyethylene glycol 3350 17 Gram(s) Oral daily  sodium chloride 0.9%. 1000 milliLiter(s) (75 mL/Hr) IV Continuous <Continuous>    MEDICATIONS  (PRN):  acetaminophen     Tablet .. 650 milliGRAM(s) Oral every 6 hours PRN Temp greater or equal to 38C (100.4F), Mild Pain (1 - 3)  dextrose Oral Gel 15 Gram(s) Oral once PRN Blood Glucose LESS THAN 70 milliGRAM(s)/deciliter  dextrose Oral Gel 15 Gram(s) Oral once PRN Blood Glucose LESS THAN 70 milliGRAM(s)/deciliter  melatonin 3 milliGRAM(s) Oral at bedtime PRN Insomnia  ondansetron Injectable 4 milliGRAM(s) IV Push every 8 hours PRN Nausea and/or Vomiting

## 2024-12-11 NOTE — PROGRESS NOTE ADULT - SUBJECTIVE AND OBJECTIVE BOX
Name of Patient : REJI MEADE  MRN: [id number]  Date of visit: 12-11-24       Subjective: Patient seen and examined. No new events except as noted.   doing okay   angio today     REVIEW OF SYSTEMS:    CONSTITUTIONAL: No weakness, fevers or chills  EYES/ENT: No visual changes;  No vertigo or throat pain   NECK: No pain or stiffness  RESPIRATORY: No cough, wheezing, hemoptysis; No shortness of breath  CARDIOVASCULAR: No chest pain or palpitations  GASTROINTESTINAL: No abdominal or epigastric pain. No nausea, vomiting, or hematemesis; No diarrhea or constipation. No melena or hematochezia.  GENITOURINARY: No dysuria, frequency or hematuria  NEUROLOGICAL: No numbness or weakness  SKIN: No itching, burning, rashes, or lesions   All other review of systems is negative unless indicated above.    MEDICATIONS:  MEDICATIONS  (STANDING):  amLODIPine   Tablet 10 milliGRAM(s) Oral daily  aspirin  chewable 81 milliGRAM(s) Oral daily  atorvastatin 20 milliGRAM(s) Oral at bedtime  dextrose 5%. 1000 milliLiter(s) (50 mL/Hr) IV Continuous <Continuous>  dextrose 5%. 1000 milliLiter(s) (100 mL/Hr) IV Continuous <Continuous>  dextrose 5%. 1000 milliLiter(s) (50 mL/Hr) IV Continuous <Continuous>  dextrose 5%. 1000 milliLiter(s) (100 mL/Hr) IV Continuous <Continuous>  dextrose 50% Injectable 25 Gram(s) IV Push once  dextrose 50% Injectable 12.5 Gram(s) IV Push once  dextrose 50% Injectable 25 Gram(s) IV Push once  dextrose 50% Injectable 25 Gram(s) IV Push once  dextrose 50% Injectable 12.5 Gram(s) IV Push once  dextrose 50% Injectable 25 Gram(s) IV Push once  gabapentin 100 milliGRAM(s) Oral three times a day  glucagon  Injectable 1 milliGRAM(s) IntraMuscular once  glucagon  Injectable 1 milliGRAM(s) IntraMuscular once  heparin   Injectable 5000 Unit(s) SubCutaneous every 8 hours  influenza  Vaccine (HIGH DOSE) 0.5 milliLiter(s) IntraMuscular once  insulin lispro (ADMELOG) corrective regimen sliding scale   SubCutaneous every 6 hours  insulin lispro Injectable (ADMELOG) 6 Unit(s) SubCutaneous three times a day before meals  piperacillin/tazobactam IVPB.. 3.375 Gram(s) IV Intermittent every 8 hours  polyethylene glycol 3350 17 Gram(s) Oral daily  sodium chloride 0.9%. 1000 milliLiter(s) (75 mL/Hr) IV Continuous <Continuous>      PHYSICAL EXAM:  T(C): 37.2 (12-11-24 @ 10:20), Max: 37.7 (12-10-24 @ 18:05)  HR: 74 (12-11-24 @ 10:20) (74 - 85)  BP: 118/58 (12-11-24 @ 10:20) (118/58 - 141/54)  RR: 18 (12-11-24 @ 10:20) (18 - 18)  SpO2: 95% (12-11-24 @ 10:20) (95% - 98%)  Wt(kg): --  I&O's Summary        Appearance: Normal	  HEENT:  PERRLA   Lymphatic: No lymphadenopathy   Cardiovascular: Normal S1 S2, no JVD  Respiratory: normal effort , clear  Gastrointestinal:  Soft, Non-tender  Skin: No rashes,  warm to touch  Psychiatry:  Mood & affect appropriate  Musculuskeletal: No edema    recent labs, Imaging and EKGs personally reviewed                           9.4    12.43 )-----------( 593      ( 11 Dec 2024 04:15 )             30.1               12-11    137  |  102  |  18  ----------------------------<  138[H]  4.2   |  21[L]  |  1.41[H]    Ca    9.2      11 Dec 2024 04:15  Phos  3.0     12-11  Mg     2.20     12-11      PT/INR - ( 11 Dec 2024 04:15 )   PT: 12.5 sec;   INR: 1.08 ratio         PTT - ( 11 Dec 2024 04:15 )  PTT:30.9 sec                   Urinalysis Basic - ( 11 Dec 2024 04:15 )    Color: x / Appearance: x / SG: x / pH: x  Gluc: 138 mg/dL / Ketone: x  / Bili: x / Urobili: x   Blood: x / Protein: x / Nitrite: x   Leuk Esterase: x / RBC: x / WBC x   Sq Epi: x / Non Sq Epi: x / Bacteria: x

## 2024-12-11 NOTE — DIETITIAN INITIAL EVALUATION ADULT - OTHER CALCULATIONS
Defer fluid needs to MD/Team.   Ideal Body Weight: 140lbs / 63.5kg +/-10%. IBW used to calculate nutritional estimated needs.    Unable to access [facility] at this time.

## 2024-12-12 ENCOUNTER — APPOINTMENT (OUTPATIENT)
Dept: VASCULAR SURGERY | Facility: HOSPITAL | Age: 69
End: 2024-12-12

## 2024-12-12 LAB
ANION GAP SERPL CALC-SCNC: 11 MMOL/L — SIGNIFICANT CHANGE UP (ref 7–14)
APTT BLD: 29.5 SEC — SIGNIFICANT CHANGE UP (ref 24.5–35.6)
BUN SERPL-MCNC: 14 MG/DL — SIGNIFICANT CHANGE UP (ref 7–23)
CALCIUM SERPL-MCNC: 9.2 MG/DL — SIGNIFICANT CHANGE UP (ref 8.4–10.5)
CHLORIDE SERPL-SCNC: 106 MMOL/L — SIGNIFICANT CHANGE UP (ref 98–107)
CO2 SERPL-SCNC: 22 MMOL/L — SIGNIFICANT CHANGE UP (ref 22–31)
CREAT SERPL-MCNC: 1.31 MG/DL — HIGH (ref 0.5–1.3)
EGFR: 44 ML/MIN/1.73M2 — LOW
GLUCOSE BLDC GLUCOMTR-MCNC: 137 MG/DL — HIGH (ref 70–99)
GLUCOSE BLDC GLUCOMTR-MCNC: 150 MG/DL — HIGH (ref 70–99)
GLUCOSE BLDC GLUCOMTR-MCNC: 152 MG/DL — HIGH (ref 70–99)
GLUCOSE BLDC GLUCOMTR-MCNC: 161 MG/DL — HIGH (ref 70–99)
GLUCOSE SERPL-MCNC: 143 MG/DL — HIGH (ref 70–99)
HCT VFR BLD CALC: 26.9 % — LOW (ref 34.5–45)
HGB BLD-MCNC: 8.3 G/DL — LOW (ref 11.5–15.5)
INR BLD: 1.09 RATIO — SIGNIFICANT CHANGE UP (ref 0.85–1.16)
MAGNESIUM SERPL-MCNC: 2.2 MG/DL — SIGNIFICANT CHANGE UP (ref 1.6–2.6)
MCHC RBC-ENTMCNC: 28 PG — SIGNIFICANT CHANGE UP (ref 27–34)
MCHC RBC-ENTMCNC: 30.9 G/DL — LOW (ref 32–36)
MCV RBC AUTO: 90.9 FL — SIGNIFICANT CHANGE UP (ref 80–100)
NRBC # BLD: 0 /100 WBCS — SIGNIFICANT CHANGE UP (ref 0–0)
NRBC # FLD: 0 K/UL — SIGNIFICANT CHANGE UP (ref 0–0)
PHOSPHATE SERPL-MCNC: 3.6 MG/DL — SIGNIFICANT CHANGE UP (ref 2.5–4.5)
PLATELET # BLD AUTO: 576 K/UL — HIGH (ref 150–400)
POTASSIUM SERPL-MCNC: 4 MMOL/L — SIGNIFICANT CHANGE UP (ref 3.5–5.3)
POTASSIUM SERPL-SCNC: 4 MMOL/L — SIGNIFICANT CHANGE UP (ref 3.5–5.3)
PROTHROM AB SERPL-ACNC: 13 SEC — SIGNIFICANT CHANGE UP (ref 9.9–13.4)
RBC # BLD: 2.96 M/UL — LOW (ref 3.8–5.2)
RBC # FLD: 12.4 % — SIGNIFICANT CHANGE UP (ref 10.3–14.5)
SODIUM SERPL-SCNC: 139 MMOL/L — SIGNIFICANT CHANGE UP (ref 135–145)
WBC # BLD: 11.29 K/UL — HIGH (ref 3.8–10.5)
WBC # FLD AUTO: 11.29 K/UL — HIGH (ref 3.8–10.5)

## 2024-12-12 RX ORDER — SODIUM CHLORIDE 9 MG/ML
1000 INJECTION, SOLUTION INTRAMUSCULAR; INTRAVENOUS; SUBCUTANEOUS
Refills: 0 | Status: DISCONTINUED | OUTPATIENT
Start: 2024-12-12 | End: 2024-12-13

## 2024-12-12 RX ORDER — INSULIN GLARGINE-YFGN 100 [IU]/ML
12 INJECTION, SOLUTION SUBCUTANEOUS ONCE
Refills: 0 | Status: COMPLETED | OUTPATIENT
Start: 2024-12-12 | End: 2024-12-12

## 2024-12-12 RX ORDER — INSULIN LISPRO 100/ML
VIAL (ML) SUBCUTANEOUS
Refills: 0 | Status: DISCONTINUED | OUTPATIENT
Start: 2024-12-12 | End: 2024-12-15

## 2024-12-12 RX ORDER — SODIUM CHLORIDE 9 MG/ML
1000 INJECTION, SOLUTION INTRAMUSCULAR; INTRAVENOUS; SUBCUTANEOUS
Refills: 0 | Status: DISCONTINUED | OUTPATIENT
Start: 2024-12-12 | End: 2024-12-12

## 2024-12-12 RX ORDER — INSULIN GLARGINE-YFGN 100 [IU]/ML
12 INJECTION, SOLUTION SUBCUTANEOUS EVERY MORNING
Refills: 0 | Status: DISCONTINUED | OUTPATIENT
Start: 2024-12-12 | End: 2024-12-13

## 2024-12-12 RX ORDER — INSULIN LISPRO 100/ML
VIAL (ML) SUBCUTANEOUS AT BEDTIME
Refills: 0 | Status: DISCONTINUED | OUTPATIENT
Start: 2024-12-12 | End: 2024-12-15

## 2024-12-12 RX ADMIN — PIPERACILLIN AND TAZOBACTAM 25 GRAM(S): 3; .375 INJECTION, POWDER, LYOPHILIZED, FOR SOLUTION INTRAVENOUS at 05:17

## 2024-12-12 RX ADMIN — Medication 1: at 06:49

## 2024-12-12 RX ADMIN — GABAPENTIN 100 MILLIGRAM(S): 300 CAPSULE ORAL at 05:17

## 2024-12-12 RX ADMIN — HEPARIN SODIUM 5000 UNIT(S): 1000 INJECTION, SOLUTION INTRAVENOUS; SUBCUTANEOUS at 05:18

## 2024-12-12 RX ADMIN — SODIUM CHLORIDE 75 MILLILITER(S): 9 INJECTION, SOLUTION INTRAMUSCULAR; INTRAVENOUS; SUBCUTANEOUS at 03:47

## 2024-12-12 RX ADMIN — HEPARIN SODIUM 5000 UNIT(S): 1000 INJECTION, SOLUTION INTRAVENOUS; SUBCUTANEOUS at 13:21

## 2024-12-12 RX ADMIN — ACETAMINOPHEN 650 MILLIGRAM(S): 80 SOLUTION/ DROPS ORAL at 22:04

## 2024-12-12 RX ADMIN — PIPERACILLIN AND TAZOBACTAM 25 GRAM(S): 3; .375 INJECTION, POWDER, LYOPHILIZED, FOR SOLUTION INTRAVENOUS at 22:01

## 2024-12-12 RX ADMIN — Medication 81 MILLIGRAM(S): at 13:21

## 2024-12-12 RX ADMIN — PIPERACILLIN AND TAZOBACTAM 25 GRAM(S): 3; .375 INJECTION, POWDER, LYOPHILIZED, FOR SOLUTION INTRAVENOUS at 13:25

## 2024-12-12 RX ADMIN — HEPARIN SODIUM 5000 UNIT(S): 1000 INJECTION, SOLUTION INTRAVENOUS; SUBCUTANEOUS at 22:05

## 2024-12-12 RX ADMIN — Medication 1: at 18:02

## 2024-12-12 RX ADMIN — Medication 10 MILLIGRAM(S): at 05:17

## 2024-12-12 RX ADMIN — Medication 6 UNIT(S): at 13:19

## 2024-12-12 RX ADMIN — GABAPENTIN 100 MILLIGRAM(S): 300 CAPSULE ORAL at 13:24

## 2024-12-12 RX ADMIN — ATORVASTATIN CALCIUM 20 MILLIGRAM(S): 40 TABLET, FILM COATED ORAL at 22:05

## 2024-12-12 RX ADMIN — Medication 6 UNIT(S): at 18:01

## 2024-12-12 RX ADMIN — INSULIN GLARGINE-YFGN 12 UNIT(S): 100 INJECTION, SOLUTION SUBCUTANEOUS at 08:50

## 2024-12-12 RX ADMIN — GABAPENTIN 100 MILLIGRAM(S): 300 CAPSULE ORAL at 22:01

## 2024-12-12 NOTE — PRE-OP CHECKLIST - SELECT TESTS ORDERED
Fasting 104/1 hour 263/2 hour 195/3 hour 86    Group B Strep: **     Cystic Fibrosis Screen: negative  Sickle Cell Screen: negative  First Trimester Screen: not done  msAFP: **  Non-Invasive Prenatal Testing: no aneuploidy  Anatomy US: ** placenta, **VC, **gender, ** anatomy- scheduled for 4/4/24    TDaP: **  Flu: **  Rhogam: **    Family Planning: **  Birth Plan: **            Bipolar affective disorder, depressed, severe, with psychotic behavior (formerly [facility]) 08/18/2021    PTSD (post-traumatic stress disorder) 08/18/2021    Cannabis use disorder, moderate, dependence (formerly [facility]) 08/18/2021    Cocaine use disorder, severe, in controlled environment (formerly [facility]) 02/19/2019     +UDS  Patient denies use      Noncompliance 02/06/2019    ADHD 11/15/2018    Depression 06/10/2018     On Seroquel 300 mg Nightly. Not currently on any other meds. Is managed by [facility].      CHUY (obstructive sleep apnea) 08/18/2017    Asthma 08/18/2017 2/1/24:   Patient not on medications and denies this diagnosis.          The patient was counseled on follow up and home care with restrictions noted.       DELFINA OBRIEN DO  Ob/Gyn Resident  [facility], [place]  3/28/2024, 11:21 AM     BMP/CBC/PT/PTT/INR/Type and Screen BMP/CBC/PT/PTT/INR/Type and Screen/Results in MD note

## 2024-12-12 NOTE — PROGRESS NOTE ADULT - ASSESSMENT
70 y/o F PMhx DM II, HTN who presented w/ left great toe wound. Pt reports she was clipping her toenails in October and accidentally cut her skin. She noticed some bleeding in her skin, which is what prompted her to present to the ED based on her podiatrist's recommendation. She underwent a bedside debridement with podiatry on 12/4/24. Vascular surgery consulted given LLE toe wound and JACK/PVR findings. Pt pending LLE angiogram, cancelled 12/11 and 12/12 due to availability. Patient transferred to vascular surgery service under Dr. Vann 12/12.    Plan:  - Plan for LLE angiogram tomorrow, 12/13  - Patient transferred to Vascular Surgery service under Dr. Vann.  - Resume diet  - Preop for OR tomorrow 12/13 1am labs    #[id number]

## 2024-12-12 NOTE — PROGRESS NOTE ADULT - ASSESSMENT
70 y/o F PMhx DM II, HTN who presented w/ left great toe wound    L hallux infection, c/f osteo  leukocytosis, DM II  CRP 88.8,   s/p podiatry I&D L hallux wound to the level of and not beyond bone,  mild malodor, scant serosanguinous drainage. Right foot no open wounds, no acute signs of infection.  foot x-ray- fracture distal phalanx great toe. soft tissue swelling of this digit as well as subcutaneous air.  per vascular possible plans for angiogram  wound cx w/ strep constellatus, citrobacter, morganella, staph lugdunensis (oxacillin sensitive)  febrile 12/7, ceftriaxone switched to zosyn    Recommendations  c/w zosyn for now   tentative plan for LLE angiogram today 12/12  f/u vascular and podiatry recs    Albino Rea M.D.  [facility], Division of Infectious Diseases  [phone]  After 5pm on weekdays and all day on weekends - please call [phone]

## 2024-12-12 NOTE — PROGRESS NOTE ADULT - SUBJECTIVE AND OBJECTIVE BOX
Name of Patient : REJI MEADE  MRN: [id number]      Subjective: Patient seen and examined. No new events except as noted.       REVIEW OF SYSTEMS:    CONSTITUTIONAL: No weakness, fevers or chills  EYES/ENT: No visual changes;  No vertigo or throat pain   NECK: No pain or stiffness  RESPIRATORY: No cough, wheezing, hemoptysis; No shortness of breath  CARDIOVASCULAR: No chest pain or palpitations  GASTROINTESTINAL: No abdominal or epigastric pain. No nausea, vomiting, or hematemesis; No diarrhea or constipation. No melena or hematochezia.  GENITOURINARY: No dysuria, frequency or hematuria  NEUROLOGICAL: No numbness or weakness  SKIN: No itching, burning, rashes, or lesions   All other review of systems is negative unless indicated above.    MEDICATIONS:  MEDICATIONS  (STANDING):  amLODIPine   Tablet 10 milliGRAM(s) Oral daily  aspirin  chewable 81 milliGRAM(s) Oral daily  atorvastatin 20 milliGRAM(s) Oral at bedtime  gabapentin 100 milliGRAM(s) Oral three times a day  glucagon  Injectable 1 milliGRAM(s) IntraMuscular once  glucagon  Injectable 1 milliGRAM(s) IntraMuscular once  heparin   Injectable 5000 Unit(s) SubCutaneous every 8 hours  influenza  Vaccine (HIGH DOSE) 0.5 milliLiter(s) IntraMuscular once  insulin glargine Injectable (LANTUS) 12 Unit(s) SubCutaneous every morning  insulin lispro (ADMELOG) corrective regimen sliding scale   SubCutaneous three times a day before meals  insulin lispro (ADMELOG) corrective regimen sliding scale   SubCutaneous at bedtime  insulin lispro Injectable (ADMELOG) 6 Unit(s) SubCutaneous three times a day before meals  piperacillin/tazobactam IVPB.. 3.375 Gram(s) IV Intermittent every 8 hours  polyethylene glycol 3350 17 Gram(s) Oral daily  sodium chloride 0.9%. 1000 milliLiter(s) (75 mL/Hr) IV Continuous <Continuous>      PHYSICAL EXAM:  T(C): 36.8 (12-12-24 @ 22:05), Max: 37.1 (12-12-24 @ 09:50)  HR: 71 (12-12-24 @ 22:05) (71 - 78)  BP: 131/51 (12-12-24 @ 22:05) (125/54 - 145/63)  RR: 18 (12-12-24 @ 22:05) (16 - 18)  SpO2: 98% (12-12-24 @ 22:05) (98% - 100%)  Wt(kg): --  I&O's Summary    Height (cm): 172.7 (12-12 @ 10:03)  Weight (kg): 81.6 (12-12 @ 10:03)  BMI (kg/m2): 27.4 (12-12 @ 10:03)  BSA (m2): 1.95 (12-12 @ 10:03)    Appearance: Normal	  HEENT:  PERRLA   Lymphatic: No lymphadenopathy   Cardiovascular: Normal S1 S2, no JVD  Respiratory: normal effort , clear  Gastrointestinal:  Soft, Non-tender  Skin: No rashes,  warm to touch  Psychiatry:  Mood & affect appropriate  Musculuskeletal: No edema    recent labs, Imaging and EKGs personally reviewed                           8.3    11.29 )-----------( 576      ( 12 Dec 2024 02:20 )             26.9               12-12    139  |  106  |  14  ----------------------------<  143[H]  4.0   |  22  |  1.31[H]    Ca    9.2      12 Dec 2024 02:20  Phos  3.6     12-12  Mg     2.20     12-12      PT/INR - ( 12 Dec 2024 02:20 )   PT: 13.0 sec;   INR: 1.09 ratio         PTT - ( 12 Dec 2024 02:20 )  PTT:29.5 sec                   Urinalysis Basic - ( 12 Dec 2024 02:20 )    Color: x / Appearance: x / SG: x / pH: x  Gluc: 143 mg/dL / Ketone: x  / Bili: x / Urobili: x   Blood: x / Protein: x / Nitrite: x   Leuk Esterase: x / RBC: x / WBC x   Sq Epi: x / Non Sq Epi: x / Bacteria: x

## 2024-12-12 NOTE — PROGRESS NOTE ADULT - ASSESSMENT
57yo female h/o DM type2, HTN, hyperlipidemia; Pt c/o left great toe infection. vascular on board planning for angiogram. nephrology consulted for elevated S.Cr.    renal insufficiency  baseline ~ 1.4 per team  long standing hx of dm and htn  admitted with scr now up trending  scr relatively stable  on losartan and htcz.  will hold for now given worsen scr (last dose 12/5)  FEurea 35%  renal us no hydro   s/p  trial of gentle hydration 12/5  Renal function stable  plans for angiogram with vascular 12/13, recommend ns @ 75cc/hr for 6 hours before and 6 hours after angio  Monitor BMP and UO. monitor for YASHIRA  Avoid further nephrotoxins if possible.    htn  controlled  holding losartan and hctz sec to worsen scr  C/W amlodipine at current dose.  Low salt diet.  monitor BP.    Hyponatremia  Marginal.  Optimize glycemic control.  Monitor Na.    LE  wound  f/u vascular  pending angio  risk vs benefit explained to patient; she expressed understanding

## 2024-12-12 NOTE — PROGRESS NOTE ADULT - SUBJECTIVE AND OBJECTIVE BOX
CARDIOLOGY FOLLOW UP - Dr. Oliva  DATE OF SERVICE: 12/12/24    CC no cp or sob       REVIEW OF SYSTEMS:  CONSTITUTIONAL: No fever, weight loss, or fatigue  RESPIRATORY: No cough, wheezing, chills or hemoptysis; No Shortness of Breath  CARDIOVASCULAR: No chest pain, palpitations, passing out, dizziness  GASTROINTESTINAL: No abdominal or epigastric pain. No nausea, vomiting, or hematemesis; No diarrhea or constipation. No melena or hematochezia.      PHYSICAL EXAM:  T(C): 37.1 (12-12-24 @ 09:50), Max: 37.7 (12-11-24 @ 23:01)  HR: 78 (12-12-24 @ 09:50) (72 - 78)  BP: 145/63 (12-12-24 @ 09:50) (119/49 - 145/63)  RR: 16 (12-12-24 @ 09:50) (16 - 18)  SpO2: 100% (12-12-24 @ 09:50) (95% - 100%)  Wt(kg): --  I&O's Summary      Appearance: Normal	  Cardiovascular: Normal S1 S2,RRR, No JVD, No murmurs  Respiratory: Lungs clear to auscultation	  Gastrointestinal:  Soft, Non-tender, + BS	  Extremities: Normal range of motion, No clubbing, cyanosis or edema      Home Medications:  aspirin 81 mg oral delayed release tablet: 1 tab(s) orally once a day (04 Dec 2024 04:28)  atorvastatin 20 mg oral tablet: 1 tab(s) orally once a day (at bedtime) (04 Dec 2024 04:28)  Farxiga 5 mg oral tablet: 1 tab(s) orally once a day (04 Dec 2024 04:32)  hydroCHLOROthiazide 25 mg oral tablet: 1 tab(s) orally once a day (04 Dec 2024 04:28)  Lantus Solostar Pen 100 units/mL subcutaneous solution: 30 unit(s) subcutaneous once a day (at bedtime) (04 Dec 2024 04:28)  losartan 100 mg oral tablet: 1 tab(s) orally once a day (04 Dec 2024 04:28)  Norvasc 10 mg oral tablet: 1 tab(s) orally once a day (04 Dec 2024 04:28)  NovoLOG 100 units/mL subcutaneous solution: 10 unit(s) subcutaneous 3 times a day (before meals) (04 Dec 2024 04:28)      MEDICATIONS  (STANDING):  amLODIPine   Tablet 10 milliGRAM(s) Oral daily  aspirin  chewable 81 milliGRAM(s) Oral daily  atorvastatin 20 milliGRAM(s) Oral at bedtime  gabapentin 100 milliGRAM(s) Oral three times a day  glucagon  Injectable 1 milliGRAM(s) IntraMuscular once  glucagon  Injectable 1 milliGRAM(s) IntraMuscular once  heparin   Injectable 5000 Unit(s) SubCutaneous every 8 hours  influenza  Vaccine (HIGH DOSE) 0.5 milliLiter(s) IntraMuscular once  insulin glargine Injectable (LANTUS) 12 Unit(s) SubCutaneous every morning  insulin lispro (ADMELOG) corrective regimen sliding scale   SubCutaneous three times a day before meals  insulin lispro (ADMELOG) corrective regimen sliding scale   SubCutaneous at bedtime  insulin lispro Injectable (ADMELOG) 6 Unit(s) SubCutaneous three times a day before meals  piperacillin/tazobactam IVPB.. 3.375 Gram(s) IV Intermittent every 8 hours  polyethylene glycol 3350 17 Gram(s) Oral daily  sodium chloride 0.9%. 1000 milliLiter(s) (75 mL/Hr) IV Continuous <Continuous>      TELEMETRY: 	    ECG:  	  RADIOLOGY:   DIAGNOSTIC TESTING:  [ ] Echocardiogram:  [ ]  Catheterization:  [ ] Stress Test:    OTHER: 	    LABS:	 	                            8.3    11.29 )-----------( 576      ( 12 Dec 2024 02:20 )             26.9     12-12    139  |  106  |  14  ----------------------------<  143[H]  4.0   |  22  |  1.31[H]    Ca    9.2      12 Dec 2024 02:20  Phos  3.6     12-12  Mg     2.20     12-12      PT/INR - ( 12 Dec 2024 02:20 )   PT: 13.0 sec;   INR: 1.09 ratio         PTT - ( 12 Dec 2024 02:20 )  PTT:29.5 sec

## 2024-12-12 NOTE — PROGRESS NOTE ADULT - PROBLEM SELECTOR PLAN 5
Heparin sq    Dispo: febrile so abx switched back to Zosyn  no longer febrile.   vascular angiogram pending tentatively Monday.   (noted vascular postponed procedure to Wednesday.  From medical perspective, pt no longer febrile, can proceed. no need to postpone)  angio   resume ASA

## 2024-12-12 NOTE — PROGRESS NOTE ADULT - ASSESSMENT
A/P  59yo female h/o DM type2, HTN, hyperlipidemia; Pt c/o left great toe infection.    #diabetic foot infection.   -sp bedside debridement with podiatry on 12/4/24  -VA JACK WITH PVR noted   -vascular planning for LLE angio today  -ecg with no ischemic changes, no chf or significant valvular disease on exam   -echo with nl LV sys FX No significant valvular disease.  -remains CV stable to proceed for LE angio with acceptable cv risk     #HTN   -c/w meds    #JENARO  -outpt hctz, arb on hold   -US Kidney and Bladder 12/5 shows No hydronephrosis  -renal fu     #dizziness   -after getting up to use the bathroom overnight 12/8  -resolved per pt  -orthostatic bp neg   -bp stable, continue to monitor     dvt ppx

## 2024-12-12 NOTE — PROGRESS NOTE ADULT - SUBJECTIVE AND OBJECTIVE BOX
[facility], Division of Infectious Diseases  TO[facility] Szym[address]ki Y. Patel, S. Shah, G. Álvaro  [phone]  ([phone] - weekdays after 5pm and weekends)    Name: REJI MEADE  Age/Gender: 69y Female  MRN: [id number]    Interval History:  Notes reviewed.   No concerning overnight events.  Afebrile.   denies diarrhea  awaiting angiogram today    Allergies: No Known Allergies      Objective:  Vitals:   T(F): 98.7 (12-12-24 @ 09:50), Max: 99.9 (12-11-24 @ 23:01)  HR: 78 (12-12-24 @ 09:50) (72 - 78)  BP: 145/63 (12-12-24 @ 09:50) (119/49 - 145/63)  RR: 16 (12-12-24 @ 09:50) (16 - 18)  SpO2: 100% (12-12-24 @ 09:50) (95% - 100%)  Physical Examination:  General: no acute distress  HEENT: anicteric  Cardio: S1, S2, normal rate  Resp: breathing comfortably on RA   Abd: soft, NT, ND  Ext: L foot wrapped w/ dressing  Skin: warm, dry    Laboratory Studies:  CBC:                       8.3    11.29 )-----------( 576      ( 12 Dec 2024 02:20 )             26.9     WBC Trend:  11.29 12-12-24 @ 02:20  12.43 12-11-24 @ 04:15  13.14 12-10-24 @ 08:20  11.60 12-09-24 @ 07:10  13.53 12-07-24 @ 18:46    CMP: 12-12    139  |  106  |  14  ----------------------------<  143[H]  4.0   |  22  |  1.31[H]    Ca    9.2      12 Dec 2024 02:20  Phos  3.6     12-12  Mg     2.20     12-12            Urinalysis Basic - ( 12 Dec 2024 02:20 )    Color: x / Appearance: x / SG: x / pH: x  Gluc: 143 mg/dL / Ketone: x  / Bili: x / Urobili: x   Blood: x / Protein: x / Nitrite: x   Leuk Esterase: x / RBC: x / WBC x   Sq Epi: x / Non Sq Epi: x / Bacteria: x      Microbiology: reviewed     Culture - Blood (collected 12-07-24 @ 22:31)  Source: .Blood BLOOD  Preliminary Report (12-12-24 @ 01:01):    No growth at 4 days    Culture - Blood (collected 12-07-24 @ 18:46)  Source: .Blood BLOOD  Preliminary Report (12-12-24 @ 01:01):    No growth at 4 days    Culture - Abscess with Gram Stain (collected 12-04-24 @ 01:07)  Source: .Abscess  Gram Stain (12-04-24 @ 16:17):    No polymorphonuclear cells seen per low power field    Few Gram Negative Rods per oil power field  Final Report (12-09-24 @ 07:31):    Numerous Morganella morganii    Moderate Citrobacter amalonaticus complex    Few Staphylococcus lugdunensis    Commensal aura consistent with body site  Organism: Morganella morganii  Citrobacter amalonaticus complex  Staphylococcus lugdunensis (12-09-24 @ 07:31)  Organism: Staphylococcus lugdunensis (12-09-24 @ 07:31)      Method Type: MARIE      -  Clindamycin: S <=0.25      -  Erythromycin: S <=0.25      -  Gentamicin: S <=4 Should not be used as monotherapy      -  Oxacillin: S 0.5      -  Penicillin: R >2      -  Rifampin: S <=1 Should not be used as monotherapy      -  Tetracycline: S <=4      -  Trimethoprim/Sulfamethoxazole: S <=0.5/9.5      -  Vancomycin: S 1  Organism: Citrobacter amalonaticus complex (12-09-24 @ 07:31)      Method Type: MARIE      -  Amoxicillin/Clavulanic Acid: S <=8/4      -  Ampicillin: R <=8 These ampicillin results predict results for amoxicillin      -  Ampicillin/Sulbactam: S <=4/2      -  Aztreonam: S <=4      -  Cefazolin: R >16      -  Cefepime: S <=2      -  Cefoxitin: S <=8      -  Ceftriaxone: S <=1      -  Ciprofloxacin: S <=0.25      -  Ertapenem: S <=0.5      -  Gentamicin: S <=2      -  Imipenem: S <=1      -  Levofloxacin: S <=0.5      -  Meropenem: S <=1      -  Piperacillin/Tazobactam: S <=8      -  Tobramycin: S <=2      -  Trimethoprim/Sulfamethoxazole: S <=0.5/9.5  Organism: Mirna chenii (12-09-24 @ 07:31)      Method Type: MARIE      -  Amoxicillin/Clavulanic Acid: R >16/8      -  Ampicillin: R >16 These ampicillin results predict results for amoxicillin      -  Ampicillin/Sulbactam: R >16/8      -  Aztreonam: R >16      -  Cefazolin: R >16      -  Cefepime: S <=2      -  Cefoxitin: S <=8      -  Ceftriaxone: S <=1      -  Ciprofloxacin: S <=0.25      -  Ertapenem: S <=0.5      -  Gentamicin: S <=2      -  Levofloxacin: S <=0.5      -  Meropenem: S <=1      -  Piperacillin/Tazobactam: S <=8      -  Tobramycin: S <=2      -  Trimethoprim/Sulfamethoxazole: S <=0.5/9.5    Culture - Blood (collected 12-03-24 @ 22:30)  Source: .Blood BLOOD  Final Report (12-09-24 @ 03:00):    No growth at 5 days    Culture - Blood (collected 12-03-24 @ 22:20)  Source: .Blood BLOOD  Final Report (12-09-24 @ 03:00):    No growth at 5 days        Radiology: reviewed     Medications:  acetaminophen     Tablet .. 650 milliGRAM(s) Oral every 6 hours PRN  amLODIPine   Tablet 10 milliGRAM(s) Oral daily  aspirin  chewable 81 milliGRAM(s) Oral daily  atorvastatin 20 milliGRAM(s) Oral at bedtime  gabapentin 100 milliGRAM(s) Oral three times a day  glucagon  Injectable 1 milliGRAM(s) IntraMuscular once  glucagon  Injectable 1 milliGRAM(s) IntraMuscular once  heparin   Injectable 5000 Unit(s) SubCutaneous every 8 hours  influenza  Vaccine (HIGH DOSE) 0.5 milliLiter(s) IntraMuscular once  insulin glargine Injectable (LANTUS) 12 Unit(s) SubCutaneous every morning  insulin lispro (ADMELOG) corrective regimen sliding scale   SubCutaneous three times a day before meals  insulin lispro (ADMELOG) corrective regimen sliding scale   SubCutaneous at bedtime  insulin lispro Injectable (ADMELOG) 6 Unit(s) SubCutaneous three times a day before meals  melatonin 3 milliGRAM(s) Oral at bedtime PRN  ondansetron Injectable 4 milliGRAM(s) IV Push every 8 hours PRN  piperacillin/tazobactam IVPB.. 3.375 Gram(s) IV Intermittent every 8 hours  polyethylene glycol 3350 17 Gram(s) Oral daily  sodium chloride 0.9%. 1000 milliLiter(s) IV Continuous <Continuous>    Antimicrobials:  piperacillin/tazobactam IVPB.. 3.375 Gram(s) IV Intermittent every 8 hours

## 2024-12-12 NOTE — CHART NOTE - NSCHARTNOTEFT_GEN_A_CORE
OR case cancelled today secondary to cath lab availability. Patient can have diet resumed. Will preop for OR tomorrow 12/13 1am labs. Patient to be transferred to vascular surgery service under the care of Dr Vann. Medicine attending Dr Harinder carcamo.  [place] [phone]

## 2024-12-12 NOTE — PROGRESS NOTE ADULT - SUBJECTIVE AND OBJECTIVE BOX
[facility]   MD LI WADE MD ANGELA WONG, PA    TEL:  OFFICE: [phone]  From 5pm-7am Answering Service [phone]    -- RENAL FOLLOW UP NOTE ---Date of Service 12-12-24 @ 14:10    Patient is a 69y old  Female who presents with a chief complaint of Sent in by podiatrist for right foot/toe wound, worsening since October. (12 Dec 2024 11:56)      Patient seen and examined at bedside. No chest pain/sob    VITALS:  T(F): 98.7 (12-12-24 @ 09:50), Max: 99.9 (12-11-24 @ 23:01)  HR: 78 (12-12-24 @ 09:50)  BP: 145/63 (12-12-24 @ 09:50)  RR: 16 (12-12-24 @ 09:50)  SpO2: 100% (12-12-24 @ 09:50)  Wt(kg): --    Height (cm): 172.7 (12-12 @ 10:03)  Weight (kg): 81.6 (12-12 @ 10:03)  BMI (kg/m2): 27.4 (12-12 @ 10:03)  BSA (m2): 1.95 (12-12 @ 10:03)    PHYSICAL EXAM:  General: NAD  Neck: No JVD  Respiratory: CTAB, no wheezes, rales or rhonchi  Cardiovascular: S1, S2, RRR  Gastrointestinal: BS+, soft, NT/ND  Extremities: No peripheral edema    Hospital Medications:   MEDICATIONS  (STANDING):  amLODIPine   Tablet 10 milliGRAM(s) Oral daily  aspirin  chewable 81 milliGRAM(s) Oral daily  atorvastatin 20 milliGRAM(s) Oral at bedtime  gabapentin 100 milliGRAM(s) Oral three times a day  glucagon  Injectable 1 milliGRAM(s) IntraMuscular once  glucagon  Injectable 1 milliGRAM(s) IntraMuscular once  heparin   Injectable 5000 Unit(s) SubCutaneous every 8 hours  influenza  Vaccine (HIGH DOSE) 0.5 milliLiter(s) IntraMuscular once  insulin glargine Injectable (LANTUS) 12 Unit(s) SubCutaneous every morning  insulin lispro (ADMELOG) corrective regimen sliding scale   SubCutaneous three times a day before meals  insulin lispro (ADMELOG) corrective regimen sliding scale   SubCutaneous at bedtime  insulin lispro Injectable (ADMELOG) 6 Unit(s) SubCutaneous three times a day before meals  piperacillin/tazobactam IVPB.. 3.375 Gram(s) IV Intermittent every 8 hours  polyethylene glycol 3350 17 Gram(s) Oral daily  sodium chloride 0.9%. 1000 milliLiter(s) (75 mL/Hr) IV Continuous <Continuous>      LABS:  12-12    139  |  106  |  14  ----------------------------<  143[H]  4.0   |  22  |  1.31[H]    Ca    9.2      12 Dec 2024 02:20  Phos  3.6     12-12  Mg     2.20     12-12      Creatinine Trend: 1.31 <--, 1.41 <--, 1.46 <--, 1.42 <--, 1.21 <--, 1.42 <--, 1.52 <--    Phosphorus: 3.6 mg/dL (12-12 @ 02:20)                              8.3    11.29 )-----------( 576      ( 12 Dec 2024 02:20 )             26.9     Urine Studies:  Urinalysis - [12-12-24 @ 02:20]      Color  / Appearance  / SG  / pH       Gluc 143 / Ketone   / Bili  / Urobili        Blood  / Protein  / Leuk Est  / Nitrite       RBC  / WBC  / Hyaline  / Gran  / Sq Epi  / Non Sq Epi  / Bacteria       HbA1c 10.7      [02-25-20 @ 14:10]  TSH 1.00      [12-04-24 @ 06:20]        RADIOLOGY & ADDITIONAL STUDIES:

## 2024-12-12 NOTE — PROGRESS NOTE ADULT - SUBJECTIVE AND OBJECTIVE BOX
Vascular Surgery Daily Progress Note    Last 24 hours events: OR case cancelled today secondary to cath lab availability. Patient transferred to vascular surgery service. Difficulty obtain AM labs, since obtained by team members.    Subjective: Patient examined at bedside this morning. Denies fevers, chills, nausea, vomiting, or motor/sensory changes to LLE. Patient re-evaluated after transfer and expressed frustration of cancelled procedures. Still amenable to proceeding with angiogram.       piperacillin/tazobactam IVPB.. 3.375  amLODIPine   Tablet 10  aspirin  chewable 81  heparin   Injectable 5000  piperacillin/tazobactam IVPB.. 3.375        Vital Signs Last 24 Hrs  T(C): 37.1 (12 Dec 2024 09:50), Max: 37.7 (11 Dec 2024 23:01)  T(F): 98.7 (12 Dec 2024 09:50), Max: 99.9 (11 Dec 2024 23:01)  HR: 78 (12 Dec 2024 09:50) (72 - 78)  BP: 145/63 (12 Dec 2024 09:50) (119/49 - 145/63)  BP(mean): --  RR: 16 (12 Dec 2024 09:50) (16 - 18)  SpO2: 100% (12 Dec 2024 09:50) (95% - 100%)    Parameters below as of 12 Dec 2024 09:50  Patient On (Oxygen Delivery Method): room air      I&O's Summary      Physical Exam:  Gen: Laying in bed, NAD  Resp: Unlabored breathing  Vascular: Left first toe wound at distal tip to bone, no drainage, no purulence, no foul odor; Bilateral lower extremities with DP/PT signals, palpable popliteal and femoral pulses      LABS:                        8.3    11.29 )-----------( 576      ( 12 Dec 2024 02:20 )             26.9     12-12    139  |  106  |  14  ----------------------------<  143[H]  4.0   |  22  |  1.31[H]    Ca    9.2      12 Dec 2024 02:20  Phos  3.6     12-12  Mg     2.20     12-12      PT/INR - ( 12 Dec 2024 02:20 )   PT: 13.0 sec;   INR: 1.09 ratio         PTT - ( 12 Dec 2024 02:20 )  PTT:29.5 sec   Vascular Surgery Daily Progress Note    Last 24 hours events: OR case cancelled today secondary to cath lab availability. Patient transferred to vascular surgery service.    Subjective: Patient examined at bedside this morning. Denies fevers, chills, nausea, vomiting, or motor/sensory changes to LLE. Patient re-evaluated after transfer and expressed frustration of cancelled procedures. Still amenable to proceeding with angiogram.       piperacillin/tazobactam IVPB.. 3.375  amLODIPine   Tablet 10  aspirin  chewable 81  heparin   Injectable 5000  piperacillin/tazobactam IVPB.. 3.375        Vital Signs Last 24 Hrs  T(C): 37.1 (12 Dec 2024 09:50), Max: 37.7 (11 Dec 2024 23:01)  T(F): 98.7 (12 Dec 2024 09:50), Max: 99.9 (11 Dec 2024 23:01)  HR: 78 (12 Dec 2024 09:50) (72 - 78)  BP: 145/63 (12 Dec 2024 09:50) (119/49 - 145/63)  BP(mean): --  RR: 16 (12 Dec 2024 09:50) (16 - 18)  SpO2: 100% (12 Dec 2024 09:50) (95% - 100%)    Parameters below as of 12 Dec 2024 09:50  Patient On (Oxygen Delivery Method): room air      I&O's Summary      Physical Exam:  Gen: Laying in bed, NAD  Resp: Unlabored breathing  Vascular: Left first toe wound at distal tip to bone, no drainage, no purulence, no foul odor; Bilateral lower extremities with DP/PT signals, palpable popliteal and femoral pulses      LABS:                        8.3    11.29 )-----------( 576      ( 12 Dec 2024 02:20 )             26.9     12-12    139  |  106  |  14  ----------------------------<  143[H]  4.0   |  22  |  1.31[H]    Ca    9.2      12 Dec 2024 02:20  Phos  3.6     12-12  Mg     2.20     12-12      PT/INR - ( 12 Dec 2024 02:20 )   PT: 13.0 sec;   INR: 1.09 ratio         PTT - ( 12 Dec 2024 02:20 )  PTT:29.5 sec

## 2024-12-13 ENCOUNTER — APPOINTMENT (OUTPATIENT)
Dept: VASCULAR SURGERY | Facility: HOSPITAL | Age: 69
End: 2024-12-13

## 2024-12-13 LAB
ANION GAP SERPL CALC-SCNC: 12 MMOL/L — SIGNIFICANT CHANGE UP (ref 7–14)
APTT BLD: 32.4 SEC — SIGNIFICANT CHANGE UP (ref 24.5–35.6)
BUN SERPL-MCNC: 13 MG/DL — SIGNIFICANT CHANGE UP (ref 7–23)
CALCIUM SERPL-MCNC: 9.6 MG/DL — SIGNIFICANT CHANGE UP (ref 8.4–10.5)
CHLORIDE SERPL-SCNC: 105 MMOL/L — SIGNIFICANT CHANGE UP (ref 98–107)
CO2 SERPL-SCNC: 23 MMOL/L — SIGNIFICANT CHANGE UP (ref 22–31)
CREAT SERPL-MCNC: 1.39 MG/DL — HIGH (ref 0.5–1.3)
CULTURE RESULTS: SIGNIFICANT CHANGE UP
CULTURE RESULTS: SIGNIFICANT CHANGE UP
EGFR: 41 ML/MIN/1.73M2 — LOW
GLUCOSE BLDC GLUCOMTR-MCNC: 127 MG/DL — HIGH (ref 70–99)
GLUCOSE BLDC GLUCOMTR-MCNC: 151 MG/DL — HIGH (ref 70–99)
GLUCOSE BLDC GLUCOMTR-MCNC: 159 MG/DL — HIGH (ref 70–99)
GLUCOSE BLDC GLUCOMTR-MCNC: 172 MG/DL — HIGH (ref 70–99)
GLUCOSE BLDC GLUCOMTR-MCNC: 184 MG/DL — HIGH (ref 70–99)
GLUCOSE SERPL-MCNC: 133 MG/DL — HIGH (ref 70–99)
HCT VFR BLD CALC: 33 % — LOW (ref 34.5–45)
HGB BLD-MCNC: 10.1 G/DL — LOW (ref 11.5–15.5)
INR BLD: 1.08 RATIO — SIGNIFICANT CHANGE UP (ref 0.85–1.16)
MAGNESIUM SERPL-MCNC: 2.3 MG/DL — SIGNIFICANT CHANGE UP (ref 1.6–2.6)
MCHC RBC-ENTMCNC: 28.4 PG — SIGNIFICANT CHANGE UP (ref 27–34)
MCHC RBC-ENTMCNC: 30.6 G/DL — LOW (ref 32–36)
MCV RBC AUTO: 92.7 FL — SIGNIFICANT CHANGE UP (ref 80–100)
NRBC # BLD: 0 /100 WBCS — SIGNIFICANT CHANGE UP (ref 0–0)
NRBC # FLD: 0 K/UL — SIGNIFICANT CHANGE UP (ref 0–0)
PHOSPHATE SERPL-MCNC: 3.1 MG/DL — SIGNIFICANT CHANGE UP (ref 2.5–4.5)
PLATELET # BLD AUTO: 589 K/UL — HIGH (ref 150–400)
POTASSIUM SERPL-MCNC: 4.5 MMOL/L — SIGNIFICANT CHANGE UP (ref 3.5–5.3)
POTASSIUM SERPL-SCNC: 4.5 MMOL/L — SIGNIFICANT CHANGE UP (ref 3.5–5.3)
PROTHROM AB SERPL-ACNC: 12.5 SEC — SIGNIFICANT CHANGE UP (ref 9.9–13.4)
RBC # BLD: 3.56 M/UL — LOW (ref 3.8–5.2)
RBC # FLD: 12.5 % — SIGNIFICANT CHANGE UP (ref 10.3–14.5)
SODIUM SERPL-SCNC: 140 MMOL/L — SIGNIFICANT CHANGE UP (ref 135–145)
SPECIMEN SOURCE: SIGNIFICANT CHANGE UP
SPECIMEN SOURCE: SIGNIFICANT CHANGE UP
WBC # BLD: 11.03 K/UL — HIGH (ref 3.8–10.5)
WBC # FLD AUTO: 11.03 K/UL — HIGH (ref 3.8–10.5)

## 2024-12-13 PROCEDURE — 99232 SBSQ HOSP IP/OBS MODERATE 35: CPT

## 2024-12-13 RX ORDER — INSULIN GLARGINE-YFGN 100 [IU]/ML
20 INJECTION, SOLUTION SUBCUTANEOUS EVERY MORNING
Refills: 0 | Status: DISCONTINUED | OUTPATIENT
Start: 2024-12-13 | End: 2024-12-15

## 2024-12-13 RX ADMIN — ACETAMINOPHEN 650 MILLIGRAM(S): 80 SOLUTION/ DROPS ORAL at 22:07

## 2024-12-13 RX ADMIN — ACETAMINOPHEN 650 MILLIGRAM(S): 80 SOLUTION/ DROPS ORAL at 22:50

## 2024-12-13 RX ADMIN — INSULIN GLARGINE-YFGN 20 UNIT(S): 100 INJECTION, SOLUTION SUBCUTANEOUS at 10:05

## 2024-12-13 RX ADMIN — Medication 6 UNIT(S): at 08:53

## 2024-12-13 RX ADMIN — ATORVASTATIN CALCIUM 20 MILLIGRAM(S): 40 TABLET, FILM COATED ORAL at 22:07

## 2024-12-13 RX ADMIN — PIPERACILLIN AND TAZOBACTAM 25 GRAM(S): 3; .375 INJECTION, POWDER, LYOPHILIZED, FOR SOLUTION INTRAVENOUS at 22:06

## 2024-12-13 RX ADMIN — Medication 6 UNIT(S): at 17:54

## 2024-12-13 RX ADMIN — Medication 17 GRAM(S): at 13:06

## 2024-12-13 RX ADMIN — Medication 10 MILLIGRAM(S): at 05:14

## 2024-12-13 RX ADMIN — HEPARIN SODIUM 5000 UNIT(S): 1000 INJECTION, SOLUTION INTRAVENOUS; SUBCUTANEOUS at 22:07

## 2024-12-13 RX ADMIN — PIPERACILLIN AND TAZOBACTAM 25 GRAM(S): 3; .375 INJECTION, POWDER, LYOPHILIZED, FOR SOLUTION INTRAVENOUS at 05:13

## 2024-12-13 RX ADMIN — GABAPENTIN 100 MILLIGRAM(S): 300 CAPSULE ORAL at 22:07

## 2024-12-13 RX ADMIN — GABAPENTIN 100 MILLIGRAM(S): 300 CAPSULE ORAL at 05:14

## 2024-12-13 RX ADMIN — HEPARIN SODIUM 5000 UNIT(S): 1000 INJECTION, SOLUTION INTRAVENOUS; SUBCUTANEOUS at 05:14

## 2024-12-13 RX ADMIN — Medication 1: at 12:52

## 2024-12-13 RX ADMIN — GABAPENTIN 100 MILLIGRAM(S): 300 CAPSULE ORAL at 13:06

## 2024-12-13 RX ADMIN — HEPARIN SODIUM 5000 UNIT(S): 1000 INJECTION, SOLUTION INTRAVENOUS; SUBCUTANEOUS at 13:06

## 2024-12-13 RX ADMIN — Medication 6 UNIT(S): at 12:52

## 2024-12-13 RX ADMIN — Medication 81 MILLIGRAM(S): at 13:06

## 2024-12-13 RX ADMIN — PIPERACILLIN AND TAZOBACTAM 25 GRAM(S): 3; .375 INJECTION, POWDER, LYOPHILIZED, FOR SOLUTION INTRAVENOUS at 13:07

## 2024-12-13 RX ADMIN — Medication 1: at 08:54

## 2024-12-13 NOTE — PROGRESS NOTE ADULT - SUBJECTIVE AND OBJECTIVE BOX
[facility], Division of Infectious Diseases  TOBY Szymanski Y. Patel, S. Shah, G. Álvaro  [phone]  ([phone] - weekdays after 5pm and weekends)    Name: REJI MEADE  Age/Gender: 69y Female  MRN: [id number]    Interval History:  Notes reviewed.   No concerning overnight events.  Afebrile.       Allergies: No Known Allergies      Objective:  Vitals:   T(F): 98.1 (12-13-24 @ 04:45), Max: 98.3 (12-12-24 @ 18:27)  HR: 70 (12-13-24 @ 04:45) (70 - 77)  BP: 128/52 (12-13-24 @ 04:45) (125/54 - 131/51)  RR: 18 (12-13-24 @ 04:45) (18 - 18)  SpO2: 100% (12-13-24 @ 04:45) (98% - 100%)  Physical Examination:  General: no acute distress  HEENT: anicteric  Cardio: S1, S2, normal rate  Resp: breathing comfortably on RA   Abd: soft, NT, ND  Ext: L foot wrapped w/ dressing  Skin: warm, dry    Laboratory Studies:  CBC:                       10.1   11.03 )-----------( 589      ( 13 Dec 2024 05:49 )             33.0     WBC Trend:  11.03 12-13-24 @ 05:49  11.29 12-12-24 @ 02:20  12.43 12-11-24 @ 04:15  13.14 12-10-24 @ 08:20  11.60 12-09-24 @ 07:10  13.53 12-07-24 @ 18:46    CMP: 12-13    140  |  105  |  13  ----------------------------<  133[H]  4.5   |  23  |  1.39[H]    Ca    9.6      13 Dec 2024 05:49  Phos  3.1     12-13  Mg     2.30     12-13            Urinalysis Basic - ( 13 Dec 2024 05:49 )    Color: x / Appearance: x / SG: x / pH: x  Gluc: 133 mg/dL / Ketone: x  / Bili: x / Urobili: x   Blood: x / Protein: x / Nitrite: x   Leuk Esterase: x / RBC: x / WBC x   Sq Epi: x / Non Sq Epi: x / Bacteria: x      Microbiology: reviewed     Culture - Blood (collected 12-07-24 @ 22:31)  Source: .Blood BLOOD  Final Report (12-13-24 @ 01:00):    No growth at 5 days    Culture - Blood (collected 12-07-24 @ 18:46)  Source: .Blood BLOOD  Final Report (12-13-24 @ 01:00):    No growth at 5 days    Culture - Abscess with Gram Stain (collected 12-04-24 @ 01:07)  Source: .Abscess  Gram Stain (12-04-24 @ 16:17):    No polymorphonuclear cells seen per low power field    Few Gram Negative Rods per oil power field  Final Report (12-09-24 @ 07:31):    Numerous Morganella morganii    Moderate Citrobacter amalonaticus complex    Few Staphylococcus lugdunensis    Commensal aura consistent with body site  Organism: Morganella morganii  Citrobacter amalonaticus complex  Staphylococcus lugdunensis (12-09-24 @ 07:31)  Organism: Staphylococcus lugdunensis (12-09-24 @ 07:31)      Method Type: MARIE      -  Clindamycin: S <=0.25      -  Erythromycin: S <=0.25      -  Gentamicin: S <=4 Should not be used as monotherapy      -  Oxacillin: S 0.5      -  Penicillin: R >2      -  Rifampin: S <=1 Should not be used as monotherapy      -  Tetracycline: S <=4      -  Trimethoprim/Sulfamethoxazole: S <=0.5/9.5      -  Vancomycin: S 1  Organism: Citrobacter amalonaticus complex (12-09-24 @ 07:31)      Method Type: MARIE      -  Amoxicillin/Clavulanic Acid: S <=8/4      -  Ampicillin: R <=8 These ampicillin results predict results for amoxicillin      -  Ampicillin/Sulbactam: S <=4/2      -  Aztreonam: S <=4      -  Cefazolin: R >16      -  Cefepime: S <=2      -  Cefoxitin: S <=8      -  Ceftriaxone: S <=1      -  Ciprofloxacin: S <=0.25      -  Ertapenem: S <=0.5      -  Gentamicin: S <=2      -  Imipenem: S <=1      -  Levofloxacin: S <=0.5      -  Meropenem: S <=1      -  Piperacillin/Tazobactam: S <=8      -  Tobramycin: S <=2      -  Trimethoprim/Sulfamethoxazole: S <=0.5/9.5  Organism: Mirna chenii (12-09-24 @ 07:31)      Method Type: MARIE      -  Amoxicillin/Clavulanic Acid: R >16/8      -  Ampicillin: R >16 These ampicillin results predict results for amoxicillin      -  Ampicillin/Sulbactam: R >16/8      -  Aztreonam: R >16      -  Cefazolin: R >16      -  Cefepime: S <=2      -  Cefoxitin: S <=8      -  Ceftriaxone: S <=1      -  Ciprofloxacin: S <=0.25      -  Ertapenem: S <=0.5      -  Gentamicin: S <=2      -  Levofloxacin: S <=0.5      -  Meropenem: S <=1      -  Piperacillin/Tazobactam: S <=8      -  Tobramycin: S <=2      -  Trimethoprim/Sulfamethoxazole: S <=0.5/9.5    Culture - Blood (collected 12-03-24 @ 22:30)  Source: .Blood BLOOD  Final Report (12-09-24 @ 03:00):    No growth at 5 days    Culture - Blood (collected 12-03-24 @ 22:20)  Source: .Blood BLOOD  Final Report (12-09-24 @ 03:00):    No growth at 5 days        Radiology: reviewed     Medications:  acetaminophen     Tablet .. 650 milliGRAM(s) Oral every 6 hours PRN  amLODIPine   Tablet 10 milliGRAM(s) Oral daily  aspirin  chewable 81 milliGRAM(s) Oral daily  atorvastatin 20 milliGRAM(s) Oral at bedtime  gabapentin 100 milliGRAM(s) Oral three times a day  glucagon  Injectable 1 milliGRAM(s) IntraMuscular once  glucagon  Injectable 1 milliGRAM(s) IntraMuscular once  heparin   Injectable 5000 Unit(s) SubCutaneous every 8 hours  influenza  Vaccine (HIGH DOSE) 0.5 milliLiter(s) IntraMuscular once  insulin glargine Injectable (LANTUS) 20 Unit(s) SubCutaneous every morning  insulin lispro (ADMELOG) corrective regimen sliding scale   SubCutaneous three times a day before meals  insulin lispro (ADMELOG) corrective regimen sliding scale   SubCutaneous at bedtime  insulin lispro Injectable (ADMELOG) 6 Unit(s) SubCutaneous three times a day before meals  melatonin 3 milliGRAM(s) Oral at bedtime PRN  ondansetron Injectable 4 milliGRAM(s) IV Push every 8 hours PRN  piperacillin/tazobactam IVPB.. 3.375 Gram(s) IV Intermittent every 8 hours  polyethylene glycol 3350 17 Gram(s) Oral daily    Antimicrobials:  piperacillin/tazobactam IVPB.. 3.375 Gram(s) IV Intermittent every 8 hours   [facility], Division of Infectious Diseases  TOBY Szymanski Y. Patel, S. Shah, G. Álvaro  [phone]  ([phone] - weekdays after 5pm and weekends)    Name: REJI MEADE  Age/Gender: 69y Female  MRN: [id number]    Interval History:  Notes reviewed.   No concerning overnight events.  Afebrile.   denies diarrhea  states she is frustrated and did not want to have angiogram this morning but would still like to proceed with an angiogram    Allergies: No Known Allergies      Objective:  Vitals:   T(F): 98.1 (12-13-24 @ 04:45), Max: 98.3 (12-12-24 @ 18:27)  HR: 70 (12-13-24 @ 04:45) (70 - 77)  BP: 128/52 (12-13-24 @ 04:45) (125/54 - 131/51)  RR: 18 (12-13-24 @ 04:45) (18 - 18)  SpO2: 100% (12-13-24 @ 04:45) (98% - 100%)  Physical Examination:  General: no acute distress  HEENT: anicteric  Cardio: S1, S2, normal rate  Resp: clear to auscultation anteriorly   Abd: soft, NT, ND  Ext: L foot wrapped w/ dressing  Skin: warm, dry    Laboratory Studies:  CBC:                       10.1   11.03 )-----------( 589      ( 13 Dec 2024 05:49 )             33.0     WBC Trend:  11.03 12-13-24 @ 05:49  11.29 12-12-24 @ 02:20  12.43 12-11-24 @ 04:15  13.14 12-10-24 @ 08:20  11.60 12-09-24 @ 07:10  13.53 12-07-24 @ 18:46    CMP: 12-13    140  |  105  |  13  ----------------------------<  133[H]  4.5   |  23  |  1.39[H]    Ca    9.6      13 Dec 2024 05:49  Phos  3.1     12-13  Mg     2.30     12-13            Urinalysis Basic - ( 13 Dec 2024 05:49 )    Color: x / Appearance: x / SG: x / pH: x  Gluc: 133 mg/dL / Ketone: x  / Bili: x / Urobili: x   Blood: x / Protein: x / Nitrite: x   Leuk Esterase: x / RBC: x / WBC x   Sq Epi: x / Non Sq Epi: x / Bacteria: x      Microbiology: reviewed     Culture - Blood (collected 12-07-24 @ 22:31)  Source: .Blood BLOOD  Final Report (12-13-24 @ 01:00):    No growth at 5 days    Culture - Blood (collected 12-07-24 @ 18:46)  Source: .Blood BLOOD  Final Report (12-13-24 @ 01:00):    No growth at 5 days    Culture - Abscess with Gram Stain (collected 12-04-24 @ 01:07)  Source: .Abscess  Gram Stain (12-04-24 @ 16:17):    No polymorphonuclear cells seen per low power field    Few Gram Negative Rods per oil power field  Final Report (12-09-24 @ 07:31):    Numerous Morganella morganii    Moderate Citrobacter amalonaticus complex    Few Staphylococcus lugdunensis    Commensal aura consistent with body site  Organism: Morganella morganii  Citrobacter amalonaticus complex  Staphylococcus lugdunensis (12-09-24 @ 07:31)  Organism: Staphylococcus lugdunensis (12-09-24 @ 07:31)      Method Type: MARIE      -  Clindamycin: S <=0.25      -  Erythromycin: S <=0.25      -  Gentamicin: S <=4 Should not be used as monotherapy      -  Oxacillin: S 0.5      -  Penicillin: R >2      -  Rifampin: S <=1 Should not be used as monotherapy      -  Tetracycline: S <=4      -  Trimethoprim/Sulfamethoxazole: S <=0.5/9.5      -  Vancomycin: S 1  Organism: Citrobacter amalonaticus complex (12-09-24 @ 07:31)      Method Type: MARIE      -  Amoxicillin/Clavulanic Acid: S <=8/4      -  Ampicillin: R <=8 These ampicillin results predict results for amoxicillin      -  Ampicillin/Sulbactam: S <=4/2      -  Aztreonam: S <=4      -  Cefazolin: R >16      -  Cefepime: S <=2      -  Cefoxitin: S <=8      -  Ceftriaxone: S <=1      -  Ciprofloxacin: S <=0.25      -  Ertapenem: S <=0.5      -  Gentamicin: S <=2      -  Imipenem: S <=1      -  Levofloxacin: S <=0.5      -  Meropenem: S <=1      -  Piperacillin/Tazobactam: S <=8      -  Tobramycin: S <=2      -  Trimethoprim/Sulfamethoxazole: S <=0.5/9.5  Organism: Mirna chenii (12-09-24 @ 07:31)      Method Type: MARIE      -  Amoxicillin/Clavulanic Acid: R >16/8      -  Ampicillin: R >16 These ampicillin results predict results for amoxicillin      -  Ampicillin/Sulbactam: R >16/8      -  Aztreonam: R >16      -  Cefazolin: R >16      -  Cefepime: S <=2      -  Cefoxitin: S <=8      -  Ceftriaxone: S <=1      -  Ciprofloxacin: S <=0.25      -  Ertapenem: S <=0.5      -  Gentamicin: S <=2      -  Levofloxacin: S <=0.5      -  Meropenem: S <=1      -  Piperacillin/Tazobactam: S <=8      -  Tobramycin: S <=2      -  Trimethoprim/Sulfamethoxazole: S <=0.5/9.5    Culture - Blood (collected 12-03-24 @ 22:30)  Source: .Blood BLOOD  Final Report (12-09-24 @ 03:00):    No growth at 5 days    Culture - Blood (collected 12-03-24 @ 22:20)  Source: .Blood BLOOD  Final Report (12-09-24 @ 03:00):    No growth at 5 days        Radiology: reviewed     Medications:  acetaminophen     Tablet .. 650 milliGRAM(s) Oral every 6 hours PRN  amLODIPine   Tablet 10 milliGRAM(s) Oral daily  aspirin  chewable 81 milliGRAM(s) Oral daily  atorvastatin 20 milliGRAM(s) Oral at bedtime  gabapentin 100 milliGRAM(s) Oral three times a day  glucagon  Injectable 1 milliGRAM(s) IntraMuscular once  glucagon  Injectable 1 milliGRAM(s) IntraMuscular once  heparin   Injectable 5000 Unit(s) SubCutaneous every 8 hours  influenza  Vaccine (HIGH DOSE) 0.5 milliLiter(s) IntraMuscular once  insulin glargine Injectable (LANTUS) 20 Unit(s) SubCutaneous every morning  insulin lispro (ADMELOG) corrective regimen sliding scale   SubCutaneous three times a day before meals  insulin lispro (ADMELOG) corrective regimen sliding scale   SubCutaneous at bedtime  insulin lispro Injectable (ADMELOG) 6 Unit(s) SubCutaneous three times a day before meals  melatonin 3 milliGRAM(s) Oral at bedtime PRN  ondansetron Injectable 4 milliGRAM(s) IV Push every 8 hours PRN  piperacillin/tazobactam IVPB.. 3.375 Gram(s) IV Intermittent every 8 hours  polyethylene glycol 3350 17 Gram(s) Oral daily    Antimicrobials:  piperacillin/tazobactam IVPB.. 3.375 Gram(s) IV Intermittent every 8 hours

## 2024-12-13 NOTE — PROGRESS NOTE ADULT - ASSESSMENT
57yo female h/o DM type2, HTN, hyperlipidemia; Pt c/o left great toe infection. vascular on board planning for angiogram. nephrology consulted for elevated S.Cr.    renal insufficiency  baseline ~ 1.4 per team  long standing hx of dm and htn  admitted with scr now up trending  scr relatively stable  on losartan and htcz.  will hold for now given worsen scr (last dose 12/5)  FEurea 35%  renal us no hydro   s/p  trial of gentle hydration 12/5  Renal function stable  pt refused angiogram. renal function stable. dc planning per team  Monitor BMP and UO. monitor for YASHIRA  Avoid further nephrotoxins if possible.    htn  controlled  continue with current meds  Low salt diet.  monitor BP.    Hyponatremia  Marginal.  Optimize glycemic control.  Monitor Na.    LE  wound  f/u vascular  refused angio  risk vs benefit explained to patient; she expressed understanding

## 2024-12-13 NOTE — CHART NOTE - NSCHARTNOTEFT_GEN_A_CORE
During AM rounds discussed with patient the scheduled angiogram that previously had consented for and how we would need to perform it in order to allow podiatry to properly surgically plan. Patient stated she understood and did not want to undergo the procedure and asked if there were other modalities for revascularization. Explained to the patient that an angiogram is the least invasive modality and other treatment options would be ineffective and that podiatry would not intervene prior to angiogram to improve vascularization. Patient initially stated that she did not want to be in the hospital during AM rounds.     Later in afternoon, patient stated that she understood the angiogram needed to be done and was willing to have the angiogram as part of treatment for podiatry planning. However, AM case had already been cancelled as per patient wishes and there was no other availability to perform the case. Patient was evaluated by ID that provided recommendations for outpatient antibioti During AM rounds discussed with patient the scheduled angiogram that previously had consented for and how we would need to perform it in order to allow podiatry to properly surgically plan. Patient stated she understood and did not want to undergo the procedure and asked if there were other modalities for revascularization. Explained to the patient that an angiogram is the least invasive modality and other treatment options would be ineffective and that podiatry would not intervene prior to angiogram to improve vascularization. Patient initially stated that she did not want to be in the hospital during AM rounds.     Later in afternoon, patient stated that she understood the angiogram needed to be done and was willing to have the angiogram as part of treatment for podiatry planning. However, AM case had already been cancelled as per patient wishes and there was no other availability to perform the case. Patient was evaluated by ID that provided recommendations for outpatient antibiotics and that the procedure could be planned for electively. Patient stated that she did not want to leave the hospital without completion of any angiogram and podiatry intervention and felt that she was being penalized for wanting to cancel the case. Explained to the patient that due to unforeseen circumstances the case had to be moved to Friday and there was availability today but the patient did not want the intervention even when informed in the morning it would need to occur prior to podiatry intervention. Informed the patient that availability was limited and that there would be no interventions that she would need to remain in the hospital for and all procedures could be scheduled outpatient. Patient stated that she had confirmed with insurance she was able to stay and did not want to leave until all interventions were completed. Reiterated to patient that case was not emergent and that it would be able to be scheduled electively as an outpatient.     Vascular Surgery   [id number]

## 2024-12-13 NOTE — PROGRESS NOTE ADULT - ASSESSMENT
69 year old female with PMH type 2 DM, CKD stage 3, HTN, HLD presents with osteomyelitis at left hallux    Plan  - Patient is refusing angiogram at this time due to multiple cancellations  - Diet: NPO  - Zosyn  - Aspirin  - Subq heparin    C team surgery [id number]   69 year old female with PMH type 2 DM, CKD stage 3, HTN, HLD presents with osteomyelitis at left hallux    Plan  - Patient is refusing angiogram at this time due to multiple cancellations  - Diet: diet  - Zosyn, f/u ID for dispo recs  - Aspirin  - Subq heparin  - Discharge    C team surgery [id number]

## 2024-12-13 NOTE — PROGRESS NOTE ADULT - SUBJECTIVE AND OBJECTIVE BOX
TEAM [ C ] Surgery Daily Progress Note  =====================================================    SUBJECTIVE: Patient seen and examined at bedside on AM rounds. Patient reports that they're feeling well. Denies fever, chills, N/V, chest pain, SOB    ALLERGIES:  No Known Allergies    -------------------------------------------------------------------------------------    MEDICATIONS:  acetaminophen     Tablet .. 650 milliGRAM(s) Oral every 6 hours PRN  amLODIPine   Tablet 10 milliGRAM(s) Oral daily  aspirin  chewable 81 milliGRAM(s) Oral daily  atorvastatin 20 milliGRAM(s) Oral at bedtime  gabapentin 100 milliGRAM(s) Oral three times a day  glucagon  Injectable 1 milliGRAM(s) IntraMuscular once  glucagon  Injectable 1 milliGRAM(s) IntraMuscular once  heparin   Injectable 5000 Unit(s) SubCutaneous every 8 hours  influenza  Vaccine (HIGH DOSE) 0.5 milliLiter(s) IntraMuscular once  insulin glargine Injectable (LANTUS) 12 Unit(s) SubCutaneous every morning  insulin lispro (ADMELOG) corrective regimen sliding scale   SubCutaneous three times a day before meals  insulin lispro (ADMELOG) corrective regimen sliding scale   SubCutaneous at bedtime  insulin lispro Injectable (ADMELOG) 6 Unit(s) SubCutaneous three times a day before meals  melatonin 3 milliGRAM(s) Oral at bedtime PRN  ondansetron Injectable 4 milliGRAM(s) IV Push every 8 hours PRN  piperacillin/tazobactam IVPB.. 3.375 Gram(s) IV Intermittent every 8 hours  polyethylene glycol 3350 17 Gram(s) Oral daily  sodium chloride 0.9%. 1000 milliLiter(s) IV Continuous <Continuous>    --------------------------------------------------------------------------------------    VITAL SIGNS:  T(C): 36.7 (12-13-24 @ 04:45), Max: 37.1 (12-12-24 @ 09:50)  HR: 70 (12-13-24 @ 04:45) (70 - 78)  BP: 128/52 (12-13-24 @ 04:45) (125/54 - 145/63)  RR: 18 (12-13-24 @ 04:45) (16 - 18)  SpO2: 100% (12-13-24 @ 04:45) (98% - 100%)  --------------------------------------------------------------------------------------    INS AND OUTS:    --------------------------------------------------------------------------------------      EXAM    General: NAD, resting in bed comfortably.  Cardiac: regular rate, warm and well perfused  Respiratory: Nonlabored respirations, normal cw expansion.  Abdomen: soft, nontender, nondistended  Extremities: normal strength, FROM, no deformities    --------------------------------------------------------------------------------------    LABS       TEAM [ C ] Surgery Daily Progress Note  =====================================================    SUBJECTIVE: Patient seen and examined at bedside on AM rounds. Patient refusing angiogram this morning. Reports concern regarding anemia. Denies fever, chills, N/V, chest pain, SOB    ALLERGIES:  No Known Allergies    -------------------------------------------------------------------------------------    MEDICATIONS:  acetaminophen     Tablet .. 650 milliGRAM(s) Oral every 6 hours PRN  amLODIPine   Tablet 10 milliGRAM(s) Oral daily  aspirin  chewable 81 milliGRAM(s) Oral daily  atorvastatin 20 milliGRAM(s) Oral at bedtime  gabapentin 100 milliGRAM(s) Oral three times a day  glucagon  Injectable 1 milliGRAM(s) IntraMuscular once  glucagon  Injectable 1 milliGRAM(s) IntraMuscular once  heparin   Injectable 5000 Unit(s) SubCutaneous every 8 hours  influenza  Vaccine (HIGH DOSE) 0.5 milliLiter(s) IntraMuscular once  insulin glargine Injectable (LANTUS) 12 Unit(s) SubCutaneous every morning  insulin lispro (ADMELOG) corrective regimen sliding scale   SubCutaneous three times a day before meals  insulin lispro (ADMELOG) corrective regimen sliding scale   SubCutaneous at bedtime  insulin lispro Injectable (ADMELOG) 6 Unit(s) SubCutaneous three times a day before meals  melatonin 3 milliGRAM(s) Oral at bedtime PRN  ondansetron Injectable 4 milliGRAM(s) IV Push every 8 hours PRN  piperacillin/tazobactam IVPB.. 3.375 Gram(s) IV Intermittent every 8 hours  polyethylene glycol 3350 17 Gram(s) Oral daily  sodium chloride 0.9%. 1000 milliLiter(s) IV Continuous <Continuous>    --------------------------------------------------------------------------------------    VITAL SIGNS:  T(C): 36.7 (12-13-24 @ 04:45), Max: 37.1 (12-12-24 @ 09:50)  HR: 70 (12-13-24 @ 04:45) (70 - 78)  BP: 128/52 (12-13-24 @ 04:45) (125/54 - 145/63)  RR: 18 (12-13-24 @ 04:45) (16 - 18)  SpO2: 100% (12-13-24 @ 04:45) (98% - 100%)  --------------------------------------------------------------------------------------    INS AND OUTS:    --------------------------------------------------------------------------------------      EXAM    General: NAD, resting in bed comfortably.  Cardiac: regular rate, warm and well perfused  Respiratory: Nonlabored respirations, normal cw expansion.  Abdomen: soft, nontender, nondistended  Extremities: normal strength, FROM, no deformities    --------------------------------------------------------------------------------------    LABS

## 2024-12-13 NOTE — PROGRESS NOTE ADULT - SUBJECTIVE AND OBJECTIVE BOX
Patient is a 69y old  Female who presents with a chief complaint of Sent in by podiatrist for right foot/toe wound, worsening since October. (13 Dec 2024 10:45)       INTERVAL HPI/OVERNIGHT EVENTS:  Patient seen and evaluated at bedside.  Pt is resting comfortable in NAD. Denies N/V/F/C.    Allergies    No Known Allergies    Intolerances        Vital Signs Last 24 Hrs  T(C): 37.1 (13 Dec 2024 14:24), Max: 37.2 (13 Dec 2024 10:46)  T(F): 98.8 (13 Dec 2024 14:24), Max: 98.9 (13 Dec 2024 10:46)  HR: 73 (13 Dec 2024 14:24) (70 - 79)  BP: 137/55 (13 Dec 2024 14:24) (125/54 - 145/58)  BP(mean): --  RR: 18 (13 Dec 2024 14:24) (18 - 18)  SpO2: 100% (13 Dec 2024 14:24) (98% - 100%)    Parameters below as of 13 Dec 2024 14:24  Patient On (Oxygen Delivery Method): room air        LABS:                        10.1   11.03 )-----------( 589      ( 13 Dec 2024 05:49 )             33.0     12-13    140  |  105  |  13  ----------------------------<  133[H]  4.5   |  23  |  1.39[H]    Ca    9.6      13 Dec 2024 05:49  Phos  3.1     12-13  Mg     2.30     12-13      PT/INR - ( 13 Dec 2024 05:49 )   PT: 12.5 sec;   INR: 1.08 ratio         PTT - ( 13 Dec 2024 05:49 )  PTT:32.4 sec  Urinalysis Basic - ( 13 Dec 2024 05:49 )    Color: x / Appearance: x / SG: x / pH: x  Gluc: 133 mg/dL / Ketone: x  / Bili: x / Urobili: x   Blood: x / Protein: x / Nitrite: x   Leuk Esterase: x / RBC: x / WBC x   Sq Epi: x / Non Sq Epi: x / Bacteria: x      CAPILLARY BLOOD GLUCOSE      POCT Blood Glucose.: 184 mg/dL (13 Dec 2024 12:34)  POCT Blood Glucose.: 172 mg/dL (13 Dec 2024 10:11)  POCT Blood Glucose.: 151 mg/dL (13 Dec 2024 08:24)  POCT Blood Glucose.: 150 mg/dL (12 Dec 2024 22:15)  POCT Blood Glucose.: 152 mg/dL (12 Dec 2024 17:14)      Lower Extremity Physical Exam:  Vascular: DP/PT 0/4, B/L, CFT <3 seconds B/L, Temperature gradient warm to cool, B/L.   Neuro: Epicritic sensation absent to the level of digits, B/L.  Musculoskeletal/Ortho: unremarkable  Skin: 12/4 s/p b/s  incision and drainage on left foot hallux wound was performed to the level of and not beyond bone,  mild malodor, scant purulence drainage. Right foot no open wounds, no acute signs of infection.    RADIOLOGY & ADDITIONAL TESTS:

## 2024-12-13 NOTE — PROGRESS NOTE ADULT - ASSESSMENT
70 y/o F PMhx DM II, HTN who presented w/ left great toe wound    L hallux infection, c/f osteo  leukocytosis, DM II w/ PAD  CRP 88.8,   s/p podiatry I&D L hallux to bone  - clinically consistent w/ osteo  foot x-ray- fracture distal phalanx great toe. soft tissue swelling of this digit as well as subcutaneous air.  wound cx w/ strep constellatus, citrobacter, morganella, staph lugdunensis (oxacillin sensitive)  zosyn 12/4-12/7, ceftriaxone 12/7, zosyn 12/8->  febrile 12/7, ceftriaxone switched to zosyn  QTc 450    DM II c/b PAD  vascular US- suggestive of distal infrapopliteal and significant small vessel arterial disease in the left foot.  LLE angiogram rescheduled 12/11 & 12/12, planned for 12/13 however pt declined today    Recommendations  c/w zosyn for now   pt states she would still like to proceed with angiogram  if no plans for angiogram will plan for short course for SSTI as antibiotics will not reach site of bone given PAD   transition to levofloxacin 750mg every 48 hours (renally dosed) and doxycycline 100mg bid w/ last day 12/17  - pt at high risk for progression     Albino Rea M.D.  [facility], Division of Infectious Diseases  [phone]  After 5pm on weekdays and all day on weekends - please call [phone]  70 y/o F PMhx DM II, HTN who presented w/ left great toe wound    L hallux infection, c/f osteo  leukocytosis, DM II w/ PAD  CRP 88.8,   s/p podiatry I&D L hallux to bone  - clinically consistent w/ osteo  foot x-ray- fracture distal phalanx great toe. soft tissue swelling of this digit as well as subcutaneous air.  wound cx w/ strep constellatus, citrobacter, morganella, staph lugdunensis (oxacillin sensitive)  zosyn 12/4-12/7, ceftriaxone 12/7, zosyn 12/8->  febrile 12/7, ceftriaxone switched to zosyn  QTc 450    DM II c/b PAD  vascular US- suggestive of distal infrapopliteal and significant small vessel arterial disease in the left foot.  LLE angiogram rescheduled 12/11 & 12/12, planned for 12/13 however pt declined today    Recommendations  c/w zosyn for now   pt states she would still like to proceed with angiogram  if no plans for angiogram   - will plan for short course for SSTI as antibiotics will not reach site of bone given PAD   - and can transition to levofloxacin 750mg every 48 hours (renally dosed) and doxycycline 100mg bid w/ last day 12/17  - pt at high risk for progression, will need close podiatry and vascular f/u    Dr. Bisi Gordillo will be covering 12/14 & 12/15. I will resume care on 12/16     Albino Rea M.D.  [facility], Division of Infectious Diseases  [phone]  After 5pm on weekdays and all day on weekends - please call [phone]

## 2024-12-13 NOTE — CHART NOTE - NSCHARTNOTEFT_GEN_A_CORE
Ms. Andrew is admitted under the vascular surgeon given LLE toe wound and JACK/PVR findings. She is planned to be going for a LLE angiogram tomorrow with Dr. Vann. However, patient has refused the procedure this evening because she states that it has been cancelled twice, and it is a sign that she should not be undergoing this procedure. I discussed with her the fact that we will be taking images of her left leg and ballooning/stenting any areas that will need intervention. She was concerned of bleeding risk and I let her know that since the procedure if percutaneous, the bleeding risk is minimal. I discussed the risks of not having this procedure including but not limited to worsening of  her wounds, possible development of  ulcers and possibly having to do a BKA/AKA if her conditions worsened. She understood these possibilities and after spending 20-30 minutes with her, she refused the procedure and does not wish to be NPO @12 am and does not wish to have an angiogram at this time. She asks for any other alternatives, and none were offered at this time.     Plan  - Continue to keep patient NPO  - 1am labs  - IVF while NPO  - Further discussion in AM regarding LLE angiogram     C Team  [id number] Ms. Andrew is admitted under the vascular surgeon given LLE toe wound and JACK/PVR findings. She is planned to be going for a LLE angiogram tomorrow with Dr. Vann. However, patient has refused the procedure this evening because she states that it has been cancelled twice, and it is a sign that she should not be undergoing this procedure. I discussed with her the fact that we will be taking images of her left leg and ballooning/stenting any areas that will need intervention. She was concerned of bleeding risk and I let her know that since the procedure if percutaneous, the bleeding risk is minimal. I discussed the risks of not having this procedure including but not limited to worsening of  her wounds, possible development of  ulcers and possibly having to do a BKA/AKA if her conditions worsened. She understood these possibilities and after spending 20-30 minutes with her, she refused the procedure and does not wish to be NPO @12 am and does not wish to have an angiogram at this time. She asks for any other alternatives, and none were offered at this time. Patient is refusing any 1am labs at this time as well.     Plan  - Continue to keep patient NPO  - 1am labs  - IVF while NPO  - Further discussion in AM regarding LLE angiogram     C Team  [id number]

## 2024-12-13 NOTE — PROGRESS NOTE ADULT - PROBLEM SELECTOR PLAN 5
Heparin sq    Dispo: febrile so abx switched back to Zosyn  no longer febrile.   vascular angiogram pending tentatively Monday.   (noted vascular postponed procedure to Wednesday.  From medical perspective, pt no longer febrile, can proceed. no need to postpone)  angio , patient refusing   resume ASA

## 2024-12-13 NOTE — PROGRESS NOTE ADULT - ASSESSMENT
A/P  57yo female h/o DM type2, HTN, hyperlipidemia; Pt c/o left great toe infection.    #diabetic foot infection.   -sp bedside debridement with podiatry on 12/4/24  -VA JACK WITH PVR noted   -vascular planning for LLE angio   -ecg with no ischemic changes, no chf or significant valvular disease on exam   -echo with nl LV sys FX No significant valvular disease.  -remains CV stable to proceed for LE angio with acceptable cv risk     #HTN   -c/w meds    #JENARO  -outpt hctz, arb on hold   -US Kidney and Bladder 12/5 shows No hydronephrosis  -renal fu     #dizziness   -after getting up to use the bathroom overnight 12/8  -resolved per pt  -orthostatic bp neg   -bp stable, continue to monitor     dvt ppx

## 2024-12-13 NOTE — PROGRESS NOTE ADULT - SUBJECTIVE AND OBJECTIVE BOX
CARDIOLOGY FOLLOW UP - Dr. Oliva  DATE OF SERVICE: 12/13/24    CC no cp or sob       REVIEW OF SYSTEMS:  CONSTITUTIONAL: No fever, weight loss, or fatigue  RESPIRATORY: No cough, wheezing, chills or hemoptysis; No Shortness of Breath  CARDIOVASCULAR: No chest pain, palpitations, passing out, dizziness  GASTROINTESTINAL: No abdominal or epigastric pain. No nausea, vomiting, or hematemesis; No diarrhea or constipation. No melena or hematochezia.      PHYSICAL EXAM:  T(C): 36.7 (12-13-24 @ 04:45), Max: 36.8 (12-12-24 @ 18:27)  HR: 70 (12-13-24 @ 04:45) (70 - 77)  BP: 128/52 (12-13-24 @ 04:45) (125/54 - 131/51)  RR: 18 (12-13-24 @ 04:45) (18 - 18)  SpO2: 100% (12-13-24 @ 04:45) (98% - 100%)  Wt(kg): --  I&O's Summary      Appearance: Normal	  Cardiovascular: Normal S1 S2,RRR, No JVD, No murmurs  Respiratory: Lungs clear to auscultation	  Gastrointestinal:  Soft, Non-tender, + BS	  Extremities: Normal range of motion, No clubbing, cyanosis or edema      Home Medications:  aspirin 81 mg oral delayed release tablet: 1 tab(s) orally once a day (04 Dec 2024 04:28)  atorvastatin 20 mg oral tablet: 1 tab(s) orally once a day (at bedtime) (04 Dec 2024 04:28)  Farxiga 5 mg oral tablet: 1 tab(s) orally once a day (04 Dec 2024 04:32)  hydroCHLOROthiazide 25 mg oral tablet: 1 tab(s) orally once a day (04 Dec 2024 04:28)  Lantus Solostar Pen 100 units/mL subcutaneous solution: 30 unit(s) subcutaneous once a day (at bedtime) (04 Dec 2024 04:28)  losartan 100 mg oral tablet: 1 tab(s) orally once a day (04 Dec 2024 04:28)  Norvasc 10 mg oral tablet: 1 tab(s) orally once a day (04 Dec 2024 04:28)  NovoLOG 100 units/mL subcutaneous solution: 10 unit(s) subcutaneous 3 times a day (before meals) (04 Dec 2024 04:28)      MEDICATIONS  (STANDING):  amLODIPine   Tablet 10 milliGRAM(s) Oral daily  aspirin  chewable 81 milliGRAM(s) Oral daily  atorvastatin 20 milliGRAM(s) Oral at bedtime  gabapentin 100 milliGRAM(s) Oral three times a day  glucagon  Injectable 1 milliGRAM(s) IntraMuscular once  glucagon  Injectable 1 milliGRAM(s) IntraMuscular once  heparin   Injectable 5000 Unit(s) SubCutaneous every 8 hours  influenza  Vaccine (HIGH DOSE) 0.5 milliLiter(s) IntraMuscular once  insulin glargine Injectable (LANTUS) 20 Unit(s) SubCutaneous every morning  insulin lispro (ADMELOG) corrective regimen sliding scale   SubCutaneous three times a day before meals  insulin lispro (ADMELOG) corrective regimen sliding scale   SubCutaneous at bedtime  insulin lispro Injectable (ADMELOG) 6 Unit(s) SubCutaneous three times a day before meals  piperacillin/tazobactam IVPB.. 3.375 Gram(s) IV Intermittent every 8 hours  polyethylene glycol 3350 17 Gram(s) Oral daily      TELEMETRY: 	    ECG:  	  RADIOLOGY:   DIAGNOSTIC TESTING:  [ ] Echocardiogram:  [ ]  Catheterization:  [ ] Stress Test:    OTHER: 	    LABS:	 	                            10.1   11.03 )-----------( 589      ( 13 Dec 2024 05:49 )             33.0     12-13    140  |  105  |  13  ----------------------------<  133[H]  4.5   |  23  |  1.39[H]    Ca    9.6      13 Dec 2024 05:49  Phos  3.1     12-13  Mg     2.30     12-13      PT/INR - ( 13 Dec 2024 05:49 )   PT: 12.5 sec;   INR: 1.08 ratio         PTT - ( 13 Dec 2024 05:49 )  PTT:32.4 sec

## 2024-12-13 NOTE — PRE PROCEDURE NOTE - PRE PROCEDURE EVALUATION
Pre Procedural Sedation Evaluation    History and physical reviewed  Medications reviewed  Labs reviewed    Anticoagulation: Heparin subc 12/13 at 0500  Dentures: None  Last PO intake:  12/12 at 2100    Pre-Procedure Physical Assessment  Mental status: Alert and oriented x 3  Level of consciousness: 1  Cardiac assessment: Stable  Pulmonary assessment: Stable  Obstructive sleep apnea: No  Aspiration risk: No  Mallampati score: 2  ASA Classification: 3  Prior Sedative or Anesthesia Experience: No complications  Informed consent by responsible adult: Yes  Based on today's assessment, anesthesia consult requested: No

## 2024-12-13 NOTE — PROGRESS NOTE ADULT - SUBJECTIVE AND OBJECTIVE BOX
[facility] [place] [facility]ACT[facility]   MD LI WADE MD ANGELA WONG, PA    TEL:  OFFICE: [phone]  From 5pm-7am Answering Service [phone]    -- RENAL FOLLOW UP NOTE ---Date of Service 12-13-24 @ 10:39    Patient is a 69y old  Female who presents with a chief complaint of Sent in by podiatrist for right foot/toe wound, worsening since October. (13 Dec 2024 10:13)      Patient seen and examined at bedside. No chest pain/sob    VITALS:  T(F): 98.1 (12-13-24 @ 04:45), Max: 98.3 (12-12-24 @ 18:27)  HR: 70 (12-13-24 @ 04:45)  BP: 128/52 (12-13-24 @ 04:45)  RR: 18 (12-13-24 @ 04:45)  SpO2: 100% (12-13-24 @ 04:45)  Wt(kg): --        PHYSICAL EXAM:  General: NAD  Neck: No JVD  Respiratory: CTAB, no wheezes, rales or rhonchi  Cardiovascular: S1, S2, RRR  Gastrointestinal: BS+, soft, NT/ND  Extremities: No peripheral edema    Hospital Medications:   MEDICATIONS  (STANDING):  amLODIPine   Tablet 10 milliGRAM(s) Oral daily  aspirin  chewable 81 milliGRAM(s) Oral daily  atorvastatin 20 milliGRAM(s) Oral at bedtime  gabapentin 100 milliGRAM(s) Oral three times a day  glucagon  Injectable 1 milliGRAM(s) IntraMuscular once  glucagon  Injectable 1 milliGRAM(s) IntraMuscular once  heparin   Injectable 5000 Unit(s) SubCutaneous every 8 hours  influenza  Vaccine (HIGH DOSE) 0.5 milliLiter(s) IntraMuscular once  insulin glargine Injectable (LANTUS) 20 Unit(s) SubCutaneous every morning  insulin lispro (ADMELOG) corrective regimen sliding scale   SubCutaneous three times a day before meals  insulin lispro (ADMELOG) corrective regimen sliding scale   SubCutaneous at bedtime  insulin lispro Injectable (ADMELOG) 6 Unit(s) SubCutaneous three times a day before meals  piperacillin/tazobactam IVPB.. 3.375 Gram(s) IV Intermittent every 8 hours  polyethylene glycol 3350 17 Gram(s) Oral daily      LABS:  12-13    140  |  105  |  13  ----------------------------<  133[H]  4.5   |  23  |  1.39[H]    Ca    9.6      13 Dec 2024 05:49  Phos  3.1     12-13  Mg     2.30     12-13      Creatinine Trend: 1.39 <--, 1.31 <--, 1.41 <--, 1.46 <--, 1.42 <--, 1.21 <--, 1.42 <--    Phosphorus: 3.1 mg/dL (12-13 @ 05:49)                              10.1   11.03 )-----------( 589      ( 13 Dec 2024 05:49 )             33.0     Urine Studies:  Urinalysis - [12-13-24 @ 05:49]      Color  / Appearance  / SG  / pH       Gluc 133 / Ketone   / Bili  / Urobili        Blood  / Protein  / Leuk Est  / Nitrite       RBC  / WBC  / Hyaline  / Gran  / Sq Epi  / Non Sq Epi  / Bacteria       HbA1c 10.7      [02-25-20 @ 14:10]  TSH 1.00      [12-04-24 @ 06:20]        RADIOLOGY & ADDITIONAL STUDIES:

## 2024-12-13 NOTE — PROGRESS NOTE ADULT - ASSESSMENT
69F 12/4 s/p L foot I&D to bone  -Pt was seen and evaluated  -Afebrile, WBC 11.6  -12/4 s/p b/s  incision and drainage on left foot hallux wound was performed to the level of and not beyond bone,  no malodor, scant Purulence drainage. Right foot no open wounds, no acute signs of infection.  -Left foot X-ray read: Fracture distal phalanx great toe. Soft tissue swelling of this digit as well as subcutaneous air.  -Left foot wound culture: Gram negative rods, Strep constellatus  -JACK/PVR: RABI NC, LABI 0.76  -ID recs, appreciated  -Vasc recs, appreciated  -Pod Plan: Local wound care vs Left foot partial 1st ray resection pending vascular recs  -Please document medial and cardiac clearance for podiatric surgery under anesthesia  -Seen with attending

## 2024-12-13 NOTE — PROGRESS NOTE ADULT - SUBJECTIVE AND OBJECTIVE BOX
Name of Patient : REJI MEADE  MRN: [id number]  Date of visit: 12-13-24       Subjective: Patient seen and examined. No new events except as noted.   Doing okay   refused vascualr angio as per team     REVIEW OF SYSTEMS:    CONSTITUTIONAL: No weakness, fevers or chills  EYES/ENT: No visual changes;  No vertigo or throat pain   NECK: No pain or stiffness  RESPIRATORY: No cough, wheezing, hemoptysis; No shortness of breath  CARDIOVASCULAR: No chest pain or palpitations  GASTROINTESTINAL: No abdominal or epigastric pain. No nausea, vomiting, or hematemesis; No diarrhea or constipation. No melena or hematochezia.  GENITOURINARY: No dysuria, frequency or hematuria  NEUROLOGICAL: No numbness or weakness  SKIN: No itching, burning, rashes, or lesions   All other review of systems is negative unless indicated above.    MEDICATIONS:  MEDICATIONS  (STANDING):  amLODIPine   Tablet 10 milliGRAM(s) Oral daily  aspirin  chewable 81 milliGRAM(s) Oral daily  atorvastatin 20 milliGRAM(s) Oral at bedtime  gabapentin 100 milliGRAM(s) Oral three times a day  glucagon  Injectable 1 milliGRAM(s) IntraMuscular once  glucagon  Injectable 1 milliGRAM(s) IntraMuscular once  heparin   Injectable 5000 Unit(s) SubCutaneous every 8 hours  influenza  Vaccine (HIGH DOSE) 0.5 milliLiter(s) IntraMuscular once  insulin glargine Injectable (LANTUS) 20 Unit(s) SubCutaneous every morning  insulin lispro (ADMELOG) corrective regimen sliding scale   SubCutaneous three times a day before meals  insulin lispro (ADMELOG) corrective regimen sliding scale   SubCutaneous at bedtime  insulin lispro Injectable (ADMELOG) 6 Unit(s) SubCutaneous three times a day before meals  piperacillin/tazobactam IVPB.. 3.375 Gram(s) IV Intermittent every 8 hours  polyethylene glycol 3350 17 Gram(s) Oral daily      PHYSICAL EXAM:  T(C): 37.3 (12-13-24 @ 18:30), Max: 37.3 (12-13-24 @ 18:30)  HR: 79 (12-13-24 @ 18:30) (70 - 79)  BP: 127/61 (12-13-24 @ 18:30) (127/61 - 145/58)  RR: 18 (12-13-24 @ 18:30) (18 - 18)  SpO2: 97% (12-13-24 @ 18:30) (97% - 100%)  Wt(kg): --  I&O's Summary        Appearance: Normal	  HEENT:  PERRLA   Lymphatic: No lymphadenopathy   Cardiovascular: Normal S1 S2, no JVD  Respiratory: normal effort , clear  Gastrointestinal:  Soft, Non-tender  Skin: No rashes,  warm to touch  Psychiatry:  Mood & affect appropriate  Musculuskeletal: No edema    recent labs, Imaging and EKGs personally reviewed                           10.1   11.03 )-----------( 589      ( 13 Dec 2024 05:49 )             33.0               12-13    140  |  105  |  13  ----------------------------<  133[H]  4.5   |  23  |  1.39[H]    Ca    9.6      13 Dec 2024 05:49  Phos  3.1     12-13  Mg     2.30     12-13      PT/INR - ( 13 Dec 2024 05:49 )   PT: 12.5 sec;   INR: 1.08 ratio         PTT - ( 13 Dec 2024 05:49 )  PTT:32.4 sec                   Urinalysis Basic - ( 13 Dec 2024 05:49 )    Color: x / Appearance: x / SG: x / pH: x  Gluc: 133 mg/dL / Ketone: x  / Bili: x / Urobili: x   Blood: x / Protein: x / Nitrite: x   Leuk Esterase: x / RBC: x / WBC x   Sq Epi: x / Non Sq Epi: x / Bacteria: x

## 2024-12-14 LAB
ANION GAP SERPL CALC-SCNC: 12 MMOL/L — SIGNIFICANT CHANGE UP (ref 7–14)
BUN SERPL-MCNC: 14 MG/DL — SIGNIFICANT CHANGE UP (ref 7–23)
CALCIUM SERPL-MCNC: 9.6 MG/DL — SIGNIFICANT CHANGE UP (ref 8.4–10.5)
CHLORIDE SERPL-SCNC: 103 MMOL/L — SIGNIFICANT CHANGE UP (ref 98–107)
CO2 SERPL-SCNC: 23 MMOL/L — SIGNIFICANT CHANGE UP (ref 22–31)
CREAT SERPL-MCNC: 1.26 MG/DL — SIGNIFICANT CHANGE UP (ref 0.5–1.3)
EGFR: 46 ML/MIN/1.73M2 — LOW
GLUCOSE BLDC GLUCOMTR-MCNC: 109 MG/DL — HIGH (ref 70–99)
GLUCOSE BLDC GLUCOMTR-MCNC: 161 MG/DL — HIGH (ref 70–99)
GLUCOSE BLDC GLUCOMTR-MCNC: 162 MG/DL — HIGH (ref 70–99)
GLUCOSE BLDC GLUCOMTR-MCNC: 167 MG/DL — HIGH (ref 70–99)
GLUCOSE SERPL-MCNC: 145 MG/DL — HIGH (ref 70–99)
HCT VFR BLD CALC: 31 % — LOW (ref 34.5–45)
HGB BLD-MCNC: 10 G/DL — LOW (ref 11.5–15.5)
MAGNESIUM SERPL-MCNC: 2.1 MG/DL — SIGNIFICANT CHANGE UP (ref 1.6–2.6)
MCHC RBC-ENTMCNC: 28.8 PG — SIGNIFICANT CHANGE UP (ref 27–34)
MCHC RBC-ENTMCNC: 32.3 G/DL — SIGNIFICANT CHANGE UP (ref 32–36)
MCV RBC AUTO: 89.3 FL — SIGNIFICANT CHANGE UP (ref 80–100)
NRBC # BLD: 0 /100 WBCS — SIGNIFICANT CHANGE UP (ref 0–0)
NRBC # FLD: 0 K/UL — SIGNIFICANT CHANGE UP (ref 0–0)
PHOSPHATE SERPL-MCNC: 3.2 MG/DL — SIGNIFICANT CHANGE UP (ref 2.5–4.5)
PLATELET # BLD AUTO: 633 K/UL — HIGH (ref 150–400)
POTASSIUM SERPL-MCNC: 4.1 MMOL/L — SIGNIFICANT CHANGE UP (ref 3.5–5.3)
POTASSIUM SERPL-SCNC: 4.1 MMOL/L — SIGNIFICANT CHANGE UP (ref 3.5–5.3)
RBC # BLD: 3.47 M/UL — LOW (ref 3.8–5.2)
RBC # FLD: 12.6 % — SIGNIFICANT CHANGE UP (ref 10.3–14.5)
SODIUM SERPL-SCNC: 138 MMOL/L — SIGNIFICANT CHANGE UP (ref 135–145)
WBC # BLD: 9.81 K/UL — SIGNIFICANT CHANGE UP (ref 3.8–10.5)
WBC # FLD AUTO: 9.81 K/UL — SIGNIFICANT CHANGE UP (ref 3.8–10.5)

## 2024-12-14 RX ADMIN — Medication 1: at 17:36

## 2024-12-14 RX ADMIN — Medication 1: at 09:11

## 2024-12-14 RX ADMIN — ATORVASTATIN CALCIUM 20 MILLIGRAM(S): 40 TABLET, FILM COATED ORAL at 21:33

## 2024-12-14 RX ADMIN — HEPARIN SODIUM 5000 UNIT(S): 1000 INJECTION, SOLUTION INTRAVENOUS; SUBCUTANEOUS at 13:21

## 2024-12-14 RX ADMIN — Medication 10 MILLIGRAM(S): at 05:55

## 2024-12-14 RX ADMIN — PIPERACILLIN AND TAZOBACTAM 25 GRAM(S): 3; .375 INJECTION, POWDER, LYOPHILIZED, FOR SOLUTION INTRAVENOUS at 05:55

## 2024-12-14 RX ADMIN — PIPERACILLIN AND TAZOBACTAM 25 GRAM(S): 3; .375 INJECTION, POWDER, LYOPHILIZED, FOR SOLUTION INTRAVENOUS at 13:21

## 2024-12-14 RX ADMIN — ACETAMINOPHEN 650 MILLIGRAM(S): 80 SOLUTION/ DROPS ORAL at 22:52

## 2024-12-14 RX ADMIN — Medication 17 GRAM(S): at 13:21

## 2024-12-14 RX ADMIN — GABAPENTIN 100 MILLIGRAM(S): 300 CAPSULE ORAL at 21:33

## 2024-12-14 RX ADMIN — INSULIN GLARGINE-YFGN 20 UNIT(S): 100 INJECTION, SOLUTION SUBCUTANEOUS at 09:12

## 2024-12-14 RX ADMIN — PIPERACILLIN AND TAZOBACTAM 25 GRAM(S): 3; .375 INJECTION, POWDER, LYOPHILIZED, FOR SOLUTION INTRAVENOUS at 21:32

## 2024-12-14 RX ADMIN — Medication 6 UNIT(S): at 09:11

## 2024-12-14 RX ADMIN — Medication 6 UNIT(S): at 12:43

## 2024-12-14 RX ADMIN — GABAPENTIN 100 MILLIGRAM(S): 300 CAPSULE ORAL at 13:20

## 2024-12-14 RX ADMIN — GABAPENTIN 100 MILLIGRAM(S): 300 CAPSULE ORAL at 05:56

## 2024-12-14 RX ADMIN — HEPARIN SODIUM 5000 UNIT(S): 1000 INJECTION, SOLUTION INTRAVENOUS; SUBCUTANEOUS at 21:33

## 2024-12-14 RX ADMIN — Medication 81 MILLIGRAM(S): at 13:21

## 2024-12-14 RX ADMIN — Medication 6 UNIT(S): at 17:36

## 2024-12-14 RX ADMIN — HEPARIN SODIUM 5000 UNIT(S): 1000 INJECTION, SOLUTION INTRAVENOUS; SUBCUTANEOUS at 05:56

## 2024-12-14 NOTE — PROGRESS NOTE ADULT - SUBJECTIVE AND OBJECTIVE BOX
[facility] [place] [facility]   MD LI WADE MD ANGELA WONG, PA QIAN CHEN, NP    TEL:  OFFICE: [phone]  From 5pm-7am Answering Service [phone]    -- RENAL FOLLOW UP NOTE ---Date of Service 12-14-24 @ 13:51    Patient is a 69y old  Female who presents with a chief complaint of Sent in by podiatrist for right foot/toe wound, worsening since October.    Patient seen and examined at bedside. No chest pain/SOB.    VITALS:  T(F): 98.8 (12-14-24 @ 10:46), Max: 99.7 (12-13-24 @ 21:57)  HR: 67 (12-14-24 @ 10:46)  BP: 130/47 (12-14-24 @ 10:46)  RR: 18 (12-14-24 @ 10:46)  SpO2: 96% (12-14-24 @ 10:46)  Wt(kg): --        PHYSICAL EXAM:  General: NAD  Neck: No JVD  Respiratory: CTAB, no wheezes, rales or rhonchi  Cardiovascular: S1, S2, RRR  Gastrointestinal: BS+, soft, NT/ND  Extremities: No peripheral edema    Hospital Medications:   MEDICATIONS  (STANDING):  amLODIPine   Tablet 10 milliGRAM(s) Oral daily  aspirin  chewable 81 milliGRAM(s) Oral daily  atorvastatin 20 milliGRAM(s) Oral at bedtime  gabapentin 100 milliGRAM(s) Oral three times a day  glucagon  Injectable 1 milliGRAM(s) IntraMuscular once  glucagon  Injectable 1 milliGRAM(s) IntraMuscular once  heparin   Injectable 5000 Unit(s) SubCutaneous every 8 hours  influenza  Vaccine (HIGH DOSE) 0.5 milliLiter(s) IntraMuscular once  insulin glargine Injectable (LANTUS) 20 Unit(s) SubCutaneous every morning  insulin lispro (ADMELOG) corrective regimen sliding scale   SubCutaneous three times a day before meals  insulin lispro (ADMELOG) corrective regimen sliding scale   SubCutaneous at bedtime  insulin lispro Injectable (ADMELOG) 6 Unit(s) SubCutaneous three times a day before meals  piperacillin/tazobactam IVPB.. 3.375 Gram(s) IV Intermittent every 8 hours  polyethylene glycol 3350 17 Gram(s) Oral daily      LABS:  12-14    138  |  103  |  14  ----------------------------<  145[H]  4.1   |  23  |  1.26    Ca    9.6      14 Dec 2024 07:20  Phos  3.2     12-14  Mg     2.10     12-14      Creatinine Trend: 1.26 <--, 1.39 <--, 1.31 <--, 1.41 <--, 1.46 <--, 1.42 <--, 1.21 <--, 1.42 <--    Phosphorus: 3.2 mg/dL (12-14 @ 07:20)                          10.0   9.81  )-----------( 633      ( 14 Dec 2024 07:20 )             31.0     Urine Studies:  Urinalysis - [12-14-24 @ 07:20]      Color  / Appearance  / SG  / pH       Gluc 145 / Ketone   / Bili  / Urobili        Blood  / Protein  / Leuk Est  / Nitrite       RBC  / WBC  / Hyaline  / Gran  / Sq Epi  / Non Sq Epi  / Bacteria       HbA1c 10.7      [02-25-20 @ 14:10]  TSH 1.00      [12-04-24 @ 06:20]

## 2024-12-14 NOTE — PROGRESS NOTE ADULT - SUBJECTIVE AND OBJECTIVE BOX
CARDIOLOGY FOLLOW UP NOTE - DR. CERVANTES    Patient Name: REJI MEADE    Date of Service: 12-14-24 @ 15:24    Patient seen and examined    Subjective:    cv: denies chest pain, dyspnea, palpitations, dizziness  pulmonary: denies cough  GI: denies abdominal pain, nausea, vomiting  vascular/legs: no edema   skin: no rash  ROS: otherwise negative   overnight events:      PHYSICAL EXAM:  T(C): 36.7 (12-14-24 @ 14:14), Max: 37.6 (12-13-24 @ 21:57)  HR: 74 (12-14-24 @ 14:14) (67 - 79)  BP: 139/46 (12-14-24 @ 14:14) (127/61 - 139/46)  RR: 18 (12-14-24 @ 14:14) (18 - 18)  SpO2: 99% (12-14-24 @ 14:14) (96% - 100%)  Wt(kg): --  I&O's Summary    Daily     Daily     Appearance: Normal	  Cardiovascular: Normal S1 S2,RRR, No JVD, No murmurs  Respiratory: Lungs clear to auscultation	  Gastrointestinal:  Soft, Non-tender, + BS	  Extremities: Normal range of motion, No clubbing, cyanosis or edema      Home Medications:  aspirin 81 mg oral delayed release tablet: 1 tab(s) orally once a day (04 Dec 2024 04:28)  atorvastatin 20 mg oral tablet: 1 tab(s) orally once a day (at bedtime) (04 Dec 2024 04:28)  Farxiga 5 mg oral tablet: 1 tab(s) orally once a day (04 Dec 2024 04:32)  hydroCHLOROthiazide 25 mg oral tablet: 1 tab(s) orally once a day (04 Dec 2024 04:28)  Lantus Solostar Pen 100 units/mL subcutaneous solution: 30 unit(s) subcutaneous once a day (at bedtime) (04 Dec 2024 04:28)  losartan 100 mg oral tablet: 1 tab(s) orally once a day (04 Dec 2024 04:28)  Norvasc 10 mg oral tablet: 1 tab(s) orally once a day (04 Dec 2024 04:28)  NovoLOG 100 units/mL subcutaneous solution: 10 unit(s) subcutaneous 3 times a day (before meals) (04 Dec 2024 04:28)      MEDICATIONS  (STANDING):  amLODIPine   Tablet 10 milliGRAM(s) Oral daily  aspirin  chewable 81 milliGRAM(s) Oral daily  atorvastatin 20 milliGRAM(s) Oral at bedtime  gabapentin 100 milliGRAM(s) Oral three times a day  glucagon  Injectable 1 milliGRAM(s) IntraMuscular once  glucagon  Injectable 1 milliGRAM(s) IntraMuscular once  heparin   Injectable 5000 Unit(s) SubCutaneous every 8 hours  influenza  Vaccine (HIGH DOSE) 0.5 milliLiter(s) IntraMuscular once  insulin glargine Injectable (LANTUS) 20 Unit(s) SubCutaneous every morning  insulin lispro (ADMELOG) corrective regimen sliding scale   SubCutaneous three times a day before meals  insulin lispro (ADMELOG) corrective regimen sliding scale   SubCutaneous at bedtime  insulin lispro Injectable (ADMELOG) 6 Unit(s) SubCutaneous three times a day before meals  piperacillin/tazobactam IVPB.. 3.375 Gram(s) IV Intermittent every 8 hours  polyethylene glycol 3350 17 Gram(s) Oral daily      TELEMETRY: 	    ECG:  	  RADIOLOGY:   DIAGNOSTIC TESTING:  [ ] Echocardiogram:  [ ] Catheterization:  [ ] Stress Test:    OTHER: 	    LABS:	 	    CARDIAC MARKERS:                                      10.0   9.81  )-----------( 633      ( 14 Dec 2024 07:20 )             31.0     12-14    138  |  103  |  14  ----------------------------<  145[H]  4.1   |  23  |  1.26    Ca    9.6      14 Dec 2024 07:20  Phos  3.2     12-14  Mg     2.10     12-14      proBNP:   PT/INR - ( 13 Dec 2024 05:49 )   PT: 12.5 sec;   INR: 1.08 ratio         PTT - ( 13 Dec 2024 05:49 )  PTT:32.4 sec  Lipid Profile:   HgA1c:     Creatinine: 1.26 mg/dL (12-14-24 @ 07:20)  Creatinine: 1.39 mg/dL (12-13-24 @ 05:49)  Creatinine: 1.31 mg/dL (12-12-24 @ 02:20)

## 2024-12-14 NOTE — PROGRESS NOTE ADULT - SUBJECTIVE AND OBJECTIVE BOX
Vascular Surgery Daily Progress Note    Last 24 hours events:     Subjective: Patient examined at bedside this morning. Denies fevers, chills, nausea, vomiting, or motor/sensory changes to _      piperacillin/tazobactam IVPB.. 3.375  amLODIPine   Tablet 10  aspirin  chewable 81  heparin   Injectable 5000  piperacillin/tazobactam IVPB.. 3.375        Vital Signs Last 24 Hrs  T(C): 36.7 (14 Dec 2024 05:33), Max: 37.6 (13 Dec 2024 21:57)  T(F): 98.1 (14 Dec 2024 05:33), Max: 99.7 (13 Dec 2024 21:57)  HR: 71 (14 Dec 2024 05:33) (71 - 79)  BP: 128/76 (14 Dec 2024 05:33) (127/61 - 137/55)  BP(mean): --  RR: 18 (14 Dec 2024 05:33) (18 - 18)  SpO2: 100% (14 Dec 2024 05:33) (97% - 100%)    Parameters below as of 14 Dec 2024 05:33  Patient On (Oxygen Delivery Method): room air      I&O's Summary      Physical Exam:  General: NAD, resting comfortably in bed  Pulmonary: Nonlabored breathing, no respiratory distress  Abdominal: soft, non distended, non tender  Extremities:   Vascular:      LABS:                        10.0   9.81  )-----------( 633      ( 14 Dec 2024 07:20 )             31.0     12-14    138  |  103  |  14  ----------------------------<  145[H]  4.1   |  23  |  1.26    Ca    9.6      14 Dec 2024 07:20  Phos  3.2     12-14  Mg     2.10     12-14      PT/INR - ( 13 Dec 2024 05:49 )   PT: 12.5 sec;   INR: 1.08 ratio         PTT - ( 13 Dec 2024 05:49 )  PTT:32.4 sec   Vascular Surgery Daily Progress Note    Last 24 hours events: Patient declined procedure and LLE angiogram subsequently cancelled. Patient later changed her mind and procedure on Monday with different provider.     Subjective: Patient examined at bedside this morning. Denies fevers, chills, nausea, vomiting, or motor/sensory changes to LLE. She still would like to proceed with the procedure Monday.      piperacillin/tazobactam IVPB.. 3.375  amLODIPine   Tablet 10  aspirin  chewable 81  heparin   Injectable 5000  piperacillin/tazobactam IVPB.. 3.375        Vital Signs Last 24 Hrs  T(C): 36.7 (14 Dec 2024 05:33), Max: 37.6 (13 Dec 2024 21:57)  T(F): 98.1 (14 Dec 2024 05:33), Max: 99.7 (13 Dec 2024 21:57)  HR: 71 (14 Dec 2024 05:33) (71 - 79)  BP: 128/76 (14 Dec 2024 05:33) (127/61 - 137/55)  BP(mean): --  RR: 18 (14 Dec 2024 05:33) (18 - 18)  SpO2: 100% (14 Dec 2024 05:33) (97% - 100%)    Parameters below as of 14 Dec 2024 05:33  Patient On (Oxygen Delivery Method): room air      I&O's Summary      Physical Exam:  General: NAD, resting in bed comfortably.  Cardiac: regular rate, warm and well perfused  Respiratory: Nonlabored respirations, normal cw expansion.  Abdomen: soft, nontender, nondistended  Extremities: normal strength, FROM, no deformities      LABS:                        10.0   9.81  )-----------( 633      ( 14 Dec 2024 07:20 )             31.0     12-14    138  |  103  |  14  ----------------------------<  145[H]  4.1   |  23  |  1.26    Ca    9.6      14 Dec 2024 07:20  Phos  3.2     12-14  Mg     2.10     12-14      PT/INR - ( 13 Dec 2024 05:49 )   PT: 12.5 sec;   INR: 1.08 ratio         PTT - ( 13 Dec 2024 05:49 )  PTT:32.4 sec

## 2024-12-14 NOTE — PROGRESS NOTE ADULT - ASSESSMENT
57yo female h/o DM type2, HTN, hyperlipidemia; Pt c/o left great toe infection. vascular on board planning for angiogram. nephrology consulted for elevated S.Cr.    renal insufficiency  baseline ~ 1.4 per team  long standing hx of dm and htn  admitted with scr now up trending  scr relatively stable  on losartan and htcz.  will hold for now given worsen scr (last dose 12/5)  FEurea 35%  renal us no hydro   s/p trial of gentle hydration 12/5  Renal function stable  Pt had initially refused angiogram, now rescheduled tentatively for 12/16.  monitor for YASHIRA  Monitor BMP and UO.   Avoid further nephrotoxins if possible.    htn  controlled  continue with current meds  Low salt diet.  monitor BP.    Hyponatremia  Marginal.  Optimize glycemic control.  Monitor Na.    LE  wound  f/u vascular  Pending angio next week.  risk vs benefit explained to patient; she expressed understanding 59yo female h/o DM type2, HTN, hyperlipidemia; Pt c/o left great toe infection. vascular on board planning for angiogram. nephrology consulted for elevated S.Cr.    renal insufficiency  baseline ~ 1.4 per team  long standing hx of dm and htn  admitted with scr now up trending  scr relatively stable  on losartan and htcz.  will hold for now given worsen scr (last dose 12/5)  FEurea 35%  renal us no hydro   s/p trial of gentle hydration 12/5  Renal function stable  Pt had initially refused angiogram, now agreeable -- rescheduled tentatively for 12/16.  monitor for YASHIRA  Monitor BMP and UO.   Avoid further nephrotoxins if possible.    htn  controlled  continue with current meds  Low salt diet.  monitor BP.    Hyponatremia  Marginal.  Optimize glycemic control.  Monitor Na.    LE  wound  f/u vascular  Pending angio next week.  risk vs benefit explained to patient; she expressed understanding

## 2024-12-14 NOTE — PROGRESS NOTE ADULT - ASSESSMENT
A/P  59yo female h/o DM type2, HTN, hyperlipidemia; Pt c/o left great toe infection.    #diabetic foot infection.   -sp bedside debridement with podiatry on 12/4/24  -VA JACK WITH PVR noted   -await possible le angio   -ecg with no ischemic changes, no chf or significant valvular disease on exam   -echo with nl LV sys FX No significant valvular disease.  -remains CV stable to proceed for LE angio with acceptable cv risk     #HTN   -c/w meds    dvt ppx     35 minutes spent on total encounter; more than 50% of the visit was spent counseling and/or coordinating care by the attending physician.

## 2024-12-14 NOTE — PROGRESS NOTE ADULT - ASSESSMENT
69 year old female with PMH type 2 DM, CKD stage 3, HTN, HLD presents with osteomyelitis at left hallux. LLE angiogram cancelled 12/11 and 12/12. Patient refused angiogram on 12/13 after multiple cancellations and later became amenable to procedure which is now planned for Monday, 12/16.     Plan  - LLE angiogram Monday, 12/16  - Diet: regular  - Zosyn, f/u ID for dispo recs  - Aspirin  - Subq heparin  - Medical and cardiac clearances documented.  - Patient consented    C team surgery [id number]

## 2024-12-14 NOTE — PROGRESS NOTE ADULT - SUBJECTIVE AND OBJECTIVE BOX
Name of Patient : REJI MEADE  MRN: [id number]  Date of visit: 12-14-24 @ 15:31      Subjective: Patient seen and examined. No new events except as noted.   doing okay     REVIEW OF SYSTEMS:    CONSTITUTIONAL: No weakness, fevers or chills  EYES/ENT: No visual changes;  No vertigo or throat pain   NECK: No pain or stiffness  RESPIRATORY: No cough, wheezing, hemoptysis; No shortness of breath  CARDIOVASCULAR: No chest pain or palpitations  GASTROINTESTINAL: No abdominal or epigastric pain. No nausea, vomiting, or hematemesis; No diarrhea or constipation. No melena or hematochezia.  GENITOURINARY: No dysuria, frequency or hematuria  NEUROLOGICAL: No numbness or weakness  SKIN: No itching, burning, rashes, or lesions   All other review of systems is negative unless indicated above.    MEDICATIONS:  MEDICATIONS  (STANDING):  amLODIPine   Tablet 10 milliGRAM(s) Oral daily  aspirin  chewable 81 milliGRAM(s) Oral daily  atorvastatin 20 milliGRAM(s) Oral at bedtime  gabapentin 100 milliGRAM(s) Oral three times a day  glucagon  Injectable 1 milliGRAM(s) IntraMuscular once  glucagon  Injectable 1 milliGRAM(s) IntraMuscular once  heparin   Injectable 5000 Unit(s) SubCutaneous every 8 hours  influenza  Vaccine (HIGH DOSE) 0.5 milliLiter(s) IntraMuscular once  insulin glargine Injectable (LANTUS) 20 Unit(s) SubCutaneous every morning  insulin lispro (ADMELOG) corrective regimen sliding scale   SubCutaneous three times a day before meals  insulin lispro (ADMELOG) corrective regimen sliding scale   SubCutaneous at bedtime  insulin lispro Injectable (ADMELOG) 6 Unit(s) SubCutaneous three times a day before meals  piperacillin/tazobactam IVPB.. 3.375 Gram(s) IV Intermittent every 8 hours  polyethylene glycol 3350 17 Gram(s) Oral daily      PHYSICAL EXAM:  T(C): 36.7 (12-14-24 @ 14:14), Max: 37.6 (12-13-24 @ 21:57)  HR: 74 (12-14-24 @ 14:14) (67 - 79)  BP: 139/46 (12-14-24 @ 14:14) (127/61 - 139/46)  RR: 18 (12-14-24 @ 14:14) (18 - 18)  SpO2: 99% (12-14-24 @ 14:14) (96% - 100%)  Wt(kg): --  I&O's Summary        Appearance: Normal	  HEENT:  PERRLA   Lymphatic: No lymphadenopathy   Cardiovascular: Normal S1 S2, no JVD  Respiratory: normal effort , clear  Gastrointestinal:  Soft, Non-tender  Skin: No rashes,  warm to touch  Psychiatry:  Mood & affect appropriate  Musculuskeletal: No edema    recent labs, Imaging and EKGs personally reviewed   CODE status discussed with the patient in detail                          10.0   9.81  )-----------( 633      ( 14 Dec 2024 07:20 )             31.0               12-14    138  |  103  |  14  ----------------------------<  145[H]  4.1   |  23  |  1.26    Ca    9.6      14 Dec 2024 07:20  Phos  3.2     12-14  Mg     2.10     12-14      PT/INR - ( 13 Dec 2024 05:49 )   PT: 12.5 sec;   INR: 1.08 ratio         PTT - ( 13 Dec 2024 05:49 )  PTT:32.4 sec                   Urinalysis Basic - ( 14 Dec 2024 07:20 )    Color: x / Appearance: x / SG: x / pH: x  Gluc: 145 mg/dL / Ketone: x  / Bili: x / Urobili: x   Blood: x / Protein: x / Nitrite: x   Leuk Esterase: x / RBC: x / WBC x   Sq Epi: x / Non Sq Epi: x / Bacteria: x

## 2024-12-15 LAB
ANION GAP SERPL CALC-SCNC: 14 MMOL/L — SIGNIFICANT CHANGE UP (ref 7–14)
BUN SERPL-MCNC: 12 MG/DL — SIGNIFICANT CHANGE UP (ref 7–23)
CALCIUM SERPL-MCNC: 9.9 MG/DL — SIGNIFICANT CHANGE UP (ref 8.4–10.5)
CHLORIDE SERPL-SCNC: 103 MMOL/L — SIGNIFICANT CHANGE UP (ref 98–107)
CO2 SERPL-SCNC: 22 MMOL/L — SIGNIFICANT CHANGE UP (ref 22–31)
CREAT SERPL-MCNC: 1.3 MG/DL — SIGNIFICANT CHANGE UP (ref 0.5–1.3)
EGFR: 44 ML/MIN/1.73M2 — LOW
GLUCOSE BLDC GLUCOMTR-MCNC: 127 MG/DL — HIGH (ref 70–99)
GLUCOSE BLDC GLUCOMTR-MCNC: 145 MG/DL — HIGH (ref 70–99)
GLUCOSE BLDC GLUCOMTR-MCNC: 158 MG/DL — HIGH (ref 70–99)
GLUCOSE BLDC GLUCOMTR-MCNC: 206 MG/DL — HIGH (ref 70–99)
GLUCOSE SERPL-MCNC: 139 MG/DL — HIGH (ref 70–99)
HCT VFR BLD CALC: 31.8 % — LOW (ref 34.5–45)
HGB BLD-MCNC: 10 G/DL — LOW (ref 11.5–15.5)
MAGNESIUM SERPL-MCNC: 2.2 MG/DL — SIGNIFICANT CHANGE UP (ref 1.6–2.6)
MCHC RBC-ENTMCNC: 28.2 PG — SIGNIFICANT CHANGE UP (ref 27–34)
MCHC RBC-ENTMCNC: 31.4 G/DL — LOW (ref 32–36)
MCV RBC AUTO: 89.6 FL — SIGNIFICANT CHANGE UP (ref 80–100)
NRBC # BLD: 0 /100 WBCS — SIGNIFICANT CHANGE UP (ref 0–0)
NRBC # FLD: 0 K/UL — SIGNIFICANT CHANGE UP (ref 0–0)
PHOSPHATE SERPL-MCNC: 3.3 MG/DL — SIGNIFICANT CHANGE UP (ref 2.5–4.5)
PLATELET # BLD AUTO: 636 K/UL — HIGH (ref 150–400)
POTASSIUM SERPL-MCNC: 4.2 MMOL/L — SIGNIFICANT CHANGE UP (ref 3.5–5.3)
POTASSIUM SERPL-SCNC: 4.2 MMOL/L — SIGNIFICANT CHANGE UP (ref 3.5–5.3)
RBC # BLD: 3.55 M/UL — LOW (ref 3.8–5.2)
RBC # FLD: 12.5 % — SIGNIFICANT CHANGE UP (ref 10.3–14.5)
SODIUM SERPL-SCNC: 139 MMOL/L — SIGNIFICANT CHANGE UP (ref 135–145)
WBC # BLD: 9.71 K/UL — SIGNIFICANT CHANGE UP (ref 3.8–10.5)
WBC # FLD AUTO: 9.71 K/UL — SIGNIFICANT CHANGE UP (ref 3.8–10.5)

## 2024-12-15 RX ORDER — SODIUM CHLORIDE 9 MG/ML
1000 INJECTION, SOLUTION INTRAVENOUS
Refills: 0 | Status: DISCONTINUED | OUTPATIENT
Start: 2024-12-15 | End: 2024-12-16

## 2024-12-15 RX ORDER — INSULIN GLARGINE-YFGN 100 [IU]/ML
16 INJECTION, SOLUTION SUBCUTANEOUS EVERY MORNING
Refills: 0 | Status: DISCONTINUED | OUTPATIENT
Start: 2024-12-15 | End: 2024-12-19

## 2024-12-15 RX ORDER — SODIUM CHLORIDE 9 MG/ML
1000 INJECTION, SOLUTION INTRAMUSCULAR; INTRAVENOUS; SUBCUTANEOUS
Refills: 0 | Status: DISCONTINUED | OUTPATIENT
Start: 2024-12-15 | End: 2024-12-15

## 2024-12-15 RX ORDER — SODIUM CHLORIDE 9 MG/ML
1000 INJECTION, SOLUTION INTRAMUSCULAR; INTRAVENOUS; SUBCUTANEOUS
Refills: 0 | Status: DISCONTINUED | OUTPATIENT
Start: 2024-12-15 | End: 2024-12-16

## 2024-12-15 RX ORDER — INSULIN LISPRO 100/ML
VIAL (ML) SUBCUTANEOUS EVERY 6 HOURS
Refills: 0 | Status: DISCONTINUED | OUTPATIENT
Start: 2024-12-15 | End: 2024-12-16

## 2024-12-15 RX ADMIN — Medication 2: at 12:36

## 2024-12-15 RX ADMIN — ATORVASTATIN CALCIUM 20 MILLIGRAM(S): 40 TABLET, FILM COATED ORAL at 21:27

## 2024-12-15 RX ADMIN — Medication 6 UNIT(S): at 17:55

## 2024-12-15 RX ADMIN — PIPERACILLIN AND TAZOBACTAM 25 GRAM(S): 3; .375 INJECTION, POWDER, LYOPHILIZED, FOR SOLUTION INTRAVENOUS at 21:28

## 2024-12-15 RX ADMIN — INSULIN GLARGINE-YFGN 20 UNIT(S): 100 INJECTION, SOLUTION SUBCUTANEOUS at 09:05

## 2024-12-15 RX ADMIN — Medication 81 MILLIGRAM(S): at 09:05

## 2024-12-15 RX ADMIN — Medication 6 UNIT(S): at 08:40

## 2024-12-15 RX ADMIN — GABAPENTIN 100 MILLIGRAM(S): 300 CAPSULE ORAL at 06:50

## 2024-12-15 RX ADMIN — Medication 1: at 23:57

## 2024-12-15 RX ADMIN — HEPARIN SODIUM 5000 UNIT(S): 1000 INJECTION, SOLUTION INTRAVENOUS; SUBCUTANEOUS at 14:40

## 2024-12-15 RX ADMIN — HEPARIN SODIUM 5000 UNIT(S): 1000 INJECTION, SOLUTION INTRAVENOUS; SUBCUTANEOUS at 06:46

## 2024-12-15 RX ADMIN — GABAPENTIN 100 MILLIGRAM(S): 300 CAPSULE ORAL at 21:27

## 2024-12-15 RX ADMIN — Medication 10 MILLIGRAM(S): at 06:46

## 2024-12-15 RX ADMIN — ACETAMINOPHEN 650 MILLIGRAM(S): 80 SOLUTION/ DROPS ORAL at 00:05

## 2024-12-15 RX ADMIN — Medication 6 UNIT(S): at 12:37

## 2024-12-15 RX ADMIN — GABAPENTIN 100 MILLIGRAM(S): 300 CAPSULE ORAL at 14:42

## 2024-12-15 RX ADMIN — PIPERACILLIN AND TAZOBACTAM 25 GRAM(S): 3; .375 INJECTION, POWDER, LYOPHILIZED, FOR SOLUTION INTRAVENOUS at 15:43

## 2024-12-15 RX ADMIN — PIPERACILLIN AND TAZOBACTAM 25 GRAM(S): 3; .375 INJECTION, POWDER, LYOPHILIZED, FOR SOLUTION INTRAVENOUS at 06:46

## 2024-12-15 RX ADMIN — HEPARIN SODIUM 5000 UNIT(S): 1000 INJECTION, SOLUTION INTRAVENOUS; SUBCUTANEOUS at 21:27

## 2024-12-15 NOTE — PROGRESS NOTE ADULT - ASSESSMENT
69 year old female with PMH type 2 DM, CKD stage 3, HTN, HLD presents with osteomyelitis at left hallux. LLE angiogram cancelled 12/11 and 12/12. Patient refused angiogram on 12/13 after multiple cancellations and later became amenable to procedure which is now planned for Monday, 12/16.     Plan  - LLE angiogram tomorrow 12/16  - Diet: regular -> NPO at MN  - 1am labs  - Zosyn, f/u ID for dispo recs pot-procedure  - Aspirin  - Subq heparin  - Medical and cardiac clearances documented.  - Patient consented  - IVF as per previous angiogram planning from nephrology recomendations    C team surgery [id number]

## 2024-12-15 NOTE — PROGRESS NOTE ADULT - ASSESSMENT
A/P  57yo female h/o DM type2, HTN, hyperlipidemia; Pt c/o left great toe infection.    #diabetic foot infection.   -sp bedside debridement with podiatry on 12/4/24  -VA JACK WITH PVR noted   -await possible le angio   -ecg with no ischemic changes, no chf or significant valvular disease on exam   -echo with nl LV sys FX No significant valvular disease.  -remains CV stable to proceed for LE angio with acceptable cv risk     #HTN   -c/w meds    dvt ppx     35 minutes spent on total encounter; more than 50% of the visit was spent counseling and/or coordinating care by the attending physician.

## 2024-12-15 NOTE — PROGRESS NOTE ADULT - ASSESSMENT
59yo female h/o DM type2, HTN, hyperlipidemia; Pt c/o left great toe infection. vascular on board planning for angiogram. nephrology consulted for elevated S.Cr.    renal insufficiency  baseline ~ 1.4 per team  long standing hx of dm and htn  admitted with scr now up trending  scr relatively stable  on losartan and htcz.  will hold for now given worsen scr (last dose 12/5)  FEurea 35%  renal us no hydro   s/p trial of gentle hydration 12/5  Renal function stable  Pt had initially refused angiogram, now agreeable -- rescheduled tentatively for 12/16.  Give IVF 75ml/hr 6hrs pre and post angiogram.  monitor for YASHIRA  Monitor BMP and UO.   Avoid further nephrotoxins if possible.    htn  controlled  continue with current meds  Low salt diet.  monitor BP.    Hyponatremia  Stable.  Optimize glycemic control.  Monitor Na.    LE  wound  f/u vascular  Pending LE angio on 12/16.  risk vs benefit explained to patient; she expressed understanding

## 2024-12-15 NOTE — PROGRESS NOTE ADULT - SUBJECTIVE AND OBJECTIVE BOX
CARDIOLOGY FOLLOW UP NOTE - DR. CERVANTES    Patient Name: REJI MEADE    Date of Service: 12-15-24 @ 12:25    Patient seen and examined    Subjective:    cv: denies chest pain, dyspnea, palpitations, dizziness  pulmonary: denies cough  GI: denies abdominal pain, nausea, vomiting  vascular/legs: no edema   skin: no rash  ROS: otherwise negative   overnight events:      PHYSICAL EXAM:  T(C): 36.9 (12-15-24 @ 10:58), Max: 37.3 (12-14-24 @ 22:51)  HR: 74 (12-15-24 @ 10:58) (66 - 74)  BP: 124/48 (12-15-24 @ 10:58) (124/48 - 139/69)  RR: 18 (12-15-24 @ 10:58) (18 - 18)  SpO2: 95% (12-15-24 @ 10:58) (95% - 100%)  Wt(kg): --  I&O's Summary    Daily     Daily     Appearance: Normal	  Cardiovascular: Normal S1 S2,RRR, No JVD, No murmurs  Respiratory: Lungs clear to auscultation	  Gastrointestinal:  Soft, Non-tender, + BS	  Extremities: Normal range of motion, No clubbing, cyanosis or edema      Home Medications:  aspirin 81 mg oral delayed release tablet: 1 tab(s) orally once a day (04 Dec 2024 04:28)  atorvastatin 20 mg oral tablet: 1 tab(s) orally once a day (at bedtime) (04 Dec 2024 04:28)  Farxiga 5 mg oral tablet: 1 tab(s) orally once a day (04 Dec 2024 04:32)  hydroCHLOROthiazide 25 mg oral tablet: 1 tab(s) orally once a day (04 Dec 2024 04:28)  Lantus Solostar Pen 100 units/mL subcutaneous solution: 30 unit(s) subcutaneous once a day (at bedtime) (04 Dec 2024 04:28)  losartan 100 mg oral tablet: 1 tab(s) orally once a day (04 Dec 2024 04:28)  Norvasc 10 mg oral tablet: 1 tab(s) orally once a day (04 Dec 2024 04:28)  NovoLOG 100 units/mL subcutaneous solution: 10 unit(s) subcutaneous 3 times a day (before meals) (04 Dec 2024 04:28)      MEDICATIONS  (STANDING):  amLODIPine   Tablet 10 milliGRAM(s) Oral daily  aspirin  chewable 81 milliGRAM(s) Oral daily  atorvastatin 20 milliGRAM(s) Oral at bedtime  gabapentin 100 milliGRAM(s) Oral three times a day  glucagon  Injectable 1 milliGRAM(s) IntraMuscular once  glucagon  Injectable 1 milliGRAM(s) IntraMuscular once  heparin   Injectable 5000 Unit(s) SubCutaneous every 8 hours  influenza  Vaccine (HIGH DOSE) 0.5 milliLiter(s) IntraMuscular once  insulin glargine Injectable (LANTUS) 16 Unit(s) SubCutaneous every morning  insulin lispro (ADMELOG) corrective regimen sliding scale   SubCutaneous three times a day before meals  insulin lispro (ADMELOG) corrective regimen sliding scale   SubCutaneous at bedtime  insulin lispro Injectable (ADMELOG) 6 Unit(s) SubCutaneous three times a day before meals  piperacillin/tazobactam IVPB.. 3.375 Gram(s) IV Intermittent every 8 hours  polyethylene glycol 3350 17 Gram(s) Oral daily  sodium chloride 0.9%. 1000 milliLiter(s) (75 mL/Hr) IV Continuous <Continuous>      TELEMETRY: 	    ECG:  	  RADIOLOGY:   DIAGNOSTIC TESTING:  [ ] Echocardiogram:  [ ] Catheterization:  [ ] Stress Test:    OTHER: 	    LABS:	 	    CARDIAC MARKERS:                                      10.0   9.71  )-----------( 636      ( 15 Dec 2024 06:35 )             31.8     12-15    139  |  103  |  12  ----------------------------<  139[H]  4.2   |  22  |  1.30    Ca    9.9      15 Dec 2024 06:35  Phos  3.3     12-15  Mg     2.20     12-15      proBNP:     Lipid Profile:   HgA1c:     Creatinine: 1.30 mg/dL (12-15-24 @ 06:35)  Creatinine: 1.26 mg/dL (12-14-24 @ 07:20)  Creatinine: 1.39 mg/dL (12-13-24 @ 05:49)

## 2024-12-15 NOTE — PROGRESS NOTE ADULT - SUBJECTIVE AND OBJECTIVE BOX
Morning Surgical Progress Note  Patient is a 69y old  Female who presents with a chief complaint of Sent in by podiatrist for right foot/toe wound, worsening since October. (14 Dec 2024 15:31)    SUBJECTIVE: Patient seen and examined at bedside with surgical team, patient without complaints and is agreeable to angiogram.     Vital Signs Last 24 Hrs  T(C): 36.9 (15 Dec 2024 10:58), Max: 37.3 (14 Dec 2024 22:51)  T(F): 98.4 (15 Dec 2024 10:58), Max: 99.2 (14 Dec 2024 22:51)  HR: 74 (15 Dec 2024 10:58) (66 - 74)  BP: 124/48 (15 Dec 2024 10:58) (124/48 - 139/69)  BP(mean): --  RR: 18 (15 Dec 2024 10:58) (18 - 18)  SpO2: 95% (15 Dec 2024 10:58) (95% - 100%)    Parameters below as of 15 Dec 2024 10:58  Patient On (Oxygen Delivery Method): room air    I&O's Detail    Medications  MEDICATIONS  (STANDING):  amLODIPine   Tablet 10 milliGRAM(s) Oral daily  aspirin  chewable 81 milliGRAM(s) Oral daily  atorvastatin 20 milliGRAM(s) Oral at bedtime  gabapentin 100 milliGRAM(s) Oral three times a day  glucagon  Injectable 1 milliGRAM(s) IntraMuscular once  glucagon  Injectable 1 milliGRAM(s) IntraMuscular once  heparin   Injectable 5000 Unit(s) SubCutaneous every 8 hours  influenza  Vaccine (HIGH DOSE) 0.5 milliLiter(s) IntraMuscular once  insulin glargine Injectable (LANTUS) 16 Unit(s) SubCutaneous every morning  insulin lispro (ADMELOG) corrective regimen sliding scale   SubCutaneous three times a day before meals  insulin lispro (ADMELOG) corrective regimen sliding scale   SubCutaneous at bedtime  insulin lispro Injectable (ADMELOG) 6 Unit(s) SubCutaneous three times a day before meals  piperacillin/tazobactam IVPB.. 3.375 Gram(s) IV Intermittent every 8 hours  polyethylene glycol 3350 17 Gram(s) Oral daily  sodium chloride 0.9%. 1000 milliLiter(s) (75 mL/Hr) IV Continuous <Continuous>    MEDICATIONS  (PRN):  acetaminophen     Tablet .. 650 milliGRAM(s) Oral every 6 hours PRN Temp greater or equal to 38C (100.4F), Mild Pain (1 - 3)  melatonin 3 milliGRAM(s) Oral at bedtime PRN Insomnia  ondansetron Injectable 4 milliGRAM(s) IV Push every 8 hours PRN Nausea and/or Vomiting    Physical Exam  General: NAD, resting in bed comfortably.  Cardiac: regular rate, warm and well perfused  Respiratory: Nonlabored respirations, normal cw expansion.  Abdomen: soft, nontender, nondistended  Extremities: normal strength, FROM, no deformities    LABS:                        10.0   9.71  )-----------( 636      ( 15 Dec 2024 06:35 )             31.8     12-15    139  |  103  |  12  ----------------------------<  139[H]  4.2   |  22  |  1.30    Ca    9.9      15 Dec 2024 06:35  Phos  3.3     12-15  Mg     2.20     12-15          Urinalysis Basic - ( 15 Dec 2024 06:35 )    Color: x / Appearance: x / SG: x / pH: x  Gluc: 139 mg/dL / Ketone: x  / Bili: x / Urobili: x   Blood: x / Protein: x / Nitrite: x   Leuk Esterase: x / RBC: x / WBC x   Sq Epi: x / Non Sq Epi: x / Bacteria: x

## 2024-12-15 NOTE — PROGRESS NOTE ADULT - SUBJECTIVE AND OBJECTIVE BOX
[facility]   MD LI WADE MD ANGELA WONG, PA QIAN CHEN, SHAYY    TEL:  OFFICE: [phone]  From 5pm-7am Answering Service [phone]    -- RENAL FOLLOW UP NOTE ---Date of Service 12-15-24 @ 12:53    Patient is a 69y old  Female who presents with a chief complaint of Sent in by podiatrist for right foot/toe wound, worsening since October.    Patient seen and examined at bedside. No chest pain/SOB.    VITALS:  T(F): 98.4 (12-15-24 @ 10:58), Max: 99.2 (12-14-24 @ 22:51)  HR: 74 (12-15-24 @ 10:58)  BP: 124/48 (12-15-24 @ 10:58)  RR: 18 (12-15-24 @ 10:58)  SpO2: 95% (12-15-24 @ 10:58)  Wt(kg): --      PHYSICAL EXAM:  General: NAD  Neck: No JVD  Respiratory: CTAB, no wheezes, rales or rhonchi  Cardiovascular: S1, S2, RRR  Gastrointestinal: BS+, soft, NT/ND  Extremities: No peripheral edema    Hospital Medications:   MEDICATIONS  (STANDING):  amLODIPine   Tablet 10 milliGRAM(s) Oral daily  aspirin  chewable 81 milliGRAM(s) Oral daily  atorvastatin 20 milliGRAM(s) Oral at bedtime  gabapentin 100 milliGRAM(s) Oral three times a day  glucagon  Injectable 1 milliGRAM(s) IntraMuscular once  glucagon  Injectable 1 milliGRAM(s) IntraMuscular once  heparin   Injectable 5000 Unit(s) SubCutaneous every 8 hours  influenza  Vaccine (HIGH DOSE) 0.5 milliLiter(s) IntraMuscular once  insulin glargine Injectable (LANTUS) 16 Unit(s) SubCutaneous every morning  insulin lispro (ADMELOG) corrective regimen sliding scale   SubCutaneous three times a day before meals  insulin lispro (ADMELOG) corrective regimen sliding scale   SubCutaneous at bedtime  insulin lispro Injectable (ADMELOG) 6 Unit(s) SubCutaneous three times a day before meals  piperacillin/tazobactam IVPB.. 3.375 Gram(s) IV Intermittent every 8 hours  polyethylene glycol 3350 17 Gram(s) Oral daily  sodium chloride 0.9%. 1000 milliLiter(s) (75 mL/Hr) IV Continuous <Continuous>      LABS:  12-15    139  |  103  |  12  ----------------------------<  139[H]  4.2   |  22  |  1.30    Ca    9.9      15 Dec 2024 06:35  Phos  3.3     12-15  Mg     2.20     12-15      Creatinine Trend: 1.30 <--, 1.26 <--, 1.39 <--, 1.31 <--, 1.41 <--, 1.46 <--, 1.42 <--    Phosphorus: 3.3 mg/dL (12-15 @ 06:35)                           10.0   9.71  )-----------( 636      ( 15 Dec 2024 06:35 )             31.8     Urine Studies:  Urinalysis - [12-15-24 @ 06:35]      Color  / Appearance  / SG  / pH       Gluc 139 / Ketone   / Bili  / Urobili        Blood  / Protein  / Leuk Est  / Nitrite       RBC  / WBC  / Hyaline  / Gran  / Sq Epi  / Non Sq Epi  / Bacteria       HbA1c 10.7      [02-25-20 @ 14:10]  TSH 1.00      [12-04-24 @ 06:20]

## 2024-12-15 NOTE — PROGRESS NOTE ADULT - SUBJECTIVE AND OBJECTIVE BOX
Name of Patient : REJI MEADE  MRN: [id number]  Date of visit: 12-15-24     Subjective: Patient seen and examined. No new events except as noted.     REVIEW OF SYSTEMS:    CONSTITUTIONAL: No weakness, fevers or chills  EYES/ENT: No visual changes;  No vertigo or throat pain   NECK: No pain or stiffness  RESPIRATORY: No cough, wheezing, hemoptysis; No shortness of breath  CARDIOVASCULAR: No chest pain or palpitations  GASTROINTESTINAL: No abdominal or epigastric pain. No nausea, vomiting, or hematemesis; No diarrhea or constipation. No melena or hematochezia.  GENITOURINARY: No dysuria, frequency or hematuria  NEUROLOGICAL: No numbness or weakness  SKIN: No itching, burning, rashes, or lesions   All other review of systems is negative unless indicated above.    MEDICATIONS:  MEDICATIONS  (STANDING):  amLODIPine   Tablet 10 milliGRAM(s) Oral daily  aspirin  chewable 81 milliGRAM(s) Oral daily  atorvastatin 20 milliGRAM(s) Oral at bedtime  dextrose 5%. 1000 milliLiter(s) (50 mL/Hr) IV Continuous <Continuous>  dextrose 5%. 1000 milliLiter(s) (100 mL/Hr) IV Continuous <Continuous>  gabapentin 100 milliGRAM(s) Oral three times a day  glucagon  Injectable 1 milliGRAM(s) IntraMuscular once  glucagon  Injectable 1 milliGRAM(s) IntraMuscular once  heparin   Injectable 5000 Unit(s) SubCutaneous every 8 hours  influenza  Vaccine (HIGH DOSE) 0.5 milliLiter(s) IntraMuscular once  insulin glargine Injectable (LANTUS) 16 Unit(s) SubCutaneous every morning  insulin lispro (ADMELOG) corrective regimen sliding scale   SubCutaneous every 6 hours  piperacillin/tazobactam IVPB.. 3.375 Gram(s) IV Intermittent every 8 hours  polyethylene glycol 3350 17 Gram(s) Oral daily  sodium chloride 0.9%. 1000 milliLiter(s) (75 mL/Hr) IV Continuous <Continuous>      PHYSICAL EXAM:  T(C): 37.2 (12-15-24 @ 21:09), Max: 37.3 (12-14-24 @ 22:51)  HR: 76 (12-15-24 @ 21:09) (66 - 84)  BP: 130/58 (12-15-24 @ 21:09) (124/48 - 139/69)  RR: 17 (12-15-24 @ 21:09) (17 - 18)  SpO2: 99% (12-15-24 @ 21:09) (95% - 100%)  Wt(kg): --  I&O's Summary        Appearance: Normal	  HEENT:  PERRLA   Lymphatic: No lymphadenopathy   Cardiovascular: Normal S1 S2, no JVD  Respiratory: normal effort , clear  Gastrointestinal:  Soft, Non-tender  Skin: No rashes,  warm to touch  Psychiatry:  Mood & affect appropriate  Musculuskeletal: No edema    recent labs, Imaging and EKGs personally reviewed                           10.0   9.71  )-----------( 636      ( 15 Dec 2024 06:35 )             31.8               12-15    139  |  103  |  12  ----------------------------<  139[H]  4.2   |  22  |  1.30    Ca    9.9      15 Dec 2024 06:35  Phos  3.3     12-15  Mg     2.20     12-15                         Urinalysis Basic - ( 15 Dec 2024 06:35 )    Color: x / Appearance: x / SG: x / pH: x  Gluc: 139 mg/dL / Ketone: x  / Bili: x / Urobili: x   Blood: x / Protein: x / Nitrite: x   Leuk Esterase: x / RBC: x / WBC x   Sq Epi: x / Non Sq Epi: x / Bacteria: x

## 2024-12-16 ENCOUNTER — APPOINTMENT (OUTPATIENT)
Dept: VASCULAR SURGERY | Facility: HOSPITAL | Age: 69
End: 2024-12-16

## 2024-12-16 LAB
ANION GAP SERPL CALC-SCNC: 12 MMOL/L — SIGNIFICANT CHANGE UP (ref 7–14)
APTT BLD: 36.9 SEC — HIGH (ref 24.5–35.6)
BLD GP AB SCN SERPL QL: NEGATIVE — SIGNIFICANT CHANGE UP
BUN SERPL-MCNC: 17 MG/DL — SIGNIFICANT CHANGE UP (ref 7–23)
CALCIUM SERPL-MCNC: 9.8 MG/DL — SIGNIFICANT CHANGE UP (ref 8.4–10.5)
CHLORIDE SERPL-SCNC: 101 MMOL/L — SIGNIFICANT CHANGE UP (ref 98–107)
CO2 SERPL-SCNC: 21 MMOL/L — LOW (ref 22–31)
CREAT SERPL-MCNC: 1.35 MG/DL — HIGH (ref 0.5–1.3)
EGFR: 42 ML/MIN/1.73M2 — LOW
GLUCOSE BLDC GLUCOMTR-MCNC: 144 MG/DL — HIGH (ref 70–99)
GLUCOSE BLDC GLUCOMTR-MCNC: 155 MG/DL — HIGH (ref 70–99)
GLUCOSE BLDC GLUCOMTR-MCNC: 160 MG/DL — HIGH (ref 70–99)
GLUCOSE BLDC GLUCOMTR-MCNC: 160 MG/DL — HIGH (ref 70–99)
GLUCOSE BLDC GLUCOMTR-MCNC: 163 MG/DL — HIGH (ref 70–99)
GLUCOSE BLDC GLUCOMTR-MCNC: 198 MG/DL — HIGH (ref 70–99)
GLUCOSE SERPL-MCNC: 160 MG/DL — HIGH (ref 70–99)
HCT VFR BLD CALC: 30.6 % — LOW (ref 34.5–45)
HGB BLD-MCNC: 9.8 G/DL — LOW (ref 11.5–15.5)
INR BLD: 1.08 RATIO — SIGNIFICANT CHANGE UP (ref 0.85–1.16)
MAGNESIUM SERPL-MCNC: 2.1 MG/DL — SIGNIFICANT CHANGE UP (ref 1.6–2.6)
MCHC RBC-ENTMCNC: 28.7 PG — SIGNIFICANT CHANGE UP (ref 27–34)
MCHC RBC-ENTMCNC: 32 G/DL — SIGNIFICANT CHANGE UP (ref 32–36)
MCV RBC AUTO: 89.7 FL — SIGNIFICANT CHANGE UP (ref 80–100)
NRBC # BLD: 0 /100 WBCS — SIGNIFICANT CHANGE UP (ref 0–0)
NRBC # FLD: 0 K/UL — SIGNIFICANT CHANGE UP (ref 0–0)
PHOSPHATE SERPL-MCNC: 3.3 MG/DL — SIGNIFICANT CHANGE UP (ref 2.5–4.5)
PLATELET # BLD AUTO: 610 K/UL — HIGH (ref 150–400)
POTASSIUM SERPL-MCNC: 3.9 MMOL/L — SIGNIFICANT CHANGE UP (ref 3.5–5.3)
POTASSIUM SERPL-SCNC: 3.9 MMOL/L — SIGNIFICANT CHANGE UP (ref 3.5–5.3)
PROTHROM AB SERPL-ACNC: 12.8 SEC — SIGNIFICANT CHANGE UP (ref 9.9–13.4)
RBC # BLD: 3.41 M/UL — LOW (ref 3.8–5.2)
RBC # FLD: 12.6 % — SIGNIFICANT CHANGE UP (ref 10.3–14.5)
RH IG SCN BLD-IMP: POSITIVE — SIGNIFICANT CHANGE UP
SODIUM SERPL-SCNC: 134 MMOL/L — LOW (ref 135–145)
WBC # BLD: 9.98 K/UL — SIGNIFICANT CHANGE UP (ref 3.8–10.5)
WBC # FLD AUTO: 9.98 K/UL — SIGNIFICANT CHANGE UP (ref 3.8–10.5)

## 2024-12-16 PROCEDURE — 37228: CPT | Mod: GC,LT

## 2024-12-16 PROCEDURE — 99152 MOD SED SAME PHYS/QHP 5/>YRS: CPT | Mod: GC

## 2024-12-16 PROCEDURE — 76937 US GUIDE VASCULAR ACCESS: CPT | Mod: 26,GC

## 2024-12-16 PROCEDURE — 36247 INS CATH ABD/L-EXT ART 3RD: CPT | Mod: GC,LT,XS

## 2024-12-16 PROCEDURE — 75625 CONTRAST EXAM ABDOMINL AORTA: CPT | Mod: 26,GC

## 2024-12-16 PROCEDURE — 75716 ARTERY X-RAYS ARMS/LEGS: CPT | Mod: 26,XU

## 2024-12-16 RX ORDER — DEXTROSE MONOHYDRATE 25 G/50ML
25 INJECTION, SOLUTION INTRAVENOUS ONCE
Refills: 0 | Status: DISCONTINUED | OUTPATIENT
Start: 2024-12-16 | End: 2024-12-23

## 2024-12-16 RX ORDER — DEXTROSE MONOHYDRATE 25 G/50ML
12.5 INJECTION, SOLUTION INTRAVENOUS ONCE
Refills: 0 | Status: DISCONTINUED | OUTPATIENT
Start: 2024-12-16 | End: 2024-12-23

## 2024-12-16 RX ORDER — INSULIN LISPRO 100/ML
VIAL (ML) SUBCUTANEOUS AT BEDTIME
Refills: 0 | Status: DISCONTINUED | OUTPATIENT
Start: 2024-12-16 | End: 2024-12-23

## 2024-12-16 RX ORDER — SODIUM CHLORIDE 9 MG/ML
1000 INJECTION, SOLUTION INTRAMUSCULAR; INTRAVENOUS; SUBCUTANEOUS
Refills: 0 | Status: DISCONTINUED | OUTPATIENT
Start: 2024-12-16 | End: 2024-12-23

## 2024-12-16 RX ORDER — BENZOCAINE AND MENTHOL 15; 3.6 MG/1; MG/1
1 LOZENGE ORAL ONCE
Refills: 0 | Status: COMPLETED | OUTPATIENT
Start: 2024-12-16 | End: 2024-12-16

## 2024-12-16 RX ORDER — INSULIN LISPRO 100/ML
VIAL (ML) SUBCUTANEOUS
Refills: 0 | Status: DISCONTINUED | OUTPATIENT
Start: 2024-12-16 | End: 2024-12-23

## 2024-12-16 RX ORDER — SODIUM CHLORIDE 9 MG/ML
1000 INJECTION, SOLUTION INTRAVENOUS
Refills: 0 | Status: DISCONTINUED | OUTPATIENT
Start: 2024-12-16 | End: 2024-12-23

## 2024-12-16 RX ORDER — CLOPIDOGREL BISULFATE 75 MG/1
75 TABLET, FILM COATED ORAL DAILY
Refills: 0 | Status: DISCONTINUED | OUTPATIENT
Start: 2024-12-16 | End: 2024-12-23

## 2024-12-16 RX ADMIN — ATORVASTATIN CALCIUM 20 MILLIGRAM(S): 40 TABLET, FILM COATED ORAL at 21:17

## 2024-12-16 RX ADMIN — BENZOCAINE AND MENTHOL 1 LOZENGE: 15; 3.6 LOZENGE ORAL at 22:07

## 2024-12-16 RX ADMIN — PIPERACILLIN AND TAZOBACTAM 25 GRAM(S): 3; .375 INJECTION, POWDER, LYOPHILIZED, FOR SOLUTION INTRAVENOUS at 15:09

## 2024-12-16 RX ADMIN — Medication 17 GRAM(S): at 11:40

## 2024-12-16 RX ADMIN — GABAPENTIN 100 MILLIGRAM(S): 300 CAPSULE ORAL at 15:10

## 2024-12-16 RX ADMIN — CLOPIDOGREL BISULFATE 75 MILLIGRAM(S): 75 TABLET, FILM COATED ORAL at 10:07

## 2024-12-16 RX ADMIN — Medication 81 MILLIGRAM(S): at 10:06

## 2024-12-16 RX ADMIN — SODIUM CHLORIDE 50 MILLILITER(S): 9 INJECTION, SOLUTION INTRAMUSCULAR; INTRAVENOUS; SUBCUTANEOUS at 09:57

## 2024-12-16 RX ADMIN — Medication 1: at 17:19

## 2024-12-16 RX ADMIN — PIPERACILLIN AND TAZOBACTAM 25 GRAM(S): 3; .375 INJECTION, POWDER, LYOPHILIZED, FOR SOLUTION INTRAVENOUS at 05:15

## 2024-12-16 RX ADMIN — HEPARIN SODIUM 5000 UNIT(S): 1000 INJECTION, SOLUTION INTRAVENOUS; SUBCUTANEOUS at 21:19

## 2024-12-16 RX ADMIN — SODIUM CHLORIDE 75 MILLILITER(S): 9 INJECTION, SOLUTION INTRAMUSCULAR; INTRAVENOUS; SUBCUTANEOUS at 00:15

## 2024-12-16 RX ADMIN — HEPARIN SODIUM 5000 UNIT(S): 1000 INJECTION, SOLUTION INTRAVENOUS; SUBCUTANEOUS at 15:09

## 2024-12-16 RX ADMIN — Medication 10 MILLIGRAM(S): at 05:15

## 2024-12-16 RX ADMIN — GABAPENTIN 100 MILLIGRAM(S): 300 CAPSULE ORAL at 05:14

## 2024-12-16 RX ADMIN — GABAPENTIN 100 MILLIGRAM(S): 300 CAPSULE ORAL at 21:17

## 2024-12-16 RX ADMIN — PIPERACILLIN AND TAZOBACTAM 25 GRAM(S): 3; .375 INJECTION, POWDER, LYOPHILIZED, FOR SOLUTION INTRAVENOUS at 21:22

## 2024-12-16 RX ADMIN — INSULIN GLARGINE-YFGN 16 UNIT(S): 100 INJECTION, SOLUTION SUBCUTANEOUS at 11:37

## 2024-12-16 RX ADMIN — HEPARIN SODIUM 5000 UNIT(S): 1000 INJECTION, SOLUTION INTRAVENOUS; SUBCUTANEOUS at 05:15

## 2024-12-16 RX ADMIN — Medication 1: at 05:23

## 2024-12-16 NOTE — PROGRESS NOTE ADULT - ASSESSMENT
59yo female h/o DM type2, HTN, hyperlipidemia; Pt c/o left great toe infection. vascular on board planning for angiogram. nephrology consulted for elevated S.Cr.    renal insufficiency  baseline ~ 1.4 per team  long standing hx of dm and htn  admitted with scr now up trending  scr relatively stable  on losartan and htcz.  will hold for now given worsen scr (last dose 12/5)  FEurea 35%  renal us no hydro   s/p trial of gentle hydration 12/5  Renal function stable  s/p angiogram today; IVF given pre and post angiogram  monitor for YASHIRA  Monitor BMP and UO.   Avoid further nephrotoxins if possible.    htn  controlled  continue with current meds  Low salt diet.  monitor BP.    Hyponatremia  Stable.  Optimize glycemic control.  Monitor Na.    LE  wound  f/u vascular  s/p angiogram 12/16

## 2024-12-16 NOTE — PROGRESS NOTE ADULT - ASSESSMENT
68 y/o F PMhx DM II, HTN who presented w/ left great toe wound    L hallux infection, c/f osteo  leukocytosis, DM II  CRP 88.8,   s/p podiatry I&D L hallux wound to the level of and not beyond bone,  mild malodor, scant serosanguinous drainage. Right foot no open wounds, no acute signs of infection.  foot x-ray- fracture distal phalanx great toe. soft tissue swelling of this digit as well as subcutaneous air.  per vascular possible plans for angiogram  wound cx w/ strep constellatus, citrobacter, morganella, staph lugdunensis (oxacillin sensitive)  febrile 12/7, ceftriaxone switched to zosyn  s/p LLE angio 12/16    Recommendations  c/w zosyn for now  f/u podiatry recs regarding possible L foot partial 1st ray resection    Albino Rea M.D.  [facility], Division of Infectious Diseases  [phone]  After 5pm on weekdays and all day on weekends - please call [phone]

## 2024-12-16 NOTE — PROGRESS NOTE ADULT - ASSESSMENT
A/P  59yo female h/o DM type2, HTN, hyperlipidemia; Pt c/o left great toe infection.    #diabetic foot infection, PAD  -sp bedside debridement with podiatry on 12/4/24  -VA JACK WITH PVR noted   -await possible le angio   -ecg with no ischemic changes, no chf or significant valvular disease on exam   -echo with nl LV sys FX No significant valvular disease.  -s/p le angio, angioplasty  -antiplat per vasc      #HTN   -c/w meds    dvt ppx     35 minutes spent on total encounter; more than 50% of the visit was spent counseling and/or coordinating care by the attending physician.

## 2024-12-16 NOTE — PROGRESS NOTE ADULT - SUBJECTIVE AND OBJECTIVE BOX
[facility], Division of Infectious Diseases  TOBY Szymanski Y. Patel, S. Shah, G. Álvaro  [phone]  ([phone] - weekdays after 5pm and weekends)    Name: REJI MEADE  Age/Gender: 69y Female  MRN: [id number]    Interval History:  Notes reviewed.   No concerning overnight events.  Afebrile.   s/p angiogram today    Allergies: No Known Allergies      Objective:  Vitals:   T(F): 98.4 (12-16-24 @ 06:47), Max: 99.1 (12-16-24 @ 01:00)  HR: 74 (12-16-24 @ 10:15) (69 - 84)  BP: 141/57 (12-16-24 @ 10:15) (130/58 - 149/65)  RR: 18 (12-16-24 @ 10:15) (16 - 23)  SpO2: 98% (12-16-24 @ 10:15) (93% - 99%)  Physical Examination:  General: no acute distress  HEENT: anicteric  Cardio: S1, S2, normal rate  Resp: clear to auscultation anteriorly   Abd: soft, NT, ND  Ext: L foot wrapped w/ dressing  Skin: warm, dry    Laboratory Studies:  CBC:                       9.8    9.98  )-----------( 610      ( 16 Dec 2024 01:20 )             30.6     WBC Trend:  9.98 12-16-24 @ 01:20  9.71 12-15-24 @ 06:35  9.81 12-14-24 @ 07:20  11.03 12-13-24 @ 05:49  11.29 12-12-24 @ 02:20  12.43 12-11-24 @ 04:15  13.14 12-10-24 @ 08:20    CMP: 12-16    134[L]  |  101  |  17  ----------------------------<  160[H]  3.9   |  21[L]  |  1.35[H]    Ca    9.8      16 Dec 2024 01:00  Phos  3.3     12-16  Mg     2.10     12-16            Urinalysis Basic - ( 16 Dec 2024 01:00 )    Color: x / Appearance: x / SG: x / pH: x  Gluc: 160 mg/dL / Ketone: x  / Bili: x / Urobili: x   Blood: x / Protein: x / Nitrite: x   Leuk Esterase: x / RBC: x / WBC x   Sq Epi: x / Non Sq Epi: x / Bacteria: x      Microbiology: reviewed     Culture - Blood (collected 12-07-24 @ 22:31)  Source: .Blood BLOOD  Final Report (12-13-24 @ 01:00):    No growth at 5 days    Culture - Blood (collected 12-07-24 @ 18:46)  Source: .Blood BLOOD  Final Report (12-13-24 @ 01:00):    No growth at 5 days    Culture - Abscess with Gram Stain (collected 12-04-24 @ 01:07)  Source: .Abscess  Gram Stain (12-04-24 @ 16:17):    No polymorphonuclear cells seen per low power field    Few Gram Negative Rods per oil power field  Final Report (12-09-24 @ 07:31):    Numerous Morganella morganii    Moderate Citrobacter amalonaticus complex    Few Staphylococcus lugdunensis    Commensal aura consistent with body site  Organism: Morganella morganii  Citrobacter amalonaticus complex  Staphylococcus lugdunensis (12-09-24 @ 07:31)  Organism: Staphylococcus lugdunensis (12-09-24 @ 07:31)      Method Type: MARIE      -  Clindamycin: S <=0.25      -  Erythromycin: S <=0.25      -  Gentamicin: S <=4 Should not be used as monotherapy      -  Oxacillin: S 0.5      -  Penicillin: R >2      -  Rifampin: S <=1 Should not be used as monotherapy      -  Tetracycline: S <=4      -  Trimethoprim/Sulfamethoxazole: S <=0.5/9.5      -  Vancomycin: S 1  Organism: Citrobacter amalonaticus complex (12-09-24 @ 07:31)      Method Type: MARIE      -  Amoxicillin/Clavulanic Acid: S <=8/4      -  Ampicillin: R <=8 These ampicillin results predict results for amoxicillin      -  Ampicillin/Sulbactam: S <=4/2      -  Aztreonam: S <=4      -  Cefazolin: R >16      -  Cefepime: S <=2      -  Cefoxitin: S <=8      -  Ceftriaxone: S <=1      -  Ciprofloxacin: S <=0.25      -  Ertapenem: S <=0.5      -  Gentamicin: S <=2      -  Imipenem: S <=1      -  Levofloxacin: S <=0.5      -  Meropenem: S <=1      -  Piperacillin/Tazobactam: S <=8      -  Tobramycin: S <=2      -  Trimethoprim/Sulfamethoxazole: S <=0.5/9.5  Organism: Morganella morganii (12-09-24 @ 07:31)      Method Type: MARIE      -  Amoxicillin/Clavulanic Acid: R >16/8      -  Ampicillin: R >16 These ampicillin results predict results for amoxicillin      -  Ampicillin/Sulbactam: R >16/8      -  Aztreonam: R >16      -  Cefazolin: R >16      -  Cefepime: S <=2      -  Cefoxitin: S <=8      -  Ceftriaxone: S <=1      -  Ciprofloxacin: S <=0.25      -  Ertapenem: S <=0.5      -  Gentamicin: S <=2      -  Levofloxacin: S <=0.5      -  Meropenem: S <=1      -  Piperacillin/Tazobactam: S <=8      -  Tobramycin: S <=2      -  Trimethoprim/Sulfamethoxazole: S <=0.5/9.5    Culture - Blood (collected 12-03-24 @ 22:30)  Source: .Blood BLOOD  Final Report (12-09-24 @ 03:00):    No growth at 5 days    Culture - Blood (collected 12-03-24 @ 22:20)  Source: .Blood BLOOD  Final Report (12-09-24 @ 03:00):    No growth at 5 days        Radiology: reviewed     Medications:  acetaminophen     Tablet .. 650 milliGRAM(s) Oral every 6 hours PRN  amLODIPine   Tablet 10 milliGRAM(s) Oral daily  aspirin  chewable 81 milliGRAM(s) Oral daily  atorvastatin 20 milliGRAM(s) Oral at bedtime  clopidogrel Tablet 75 milliGRAM(s) Oral daily  dextrose 5%. 1000 milliLiter(s) IV Continuous <Continuous>  dextrose 5%. 1000 milliLiter(s) IV Continuous <Continuous>  gabapentin 100 milliGRAM(s) Oral three times a day  glucagon  Injectable 1 milliGRAM(s) IntraMuscular once  glucagon  Injectable 1 milliGRAM(s) IntraMuscular once  heparin   Injectable 5000 Unit(s) SubCutaneous every 8 hours  influenza  Vaccine (HIGH DOSE) 0.5 milliLiter(s) IntraMuscular once  insulin glargine Injectable (LANTUS) 16 Unit(s) SubCutaneous every morning  insulin lispro (ADMELOG) corrective regimen sliding scale   SubCutaneous every 6 hours  melatonin 3 milliGRAM(s) Oral at bedtime PRN  ondansetron Injectable 4 milliGRAM(s) IV Push every 8 hours PRN  piperacillin/tazobactam IVPB.. 3.375 Gram(s) IV Intermittent every 8 hours  polyethylene glycol 3350 17 Gram(s) Oral daily  sodium chloride 0.9%. 1000 milliLiter(s) IV Continuous <Continuous>    Antimicrobials:  piperacillin/tazobactam IVPB.. 3.375 Gram(s) IV Intermittent every 8 hours

## 2024-12-16 NOTE — PROGRESS NOTE ADULT - SUBJECTIVE AND OBJECTIVE BOX
Name of Patient : REJI MEADE  MRN: [id number]  Date of visit: 12-16-24      Subjective: Patient seen and examined. No new events except as noted.     REVIEW OF SYSTEMS:    CONSTITUTIONAL: No weakness, fevers or chills  EYES/ENT: No visual changes;  No vertigo or throat pain   NECK: No pain or stiffness  RESPIRATORY: No cough, wheezing, hemoptysis; No shortness of breath  CARDIOVASCULAR: No chest pain or palpitations  GASTROINTESTINAL: No abdominal or epigastric pain. No nausea, vomiting, or hematemesis; No diarrhea or constipation. No melena or hematochezia.  GENITOURINARY: No dysuria, frequency or hematuria  NEUROLOGICAL: No numbness or weakness  SKIN: No itching, burning, rashes, or lesions   All other review of systems is negative unless indicated above.    MEDICATIONS:  MEDICATIONS  (STANDING):  amLODIPine   Tablet 10 milliGRAM(s) Oral daily  aspirin  chewable 81 milliGRAM(s) Oral daily  atorvastatin 20 milliGRAM(s) Oral at bedtime  clopidogrel Tablet 75 milliGRAM(s) Oral daily  dextrose 5%. 1000 milliLiter(s) (50 mL/Hr) IV Continuous <Continuous>  dextrose 5%. 1000 milliLiter(s) (100 mL/Hr) IV Continuous <Continuous>  gabapentin 100 milliGRAM(s) Oral three times a day  glucagon  Injectable 1 milliGRAM(s) IntraMuscular once  glucagon  Injectable 1 milliGRAM(s) IntraMuscular once  heparin   Injectable 5000 Unit(s) SubCutaneous every 8 hours  influenza  Vaccine (HIGH DOSE) 0.5 milliLiter(s) IntraMuscular once  insulin glargine Injectable (LANTUS) 16 Unit(s) SubCutaneous every morning  insulin lispro (ADMELOG) corrective regimen sliding scale   SubCutaneous every 6 hours  piperacillin/tazobactam IVPB.. 3.375 Gram(s) IV Intermittent every 8 hours  polyethylene glycol 3350 17 Gram(s) Oral daily  sodium chloride 0.9%. 1000 milliLiter(s) (50 mL/Hr) IV Continuous <Continuous>      PHYSICAL EXAM:  T(C): 36.6 (12-16-24 @ 12:00), Max: 37.3 (12-16-24 @ 01:00)  HR: 72 (12-16-24 @ 12:00) (69 - 84)  BP: 130/52 (12-16-24 @ 12:00) (130/52 - 149/65)  RR: 17 (12-16-24 @ 12:00) (16 - 23)  SpO2: 98% (12-16-24 @ 12:00) (93% - 99%)  Wt(kg): --  I&O's Summary    15 Dec 2024 07:01  -  16 Dec 2024 07:00  --------------------------------------------------------  IN: 0 mL / OUT: 250 mL / NET: -250 mL      Height (cm): 172.7 (12-16 @ 06:47)  Weight (kg): 81.6 (12-16 @ 06:47)  BMI (kg/m2): 27.4 (12-16 @ 06:47)  BSA (m2): 1.95 (12-16 @ 06:47)    Appearance: Normal	  HEENT:  PERRLA   Lymphatic: No lymphadenopathy   Cardiovascular: Normal S1 S2, no JVD  Respiratory: normal effort , clear  Gastrointestinal:  Soft, Non-tender  Skin: No rashes,  warm to touch  Psychiatry:  Mood & affect appropriate  Musculuskeletal: No edema    recent labs, Imaging and EKGs personally reviewed       12-15-24 @ 07:01  -  12-16-24 @ 07:00  --------------------------------------------------------  IN: 0 mL / OUT: 250 mL / NET: -250 mL                          9.8    9.98  )-----------( 610      ( 16 Dec 2024 01:20 )             30.6               12-16    134[L]  |  101  |  17  ----------------------------<  160[H]  3.9   |  21[L]  |  1.35[H]    Ca    9.8      16 Dec 2024 01:00  Phos  3.3     12-16  Mg     2.10     12-16      PT/INR - ( 16 Dec 2024 01:00 )   PT: 12.8 sec;   INR: 1.08 ratio         PTT - ( 16 Dec 2024 01:00 )  PTT:36.9 sec                   Urinalysis Basic - ( 16 Dec 2024 01:00 )    Color: x / Appearance: x / SG: x / pH: x  Gluc: 160 mg/dL / Ketone: x  / Bili: x / Urobili: x   Blood: x / Protein: x / Nitrite: x   Leuk Esterase: x / RBC: x / WBC x   Sq Epi: x / Non Sq Epi: x / Bacteria: x

## 2024-12-16 NOTE — PROGRESS NOTE ADULT - SUBJECTIVE AND OBJECTIVE BOX
Vascular Surgery Daily Resident Progress Note    Subjective and Interval  Seen and examined at bedside. Scheduled for LLE angio this morning Afebrile and VSS. Does not endorse chest pain, SOB, nausea, headache, fever or chills.   ___________________________________________________  Vital Signs  T(C): 36.9 (06:47), Max: 37.3 (01:00)  HR: 74 (10:15) (69 - 84)  BP: 141/57 (10:15) (130/58 - 149/65)  ABP: --  ABP(mean): --  RR: 18 (10:15) (16 - 23)  SpO2: 98% (10:15) (93% - 99%)    Physical Exam  General: NAD   Cardiovascular: Normal S1 S2,RRR, No JVD, No murmurs  Respiratory: Lungs clear to auscultation	  Gastrointestinal:  Soft, Non-tender, + BS	  Extremities: Normal range of motion, No clubbing, cyanosis or edema  LLE: dressing d/d/i; DP and PT + signal     In&Out    12-15-24 @ 07:01  -  12-16-24 @ 07:00  --------------------------------------------------------  IN: 0 mL / OUT: 250 mL / NET: -250 mL        Labs      Medications  acetaminophen     Tablet .. 650 milliGRAM(s) Oral every 6 hours PRN  amLODIPine   Tablet 10 milliGRAM(s) Oral daily  aspirin  chewable 81 milliGRAM(s) Oral daily  atorvastatin 20 milliGRAM(s) Oral at bedtime  clopidogrel Tablet 75 milliGRAM(s) Oral daily  dextrose 5%. 1000 milliLiter(s) IV Continuous <Continuous>  dextrose 5%. 1000 milliLiter(s) IV Continuous <Continuous>  gabapentin 100 milliGRAM(s) Oral three times a day  glucagon  Injectable 1 milliGRAM(s) IntraMuscular once  glucagon  Injectable 1 milliGRAM(s) IntraMuscular once  heparin   Injectable 5000 Unit(s) SubCutaneous every 8 hours  influenza  Vaccine (HIGH DOSE) 0.5 milliLiter(s) IntraMuscular once  insulin glargine Injectable (LANTUS) 16 Unit(s) SubCutaneous every morning  insulin lispro (ADMELOG) corrective regimen sliding scale   SubCutaneous every 6 hours  melatonin 3 milliGRAM(s) Oral at bedtime PRN  ondansetron Injectable 4 milliGRAM(s) IV Push every 8 hours PRN  piperacillin/tazobactam IVPB.. 3.375 Gram(s) IV Intermittent every 8 hours  polyethylene glycol 3350 17 Gram(s) Oral daily  sodium chloride 0.9%. 1000 milliLiter(s) IV Continuous <Continuous>      Assessment & Plan    69 year old female with PMH type 2 DM, CKD stage 3, HTN, HLD presents with osteomyelitis at left hallux. LLE angiogram cancelled 12/11 and 12/12. Patient refused angiogram on 12/13 after multiple cancellations and later became amenable to procedure which is now planned for Monday, 12/16.     Plan  - LLE angiogram today 12/16      - Diet: regular -> NPO at MN        - 1am labs  - Zosyn, f/u ID for dispo recs pot-procedure  - Aspirin  - Subq heparin  - Medical and cardiac clearances documented.  - Patient consented  - IVF as per previous angiogram planning from nephrology recommendations    C team surgery [id number]

## 2024-12-16 NOTE — PROGRESS NOTE ADULT - SUBJECTIVE AND OBJECTIVE BOX
[facility]   MD LI WADE MD MARIA SANTIAGO, NP    TEL:  OFFICE: [phone]  From 5pm-7am Answering Service [phone]    -- RENAL FOLLOW UP NOTE ---Date of Service 12-16-24 @ 15:06    Patient is a 69y old  Female who presents with a chief complaint of Sent in by podiatrist for right foot/toe wound, worsening since October.       Patient seen and examined at bedside. No chest pain/sob    VITALS:  T(F): 97.9 (12-16-24 @ 12:00), Max: 99.1 (12-16-24 @ 01:00)  HR: 72 (12-16-24 @ 12:00)  BP: 130/52 (12-16-24 @ 12:00)  RR: 17 (12-16-24 @ 12:00)  SpO2: 98% (12-16-24 @ 12:00)  Wt(kg): --    12-15 @ 07:01  -  12-16 @ 07:00  --------------------------------------------------------  IN: 0 mL / OUT: 250 mL / NET: -250 mL      Height (cm): 172.7 (12-16 @ 06:47)  Weight (kg): 81.6 (12-16 @ 06:47)  BMI (kg/m2): 27.4 (12-16 @ 06:47)  BSA (m2): 1.95 (12-16 @ 06:47)    PHYSICAL EXAM:  General: NAD  Neck: No JVD  Respiratory: CTAB, no wheezes, rales or rhonchi  Cardiovascular: S1, S2, RRR  Gastrointestinal: BS+, soft, NT/ND  Extremities: No peripheral edema    Hospital Medications:   MEDICATIONS  (STANDING):  amLODIPine   Tablet 10 milliGRAM(s) Oral daily  aspirin  chewable 81 milliGRAM(s) Oral daily  atorvastatin 20 milliGRAM(s) Oral at bedtime  clopidogrel Tablet 75 milliGRAM(s) Oral daily  dextrose 5%. 1000 milliLiter(s) (50 mL/Hr) IV Continuous <Continuous>  dextrose 5%. 1000 milliLiter(s) (100 mL/Hr) IV Continuous <Continuous>  gabapentin 100 milliGRAM(s) Oral three times a day  glucagon  Injectable 1 milliGRAM(s) IntraMuscular once  glucagon  Injectable 1 milliGRAM(s) IntraMuscular once  heparin   Injectable 5000 Unit(s) SubCutaneous every 8 hours  influenza  Vaccine (HIGH DOSE) 0.5 milliLiter(s) IntraMuscular once  insulin glargine Injectable (LANTUS) 16 Unit(s) SubCutaneous every morning  insulin lispro (ADMELOG) corrective regimen sliding scale   SubCutaneous every 6 hours  piperacillin/tazobactam IVPB.. 3.375 Gram(s) IV Intermittent every 8 hours  polyethylene glycol 3350 17 Gram(s) Oral daily  sodium chloride 0.9%. 1000 milliLiter(s) (50 mL/Hr) IV Continuous <Continuous>      LABS:  12-16    134[L]  |  101  |  17  ----------------------------<  160[H]  3.9   |  21[L]  |  1.35[H]    Ca    9.8      16 Dec 2024 01:00  Phos  3.3     12-16  Mg     2.10     12-16      Creatinine Trend: 1.35 <--, 1.30 <--, 1.26 <--, 1.39 <--, 1.31 <--, 1.41 <--, 1.46 <--    Phosphorus: 3.3 mg/dL (12-16 @ 01:00)                              9.8    9.98  )-----------( 610      ( 16 Dec 2024 01:20 )             30.6     Urine Studies:  Urinalysis - [12-16-24 @ 01:00]      Color  / Appearance  / SG  / pH       Gluc 160 / Ketone   / Bili  / Urobili        Blood  / Protein  / Leuk Est  / Nitrite       RBC  / WBC  / Hyaline  / Gran  / Sq Epi  / Non Sq Epi  / Bacteria       HbA1c 10.7      [02-25-20 @ 14:10]  TSH 1.00      [12-04-24 @ 06:20]        RADIOLOGY & ADDITIONAL STUDIES:

## 2024-12-16 NOTE — PRE PROCEDURE NOTE - PRE PROCEDURE EVALUATION
Pre Procedural Sedation Evaluation    Proceduralist: Dr. Vann    History and physical reviewed  Medications reviewed  Labs reviewed  EKG reviewed    Anticoagulation: Heparin 5000 units SQ given on 12/16/24 at 05:00AM (DVT ppx)  Urine pregnancy: N/A  Dentures: Removed  Last PO intake: 12/15/2024 PM    Pre-Procedure Physical Assessment  Mental status: Alert and oriented x 3  Level of consciousness: 1  Cardiac assessment: Stable  Pulmonary assessment: Stable  Obstructive sleep apnea: No  Aspiration risk: No  Mallampati score: 2  ASA Classification: 3  Prior Sedative or Anesthesia Experience: No complications  Informed consent by responsible adult: Yes  Based on today's assessment, anesthesia consult requested: No

## 2024-12-16 NOTE — CHART NOTE - NSCHARTNOTEFT_GEN_A_CORE
Patient is a 69y old  Female who presents with a chief complaint of Sent in by podiatrist for right foot/toe wound, worsening since October. (16 Dec 2024 13:24)    Pt is S/P left lower extremity angiogram with anterior tibial artery angioplasty                                    POD#   0    Vital Signs Last 24 Hrs  T(C): 36.6 (16 Dec 2024 12:00), Max: 37.3 (16 Dec 2024 01:00)  T(F): 97.9 (16 Dec 2024 12:00), Max: 99.1 (16 Dec 2024 01:00)  HR: 72 (16 Dec 2024 12:00) (69 - 84)  BP: 130/52 (16 Dec 2024 12:00) (130/52 - 149/65)  BP(mean): 81 (16 Dec 2024 10:15) (78 - 89)  RR: 17 (16 Dec 2024 12:00) (16 - 23)  SpO2: 98% (16 Dec 2024 12:00) (93% - 99%)    Parameters below as of 16 Dec 2024 12:00  Patient On (Oxygen Delivery Method): room air      I&O's Detail    15 Dec 2024 07:01  -  16 Dec 2024 07:00  --------------------------------------------------------  IN:  Total IN: 0 mL    OUT:    Voided (mL): 250 mL  Total OUT: 250 mL    Total NET: -250 mL        piperacillin/tazobactam IVPB.. 3.375  amLODIPine   Tablet 10  aspirin  chewable 81  clopidogrel Tablet 75  heparin   Injectable 5000  piperacillin/tazobactam IVPB.. 3.375    PAST MEDICAL & SURGICAL HISTORY:  Diabetes mellitus type 2, noninsulin dependent      HTN (hypertension)      Hyperlipemia      S/P hysterectomy        Physical Exam:  General: NAD, resting comfortably in bed  Pulmonary: Nonlabored breathing, no respiratory distress  Cardiovascular: NSR  Abdominal: soft, NT/ND  Extremities: WWP  Pulses:   Right:                                                                          Left:  FEM [X ]2+ [ ]1+ [ ]doppler                                             FEM [X ]2+ [ ]1+ [ ]doppler    POP [ ]2+ [ ]1+ [ ]doppler                                             POP [X ]2+ [ ]1+ [ ]doppler    DP [ ]2+ [ ]1+ [ ]doppler                                                DP [ ]2+ [ ]1+ [X ]doppler  PT[ ]2+ [ ]1+ [ ]doppler                                                  PT [ ]2+ [X ]1+ [ ]doppler      LABS:                        9.8    9.98  )-----------( 610      ( 16 Dec 2024 01:20 )             30.6     12-16    134[L]  |  101  |  17  ----------------------------<  160[H]  3.9   |  21[L]  |  1.35[H]    Ca    9.8      16 Dec 2024 01:00  Phos  3.3     12-16  Mg     2.10     12-16      PT/INR - ( 16 Dec 2024 01:00 )   PT: 12.8 sec;   INR: 1.08 ratio         PTT - ( 16 Dec 2024 01:00 )  PTT:36.9 sec  CAPILLARY BLOOD GLUCOSE      POCT Blood Glucose.: 144 mg/dL (16 Dec 2024 11:18)  POCT Blood Glucose.: 160 mg/dL (16 Dec 2024 10:25)  POCT Blood Glucose.: 160 mg/dL (16 Dec 2024 06:27)  POCT Blood Glucose.: 163 mg/dL (16 Dec 2024 05:19)  POCT Blood Glucose.: 158 mg/dL (15 Dec 2024 23:45)  POCT Blood Glucose.: 127 mg/dL (15 Dec 2024 17:49)      Radiology and Additional Studies:    Assessment:69yFemaleHPI:  57yo female h/o DM type2, HTN, hyperlipidemia; Pt c/o left great toe infection.  Patient reports she was clipping her nail toenails in October and accidentally cut her skin.  Patient reports this week she noticed bleeding through her sock which prompted her to check and noticed an open wound on her toe.  Patient reports she followed up with her podiatrist yesterday who instructed she come to the ED for IV antibiotics and further workup.  Patient reports she was having subjective fever at home.  Otherwise reports no chest pain, palpitations, shortness of breath, nausea, vomiting.    ED course: Abx, Pod Sx c/s  (04 Dec 2024 03:42)  Pt is S/P left lower extremity angiogram with anterior tibial artery angioplasty                                    POD#   0    Plan:  ASA and Plavix  Statin  OOB/Ambulate/PT  DM management  Seen and discussed with vascular surgery fellows    C team  [id number]

## 2024-12-16 NOTE — PROGRESS NOTE ADULT - SUBJECTIVE AND OBJECTIVE BOX
CARDIOLOGY FOLLOW UP NOTE - DR. CERVANTES    Patient Name: REJI MEADE    Date of Service: 12-16-24 @ 12:24    Patient seen and examined    Subjective:    cv: denies chest pain, dyspnea, palpitations, dizziness  pulmonary: denies cough  GI: denies abdominal pain, nausea, vomiting  vascular/legs: no edema   skin: no rash  ROS: otherwise negative   overnight events:      PHYSICAL EXAM:  T(C): 36.9 (12-16-24 @ 06:47), Max: 37.3 (12-16-24 @ 01:00)  HR: 74 (12-16-24 @ 10:15) (69 - 84)  BP: 141/57 (12-16-24 @ 10:15) (130/58 - 149/65)  RR: 18 (12-16-24 @ 10:15) (16 - 23)  SpO2: 98% (12-16-24 @ 10:15) (93% - 99%)  Wt(kg): --  I&O's Summary    15 Dec 2024 07:01  -  16 Dec 2024 07:00  --------------------------------------------------------  IN: 0 mL / OUT: 250 mL / NET: -250 mL      Daily Height in cm: 172.7 (16 Dec 2024 06:47)    Daily     Appearance: Normal	  Cardiovascular: Normal S1 S2,RRR, No JVD, No murmurs  Respiratory: Lungs clear to auscultation	  Gastrointestinal:  Soft, Non-tender, + BS	  Extremities: Normal range of motion, No clubbing, cyanosis or edema      Home Medications:  aspirin 81 mg oral delayed release tablet: 1 tab(s) orally once a day (04 Dec 2024 04:28)  atorvastatin 20 mg oral tablet: 1 tab(s) orally once a day (at bedtime) (04 Dec 2024 04:28)  Farxiga 5 mg oral tablet: 1 tab(s) orally once a day (04 Dec 2024 04:32)  hydroCHLOROthiazide 25 mg oral tablet: 1 tab(s) orally once a day (04 Dec 2024 04:28)  Lantus Solostar Pen 100 units/mL subcutaneous solution: 30 unit(s) subcutaneous once a day (at bedtime) (04 Dec 2024 04:28)  losartan 100 mg oral tablet: 1 tab(s) orally once a day (04 Dec 2024 04:28)  Norvasc 10 mg oral tablet: 1 tab(s) orally once a day (04 Dec 2024 04:28)  NovoLOG 100 units/mL subcutaneous solution: 10 unit(s) subcutaneous 3 times a day (before meals) (04 Dec 2024 04:28)      MEDICATIONS  (STANDING):  amLODIPine   Tablet 10 milliGRAM(s) Oral daily  aspirin  chewable 81 milliGRAM(s) Oral daily  atorvastatin 20 milliGRAM(s) Oral at bedtime  clopidogrel Tablet 75 milliGRAM(s) Oral daily  dextrose 5%. 1000 milliLiter(s) (50 mL/Hr) IV Continuous <Continuous>  dextrose 5%. 1000 milliLiter(s) (100 mL/Hr) IV Continuous <Continuous>  gabapentin 100 milliGRAM(s) Oral three times a day  glucagon  Injectable 1 milliGRAM(s) IntraMuscular once  glucagon  Injectable 1 milliGRAM(s) IntraMuscular once  heparin   Injectable 5000 Unit(s) SubCutaneous every 8 hours  influenza  Vaccine (HIGH DOSE) 0.5 milliLiter(s) IntraMuscular once  insulin glargine Injectable (LANTUS) 16 Unit(s) SubCutaneous every morning  insulin lispro (ADMELOG) corrective regimen sliding scale   SubCutaneous every 6 hours  piperacillin/tazobactam IVPB.. 3.375 Gram(s) IV Intermittent every 8 hours  polyethylene glycol 3350 17 Gram(s) Oral daily  sodium chloride 0.9%. 1000 milliLiter(s) (50 mL/Hr) IV Continuous <Continuous>      TELEMETRY: 	    ECG:  	  RADIOLOGY:   DIAGNOSTIC TESTING:  [ ] Echocardiogram:  [ ] Catheterization:  [ ] Stress Test:    OTHER: 	    LABS:	 	    CARDIAC MARKERS:                                      9.8    9.98  )-----------( 610      ( 16 Dec 2024 01:20 )             30.6     12-16    134[L]  |  101  |  17  ----------------------------<  160[H]  3.9   |  21[L]  |  1.35[H]    Ca    9.8      16 Dec 2024 01:00  Phos  3.3     12-16  Mg     2.10     12-16      proBNP:   PT/INR - ( 16 Dec 2024 01:00 )   PT: 12.8 sec;   INR: 1.08 ratio         PTT - ( 16 Dec 2024 01:00 )  PTT:36.9 sec  Lipid Profile:   HgA1c:     Creatinine: 1.35 mg/dL (12-16-24 @ 01:00)  Creatinine: 1.30 mg/dL (12-15-24 @ 06:35)  Creatinine: 1.26 mg/dL (12-14-24 @ 07:20)

## 2024-12-17 ENCOUNTER — RESULT REVIEW (OUTPATIENT)
Age: 69
End: 2024-12-17

## 2024-12-17 LAB
ANION GAP SERPL CALC-SCNC: 11 MMOL/L — SIGNIFICANT CHANGE UP (ref 7–14)
BUN SERPL-MCNC: 16 MG/DL — SIGNIFICANT CHANGE UP (ref 7–23)
CALCIUM SERPL-MCNC: 9.7 MG/DL — SIGNIFICANT CHANGE UP (ref 8.4–10.5)
CHLORIDE SERPL-SCNC: 104 MMOL/L — SIGNIFICANT CHANGE UP (ref 98–107)
CO2 SERPL-SCNC: 21 MMOL/L — LOW (ref 22–31)
CREAT SERPL-MCNC: 1.34 MG/DL — HIGH (ref 0.5–1.3)
EGFR: 42 ML/MIN/1.73M2 — LOW
GLUCOSE BLDC GLUCOMTR-MCNC: 181 MG/DL — HIGH (ref 70–99)
GLUCOSE BLDC GLUCOMTR-MCNC: 188 MG/DL — HIGH (ref 70–99)
GLUCOSE BLDC GLUCOMTR-MCNC: 191 MG/DL — HIGH (ref 70–99)
GLUCOSE BLDC GLUCOMTR-MCNC: 287 MG/DL — HIGH (ref 70–99)
GLUCOSE SERPL-MCNC: 148 MG/DL — HIGH (ref 70–99)
HCT VFR BLD CALC: 34.5 % — SIGNIFICANT CHANGE UP (ref 34.5–45)
HGB BLD-MCNC: 10.7 G/DL — LOW (ref 11.5–15.5)
MAGNESIUM SERPL-MCNC: 2.2 MG/DL — SIGNIFICANT CHANGE UP (ref 1.6–2.6)
MCHC RBC-ENTMCNC: 28.2 PG — SIGNIFICANT CHANGE UP (ref 27–34)
MCHC RBC-ENTMCNC: 31 G/DL — LOW (ref 32–36)
MCV RBC AUTO: 91 FL — SIGNIFICANT CHANGE UP (ref 80–100)
NRBC # BLD: 0 /100 WBCS — SIGNIFICANT CHANGE UP (ref 0–0)
NRBC # FLD: 0 K/UL — SIGNIFICANT CHANGE UP (ref 0–0)
PHOSPHATE SERPL-MCNC: 3.2 MG/DL — SIGNIFICANT CHANGE UP (ref 2.5–4.5)
PLATELET # BLD AUTO: 666 K/UL — HIGH (ref 150–400)
POTASSIUM SERPL-MCNC: 4.5 MMOL/L — SIGNIFICANT CHANGE UP (ref 3.5–5.3)
POTASSIUM SERPL-SCNC: 4.5 MMOL/L — SIGNIFICANT CHANGE UP (ref 3.5–5.3)
RBC # BLD: 3.79 M/UL — LOW (ref 3.8–5.2)
RBC # FLD: 12.9 % — SIGNIFICANT CHANGE UP (ref 10.3–14.5)
SODIUM SERPL-SCNC: 136 MMOL/L — SIGNIFICANT CHANGE UP (ref 135–145)
WBC # BLD: 8.99 K/UL — SIGNIFICANT CHANGE UP (ref 3.8–10.5)
WBC # FLD AUTO: 8.99 K/UL — SIGNIFICANT CHANGE UP (ref 3.8–10.5)

## 2024-12-17 PROCEDURE — 93923 UPR/LXTR ART STDY 3+ LVLS: CPT | Mod: 26

## 2024-12-17 PROCEDURE — 71045 X-RAY EXAM CHEST 1 VIEW: CPT | Mod: 26

## 2024-12-17 RX ORDER — BENZOCAINE AND MENTHOL 15; 3.6 MG/1; MG/1
1 LOZENGE ORAL
Refills: 0 | Status: DISCONTINUED | OUTPATIENT
Start: 2024-12-17 | End: 2024-12-23

## 2024-12-17 RX ADMIN — BENZOCAINE AND MENTHOL 1 LOZENGE: 15; 3.6 LOZENGE ORAL at 17:55

## 2024-12-17 RX ADMIN — HEPARIN SODIUM 5000 UNIT(S): 1000 INJECTION, SOLUTION INTRAVENOUS; SUBCUTANEOUS at 05:15

## 2024-12-17 RX ADMIN — GABAPENTIN 100 MILLIGRAM(S): 300 CAPSULE ORAL at 05:15

## 2024-12-17 RX ADMIN — GABAPENTIN 100 MILLIGRAM(S): 300 CAPSULE ORAL at 23:15

## 2024-12-17 RX ADMIN — HEPARIN SODIUM 5000 UNIT(S): 1000 INJECTION, SOLUTION INTRAVENOUS; SUBCUTANEOUS at 23:16

## 2024-12-17 RX ADMIN — Medication 17 GRAM(S): at 12:52

## 2024-12-17 RX ADMIN — ACETAMINOPHEN 650 MILLIGRAM(S): 80 SOLUTION/ DROPS ORAL at 17:15

## 2024-12-17 RX ADMIN — Medication 3: at 12:53

## 2024-12-17 RX ADMIN — GABAPENTIN 100 MILLIGRAM(S): 300 CAPSULE ORAL at 13:32

## 2024-12-17 RX ADMIN — PIPERACILLIN AND TAZOBACTAM 25 GRAM(S): 3; .375 INJECTION, POWDER, LYOPHILIZED, FOR SOLUTION INTRAVENOUS at 13:33

## 2024-12-17 RX ADMIN — HEPARIN SODIUM 5000 UNIT(S): 1000 INJECTION, SOLUTION INTRAVENOUS; SUBCUTANEOUS at 13:31

## 2024-12-17 RX ADMIN — Medication 81 MILLIGRAM(S): at 12:52

## 2024-12-17 RX ADMIN — ACETAMINOPHEN 650 MILLIGRAM(S): 80 SOLUTION/ DROPS ORAL at 17:45

## 2024-12-17 RX ADMIN — ATORVASTATIN CALCIUM 20 MILLIGRAM(S): 40 TABLET, FILM COATED ORAL at 23:15

## 2024-12-17 RX ADMIN — PIPERACILLIN AND TAZOBACTAM 25 GRAM(S): 3; .375 INJECTION, POWDER, LYOPHILIZED, FOR SOLUTION INTRAVENOUS at 05:18

## 2024-12-17 RX ADMIN — INSULIN GLARGINE-YFGN 16 UNIT(S): 100 INJECTION, SOLUTION SUBCUTANEOUS at 08:18

## 2024-12-17 RX ADMIN — Medication 1: at 08:17

## 2024-12-17 RX ADMIN — PIPERACILLIN AND TAZOBACTAM 25 GRAM(S): 3; .375 INJECTION, POWDER, LYOPHILIZED, FOR SOLUTION INTRAVENOUS at 23:18

## 2024-12-17 RX ADMIN — Medication 1: at 17:18

## 2024-12-17 RX ADMIN — CLOPIDOGREL BISULFATE 75 MILLIGRAM(S): 75 TABLET, FILM COATED ORAL at 12:52

## 2024-12-17 RX ADMIN — Medication 10 MILLIGRAM(S): at 05:15

## 2024-12-17 NOTE — PROGRESS NOTE ADULT - SUBJECTIVE AND OBJECTIVE BOX
CARDIOLOGY FOLLOW UP - Dr. Oliva  DATE OF SERVICE: 12/17/24    CC no cp or sob      REVIEW OF SYSTEMS:  CONSTITUTIONAL: No fever, weight loss, or fatigue  RESPIRATORY: No cough, wheezing, chills or hemoptysis; No Shortness of Breath  CARDIOVASCULAR: No chest pain, palpitations, passing out, dizziness  GASTROINTESTINAL: No abdominal or epigastric pain. No nausea, vomiting, or hematemesis; No diarrhea or constipation. No melena or hematochezia.      PHYSICAL EXAM:  T(C): 37 (12-17-24 @ 03:50), Max: 37.2 (12-16-24 @ 23:50)  HR: 75 (12-17-24 @ 03:50) (72 - 81)  BP: 135/61 (12-17-24 @ 03:50) (130/52 - 146/61)  RR: 17 (12-17-24 @ 03:50) (17 - 18)  SpO2: 98% (12-17-24 @ 03:50) (97% - 99%)  Wt(kg): --  I&O's Summary      Appearance: Normal	  Cardiovascular: Normal S1 S2,RRR, No JVD, No murmurs  Respiratory: Lungs clear to auscultation	  Gastrointestinal:  Soft, Non-tender, + BS	  Extremities: Normal range of motion, No clubbing, cyanosis or edema      Home Medications:  aspirin 81 mg oral delayed release tablet: 1 tab(s) orally once a day (04 Dec 2024 04:28)  atorvastatin 20 mg oral tablet: 1 tab(s) orally once a day (at bedtime) (04 Dec 2024 04:28)  Farxiga 5 mg oral tablet: 1 tab(s) orally once a day (04 Dec 2024 04:32)  hydroCHLOROthiazide 25 mg oral tablet: 1 tab(s) orally once a day (04 Dec 2024 04:28)  Lantus Solostar Pen 100 units/mL subcutaneous solution: 30 unit(s) subcutaneous once a day (at bedtime) (04 Dec 2024 04:28)  losartan 100 mg oral tablet: 1 tab(s) orally once a day (04 Dec 2024 04:28)  Norvasc 10 mg oral tablet: 1 tab(s) orally once a day (04 Dec 2024 04:28)  NovoLOG 100 units/mL subcutaneous solution: 10 unit(s) subcutaneous 3 times a day (before meals) (04 Dec 2024 04:28)      MEDICATIONS  (STANDING):  amLODIPine   Tablet 10 milliGRAM(s) Oral daily  aspirin  chewable 81 milliGRAM(s) Oral daily  atorvastatin 20 milliGRAM(s) Oral at bedtime  clopidogrel Tablet 75 milliGRAM(s) Oral daily  dextrose 5%. 1000 milliLiter(s) (50 mL/Hr) IV Continuous <Continuous>  dextrose 5%. 1000 milliLiter(s) (100 mL/Hr) IV Continuous <Continuous>  dextrose 50% Injectable 25 Gram(s) IV Push once  dextrose 50% Injectable 12.5 Gram(s) IV Push once  dextrose 50% Injectable 25 Gram(s) IV Push once  gabapentin 100 milliGRAM(s) Oral three times a day  glucagon  Injectable 1 milliGRAM(s) IntraMuscular once  glucagon  Injectable 1 milliGRAM(s) IntraMuscular once  heparin   Injectable 5000 Unit(s) SubCutaneous every 8 hours  influenza  Vaccine (HIGH DOSE) 0.5 milliLiter(s) IntraMuscular once  insulin glargine Injectable (LANTUS) 16 Unit(s) SubCutaneous every morning  insulin lispro (ADMELOG) corrective regimen sliding scale   SubCutaneous three times a day before meals  insulin lispro (ADMELOG) corrective regimen sliding scale   SubCutaneous at bedtime  piperacillin/tazobactam IVPB.. 3.375 Gram(s) IV Intermittent every 8 hours  polyethylene glycol 3350 17 Gram(s) Oral daily  sodium chloride 0.9%. 1000 milliLiter(s) (50 mL/Hr) IV Continuous <Continuous>      TELEMETRY: 	    ECG:  	  RADIOLOGY:   DIAGNOSTIC TESTING:  [ ] Echocardiogram:  [ ]  Catheterization:  [ ] Stress Test:    OTHER: 	    LABS:	 	                            10.7   8.99  )-----------( 666      ( 17 Dec 2024 07:11 )             34.5     12-17    136  |  104  |  16  ----------------------------<  148[H]  4.5   |  21[L]  |  1.34[H]    Ca    9.7      17 Dec 2024 07:11  Phos  3.2     12-17  Mg     2.20     12-17      PT/INR - ( 16 Dec 2024 01:00 )   PT: 12.8 sec;   INR: 1.08 ratio         PTT - ( 16 Dec 2024 01:00 )  PTT:36.9 sec

## 2024-12-17 NOTE — PROGRESS NOTE ADULT - ASSESSMENT
57yo female h/o DM type2, HTN, hyperlipidemia; Pt c/o left great toe infection. vascular on board planning for angiogram. nephrology consulted for elevated S.Cr.    renal insufficiency  baseline ~ 1.4   long standing hx of dm and htn  admitted with scr now up trending  scr relatively stable  on losartan and htcz.  will hold for now given worsen scr (last dose 12/5)  FEurea 35%  renal us no hydro   s/p trial of gentle hydration 12/5  Renal function stable  s/p angiogram today; IVF given pre and post angiogram  monitor for YASHIRA  Monitor BMP and UO.   Avoid further nephrotoxins if possible.    htn  controlled  continue with current meds  Low salt diet.  monitor BP.    Hyponatremia  Stable.  Optimize glycemic control.  Monitor Na.    LE  wound  f/u vascular  s/p angiogram 12/16   59yo female h/o DM type2, HTN, hyperlipidemia; Pt c/o left great toe infection. vascular on board planning for angiogram. nephrology consulted for elevated S.Cr.    renal insufficiency  baseline ~ 1.4   long standing hx of dm and htn  admitted with scr now up trending  scr relatively stable  on losartan and htcz.  current on (last dose 12/5). no objection to resume losartan/hctz if bp allows  FEurea 35%  renal us no hydro   s/p trial of gentle hydration 12/5  Renal function stable  s/p angiogram today; IVF given pre and post angiogram  monitor for YASHIRA  Monitor BMP and UO.   Avoid further nephrotoxins if possible.    htn  controlled  continue with current meds  Low salt diet.  monitor BP.    Hyponatremia  Stable.  Optimize glycemic control.  Monitor Na.    LE  wound  f/u vascular  s/p angiogram 12/16

## 2024-12-17 NOTE — PROGRESS NOTE ADULT - ASSESSMENT
68 y/o F PMhx DM II, HTN who presented w/ left great toe wound    L hallux infection, c/f osteo  leukocytosis, DM II  CRP 88.8,   s/p podiatry I&D L hallux wound to the level of and not beyond bone,  mild malodor, scant serosanguinous drainage. Right foot no open wounds, no acute signs of infection.  foot x-ray- fracture distal phalanx great toe. soft tissue swelling of this digit as well as subcutaneous air.  per vascular possible plans for angiogram  wound cx w/ strep constellatus, citrobacter, morganella, staph lugdunensis (oxacillin sensitive)  febrile 12/7, ceftriaxone switched to zosyn  s/p LLE angio & AT angioplasty 12/16    Recommendations  c/w zosyn for now  noted plans for Left foot partial 1st ray resection tomorrow 12/18  - please send clean margin for gram stain, culture and path    Albino Rea M.D.  [facility], Division of Infectious Diseases  [phone]  After 5pm on weekdays and all day on weekends - please call [phone]

## 2024-12-17 NOTE — PROGRESS NOTE ADULT - SUBJECTIVE AND OBJECTIVE BOX
[facility]   MD LI WADE MD ANGELA WONG, PA    TEL:  OFFICE: [phone]  From 5pm-7am Answering Service [phone]    -- RENAL FOLLOW UP NOTE ---Date of Service 12-17-24 @ 10:11    Patient is a 69y old  Female who presents with a chief complaint of Sent in by podiatrist for right foot/toe wound, worsening since October. (17 Dec 2024 07:46)      Patient seen and examined at bedside. No chest pain/sob    VITALS:  T(F): 98.6 (12-17-24 @ 03:50), Max: 98.9 (12-16-24 @ 23:50)  HR: 75 (12-17-24 @ 03:50)  BP: 135/61 (12-17-24 @ 03:50)  RR: 17 (12-17-24 @ 03:50)  SpO2: 98% (12-17-24 @ 03:50)  Wt(kg): --        PHYSICAL EXAM:  General: NAD  Neck: No JVD  Respiratory: CTAB, no wheezes, rales or rhonchi  Cardiovascular: S1, S2, RRR  Gastrointestinal: BS+, soft, NT/ND  Extremities: No peripheral edema    Hospital Medications:   MEDICATIONS  (STANDING):  amLODIPine   Tablet 10 milliGRAM(s) Oral daily  aspirin  chewable 81 milliGRAM(s) Oral daily  atorvastatin 20 milliGRAM(s) Oral at bedtime  clopidogrel Tablet 75 milliGRAM(s) Oral daily  dextrose 5%. 1000 milliLiter(s) (100 mL/Hr) IV Continuous <Continuous>  dextrose 5%. 1000 milliLiter(s) (50 mL/Hr) IV Continuous <Continuous>  dextrose 50% Injectable 25 Gram(s) IV Push once  dextrose 50% Injectable 12.5 Gram(s) IV Push once  dextrose 50% Injectable 25 Gram(s) IV Push once  gabapentin 100 milliGRAM(s) Oral three times a day  glucagon  Injectable 1 milliGRAM(s) IntraMuscular once  glucagon  Injectable 1 milliGRAM(s) IntraMuscular once  heparin   Injectable 5000 Unit(s) SubCutaneous every 8 hours  influenza  Vaccine (HIGH DOSE) 0.5 milliLiter(s) IntraMuscular once  insulin glargine Injectable (LANTUS) 16 Unit(s) SubCutaneous every morning  insulin lispro (ADMELOG) corrective regimen sliding scale   SubCutaneous three times a day before meals  insulin lispro (ADMELOG) corrective regimen sliding scale   SubCutaneous at bedtime  piperacillin/tazobactam IVPB.. 3.375 Gram(s) IV Intermittent every 8 hours  polyethylene glycol 3350 17 Gram(s) Oral daily  sodium chloride 0.9%. 1000 milliLiter(s) (50 mL/Hr) IV Continuous <Continuous>      LABS:  12-17    136  |  104  |  16  ----------------------------<  148[H]  4.5   |  21[L]  |  1.34[H]    Ca    9.7      17 Dec 2024 07:11  Phos  3.2     12-17  Mg     2.20     12-17      Creatinine Trend: 1.34 <--, 1.35 <--, 1.30 <--, 1.26 <--, 1.39 <--, 1.31 <--, 1.41 <--    Phosphorus: 3.2 mg/dL (12-17 @ 07:11)                              10.7   8.99  )-----------( 666      ( 17 Dec 2024 07:11 )             34.5     Urine Studies:  Urinalysis - [12-17-24 @ 07:11]      Color  / Appearance  / SG  / pH       Gluc 148 / Ketone   / Bili  / Urobili        Blood  / Protein  / Leuk Est  / Nitrite       RBC  / WBC  / Hyaline  / Gran  / Sq Epi  / Non Sq Epi  / Bacteria       HbA1c 10.7      [02-25-20 @ 14:10]  TSH 1.00      [12-04-24 @ 06:20]        RADIOLOGY & ADDITIONAL STUDIES:

## 2024-12-17 NOTE — PROGRESS NOTE ADULT - SUBJECTIVE AND OBJECTIVE BOX
Vascular Surgery Daily Resident Progress Note    Subjective and Interval  Seen and examined at bedside. No acute overnight events. Afebrile and VSS. Does not endorse chest pain, SOB, nausea, headache, fever or chills.   ___________________________________________________  Vital Signs  T(C): 37 (03:50), Max: 37.2 (23:50)  HR: 75 (03:50) (72 - 81)  BP: 135/61 (03:50) (130/52 - 146/61)  ABP: --  ABP(mean): --  RR: 17 (03:50) (17 - 18)  SpO2: 98% (03:50) (97% - 99%)    Physical Exam  General: NAD   Cardiovascular: Normal S1 S2,RRR, No JVD, No murmurs  Respiratory: Lungs clear to auscultation	  Gastrointestinal:  Soft, Non-tender, + BS	  Extremities: Normal range of motion, No clubbing, cyanosis or edema  LLE: dressing d/d/i; DP and PT + signal   RLE: No bleeding, small palp lump at right groin that is nontender     In&Out        Labs    LABS:  cret                        10.7   8.99  )-----------( 666      ( 17 Dec 2024 07:11 )             34.5     12-17    136  |  104  |  16  ----------------------------<  148[H]  4.5   |  21[L]  |  1.34[H]    Ca    9.7      17 Dec 2024 07:11  Phos  3.2     12-17  Mg     2.20     12-17      PT/INR - ( 16 Dec 2024 01:00 )   PT: 12.8 sec;   INR: 1.08 ratio         PTT - ( 16 Dec 2024 01:00 )  PTT:36.9 sec      Medications  acetaminophen     Tablet .. 650 milliGRAM(s) Oral every 6 hours PRN  amLODIPine   Tablet 10 milliGRAM(s) Oral daily  aspirin  chewable 81 milliGRAM(s) Oral daily  atorvastatin 20 milliGRAM(s) Oral at bedtime  clopidogrel Tablet 75 milliGRAM(s) Oral daily  dextrose 5%. 1000 milliLiter(s) IV Continuous <Continuous>  dextrose 5%. 1000 milliLiter(s) IV Continuous <Continuous>  dextrose 50% Injectable 25 Gram(s) IV Push once  dextrose 50% Injectable 12.5 Gram(s) IV Push once  dextrose 50% Injectable 25 Gram(s) IV Push once  gabapentin 100 milliGRAM(s) Oral three times a day  glucagon  Injectable 1 milliGRAM(s) IntraMuscular once  glucagon  Injectable 1 milliGRAM(s) IntraMuscular once  heparin   Injectable 5000 Unit(s) SubCutaneous every 8 hours  influenza  Vaccine (HIGH DOSE) 0.5 milliLiter(s) IntraMuscular once  insulin glargine Injectable (LANTUS) 16 Unit(s) SubCutaneous every morning  insulin lispro (ADMELOG) corrective regimen sliding scale   SubCutaneous three times a day before meals  insulin lispro (ADMELOG) corrective regimen sliding scale   SubCutaneous at bedtime  melatonin 3 milliGRAM(s) Oral at bedtime PRN  ondansetron Injectable 4 milliGRAM(s) IV Push every 8 hours PRN  piperacillin/tazobactam IVPB.. 3.375 Gram(s) IV Intermittent every 8 hours  polyethylene glycol 3350 17 Gram(s) Oral daily  sodium chloride 0.9%. 1000 milliLiter(s) IV Continuous <Continuous>      Microbiology      ___________________________________________________  69 year old female with PMH type 2 DM, CKD stage 3, HTN, HLD presents with osteomyelitis at left hallux. LLE angiogram cancelled 12/11 and 12/12. Patient refused angiogram on 12/13 after multiple cancellations. Now s/p LLE angiogram and AT angioplasty on 12/16. Plan for potential future bypass.     Plan  - 1st ray resection with podiatry 12/18     -NPO at 7 am     -preop labs    -medical/cardiac clearance   - Zosyn, f/u ID for dispo recs pot-procedure  - Aspirin  - Subq heparin  - Medical and cardiac clearances documented.  - Patient consented  - IVF as per previous angiogram planning from nephrology recommendations    C team surgery [id number]   Vascular Surgery Daily Resident Progress Note    Subjective and Interval  Seen and examined at bedside. No acute overnight events. Afebrile and VSS. Does not endorse chest pain, SOB, nausea, headache, fever or chills.   ___________________________________________________  Vital Signs  T(C): 37 (03:50), Max: 37.2 (23:50)  HR: 75 (03:50) (72 - 81)  BP: 135/61 (03:50) (130/52 - 146/61)  ABP: --  ABP(mean): --  RR: 17 (03:50) (17 - 18)  SpO2: 98% (03:50) (97% - 99%)    Physical Exam  General: NAD   Cardiovascular: Normal S1 S2,RRR, No JVD, No murmurs  Respiratory: Lungs clear to auscultation	  Gastrointestinal:  Soft, Non-tender, + BS	  Extremities: Normal range of motion, No clubbing, cyanosis or edema  LLE: dressing d/d/i; DP and PT + signal   RLE: No bleeding, small palp lump at right groin that is nontender     In&Out        Labs    LABS:  cret                        10.7   8.99  )-----------( 666      ( 17 Dec 2024 07:11 )             34.5     12-17    136  |  104  |  16  ----------------------------<  148[H]  4.5   |  21[L]  |  1.34[H]    Ca    9.7      17 Dec 2024 07:11  Phos  3.2     12-17  Mg     2.20     12-17      PT/INR - ( 16 Dec 2024 01:00 )   PT: 12.8 sec;   INR: 1.08 ratio         PTT - ( 16 Dec 2024 01:00 )  PTT:36.9 sec      Medications  acetaminophen     Tablet .. 650 milliGRAM(s) Oral every 6 hours PRN  amLODIPine   Tablet 10 milliGRAM(s) Oral daily  aspirin  chewable 81 milliGRAM(s) Oral daily  atorvastatin 20 milliGRAM(s) Oral at bedtime  clopidogrel Tablet 75 milliGRAM(s) Oral daily  dextrose 5%. 1000 milliLiter(s) IV Continuous <Continuous>  dextrose 5%. 1000 milliLiter(s) IV Continuous <Continuous>  dextrose 50% Injectable 25 Gram(s) IV Push once  dextrose 50% Injectable 12.5 Gram(s) IV Push once  dextrose 50% Injectable 25 Gram(s) IV Push once  gabapentin 100 milliGRAM(s) Oral three times a day  glucagon  Injectable 1 milliGRAM(s) IntraMuscular once  glucagon  Injectable 1 milliGRAM(s) IntraMuscular once  heparin   Injectable 5000 Unit(s) SubCutaneous every 8 hours  influenza  Vaccine (HIGH DOSE) 0.5 milliLiter(s) IntraMuscular once  insulin glargine Injectable (LANTUS) 16 Unit(s) SubCutaneous every morning  insulin lispro (ADMELOG) corrective regimen sliding scale   SubCutaneous three times a day before meals  insulin lispro (ADMELOG) corrective regimen sliding scale   SubCutaneous at bedtime  melatonin 3 milliGRAM(s) Oral at bedtime PRN  ondansetron Injectable 4 milliGRAM(s) IV Push every 8 hours PRN  piperacillin/tazobactam IVPB.. 3.375 Gram(s) IV Intermittent every 8 hours  polyethylene glycol 3350 17 Gram(s) Oral daily  sodium chloride 0.9%. 1000 milliLiter(s) IV Continuous <Continuous>      Microbiology      ___________________________________________________  69 year old female with PMH type 2 DM, CKD stage 3, HTN, HLD presents with osteomyelitis at left hallux. LLE angiogram cancelled 12/11 and 12/12. Patient refused angiogram on 12/13 after multiple cancellations. Now s/p LLE angiogram and AT angioplasty on 12/16. Plan for potential future bypass.     Plan  - 1st ray resection with podiatry 12/18     -NPO at 7 am     -preop labs    -medical/cardiac clearance   - Zosyn, f/u ID for dispo recs pot-procedure  - Aspirin  - Subq heparin  - Medical and cardiac clearances documented.  - Patient consented  - IVF as per previous angiogram planning from nephrology recommendations    C team surgery [id number]

## 2024-12-17 NOTE — PROGRESS NOTE ADULT - ASSESSMENT
69F 12/4 s/p L foot I&D to bone  - Pt was seen and evaluated  - Afebrile, no leukocytosis   - 12/4 s/p b/s  incision and drainage on left foot hallux wound was performed to the level of and not beyond bone,  no malodor, scant Purulence drainage. Right foot no open wounds, no acute signs of infection.  - Left foot X-ray read: Fracture distal phalanx great toe. Soft tissue swelling of this digit as well as subcutaneous air.  - Left foot wound culture: Gram negative rods, Strep constellatus  - ID recs, appreciated  - Vasc recs, appreciated  - Left foot partial 1st ray resection tomorrow 12/18 at 4pm with Dr Waterhouse   - NPO after 7am   - Pre op labs in AM  - Please document medial and cardiac clearance for podiatric surgery under anesthesia  - Discussed with attending

## 2024-12-17 NOTE — PROGRESS NOTE ADULT - SUBJECTIVE AND OBJECTIVE BOX
Name of Patient : REJI MEADE  MRN: [id number]  Date of visit: 12-17-24 @ 13:20      Subjective: Patient seen and examined. No new events except as noted.   doing okay   S/ p Angio     REVIEW OF SYSTEMS:    CONSTITUTIONAL: No weakness, fevers or chills  EYES/ENT: No visual changes;  No vertigo or throat pain   NECK: No pain or stiffness  RESPIRATORY: No cough, wheezing, hemoptysis; No shortness of breath  CARDIOVASCULAR: No chest pain or palpitations  GASTROINTESTINAL: No abdominal or epigastric pain. No nausea, vomiting, or hematemesis; No diarrhea or constipation. No melena or hematochezia.  GENITOURINARY: No dysuria, frequency or hematuria  NEUROLOGICAL: No numbness or weakness  SKIN: No itching, burning, rashes, or lesions   All other review of systems is negative unless indicated above.    MEDICATIONS:  MEDICATIONS  (STANDING):  amLODIPine   Tablet 10 milliGRAM(s) Oral daily  aspirin  chewable 81 milliGRAM(s) Oral daily  atorvastatin 20 milliGRAM(s) Oral at bedtime  clopidogrel Tablet 75 milliGRAM(s) Oral daily  dextrose 5%. 1000 milliLiter(s) (50 mL/Hr) IV Continuous <Continuous>  dextrose 5%. 1000 milliLiter(s) (100 mL/Hr) IV Continuous <Continuous>  dextrose 50% Injectable 25 Gram(s) IV Push once  dextrose 50% Injectable 12.5 Gram(s) IV Push once  dextrose 50% Injectable 25 Gram(s) IV Push once  gabapentin 100 milliGRAM(s) Oral three times a day  glucagon  Injectable 1 milliGRAM(s) IntraMuscular once  glucagon  Injectable 1 milliGRAM(s) IntraMuscular once  heparin   Injectable 5000 Unit(s) SubCutaneous every 8 hours  influenza  Vaccine (HIGH DOSE) 0.5 milliLiter(s) IntraMuscular once  insulin glargine Injectable (LANTUS) 16 Unit(s) SubCutaneous every morning  insulin lispro (ADMELOG) corrective regimen sliding scale   SubCutaneous three times a day before meals  insulin lispro (ADMELOG) corrective regimen sliding scale   SubCutaneous at bedtime  piperacillin/tazobactam IVPB.. 3.375 Gram(s) IV Intermittent every 8 hours  polyethylene glycol 3350 17 Gram(s) Oral daily  sodium chloride 0.9%. 1000 milliLiter(s) (50 mL/Hr) IV Continuous <Continuous>      PHYSICAL EXAM:  T(C): 36.9 (12-17-24 @ 10:30), Max: 37.2 (12-16-24 @ 23:50)  HR: 75 (12-17-24 @ 10:30) (73 - 81)  BP: 141/55 (12-17-24 @ 10:30) (135/57 - 146/61)  RR: 18 (12-17-24 @ 10:30) (17 - 18)  SpO2: 99% (12-17-24 @ 10:30) (97% - 99%)  Wt(kg): --  I&O's Summary        Appearance: Normal	  HEENT:  PERRLA   Lymphatic: No lymphadenopathy   Cardiovascular: Normal S1 S2, no JVD  Respiratory: normal effort , clear  Gastrointestinal:  Soft, Non-tender  Skin: No rashes,  warm to touch  Psychiatry:  Mood & affect appropriate  Musculuskeletal: No edema    recent labs, Imaging and EKGs personally reviewed   CODE status discussed with the patient in detail    CBC:            10.7   8.99  )-----------( 666      ( 12-17-24 @ 07:11 )             34.5         Chem:         ( 12-17-24 @ 07:11 )    136  |  104  |  16  ----------------------------<  148[H]  4.5   |  21[L]  |  1.34[H]        Liver Functions:     Type & Screen: ( 12-16-24 @ 01:24 )    ABO/Rh/Mario:  B Positive

## 2024-12-17 NOTE — PROGRESS NOTE ADULT - SUBJECTIVE AND OBJECTIVE BOX
[facility], Division of Infectious Diseases  TOBY [place] Y. Patel, S. Shah, G. Álvaro  [phone]  ([phone] - weekdays after 5pm and weekends)    Name: REJI MEADE  Age/Gender: 69y Female  MRN: [id number]    Interval History:  Notes reviewed.   No concerning overnight events.  Afebrile.   denies diarrhea  awaiting OR tomorrow    Allergies: No Known Allergies      Objective:  Vitals:   T(F): 98.4 (12-17-24 @ 10:30), Max: 98.9 (12-16-24 @ 23:50)  HR: 75 (12-17-24 @ 10:30) (73 - 81)  BP: 141/55 (12-17-24 @ 10:30) (135/57 - 146/61)  RR: 18 (12-17-24 @ 10:30) (17 - 18)  SpO2: 99% (12-17-24 @ 10:30) (97% - 99%)  Physical Examination:  General: no acute distress  HEENT: anicteric  Cardio: S1, S2, normal rate  Resp: clear to auscultation anteriorly   Abd: soft, NT, ND  Ext: L foot wrapped w/ dressing  Skin: warm, dry    Laboratory Studies:  CBC:                       10.7   8.99  )-----------( 666      ( 17 Dec 2024 07:11 )             34.5     WBC Trend:  8.99 12-17-24 @ 07:11  9.98 12-16-24 @ 01:20  9.71 12-15-24 @ 06:35  9.81 12-14-24 @ 07:20  11.03 12-13-24 @ 05:49  11.29 12-12-24 @ 02:20  12.43 12-11-24 @ 04:15    CMP: 12-17    136  |  104  |  16  ----------------------------<  148[H]  4.5   |  21[L]  |  1.34[H]    Ca    9.7      17 Dec 2024 07:11  Phos  3.2     12-17  Mg     2.20     12-17            Urinalysis Basic - ( 17 Dec 2024 07:11 )    Color: x / Appearance: x / SG: x / pH: x  Gluc: 148 mg/dL / Ketone: x  / Bili: x / Urobili: x   Blood: x / Protein: x / Nitrite: x   Leuk Esterase: x / RBC: x / WBC x   Sq Epi: x / Non Sq Epi: x / Bacteria: x      Microbiology: reviewed     Culture - Blood (collected 12-07-24 @ 22:31)  Source: .Blood BLOOD  Final Report (12-13-24 @ 01:00):    No growth at 5 days    Culture - Blood (collected 12-07-24 @ 18:46)  Source: .Blood BLOOD  Final Report (12-13-24 @ 01:00):    No growth at 5 days    Culture - Abscess with Gram Stain (collected 12-04-24 @ 01:07)  Source: .Abscess  Gram Stain (12-04-24 @ 16:17):    No polymorphonuclear cells seen per low power field    Few Gram Negative Rods per oil power field  Final Report (12-09-24 @ 07:31):    Numerous Morganella morganii    Moderate Citrobacter amalonaticus complex    Few Staphylococcus lugdunensis    Commensal aura consistent with body site  Organism: Morganella morganii  Citrobacter amalonaticus complex  Staphylococcus lugdunensis (12-09-24 @ 07:31)  Organism: Staphylococcus lugdunensis (12-09-24 @ 07:31)      Method Type: MARIE      -  Clindamycin: S <=0.25      -  Erythromycin: S <=0.25      -  Gentamicin: S <=4 Should not be used as monotherapy      -  Oxacillin: S 0.5      -  Penicillin: R >2      -  Rifampin: S <=1 Should not be used as monotherapy      -  Tetracycline: S <=4      -  Trimethoprim/Sulfamethoxazole: S <=0.5/9.5      -  Vancomycin: S 1  Organism: Citrobacter amalonaticus complex (12-09-24 @ 07:31)      Method Type: MARIE      -  Amoxicillin/Clavulanic Acid: S <=8/4      -  Ampicillin: R <=8 These ampicillin results predict results for amoxicillin      -  Ampicillin/Sulbactam: S <=4/2      -  Aztreonam: S <=4      -  Cefazolin: R >16      -  Cefepime: S <=2      -  Cefoxitin: S <=8      -  Ceftriaxone: S <=1      -  Ciprofloxacin: S <=0.25      -  Ertapenem: S <=0.5      -  Gentamicin: S <=2      -  Imipenem: S <=1      -  Levofloxacin: S <=0.5      -  Meropenem: S <=1      -  Piperacillin/Tazobactam: S <=8      -  Tobramycin: S <=2      -  Trimethoprim/Sulfamethoxazole: S <=0.5/9.5  Organism: Morganella morganii (12-09-24 @ 07:31)      Method Type: MARIE      -  Amoxicillin/Clavulanic Acid: R >16/8      -  Ampicillin: R >16 These ampicillin results predict results for amoxicillin      -  Ampicillin/Sulbactam: R >16/8      -  Aztreonam: R >16      -  Cefazolin: R >16      -  Cefepime: S <=2      -  Cefoxitin: S <=8      -  Ceftriaxone: S <=1      -  Ciprofloxacin: S <=0.25      -  Ertapenem: S <=0.5      -  Gentamicin: S <=2      -  Levofloxacin: S <=0.5      -  Meropenem: S <=1      -  Piperacillin/Tazobactam: S <=8      -  Tobramycin: S <=2      -  Trimethoprim/Sulfamethoxazole: S <=0.5/9.5    Culture - Blood (collected 12-03-24 @ 22:30)  Source: .Blood BLOOD  Final Report (12-09-24 @ 03:00):    No growth at 5 days    Culture - Blood (collected 12-03-24 @ 22:20)  Source: .Blood BLOOD  Final Report (12-09-24 @ 03:00):    No growth at 5 days        Radiology: reviewed     Medications:  acetaminophen     Tablet .. 650 milliGRAM(s) Oral every 6 hours PRN  amLODIPine   Tablet 10 milliGRAM(s) Oral daily  aspirin  chewable 81 milliGRAM(s) Oral daily  atorvastatin 20 milliGRAM(s) Oral at bedtime  clopidogrel Tablet 75 milliGRAM(s) Oral daily  dextrose 5%. 1000 milliLiter(s) IV Continuous <Continuous>  dextrose 5%. 1000 milliLiter(s) IV Continuous <Continuous>  dextrose 50% Injectable 25 Gram(s) IV Push once  dextrose 50% Injectable 12.5 Gram(s) IV Push once  dextrose 50% Injectable 25 Gram(s) IV Push once  gabapentin 100 milliGRAM(s) Oral three times a day  glucagon  Injectable 1 milliGRAM(s) IntraMuscular once  glucagon  Injectable 1 milliGRAM(s) IntraMuscular once  heparin   Injectable 5000 Unit(s) SubCutaneous every 8 hours  influenza  Vaccine (HIGH DOSE) 0.5 milliLiter(s) IntraMuscular once  insulin glargine Injectable (LANTUS) 16 Unit(s) SubCutaneous every morning  insulin lispro (ADMELOG) corrective regimen sliding scale   SubCutaneous three times a day before meals  insulin lispro (ADMELOG) corrective regimen sliding scale   SubCutaneous at bedtime  melatonin 3 milliGRAM(s) Oral at bedtime PRN  ondansetron Injectable 4 milliGRAM(s) IV Push every 8 hours PRN  piperacillin/tazobactam IVPB.. 3.375 Gram(s) IV Intermittent every 8 hours  polyethylene glycol 3350 17 Gram(s) Oral daily  sodium chloride 0.9%. 1000 milliLiter(s) IV Continuous <Continuous>    Antimicrobials:  piperacillin/tazobactam IVPB.. 3.375 Gram(s) IV Intermittent every 8 hours

## 2024-12-17 NOTE — PROGRESS NOTE ADULT - SUBJECTIVE AND OBJECTIVE BOX
Patient is a 69y old  Female who presents with a chief complaint of Sent in by podiatrist for right foot/toe wound, worsening since October. (16 Dec 2024 15:05)       INTERVAL HPI/OVERNIGHT EVENTS:  Patient seen and evaluated at bedside.  Pt is resting comfortable in NAD. Denies N/V/F/C.      Allergies    No Known Allergies    Intolerances        Vital Signs Last 24 Hrs  T(C): 37 (17 Dec 2024 03:50), Max: 37.2 (16 Dec 2024 23:50)  T(F): 98.6 (17 Dec 2024 03:50), Max: 98.9 (16 Dec 2024 23:50)  HR: 75 (17 Dec 2024 03:50) (69 - 81)  BP: 135/61 (17 Dec 2024 03:50) (130/52 - 149/65)  BP(mean): 81 (16 Dec 2024 10:15) (78 - 89)  RR: 17 (17 Dec 2024 03:50) (17 - 23)  SpO2: 98% (17 Dec 2024 03:50) (93% - 99%)    Parameters below as of 17 Dec 2024 03:50  Patient On (Oxygen Delivery Method): room air        LABS:                        9.8    9.98  )-----------( 610      ( 16 Dec 2024 01:20 )             30.6     12-16    134[L]  |  101  |  17  ----------------------------<  160[H]  3.9   |  21[L]  |  1.35[H]    Ca    9.8      16 Dec 2024 01:00  Phos  3.3     12-16  Mg     2.10     12-16      PT/INR - ( 16 Dec 2024 01:00 )   PT: 12.8 sec;   INR: 1.08 ratio         PTT - ( 16 Dec 2024 01:00 )  PTT:36.9 sec  Urinalysis Basic - ( 16 Dec 2024 01:00 )    Color: x / Appearance: x / SG: x / pH: x  Gluc: 160 mg/dL / Ketone: x  / Bili: x / Urobili: x   Blood: x / Protein: x / Nitrite: x   Leuk Esterase: x / RBC: x / WBC x   Sq Epi: x / Non Sq Epi: x / Bacteria: x      CAPILLARY BLOOD GLUCOSE      POCT Blood Glucose.: 198 mg/dL (16 Dec 2024 21:47)  POCT Blood Glucose.: 155 mg/dL (16 Dec 2024 17:15)  POCT Blood Glucose.: 144 mg/dL (16 Dec 2024 11:18)  POCT Blood Glucose.: 160 mg/dL (16 Dec 2024 10:25)      Lower Extremity Physical Exam:    Vascular: DP/PT 0/4, B/L, CFT <3 seconds B/L, Temperature gradient warm to cool, B/L.   Neuro: Epicritic sensation absent to the level of digits, B/L.  Musculoskeletal/Ortho: unremarkable  Skin: 12/4 s/p b/s  incision and drainage on left foot hallux wound was performed to the level of and not beyond bone,  mild malodor, scant purulence drainage. Right foot no open wounds, no acute signs of infection.  RADIOLOGY & ADDITIONAL TESTS:

## 2024-12-17 NOTE — PROGRESS NOTE ADULT - ASSESSMENT
A/P  57yo female h/o DM type2, HTN, hyperlipidemia; Pt c/o left great toe infection.    #diabetic foot infection, PAD  -sp bedside debridement with podiatry on 12/4/24  -VA JACK WITH PVR noted   -ecg with no ischemic changes, no chf or significant valvular disease on exam   -echo with nl LV sys FX No significant valvular disease.  -s/p LLE angiogram and AT angioplasty on 12/16  -per pod plan for 1st ray resection 12/18 - pt can proceed from a cv standpoint  -antiplat per vasc      #HTN   -c/w meds    dvt ppx

## 2024-12-18 ENCOUNTER — TRANSCRIPTION ENCOUNTER (OUTPATIENT)
Age: 69
End: 2024-12-18

## 2024-12-18 ENCOUNTER — RESULT REVIEW (OUTPATIENT)
Age: 69
End: 2024-12-18

## 2024-12-18 LAB
ANION GAP SERPL CALC-SCNC: 11 MMOL/L — SIGNIFICANT CHANGE UP (ref 7–14)
APTT BLD: 37.7 SEC — HIGH (ref 24.5–35.6)
BLD GP AB SCN SERPL QL: NEGATIVE — SIGNIFICANT CHANGE UP
BUN SERPL-MCNC: 19 MG/DL — SIGNIFICANT CHANGE UP (ref 7–23)
CALCIUM SERPL-MCNC: 9.5 MG/DL — SIGNIFICANT CHANGE UP (ref 8.4–10.5)
CHLORIDE SERPL-SCNC: 100 MMOL/L — SIGNIFICANT CHANGE UP (ref 98–107)
CO2 SERPL-SCNC: 21 MMOL/L — LOW (ref 22–31)
CREAT SERPL-MCNC: 1.42 MG/DL — HIGH (ref 0.5–1.3)
EGFR: 40 ML/MIN/1.73M2 — LOW
GLUCOSE BLDC GLUCOMTR-MCNC: 115 MG/DL — HIGH (ref 70–99)
GLUCOSE BLDC GLUCOMTR-MCNC: 128 MG/DL — HIGH (ref 70–99)
GLUCOSE BLDC GLUCOMTR-MCNC: 134 MG/DL — HIGH (ref 70–99)
GLUCOSE BLDC GLUCOMTR-MCNC: 143 MG/DL — HIGH (ref 70–99)
GLUCOSE BLDC GLUCOMTR-MCNC: 242 MG/DL — HIGH (ref 70–99)
GLUCOSE SERPL-MCNC: 179 MG/DL — HIGH (ref 70–99)
HCT VFR BLD CALC: 30 % — LOW (ref 34.5–45)
HGB BLD-MCNC: 9.7 G/DL — LOW (ref 11.5–15.5)
INR BLD: 1.08 RATIO — SIGNIFICANT CHANGE UP (ref 0.85–1.16)
MAGNESIUM SERPL-MCNC: 2.1 MG/DL — SIGNIFICANT CHANGE UP (ref 1.6–2.6)
MCHC RBC-ENTMCNC: 29.1 PG — SIGNIFICANT CHANGE UP (ref 27–34)
MCHC RBC-ENTMCNC: 32.3 G/DL — SIGNIFICANT CHANGE UP (ref 32–36)
MCV RBC AUTO: 90.1 FL — SIGNIFICANT CHANGE UP (ref 80–100)
NRBC # BLD: 0 /100 WBCS — SIGNIFICANT CHANGE UP (ref 0–0)
NRBC # FLD: 0 K/UL — SIGNIFICANT CHANGE UP (ref 0–0)
PHOSPHATE SERPL-MCNC: 3 MG/DL — SIGNIFICANT CHANGE UP (ref 2.5–4.5)
PLATELET # BLD AUTO: 559 K/UL — HIGH (ref 150–400)
POTASSIUM SERPL-MCNC: 4 MMOL/L — SIGNIFICANT CHANGE UP (ref 3.5–5.3)
POTASSIUM SERPL-SCNC: 4 MMOL/L — SIGNIFICANT CHANGE UP (ref 3.5–5.3)
PROTHROM AB SERPL-ACNC: 12.9 SEC — SIGNIFICANT CHANGE UP (ref 9.9–13.4)
RBC # BLD: 3.33 M/UL — LOW (ref 3.8–5.2)
RBC # FLD: 12.9 % — SIGNIFICANT CHANGE UP (ref 10.3–14.5)
RH IG SCN BLD-IMP: POSITIVE — SIGNIFICANT CHANGE UP
SODIUM SERPL-SCNC: 132 MMOL/L — LOW (ref 135–145)
WBC # BLD: 10.54 K/UL — HIGH (ref 3.8–10.5)
WBC # FLD AUTO: 10.54 K/UL — HIGH (ref 3.8–10.5)

## 2024-12-18 PROCEDURE — 73630 X-RAY EXAM OF FOOT: CPT | Mod: 26,LT

## 2024-12-18 PROCEDURE — 88311 DECALCIFY TISSUE: CPT | Mod: 26

## 2024-12-18 PROCEDURE — 88305 TISSUE EXAM BY PATHOLOGIST: CPT | Mod: 26

## 2024-12-18 RX ORDER — FENTANYL 75 UG/H
25 PATCH, EXTENDED RELEASE TRANSDERMAL
Refills: 0 | Status: DISCONTINUED | OUTPATIENT
Start: 2024-12-18 | End: 2024-12-18

## 2024-12-18 RX ORDER — MORPHINE SULFATE 15 MG
2 TABLET, EXTENDED RELEASE ORAL EVERY 4 HOURS
Refills: 0 | Status: DISCONTINUED | OUTPATIENT
Start: 2024-12-18 | End: 2024-12-19

## 2024-12-18 RX ORDER — ACETAMINOPHEN 80 MG/.8ML
650 SOLUTION/ DROPS ORAL EVERY 6 HOURS
Refills: 0 | Status: DISCONTINUED | OUTPATIENT
Start: 2024-12-18 | End: 2024-12-19

## 2024-12-18 RX ORDER — FENTANYL 75 UG/H
50 PATCH, EXTENDED RELEASE TRANSDERMAL ONCE
Refills: 0 | Status: DISCONTINUED | OUTPATIENT
Start: 2024-12-18 | End: 2024-12-18

## 2024-12-18 RX ORDER — ONDANSETRON 4 MG/1
4 TABLET ORAL ONCE
Refills: 0 | Status: DISCONTINUED | OUTPATIENT
Start: 2024-12-18 | End: 2024-12-18

## 2024-12-18 RX ORDER — OXYCODONE AND ACETAMINOPHEN 5; 325 MG/1; MG/1
1 TABLET ORAL EVERY 4 HOURS
Refills: 0 | Status: DISCONTINUED | OUTPATIENT
Start: 2024-12-18 | End: 2024-12-19

## 2024-12-18 RX ADMIN — Medication 81 MILLIGRAM(S): at 15:02

## 2024-12-18 RX ADMIN — Medication 17 GRAM(S): at 14:53

## 2024-12-18 RX ADMIN — Medication 2: at 08:48

## 2024-12-18 RX ADMIN — PIPERACILLIN AND TAZOBACTAM 25 GRAM(S): 3; .375 INJECTION, POWDER, LYOPHILIZED, FOR SOLUTION INTRAVENOUS at 14:52

## 2024-12-18 RX ADMIN — PIPERACILLIN AND TAZOBACTAM 25 GRAM(S): 3; .375 INJECTION, POWDER, LYOPHILIZED, FOR SOLUTION INTRAVENOUS at 22:46

## 2024-12-18 RX ADMIN — HEPARIN SODIUM 5000 UNIT(S): 1000 INJECTION, SOLUTION INTRAVENOUS; SUBCUTANEOUS at 05:13

## 2024-12-18 RX ADMIN — PIPERACILLIN AND TAZOBACTAM 25 GRAM(S): 3; .375 INJECTION, POWDER, LYOPHILIZED, FOR SOLUTION INTRAVENOUS at 05:16

## 2024-12-18 RX ADMIN — GABAPENTIN 100 MILLIGRAM(S): 300 CAPSULE ORAL at 23:40

## 2024-12-18 RX ADMIN — ATORVASTATIN CALCIUM 20 MILLIGRAM(S): 40 TABLET, FILM COATED ORAL at 23:40

## 2024-12-18 RX ADMIN — INSULIN GLARGINE-YFGN 16 UNIT(S): 100 INJECTION, SOLUTION SUBCUTANEOUS at 08:48

## 2024-12-18 RX ADMIN — CLOPIDOGREL BISULFATE 75 MILLIGRAM(S): 75 TABLET, FILM COATED ORAL at 15:02

## 2024-12-18 RX ADMIN — ACETAMINOPHEN 650 MILLIGRAM(S): 80 SOLUTION/ DROPS ORAL at 11:25

## 2024-12-18 RX ADMIN — Medication 10 MILLIGRAM(S): at 05:13

## 2024-12-18 RX ADMIN — GABAPENTIN 100 MILLIGRAM(S): 300 CAPSULE ORAL at 14:52

## 2024-12-18 RX ADMIN — HEPARIN SODIUM 5000 UNIT(S): 1000 INJECTION, SOLUTION INTRAVENOUS; SUBCUTANEOUS at 23:40

## 2024-12-18 RX ADMIN — GABAPENTIN 100 MILLIGRAM(S): 300 CAPSULE ORAL at 05:13

## 2024-12-18 RX ADMIN — BENZOCAINE AND MENTHOL 1 LOZENGE: 15; 3.6 LOZENGE ORAL at 05:15

## 2024-12-18 RX ADMIN — ACETAMINOPHEN 650 MILLIGRAM(S): 80 SOLUTION/ DROPS ORAL at 11:55

## 2024-12-18 NOTE — PROGRESS NOTE ADULT - SUBJECTIVE AND OBJECTIVE BOX
CARDIOLOGY FOLLOW UP - Dr. Oliva  Date of Service: 12-18-24 @ 11:12    CC: no events    Review of Systems:  Constitutional: No fever, weight loss, or fatigue  Respiratory: No cough, wheezing, or hemoptysis, no shortness of breath  Cardiovascular: No chest pain, palpitations, passing out, dizziness, or leg swelling  Gastrointestinal: No abd or epigastric pain. No nausea, vomiting, or hematemesis; no diarrhea or consiptaiton, no melena or hematochezia  Vascular: No edema     TELEMETRY:    PHYSICAL EXAM:  T(C): 37.7 (12-18-24 @ 04:55), Max: 37.7 (12-18-24 @ 01:50)  HR: 75 (12-18-24 @ 04:55) (75 - 79)  BP: 130/60 (12-18-24 @ 04:55) (130/55 - 140/60)  RR: 18 (12-18-24 @ 04:55) (18 - 18)  SpO2: 95% (12-18-24 @ 04:55) (95% - 99%)  Wt(kg): --  I&O's Summary      Appearance: Normal	  Cardiovascular: Normal S1 S2,RRR, No JVD, No murmurs  Respiratory: Lungs clear to auscultation	  Gastrointestinal:  Soft, Non-tender, + BS	  Extremities: Normal range of motion, No clubbing, cyanosis or edema  Vascular: Peripheral pulses palpable 2+ bilaterally       Home Medications:  aspirin 81 mg oral delayed release tablet: 1 tab(s) orally once a day (04 Dec 2024 04:28)  atorvastatin 20 mg oral tablet: 1 tab(s) orally once a day (at bedtime) (04 Dec 2024 04:28)  Farxiga 5 mg oral tablet: 1 tab(s) orally once a day (04 Dec 2024 04:32)  hydroCHLOROthiazide 25 mg oral tablet: 1 tab(s) orally once a day (04 Dec 2024 04:28)  Lantus Solostar Pen 100 units/mL subcutaneous solution: 30 unit(s) subcutaneous once a day (at bedtime) (04 Dec 2024 04:28)  losartan 100 mg oral tablet: 1 tab(s) orally once a day (04 Dec 2024 04:28)  Norvasc 10 mg oral tablet: 1 tab(s) orally once a day (04 Dec 2024 04:28)  NovoLOG 100 units/mL subcutaneous solution: 10 unit(s) subcutaneous 3 times a day (before meals) (04 Dec 2024 04:28)        MEDICATIONS  (STANDING):  amLODIPine   Tablet 10 milliGRAM(s) Oral daily  aspirin  chewable 81 milliGRAM(s) Oral daily  atorvastatin 20 milliGRAM(s) Oral at bedtime  benzocaine/menthol Lozenge 1 Lozenge Oral two times a day  clopidogrel Tablet 75 milliGRAM(s) Oral daily  dextrose 5%. 1000 milliLiter(s) (50 mL/Hr) IV Continuous <Continuous>  dextrose 5%. 1000 milliLiter(s) (100 mL/Hr) IV Continuous <Continuous>  dextrose 50% Injectable 25 Gram(s) IV Push once  dextrose 50% Injectable 12.5 Gram(s) IV Push once  dextrose 50% Injectable 25 Gram(s) IV Push once  gabapentin 100 milliGRAM(s) Oral three times a day  glucagon  Injectable 1 milliGRAM(s) IntraMuscular once  glucagon  Injectable 1 milliGRAM(s) IntraMuscular once  heparin   Injectable 5000 Unit(s) SubCutaneous every 8 hours  influenza  Vaccine (HIGH DOSE) 0.5 milliLiter(s) IntraMuscular once  insulin glargine Injectable (LANTUS) 16 Unit(s) SubCutaneous every morning  insulin lispro (ADMELOG) corrective regimen sliding scale   SubCutaneous three times a day before meals  insulin lispro (ADMELOG) corrective regimen sliding scale   SubCutaneous at bedtime  piperacillin/tazobactam IVPB.. 3.375 Gram(s) IV Intermittent every 8 hours  polyethylene glycol 3350 17 Gram(s) Oral daily  sodium chloride 0.9%. 1000 milliLiter(s) (50 mL/Hr) IV Continuous <Continuous>        EKG:  RADIOLOGY:  DIAGNOSTIC TESTING:  [ ] Echocardiogram:  [ ] Catherterization:  [ ] Stress Test:  OTHER:     LABS:	 	                          9.7    10.54 )-----------( 559      ( 18 Dec 2024 02:37 )             30.0     12-18    132[L]  |  100  |  19  ----------------------------<  179[H]  4.0   |  21[L]  |  1.42[H]    Ca    9.5      18 Dec 2024 02:37  Phos  3.0     12-18  Mg     2.10     12-18        PT/INR - ( 18 Dec 2024 02:37 )   PT: 12.9 sec;   INR: 1.08 ratio         PTT - ( 18 Dec 2024 02:37 )  PTT:37.7 sec    CARDIAC MARKERS:

## 2024-12-18 NOTE — PROVIDER CONTACT NOTE (OTHER) - ASSESSMENT
Pt a&Ox4. Vitals stable no complaints of pain. Pt asking if she is getting the food she promised in the morning early before her surgery. I called who was covering for vascular to verify and they stated they did not hear any changes about the order. "Order states NPO for procedure/ test, start date 12/18/2024, start time 07,00 except medications" Pt a&Ox4. Vitals stable no complaints of pain. Pt asking if she is getting the food she promised by Vascular Surgery in the morning early before her surgery. Vascular Surgery to verify and they stated they did not hear any changes about the order. Order states- "NPO for Procedure/Test: NPO Start Date: 12/18/2024; Start Time: 07:00; Except Medications." Pt scheduled for OR 12/18/2024.

## 2024-12-18 NOTE — PROGRESS NOTE ADULT - SUBJECTIVE AND OBJECTIVE BOX
[facility], Division of Infectious Diseases  TOBY [place] Y. Patel, S. Shah, G. Álvaro  [phone]  ([phone] - weekdays after 5pm and weekends)    Name: REJI MEADE  Age/Gender: 69y Female  MRN: [id number]    Interval History:  Notes reviewed.   No concerning overnight events.  Afebrile.   denies diarrhea  awaiting OR today    Allergies: No Known Allergies      Objective:  Vitals:   T(F): 99.8 (12-18-24 @ 04:55), Max: 99.9 (12-18-24 @ 01:50)  HR: 75 (12-18-24 @ 04:55) (75 - 79)  BP: 130/60 (12-18-24 @ 04:55) (130/55 - 141/55)  RR: 18 (12-18-24 @ 04:55) (18 - 18)  SpO2: 95% (12-18-24 @ 04:55) (95% - 99%)  Physical Examination:  General: no acute distress  HEENT: anicteric  Cardio: S1, S2, normal rate  Resp: breathing comfortably on RA   Abd: soft, NT, ND  Ext: L foot wrapped w/ dressing  Skin: warm, dry    Laboratory Studies:  CBC:                       9.7    10.54 )-----------( 559      ( 18 Dec 2024 02:37 )             30.0     WBC Trend:  10.54 12-18-24 @ 02:37  8.99 12-17-24 @ 07:11  9.98 12-16-24 @ 01:20  9.71 12-15-24 @ 06:35  9.81 12-14-24 @ 07:20  11.03 12-13-24 @ 05:49  11.29 12-12-24 @ 02:20    CMP: 12-18    132[L]  |  100  |  19  ----------------------------<  179[H]  4.0   |  21[L]  |  1.42[H]    Ca    9.5      18 Dec 2024 02:37  Phos  3.0     12-18  Mg     2.10     12-18            Urinalysis Basic - ( 18 Dec 2024 02:37 )    Color: x / Appearance: x / SG: x / pH: x  Gluc: 179 mg/dL / Ketone: x  / Bili: x / Urobili: x   Blood: x / Protein: x / Nitrite: x   Leuk Esterase: x / RBC: x / WBC x   Sq Epi: x / Non Sq Epi: x / Bacteria: x      Microbiology: reviewed     Culture - Blood (collected 12-07-24 @ 22:31)  Source: .Blood BLOOD  Final Report (12-13-24 @ 01:00):    No growth at 5 days    Culture - Blood (collected 12-07-24 @ 18:46)  Source: .Blood BLOOD  Final Report (12-13-24 @ 01:00):    No growth at 5 days        Radiology: reviewed     Medications:  acetaminophen     Tablet .. 650 milliGRAM(s) Oral every 6 hours PRN  amLODIPine   Tablet 10 milliGRAM(s) Oral daily  aspirin  chewable 81 milliGRAM(s) Oral daily  atorvastatin 20 milliGRAM(s) Oral at bedtime  benzocaine/menthol Lozenge 1 Lozenge Oral two times a day  clopidogrel Tablet 75 milliGRAM(s) Oral daily  dextrose 5%. 1000 milliLiter(s) IV Continuous <Continuous>  dextrose 5%. 1000 milliLiter(s) IV Continuous <Continuous>  dextrose 50% Injectable 25 Gram(s) IV Push once  dextrose 50% Injectable 12.5 Gram(s) IV Push once  dextrose 50% Injectable 25 Gram(s) IV Push once  gabapentin 100 milliGRAM(s) Oral three times a day  glucagon  Injectable 1 milliGRAM(s) IntraMuscular once  glucagon  Injectable 1 milliGRAM(s) IntraMuscular once  heparin   Injectable 5000 Unit(s) SubCutaneous every 8 hours  influenza  Vaccine (HIGH DOSE) 0.5 milliLiter(s) IntraMuscular once  insulin glargine Injectable (LANTUS) 16 Unit(s) SubCutaneous every morning  insulin lispro (ADMELOG) corrective regimen sliding scale   SubCutaneous three times a day before meals  insulin lispro (ADMELOG) corrective regimen sliding scale   SubCutaneous at bedtime  melatonin 3 milliGRAM(s) Oral at bedtime PRN  ondansetron Injectable 4 milliGRAM(s) IV Push every 8 hours PRN  piperacillin/tazobactam IVPB.. 3.375 Gram(s) IV Intermittent every 8 hours  polyethylene glycol 3350 17 Gram(s) Oral daily  sodium chloride 0.9%. 1000 milliLiter(s) IV Continuous <Continuous>    Antimicrobials:  piperacillin/tazobactam IVPB.. 3.375 Gram(s) IV Intermittent every 8 hours

## 2024-12-18 NOTE — PROVIDER CONTACT NOTE (OTHER) - REASON
Confirming NPO order Confirming NPO order that states: "NPO for Procedure/Test: NPO Start Date: 12/18/2024; Start Time: 07:00; Except Medications." Pt scheduled for OR 12/18/2024.

## 2024-12-18 NOTE — PROGRESS NOTE ADULT - SUBJECTIVE AND OBJECTIVE BOX
Name of Patient : REJI MEADE  MRN: [id number]  Date of visit: 12-18-24     Subjective: Patient seen and examined. No new events except as noted.   doing okay     REVIEW OF SYSTEMS:    CONSTITUTIONAL: No weakness, fevers or chills  EYES/ENT: No visual changes;  No vertigo or throat pain   NECK: No pain or stiffness  RESPIRATORY: No cough, wheezing, hemoptysis; No shortness of breath  CARDIOVASCULAR: No chest pain or palpitations  GASTROINTESTINAL: No abdominal or epigastric pain. No nausea, vomiting, or hematemesis; No diarrhea or constipation. No melena or hematochezia.  GENITOURINARY: No dysuria, frequency or hematuria  NEUROLOGICAL: No numbness or weakness  SKIN: No itching, burning, rashes, or lesions   All other review of systems is negative unless indicated above.    MEDICATIONS:  MEDICATIONS  (STANDING):  amLODIPine   Tablet 10 milliGRAM(s) Oral daily  aspirin  chewable 81 milliGRAM(s) Oral daily  atorvastatin 20 milliGRAM(s) Oral at bedtime  benzocaine/menthol Lozenge 1 Lozenge Oral two times a day  clopidogrel Tablet 75 milliGRAM(s) Oral daily  dextrose 5%. 1000 milliLiter(s) (50 mL/Hr) IV Continuous <Continuous>  dextrose 5%. 1000 milliLiter(s) (100 mL/Hr) IV Continuous <Continuous>  dextrose 50% Injectable 25 Gram(s) IV Push once  dextrose 50% Injectable 12.5 Gram(s) IV Push once  dextrose 50% Injectable 25 Gram(s) IV Push once  gabapentin 100 milliGRAM(s) Oral three times a day  glucagon  Injectable 1 milliGRAM(s) IntraMuscular once  glucagon  Injectable 1 milliGRAM(s) IntraMuscular once  heparin   Injectable 5000 Unit(s) SubCutaneous every 8 hours  influenza  Vaccine (HIGH DOSE) 0.5 milliLiter(s) IntraMuscular once  insulin glargine Injectable (LANTUS) 16 Unit(s) SubCutaneous every morning  insulin lispro (ADMELOG) corrective regimen sliding scale   SubCutaneous three times a day before meals  insulin lispro (ADMELOG) corrective regimen sliding scale   SubCutaneous at bedtime  piperacillin/tazobactam IVPB.. 3.375 Gram(s) IV Intermittent every 8 hours  polyethylene glycol 3350 17 Gram(s) Oral daily  sodium chloride 0.9%. 1000 milliLiter(s) (50 mL/Hr) IV Continuous <Continuous>      PHYSICAL EXAM:  T(C): 37.8 (12-18-24 @ 20:45), Max: 38 (12-18-24 @ 11:00)  HR: 70 (12-18-24 @ 22:30) (69 - 85)  BP: 130/66 (12-18-24 @ 22:30) (109/61 - 152/64)  RR: 14 (12-18-24 @ 22:30) (13 - 20)  SpO2: 94% (12-18-24 @ 22:30) (92% - 100%)  Wt(kg): --  I&O's Summary    Height (cm): 172.7 (12-18 @ 17:38)  Weight (kg): 81.6 (12-18 @ 17:38)  BMI (kg/m2): 27.4 (12-18 @ 17:38)  BSA (m2): 1.95 (12-18 @ 17:38)    Appearance: Normal	  HEENT:  PERRLA   Lymphatic: No lymphadenopathy   Cardiovascular: Normal S1 S2, no JVD  Respiratory: normal effort , clear  Gastrointestinal:  Soft, Non-tender  Skin: No rashes,  warm to touch  Psychiatry:  Mood & affect appropriate  Musculuskeletal: No edema    recent labs, Imaging and EKGs personally reviewed   CODE status discussed with the patient in detail

## 2024-12-18 NOTE — PROVIDER CONTACT NOTE (OTHER) - SITUATION
verifying NPO status for the morning Confirming NPO order that states: "NPO for Procedure/Test: NPO Start Date: 12/18/2024; Start Time: 07:00; Except Medications." Pt scheduled for OR 12/18/2024.

## 2024-12-18 NOTE — BRIEF OPERATIVE NOTE - COMMENTS
Dressing: Gauze and tege   Doppler + L DP and PT 
pt is s/p left foot Partial 1st ray resection, closed  Low concern for residual bone infection  Low concern for viability, adequate intraop bleeding  Podiatry plan: follow-up OR data for 48 hours of no growth, stable to discharge from podiatry standpoint pending 48 hours of no growth

## 2024-12-18 NOTE — BRIEF OPERATIVE NOTE - SPECIMENS
Pathology: left hallux, left foot 1st metatarsal clean bone margin  Microbiology: left foot 1st metatarsal clean bone margin
None

## 2024-12-18 NOTE — PROGRESS NOTE ADULT - ASSESSMENT
To OR today at 7:30am with Dr. Waterhouse for left foot partial 1st ray resectin  CXR on sunrise.  EKG on sunrise.  Medical/Cardiac clearance documented in chart.  Consent signed and in chart.  Procedure was explained to patient in detail. All alternatives, risks and complications were discussed. All questions answered.

## 2024-12-18 NOTE — PROGRESS NOTE ADULT - SUBJECTIVE AND OBJECTIVE BOX
[facility] [place] [facility]   MD LI WADE MD ANGELA WONG, PA    TEL:  OFFICE: [phone]  From 5pm-7am Answering Service [phone]    -- RENAL FOLLOW UP NOTE ---Date of Service 12-18-24 @ 14:28    Patient is a 69y old  Female who presents with a chief complaint of Sent in by podiatrist for right foot/toe wound, worsening since October. (18 Dec 2024 11:12)      Patient seen and examined at bedside. No chest pain/sob    VITALS:  T(F): 100.4 (12-18-24 @ 11:00), Max: 100.4 (12-18-24 @ 11:00)  HR: 75 (12-18-24 @ 11:00)  BP: 137/65 (12-18-24 @ 11:00)  RR: 18 (12-18-24 @ 11:00)  SpO2: 98% (12-18-24 @ 11:00)  Wt(kg): --        PHYSICAL EXAM:  General: NAD  Neck: No JVD  Respiratory: CTAB, no wheezes, rales or rhonchi  Cardiovascular: S1, S2, RRR  Gastrointestinal: BS+, soft, NT/ND  Extremities: No peripheral edema    Hospital Medications:   MEDICATIONS  (STANDING):  amLODIPine   Tablet 10 milliGRAM(s) Oral daily  aspirin  chewable 81 milliGRAM(s) Oral daily  atorvastatin 20 milliGRAM(s) Oral at bedtime  benzocaine/menthol Lozenge 1 Lozenge Oral two times a day  clopidogrel Tablet 75 milliGRAM(s) Oral daily  dextrose 5%. 1000 milliLiter(s) (50 mL/Hr) IV Continuous <Continuous>  dextrose 5%. 1000 milliLiter(s) (100 mL/Hr) IV Continuous <Continuous>  dextrose 50% Injectable 25 Gram(s) IV Push once  dextrose 50% Injectable 12.5 Gram(s) IV Push once  dextrose 50% Injectable 25 Gram(s) IV Push once  gabapentin 100 milliGRAM(s) Oral three times a day  glucagon  Injectable 1 milliGRAM(s) IntraMuscular once  glucagon  Injectable 1 milliGRAM(s) IntraMuscular once  heparin   Injectable 5000 Unit(s) SubCutaneous every 8 hours  influenza  Vaccine (HIGH DOSE) 0.5 milliLiter(s) IntraMuscular once  insulin glargine Injectable (LANTUS) 16 Unit(s) SubCutaneous every morning  insulin lispro (ADMELOG) corrective regimen sliding scale   SubCutaneous three times a day before meals  insulin lispro (ADMELOG) corrective regimen sliding scale   SubCutaneous at bedtime  piperacillin/tazobactam IVPB.. 3.375 Gram(s) IV Intermittent every 8 hours  polyethylene glycol 3350 17 Gram(s) Oral daily  sodium chloride 0.9%. 1000 milliLiter(s) (50 mL/Hr) IV Continuous <Continuous>      LABS:  12-18    132[L]  |  100  |  19  ----------------------------<  179[H]  4.0   |  21[L]  |  1.42[H]    Ca    9.5      18 Dec 2024 02:37  Phos  3.0     12-18  Mg     2.10     12-18      Creatinine Trend: 1.42 <--, 1.34 <--, 1.35 <--, 1.30 <--, 1.26 <--, 1.39 <--, 1.31 <--    Phosphorus: 3.0 mg/dL (12-18 @ 02:37)                              9.7    10.54 )-----------( 559      ( 18 Dec 2024 02:37 )             30.0     Urine Studies:  Urinalysis - [12-18-24 @ 02:37]      Color  / Appearance  / SG  / pH       Gluc 179 / Ketone   / Bili  / Urobili        Blood  / Protein  / Leuk Est  / Nitrite       RBC  / WBC  / Hyaline  / Gran  / Sq Epi  / Non Sq Epi  / Bacteria       HbA1c 10.7      [02-25-20 @ 14:10]  TSH 1.00      [12-04-24 @ 06:20]        RADIOLOGY & ADDITIONAL STUDIES:

## 2024-12-18 NOTE — BRIEF OPERATIVE NOTE - NSICDXBRIEFPREOP_GEN_ALL_CORE_FT
PRE-OP DIAGNOSIS:  Osteomyelitis 16-Dec-2024 09:45:02 Arlen Hernandez  
PRE-OP DIAGNOSIS:  Osteomyelitis 16-Dec-2024 09:45:02 Arlen Hernandez

## 2024-12-18 NOTE — PROGRESS NOTE ADULT - ASSESSMENT
57yo female h/o DM type2, HTN, hyperlipidemia; Pt c/o left great toe infection. vascular on board planning for angiogram. nephrology consulted for elevated S.Cr.    ckd stage 3B  baseline ~ 1.4   long standing hx of dm and htn  admitted with scr now up trending  scr relatively stable  on losartan and htcz.  current on (last dose 12/5). no objection to resume losartan/hctz if bp allows  FEurea 35%  renal us no hydro   s/p trial of gentle hydration 12/5  Renal function stable  s/p angio  Monitor BMP and UO.   Avoid further nephrotoxins if possible.    htn  controlled  continue with current meds  Low salt diet.  monitor BP.    Hyponatremia  na dropped today  check urine na and osmo  Optimize glycemic control.  Monitor Na.    LE  wound  f/u vascular  s/p angiogram 12/16

## 2024-12-18 NOTE — PROVIDER CONTACT NOTE (OTHER) - ACTION/TREATMENT ORDERED:
No further actions at this time. Vascular confirmed NPO status and order. Provider aware & states "I did not hear about any changes to the current order at this time." Continue to monitor.

## 2024-12-18 NOTE — PROGRESS NOTE ADULT - ASSESSMENT
68 y/o F PMhx DM II, HTN who presented w/ left great toe wound    L hallux infection, c/f osteo  leukocytosis, DM II  CRP 88.8,   s/p podiatry I&D L hallux wound to the level of and not beyond bone,  mild malodor, scant serosanguinous drainage. Right foot no open wounds, no acute signs of infection.  foot x-ray- fracture distal phalanx great toe. soft tissue swelling of this digit as well as subcutaneous air.  per vascular possible plans for angiogram  wound cx w/ strep constellatus, citrobacter, morganella, staph lugdunensis (oxacillin sensitive)  febrile 12/7, ceftriaxone switched to zosyn  s/p LLE angio & AT angioplasty 12/16    Recommendations  c/w zosyn for now  plan L foot partial 1st ray resection today 12/18  - please send clean margin for gram stain, culture and path    Albino Rea M.D.  [facility], Division of Infectious Diseases  [phone]  After 5pm on weekdays and all day on weekends - please call [phone]

## 2024-12-18 NOTE — BRIEF OPERATIVE NOTE - NSICDXBRIEFPROCEDURE_GEN_ALL_CORE_FT
PROCEDURES:  Partial amputation of first ray of left foot by open approach 18-Dec-2024 20:54:01  Maria G Jenkins  
PROCEDURES:  Angiogram, lower extremity, left 16-Dec-2024 09:45:26  Arlen Hu  Angioplasty, artery, anterior tibial 16-Dec-2024 09:45:57  Arlen Hu

## 2024-12-18 NOTE — CHART NOTE - NSCHARTNOTEFT_GEN_A_CORE
NUTRITION FOLLOW UP NOTE    Pt seen for follow-up.    SOURCE: [X] Patient [X] Medical record [] RN/PCA [] Family/Caregiver []Patient unavailable []Patient inappropriate (disoriented, nonverbal, intubated/sedated) [] Other:    Medical Course: Per chart, Pt is 57yo female h/o DM type2, HTN, hyperlipidemia a/w infected L foot wound iso Type 2 DM. c/f OM.     Diet, NPO:   NPO for Procedure/Test     NPO Start Date: 18-Dec-2024,   NPO Start Time: 07:00  Except Medications (12-17-24 @ 09:01) [Available for Activation]  Diet, Regular:   Consistent Carbohydrate {Evening Snack} (CSTCHOSN)  DASH/TLC {Sodium & Cholesterol Restricted} (DASH)  Easy to Chew (EASYTOCHEW)  Low Sodium (12-04-24 @ 04:43) [Active]        Nutrition Course: Pt seen at bedside. Pt is NPO for Angio. Pt reported good PO intake in house. Per RN flowsheet, Pt with % noted. No new food preferences verbalized at this time. Patient denies difficulty chewing or swallowing at present. Pt denies any nausea, vomiting, diarrhea, or constipation at this time. Per chart, last BM 12/16. Ordered for bowel regimen: Miralax 17 grams 1x daily. Labs noted for hyperglycemia, hyponatremia, elevated Cr. Nephrology following. RD remains available upon request and will follow up per protocol.        Pertinent Medications: MEDICATIONS  (STANDING):  amLODIPine   Tablet 10 milliGRAM(s) Oral daily  aspirin  chewable 81 milliGRAM(s) Oral daily  atorvastatin 20 milliGRAM(s) Oral at bedtime  benzocaine/menthol Lozenge 1 Lozenge Oral two times a day  clopidogrel Tablet 75 milliGRAM(s) Oral daily  dextrose 5%. 1000 milliLiter(s) (50 mL/Hr) IV Continuous <Continuous>  dextrose 5%. 1000 milliLiter(s) (100 mL/Hr) IV Continuous <Continuous>  dextrose 50% Injectable 25 Gram(s) IV Push once  dextrose 50% Injectable 12.5 Gram(s) IV Push once  dextrose 50% Injectable 25 Gram(s) IV Push once  gabapentin 100 milliGRAM(s) Oral three times a day  glucagon  Injectable 1 milliGRAM(s) IntraMuscular once  glucagon  Injectable 1 milliGRAM(s) IntraMuscular once  heparin   Injectable 5000 Unit(s) SubCutaneous every 8 hours  influenza  Vaccine (HIGH DOSE) 0.5 milliLiter(s) IntraMuscular once  insulin glargine Injectable (LANTUS) 16 Unit(s) SubCutaneous every morning  insulin lispro (ADMELOG) corrective regimen sliding scale   SubCutaneous three times a day before meals  insulin lispro (ADMELOG) corrective regimen sliding scale   SubCutaneous at bedtime  piperacillin/tazobactam IVPB.. 3.375 Gram(s) IV Intermittent every 8 hours  polyethylene glycol 3350 17 Gram(s) Oral daily  sodium chloride 0.9%. 1000 milliLiter(s) (50 mL/Hr) IV Continuous <Continuous>    MEDICATIONS  (PRN):  acetaminophen     Tablet .. 650 milliGRAM(s) Oral every 6 hours PRN Temp greater or equal to 38C (100.4F), Mild Pain (1 - 3)  melatonin 3 milliGRAM(s) Oral at bedtime PRN Insomnia  ondansetron Injectable 4 milliGRAM(s) IV Push every 8 hours PRN Nausea and/or Vomiting    [] Relevant notes on medications:     Pertinent Labs: 12-18 Na132 mmol/L[L] Glu 179 mg/dL[H] K+ 4.0 mmol/L Cr  1.42 mg/dL[H] BUN 19 mg/dL 12-18 Phos 3.0 mg/dL    A1C with Estimated Average Glucose Result: 7.8 % (12-05-24 @ 06:02)  A1C with Estimated Average Glucose Result: 7.7 % (12-04-24 @ 06:20)         CAPILLARY BLOOD GLUCOSE  POCT Blood Glucose.: 143 mg/dL (18 Dec 2024 12:21)  POCT Blood Glucose.: 242 mg/dL (18 Dec 2024 08:22)  POCT Blood Glucose.: 188 mg/dL (17 Dec 2024 22:07)  POCT Blood Glucose.: 191 mg/dL (17 Dec 2024 17:07)      Weight: 81.6kg (dosing wt 12/16), 81.6kg (12/12)  Weight Assessment: Wt appears stable  Height: 68in / 172.7cm  IBW: 140lbs / 63.5kg +/-10%  BMI: 27.3kg/m^2    Physical Assessment, per flowsheets:  Edema: No edema noted.   Pressure Injury: No PI/DTI noted.       Estimated Needs:   [X] No change since previous assessment, based on IBW 140lbs / 63.5kg  1778 - 2095 kcal daily @ 28 - 33 kcal/kg, 57 - 70 gm protein daily @ 0.9 - 1.1 gm/kg       Previous Nutrition Diagnosis:   [X] Altered Nutrition Related Lab Values  Nutrition Diagnosis is [X] ongoing  [ ] resolved [ ] not applicable   New Nutrition Diagnosis: [X] not applicable     Education:  [ ] Provided pt with verbal / written education on   [X] Provided on previous assessment by RD  [ ] Not applicable secondary to   [ ] Pt refused     Interventions:   1) - Continue current diet order, which remains appropriate at this time.   2) - Consider Glucerna 1x daily (provides 220 kcal, 10 gm protein per 8oz serving). Consider - Recommend MVI no medical contraindications, for micronutrient support.   3) - RDN services remain available as needed.     Monitor & Evaluate:  PO intake, tolerance to diet/supplement, nutrition related lab values, weight trends, BMs/GI distress, hydration status, skin integrity.    Darwin Aragon RD, CDN. Available on [facility] teams,  Pager #[phone]

## 2024-12-18 NOTE — BRIEF OPERATIVE NOTE - NSICDXBRIEFPOSTOP_GEN_ALL_CORE_FT
POST-OP DIAGNOSIS:  Osteomyelitis 16-Dec-2024 09:43:38 Arlen Hernandez  
POST-OP DIAGNOSIS:  Osteomyelitis 16-Dec-2024 09:43:38 Arlen Hernandez

## 2024-12-18 NOTE — PROGRESS NOTE ADULT - ASSESSMENT
69 year old female with PMH type 2 DM, CKD stage 3, HTN, HLD presents with osteomyelitis at left hallux. LLE angiogram cancelled 12/11 and 12/12. Patient refused angiogram on 12/13 after multiple cancellations. Now s/p LLE angiogram and AT angioplasty on 12/16. Plan for potential future bypass.     Plan  - 1st ray resection with podiatry 12/18     -NPO at 7 am     -preop labs    -medical/cardiac clearance   - Zosyn, f/u ID for dispo recs pot-procedure  - Aspirin  - Subq heparin  - Medical and cardiac clearances documented.  - Patient consented  - IVF as per previous angiogram planning from nephrology recommendations    C team surgery   [id number] 69 year old female with PMH type 2 DM, CKD stage 3, HTN, HLD presents with osteomyelitis at left hallux. LLE angiogram cancelled 12/11 and 12/12. Patient refused angiogram on 12/13 after multiple cancellations. Now s/p LLE angiogram and AT angioplasty on 12/16. Plan for potential future bypass.     Plan  - OR today, plan for 1st ray resection with podiatry   - Rudolph, f/u ID for dispo recs post-procedure  - Aspirin  - Subq heparin  - Medical and cardiac clearances documented  - Patient consented  - IVF as per previous angiogram planning from nephrology recommendations    C team surgery   [id number] 69 year old female with PMH type 2 DM, CKD stage 3, HTN, HLD presents with osteomyelitis at left hallux. LLE angiogram cancelled 12/11 and 12/12. Patient refused angiogram on 12/13 after multiple cancellations. Now s/p LLE angiogram and AT angioplasty on 12/16. Plan for potential future bypass.     Plan  - OR today, plan for 1st ray resection with podiatry   - Cleared from vascular standpoint for OR today with podiatry   - Rudolph, f/u ID for dispo recs post-procedure  - Aspirin  - Subq heparin  - Medical and cardiac clearances documented  - Patient consented  - IVF as per previous angiogram planning from nephrology recommendations    C team surgery   [id number]

## 2024-12-18 NOTE — BRIEF OPERATIVE NOTE - OPERATION/FINDINGS
s/p left foot Partial 1st ray resection, closed  -Bone at proximal resection was hard and of good quality  -Adequate intra-op bleeding  -No evidence of purulence or soft tissue infection  -Incision primarily closed with 3-0 nylon 
LLE angiogram and AT angioplasty

## 2024-12-18 NOTE — PROVIDER CONTACT NOTE (OTHER) - ACTION/TREATMENT ORDERED:
Provider aware. Administer Tylenol. Recheck temperature 30mins-1hr s/p administration. Continue to monitor.

## 2024-12-18 NOTE — PROGRESS NOTE ADULT - SUBJECTIVE AND OBJECTIVE BOX
Podiatry Pager #: [phone]    Patient is a 69y old  Female who presents with a chief complaint of Sent in by podiatrist for right foot/toe wound, worsening since October. (17 Dec 2024 13:20)      INTERVAL HPI/OVERNIGHT EVENTS:   Pt is scheduled for left foot partial 1st ray resection with Dr. Waterhouse at 7:30am. Patient is aware of procedure and is NPO since midnight.    MEDICATIONS  (STANDING):  amLODIPine   Tablet 10 milliGRAM(s) Oral daily  aspirin  chewable 81 milliGRAM(s) Oral daily  atorvastatin 20 milliGRAM(s) Oral at bedtime  benzocaine/menthol Lozenge 1 Lozenge Oral two times a day  clopidogrel Tablet 75 milliGRAM(s) Oral daily  dextrose 5%. 1000 milliLiter(s) (50 mL/Hr) IV Continuous <Continuous>  dextrose 5%. 1000 milliLiter(s) (100 mL/Hr) IV Continuous <Continuous>  dextrose 50% Injectable 25 Gram(s) IV Push once  dextrose 50% Injectable 12.5 Gram(s) IV Push once  dextrose 50% Injectable 25 Gram(s) IV Push once  gabapentin 100 milliGRAM(s) Oral three times a day  glucagon  Injectable 1 milliGRAM(s) IntraMuscular once  glucagon  Injectable 1 milliGRAM(s) IntraMuscular once  heparin   Injectable 5000 Unit(s) SubCutaneous every 8 hours  influenza  Vaccine (HIGH DOSE) 0.5 milliLiter(s) IntraMuscular once  insulin glargine Injectable (LANTUS) 16 Unit(s) SubCutaneous every morning  insulin lispro (ADMELOG) corrective regimen sliding scale   SubCutaneous three times a day before meals  insulin lispro (ADMELOG) corrective regimen sliding scale   SubCutaneous at bedtime  piperacillin/tazobactam IVPB.. 3.375 Gram(s) IV Intermittent every 8 hours  polyethylene glycol 3350 17 Gram(s) Oral daily  sodium chloride 0.9%. 1000 milliLiter(s) (50 mL/Hr) IV Continuous <Continuous>    MEDICATIONS  (PRN):  acetaminophen     Tablet .. 650 milliGRAM(s) Oral every 6 hours PRN Temp greater or equal to 38C (100.4F), Mild Pain (1 - 3)  melatonin 3 milliGRAM(s) Oral at bedtime PRN Insomnia  ondansetron Injectable 4 milliGRAM(s) IV Push every 8 hours PRN Nausea and/or Vomiting      Allergies    No Known Allergies    Intolerances        Vital Signs Last 24 Hrs  T(C): 37.7 (18 Dec 2024 04:55), Max: 37.7 (18 Dec 2024 01:50)  T(F): 99.8 (18 Dec 2024 04:55), Max: 99.9 (18 Dec 2024 01:50)  HR: 75 (18 Dec 2024 04:55) (75 - 79)  BP: 130/60 (18 Dec 2024 04:55) (130/55 - 141/55)  BP(mean): --  RR: 18 (18 Dec 2024 04:55) (18 - 18)  SpO2: 95% (18 Dec 2024 04:55) (95% - 99%)    Parameters below as of 18 Dec 2024 04:55  Patient On (Oxygen Delivery Method): room air        LABS:                        9.7    10.54 )-----------( 559      ( 18 Dec 2024 02:37 )             30.0     12-18    132[L]  |  100  |  19  ----------------------------<  179[H]  4.0   |  21[L]  |  1.42[H]    Ca    9.5      18 Dec 2024 02:37  Phos  3.0     12-18  Mg     2.10     12-18      PT/INR - ( 18 Dec 2024 02:37 )   PT: 12.9 sec;   INR: 1.08 ratio         PTT - ( 18 Dec 2024 02:37 )  PTT:37.7 sec  Urinalysis Basic - ( 18 Dec 2024 02:37 )    Color: x / Appearance: x / SG: x / pH: x  Gluc: 179 mg/dL / Ketone: x  / Bili: x / Urobili: x   Blood: x / Protein: x / Nitrite: x   Leuk Esterase: x / RBC: x / WBC x   Sq Epi: x / Non Sq Epi: x / Bacteria: x      CAPILLARY BLOOD GLUCOSE      POCT Blood Glucose.: 188 mg/dL (17 Dec 2024 22:07)  POCT Blood Glucose.: 191 mg/dL (17 Dec 2024 17:07)  POCT Blood Glucose.: 287 mg/dL (17 Dec 2024 12:30)  POCT Blood Glucose.: 181 mg/dL (17 Dec 2024 08:11)      RADIOLOGY & ADDITIONAL TESTS:    Plan:

## 2024-12-18 NOTE — PROGRESS NOTE ADULT - ASSESSMENT
A/P  57yo female h/o DM type2, HTN, hyperlipidemia; Pt c/o left great toe infection.    #diabetic foot infection, PAD  -sp bedside debridement with podiatry on 12/4/24  -VA JACK WITH PVR noted   -ecg with no ischemic changes, no chf or significant valvular disease on exam   -echo with nl LV sys FX No significant valvular disease.  -s/p LLE angiogram and AT angioplasty on 12/16  -per pod plan for 1st ray resection  - pt can proceed from a cv standpoint  -antiplat per vasc      #HTN   -c/w meds    dvt ppx     35 minutes spent on total encounter; more than 50% of the visit was spent counseling and/or coordinating care by the attending physician.

## 2024-12-19 LAB
ANION GAP SERPL CALC-SCNC: 11 MMOL/L — SIGNIFICANT CHANGE UP (ref 7–14)
ANION GAP SERPL CALC-SCNC: 12 MMOL/L — SIGNIFICANT CHANGE UP (ref 7–14)
APPEARANCE UR: CLEAR — SIGNIFICANT CHANGE UP
BACTERIA # UR AUTO: ABNORMAL /HPF
BILIRUB UR-MCNC: NEGATIVE — SIGNIFICANT CHANGE UP
BUN SERPL-MCNC: 16 MG/DL — SIGNIFICANT CHANGE UP (ref 7–23)
BUN SERPL-MCNC: 16 MG/DL — SIGNIFICANT CHANGE UP (ref 7–23)
CALCIUM SERPL-MCNC: 9.2 MG/DL — SIGNIFICANT CHANGE UP (ref 8.4–10.5)
CALCIUM SERPL-MCNC: 9.6 MG/DL — SIGNIFICANT CHANGE UP (ref 8.4–10.5)
CAST: 2 /LPF — SIGNIFICANT CHANGE UP (ref 0–4)
CHLORIDE SERPL-SCNC: 100 MMOL/L — SIGNIFICANT CHANGE UP (ref 98–107)
CHLORIDE SERPL-SCNC: 99 MMOL/L — SIGNIFICANT CHANGE UP (ref 98–107)
CO2 SERPL-SCNC: 22 MMOL/L — SIGNIFICANT CHANGE UP (ref 22–31)
CO2 SERPL-SCNC: 22 MMOL/L — SIGNIFICANT CHANGE UP (ref 22–31)
COLOR SPEC: YELLOW — SIGNIFICANT CHANGE UP
CREAT SERPL-MCNC: 1.37 MG/DL — HIGH (ref 0.5–1.3)
CREAT SERPL-MCNC: 1.39 MG/DL — HIGH (ref 0.5–1.3)
DIFF PNL FLD: ABNORMAL
EGFR: 41 ML/MIN/1.73M2 — LOW
EGFR: 41 ML/MIN/1.73M2 — LOW
GLUCOSE BLDC GLUCOMTR-MCNC: 198 MG/DL — HIGH (ref 70–99)
GLUCOSE BLDC GLUCOMTR-MCNC: 207 MG/DL — HIGH (ref 70–99)
GLUCOSE BLDC GLUCOMTR-MCNC: 218 MG/DL — HIGH (ref 70–99)
GLUCOSE BLDC GLUCOMTR-MCNC: 283 MG/DL — HIGH (ref 70–99)
GLUCOSE BLDC GLUCOMTR-MCNC: 315 MG/DL — HIGH (ref 70–99)
GLUCOSE SERPL-MCNC: 211 MG/DL — HIGH (ref 70–99)
GLUCOSE SERPL-MCNC: 212 MG/DL — HIGH (ref 70–99)
GLUCOSE UR QL: >=1000 MG/DL
GRAM STN FLD: SIGNIFICANT CHANGE UP
HCT VFR BLD CALC: 30.7 % — LOW (ref 34.5–45)
HCT VFR BLD CALC: 33 % — LOW (ref 34.5–45)
HGB BLD-MCNC: 10.3 G/DL — LOW (ref 11.5–15.5)
HGB BLD-MCNC: 9.6 G/DL — LOW (ref 11.5–15.5)
KETONES UR-MCNC: NEGATIVE MG/DL — SIGNIFICANT CHANGE UP
LEUKOCYTE ESTERASE UR-ACNC: NEGATIVE — SIGNIFICANT CHANGE UP
MAGNESIUM SERPL-MCNC: 2.1 MG/DL — SIGNIFICANT CHANGE UP (ref 1.6–2.6)
MAGNESIUM SERPL-MCNC: 2.1 MG/DL — SIGNIFICANT CHANGE UP (ref 1.6–2.6)
MCHC RBC-ENTMCNC: 28.1 PG — SIGNIFICANT CHANGE UP (ref 27–34)
MCHC RBC-ENTMCNC: 28.3 PG — SIGNIFICANT CHANGE UP (ref 27–34)
MCHC RBC-ENTMCNC: 31.2 G/DL — LOW (ref 32–36)
MCHC RBC-ENTMCNC: 31.3 G/DL — LOW (ref 32–36)
MCV RBC AUTO: 89.8 FL — SIGNIFICANT CHANGE UP (ref 80–100)
MCV RBC AUTO: 90.7 FL — SIGNIFICANT CHANGE UP (ref 80–100)
NIGHT BLUE STAIN TISS: SIGNIFICANT CHANGE UP
NITRITE UR-MCNC: NEGATIVE — SIGNIFICANT CHANGE UP
NRBC # BLD: 0 /100 WBCS — SIGNIFICANT CHANGE UP (ref 0–0)
NRBC # BLD: 0 /100 WBCS — SIGNIFICANT CHANGE UP (ref 0–0)
NRBC # FLD: 0 K/UL — SIGNIFICANT CHANGE UP (ref 0–0)
NRBC # FLD: 0 K/UL — SIGNIFICANT CHANGE UP (ref 0–0)
PH UR: 6 — SIGNIFICANT CHANGE UP (ref 5–8)
PHOSPHATE SERPL-MCNC: 2.2 MG/DL — LOW (ref 2.5–4.5)
PHOSPHATE SERPL-MCNC: 3.2 MG/DL — SIGNIFICANT CHANGE UP (ref 2.5–4.5)
PLATELET # BLD AUTO: 438 K/UL — HIGH (ref 150–400)
PLATELET # BLD AUTO: 477 K/UL — HIGH (ref 150–400)
POTASSIUM SERPL-MCNC: 4.1 MMOL/L — SIGNIFICANT CHANGE UP (ref 3.5–5.3)
POTASSIUM SERPL-MCNC: 4.2 MMOL/L — SIGNIFICANT CHANGE UP (ref 3.5–5.3)
POTASSIUM SERPL-SCNC: 4.1 MMOL/L — SIGNIFICANT CHANGE UP (ref 3.5–5.3)
POTASSIUM SERPL-SCNC: 4.2 MMOL/L — SIGNIFICANT CHANGE UP (ref 3.5–5.3)
PROT UR-MCNC: 100 MG/DL
RBC # BLD: 3.42 M/UL — LOW (ref 3.8–5.2)
RBC # BLD: 3.64 M/UL — LOW (ref 3.8–5.2)
RBC # FLD: 12.8 % — SIGNIFICANT CHANGE UP (ref 10.3–14.5)
RBC # FLD: 13 % — SIGNIFICANT CHANGE UP (ref 10.3–14.5)
RBC CASTS # UR COMP ASSIST: 24 /HPF — HIGH (ref 0–4)
REVIEW: SIGNIFICANT CHANGE UP
SODIUM SERPL-SCNC: 132 MMOL/L — LOW (ref 135–145)
SODIUM SERPL-SCNC: 134 MMOL/L — LOW (ref 135–145)
SP GR SPEC: 1.03 — HIGH (ref 1–1.03)
SPECIMEN SOURCE: SIGNIFICANT CHANGE UP
SPECIMEN SOURCE: SIGNIFICANT CHANGE UP
SQUAMOUS # UR AUTO: 1 /HPF — SIGNIFICANT CHANGE UP (ref 0–5)
URATE CRY FLD QL MICRO: PRESENT
UROBILINOGEN FLD QL: 0.2 MG/DL — SIGNIFICANT CHANGE UP (ref 0.2–1)
WBC # BLD: 10.22 K/UL — SIGNIFICANT CHANGE UP (ref 3.8–10.5)
WBC # BLD: 8.99 K/UL — SIGNIFICANT CHANGE UP (ref 3.8–10.5)
WBC # FLD AUTO: 10.22 K/UL — SIGNIFICANT CHANGE UP (ref 3.8–10.5)
WBC # FLD AUTO: 8.99 K/UL — SIGNIFICANT CHANGE UP (ref 3.8–10.5)
WBC UR QL: 1 /HPF — SIGNIFICANT CHANGE UP (ref 0–5)
YEAST-LIKE CELLS: PRESENT

## 2024-12-19 PROCEDURE — 71045 X-RAY EXAM CHEST 1 VIEW: CPT | Mod: 26

## 2024-12-19 RX ORDER — ACETAMINOPHEN 80 MG/.8ML
650 SOLUTION/ DROPS ORAL EVERY 6 HOURS
Refills: 0 | Status: DISCONTINUED | OUTPATIENT
Start: 2024-12-19 | End: 2024-12-19

## 2024-12-19 RX ORDER — INSULIN GLARGINE-YFGN 100 [IU]/ML
20 INJECTION, SOLUTION SUBCUTANEOUS
Refills: 0 | Status: DISCONTINUED | OUTPATIENT
Start: 2024-12-19 | End: 2024-12-22

## 2024-12-19 RX ORDER — ACETAMINOPHEN 80 MG/.8ML
975 SOLUTION/ DROPS ORAL EVERY 6 HOURS
Refills: 0 | Status: DISCONTINUED | OUTPATIENT
Start: 2024-12-19 | End: 2024-12-23

## 2024-12-19 RX ADMIN — HEPARIN SODIUM 5000 UNIT(S): 1000 INJECTION, SOLUTION INTRAVENOUS; SUBCUTANEOUS at 05:34

## 2024-12-19 RX ADMIN — ACETAMINOPHEN 975 MILLIGRAM(S): 80 SOLUTION/ DROPS ORAL at 22:11

## 2024-12-19 RX ADMIN — BENZOCAINE AND MENTHOL 1 LOZENGE: 15; 3.6 LOZENGE ORAL at 05:33

## 2024-12-19 RX ADMIN — ACETAMINOPHEN 650 MILLIGRAM(S): 80 SOLUTION/ DROPS ORAL at 06:18

## 2024-12-19 RX ADMIN — PIPERACILLIN AND TAZOBACTAM 25 GRAM(S): 3; .375 INJECTION, POWDER, LYOPHILIZED, FOR SOLUTION INTRAVENOUS at 21:54

## 2024-12-19 RX ADMIN — BENZOCAINE AND MENTHOL 1 LOZENGE: 15; 3.6 LOZENGE ORAL at 17:39

## 2024-12-19 RX ADMIN — Medication 81 MILLIGRAM(S): at 12:35

## 2024-12-19 RX ADMIN — HEPARIN SODIUM 5000 UNIT(S): 1000 INJECTION, SOLUTION INTRAVENOUS; SUBCUTANEOUS at 21:51

## 2024-12-19 RX ADMIN — GABAPENTIN 100 MILLIGRAM(S): 300 CAPSULE ORAL at 21:51

## 2024-12-19 RX ADMIN — ATORVASTATIN CALCIUM 20 MILLIGRAM(S): 40 TABLET, FILM COATED ORAL at 21:51

## 2024-12-19 RX ADMIN — PIPERACILLIN AND TAZOBACTAM 25 GRAM(S): 3; .375 INJECTION, POWDER, LYOPHILIZED, FOR SOLUTION INTRAVENOUS at 13:25

## 2024-12-19 RX ADMIN — ACETAMINOPHEN 650 MILLIGRAM(S): 80 SOLUTION/ DROPS ORAL at 11:50

## 2024-12-19 RX ADMIN — GABAPENTIN 100 MILLIGRAM(S): 300 CAPSULE ORAL at 13:25

## 2024-12-19 RX ADMIN — GABAPENTIN 100 MILLIGRAM(S): 300 CAPSULE ORAL at 07:04

## 2024-12-19 RX ADMIN — Medication 4: at 12:32

## 2024-12-19 RX ADMIN — Medication 2: at 08:41

## 2024-12-19 RX ADMIN — CLOPIDOGREL BISULFATE 75 MILLIGRAM(S): 75 TABLET, FILM COATED ORAL at 12:35

## 2024-12-19 RX ADMIN — INSULIN GLARGINE-YFGN 20 UNIT(S): 100 INJECTION, SOLUTION SUBCUTANEOUS at 09:31

## 2024-12-19 RX ADMIN — ACETAMINOPHEN 650 MILLIGRAM(S): 80 SOLUTION/ DROPS ORAL at 11:00

## 2024-12-19 RX ADMIN — Medication 1: at 17:38

## 2024-12-19 RX ADMIN — ACETAMINOPHEN 975 MILLIGRAM(S): 80 SOLUTION/ DROPS ORAL at 20:15

## 2024-12-19 RX ADMIN — Medication 10 MILLIGRAM(S): at 05:34

## 2024-12-19 RX ADMIN — HEPARIN SODIUM 5000 UNIT(S): 1000 INJECTION, SOLUTION INTRAVENOUS; SUBCUTANEOUS at 13:26

## 2024-12-19 RX ADMIN — PIPERACILLIN AND TAZOBACTAM 25 GRAM(S): 3; .375 INJECTION, POWDER, LYOPHILIZED, FOR SOLUTION INTRAVENOUS at 05:35

## 2024-12-19 RX ADMIN — ACETAMINOPHEN 650 MILLIGRAM(S): 80 SOLUTION/ DROPS ORAL at 05:32

## 2024-12-19 NOTE — PROGRESS NOTE ADULT - SUBJECTIVE AND OBJECTIVE BOX
CARDIOLOGY FOLLOW UP - Dr. Oliva  DATE OF SERVICE: 12/19/24    CC no cp or sob  s/p L foot partial first ray resection, closed     REVIEW OF SYSTEMS:  CONSTITUTIONAL: No fever, weight loss, or fatigue  RESPIRATORY: No cough, wheezing, chills or hemoptysis; No Shortness of Breath  CARDIOVASCULAR: No chest pain, palpitations, passing out, dizziness  GASTROINTESTINAL: No abdominal or epigastric pain. No nausea, vomiting, or hematemesis; No diarrhea or constipation. No melena or hematochezia.      PHYSICAL EXAM:  T(C): 37.3 (12-19-24 @ 05:08), Max: 38 (12-18-24 @ 11:00)  HR: 84 (12-19-24 @ 05:08) (69 - 85)  BP: 150/60 (12-19-24 @ 05:08) (109/61 - 152/64)  RR: 18 (12-19-24 @ 05:08) (13 - 20)  SpO2: 98% (12-19-24 @ 05:08) (92% - 100%)  Wt(kg): --  I&O's Summary    18 Dec 2024 07:01  -  19 Dec 2024 07:00  --------------------------------------------------------  IN: 200 mL / OUT: 0 mL / NET: 200 mL        Appearance: Normal	  Cardiovascular: Normal S1 S2,RRR, No JVD, No murmurs  Respiratory: Lungs clear to auscultation	  Gastrointestinal:  Soft, Non-tender, + BS	  Extremities: no edema , dressing LLE      Home Medications:  aspirin 81 mg oral delayed release tablet: 1 tab(s) orally once a day (04 Dec 2024 04:28)  atorvastatin 20 mg oral tablet: 1 tab(s) orally once a day (at bedtime) (04 Dec 2024 04:28)  Farxiga 5 mg oral tablet: 1 tab(s) orally once a day (04 Dec 2024 04:32)  hydroCHLOROthiazide 25 mg oral tablet: 1 tab(s) orally once a day (04 Dec 2024 04:28)  Amanda Aguirrear Pen 100 units/mL subcutaneous solution: 30 unit(s) subcutaneous once a day (at bedtime) (04 Dec 2024 04:28)  losartan 100 mg oral tablet: 1 tab(s) orally once a day (04 Dec 2024 04:28)  Norvasc 10 mg oral tablet: 1 tab(s) orally once a day (04 Dec 2024 04:28)  NovoLOG 100 units/mL subcutaneous solution: 10 unit(s) subcutaneous 3 times a day (before meals) (04 Dec 2024 04:28)      MEDICATIONS  (STANDING):  amLODIPine   Tablet 10 milliGRAM(s) Oral daily  aspirin  chewable 81 milliGRAM(s) Oral daily  atorvastatin 20 milliGRAM(s) Oral at bedtime  benzocaine/menthol Lozenge 1 Lozenge Oral two times a day  clopidogrel Tablet 75 milliGRAM(s) Oral daily  dextrose 5%. 1000 milliLiter(s) (50 mL/Hr) IV Continuous <Continuous>  dextrose 5%. 1000 milliLiter(s) (100 mL/Hr) IV Continuous <Continuous>  dextrose 50% Injectable 25 Gram(s) IV Push once  dextrose 50% Injectable 12.5 Gram(s) IV Push once  dextrose 50% Injectable 25 Gram(s) IV Push once  gabapentin 100 milliGRAM(s) Oral three times a day  glucagon  Injectable 1 milliGRAM(s) IntraMuscular once  glucagon  Injectable 1 milliGRAM(s) IntraMuscular once  heparin   Injectable 5000 Unit(s) SubCutaneous every 8 hours  influenza  Vaccine (HIGH DOSE) 0.5 milliLiter(s) IntraMuscular once  insulin glargine Injectable (LANTUS) 20 Unit(s) SubCutaneous before breakfast  insulin lispro (ADMELOG) corrective regimen sliding scale   SubCutaneous three times a day before meals  insulin lispro (ADMELOG) corrective regimen sliding scale   SubCutaneous at bedtime  piperacillin/tazobactam IVPB.. 3.375 Gram(s) IV Intermittent every 8 hours  polyethylene glycol 3350 17 Gram(s) Oral daily  sodium chloride 0.9%. 1000 milliLiter(s) (50 mL/Hr) IV Continuous <Continuous>      TELEMETRY: 	    ECG:  	  RADIOLOGY:   DIAGNOSTIC TESTING:  [ ] Echocardiogram:  [ ]  Catheterization:  [ ] Stress Test:    OTHER: 	    LABS:	 	                            10.3   8.99  )-----------( 477      ( 19 Dec 2024 08:21 )             33.0     12-19    134[L]  |  100  |  16  ----------------------------<  212[H]  4.1   |  22  |  1.37[H]    Ca    9.6      19 Dec 2024 08:21  Phos  3.2     12-19  Mg     2.10     12-19      PT/INR - ( 18 Dec 2024 02:37 )   PT: 12.9 sec;   INR: 1.08 ratio         PTT - ( 18 Dec 2024 02:37 )  PTT:37.7 sec

## 2024-12-19 NOTE — PROGRESS NOTE ADULT - SUBJECTIVE AND OBJECTIVE BOX
[facility], Division of Infectious Diseases  TOBY Szymanski Y. Patel, S. Shah, G. Álvaro  [phone]  ([phone] - weekdays after 5pm and weekends)    Name: REJI MEADE  Age/Gender: 69y Female  MRN: [id number]    Interval History:  Notes reviewed.   No concerning overnight events.  Afebrile.   s/p OR yesterday  denies diarrhea    Allergies: No Known Allergies      Objective:  Vitals:   T(F): 99.1 (12-19-24 @ 05:08), Max: 100.4 (12-18-24 @ 11:00)  HR: 84 (12-19-24 @ 05:08) (69 - 85)  BP: 150/60 (12-19-24 @ 05:08) (109/61 - 152/64)  RR: 18 (12-19-24 @ 05:08) (13 - 20)  SpO2: 98% (12-19-24 @ 05:08) (92% - 100%)  Physical Examination:  General: no acute distress  HEENT: anicteric  Cardio: S1, S2, normal rate  Resp: clear to auscultation anteriorly   Abd: soft, NT, ND  Ext: L foot wrapped w/ dressing  Skin: warm, dry    Laboratory Studies:  CBC:                       10.3   8.99  )-----------( 477      ( 19 Dec 2024 08:21 )             33.0     WBC Trend:  8.99 12-19-24 @ 08:21  10.54 12-18-24 @ 02:37  8.99 12-17-24 @ 07:11  9.98 12-16-24 @ 01:20  9.71 12-15-24 @ 06:35  9.81 12-14-24 @ 07:20  11.03 12-13-24 @ 05:49    CMP: 12-19    134[L]  |  100  |  16  ----------------------------<  212[H]  4.1   |  22  |  1.37[H]    Ca    9.6      19 Dec 2024 08:21  Phos  3.2     12-19  Mg     2.10     12-19            Urinalysis Basic - ( 19 Dec 2024 08:21 )    Color: x / Appearance: x / SG: x / pH: x  Gluc: 212 mg/dL / Ketone: x  / Bili: x / Urobili: x   Blood: x / Protein: x / Nitrite: x   Leuk Esterase: x / RBC: x / WBC x   Sq Epi: x / Non Sq Epi: x / Bacteria: x      Microbiology: reviewed     Culture - Blood (collected 12-07-24 @ 22:31)  Source: .Blood BLOOD  Final Report (12-13-24 @ 01:00):    No growth at 5 days    Culture - Blood (collected 12-07-24 @ 18:46)  Source: .Blood BLOOD  Final Report (12-13-24 @ 01:00):    No growth at 5 days        Radiology: reviewed     Medications:  acetaminophen     Tablet .. 650 milliGRAM(s) Oral every 6 hours PRN  amLODIPine   Tablet 10 milliGRAM(s) Oral daily  aspirin  chewable 81 milliGRAM(s) Oral daily  atorvastatin 20 milliGRAM(s) Oral at bedtime  benzocaine/menthol Lozenge 1 Lozenge Oral two times a day  clopidogrel Tablet 75 milliGRAM(s) Oral daily  dextrose 5%. 1000 milliLiter(s) IV Continuous <Continuous>  dextrose 5%. 1000 milliLiter(s) IV Continuous <Continuous>  dextrose 50% Injectable 25 Gram(s) IV Push once  dextrose 50% Injectable 12.5 Gram(s) IV Push once  dextrose 50% Injectable 25 Gram(s) IV Push once  gabapentin 100 milliGRAM(s) Oral three times a day  glucagon  Injectable 1 milliGRAM(s) IntraMuscular once  glucagon  Injectable 1 milliGRAM(s) IntraMuscular once  heparin   Injectable 5000 Unit(s) SubCutaneous every 8 hours  influenza  Vaccine (HIGH DOSE) 0.5 milliLiter(s) IntraMuscular once  insulin glargine Injectable (LANTUS) 20 Unit(s) SubCutaneous before breakfast  insulin lispro (ADMELOG) corrective regimen sliding scale   SubCutaneous three times a day before meals  insulin lispro (ADMELOG) corrective regimen sliding scale   SubCutaneous at bedtime  melatonin 3 milliGRAM(s) Oral at bedtime PRN  morphine  - Injectable 2 milliGRAM(s) IV Push every 4 hours PRN  ondansetron Injectable 4 milliGRAM(s) IV Push every 8 hours PRN  oxycodone    5 mG/acetaminophen 325 mG 1 Tablet(s) Oral every 4 hours PRN  piperacillin/tazobactam IVPB.. 3.375 Gram(s) IV Intermittent every 8 hours  polyethylene glycol 3350 17 Gram(s) Oral daily  sodium chloride 0.9%. 1000 milliLiter(s) IV Continuous <Continuous>    Antimicrobials:  piperacillin/tazobactam IVPB.. 3.375 Gram(s) IV Intermittent every 8 hours

## 2024-12-19 NOTE — PROGRESS NOTE ADULT - SUBJECTIVE AND OBJECTIVE BOX
[facility] [facility]   MD LI WADE MD ANGELA WONG, PA    TEL:  OFFICE: [phone]  From 5pm-7am Answering Service [phone]    -- RENAL FOLLOW UP NOTE ---Date of Service 12-19-24 @ 13:22    Patient is a 69y old  Female who presents with a chief complaint of Sent in by podiatrist for right foot/toe wound, worsening since October. (19 Dec 2024 10:50)      Patient seen and examined at bedside. No chest pain/sob    VITALS:  T(F): 99 (12-19-24 @ 11:50), Max: 100.4 (12-19-24 @ 10:53)  HR: 87 (12-19-24 @ 10:53)  BP: 151/61 (12-19-24 @ 10:53)  RR: 20 (12-19-24 @ 10:53)  SpO2: 100% (12-19-24 @ 10:53)  Wt(kg): --    12-18 @ 07:01  -  12-19 @ 07:00  --------------------------------------------------------  IN: 200 mL / OUT: 0 mL / NET: 200 mL      Height (cm): 172.7 (12-18 @ 17:38)  Weight (kg): 81.6 (12-18 @ 17:38)  BMI (kg/m2): 27.4 (12-18 @ 17:38)  BSA (m2): 1.95 (12-18 @ 17:38)    PHYSICAL EXAM:  General: NAD  Neck: No JVD  Respiratory: CTAB, no wheezes, rales or rhonchi  Cardiovascular: S1, S2, RRR  Gastrointestinal: BS+, soft, NT/ND  Extremities: No peripheral edema    Hospital Medications:   MEDICATIONS  (STANDING):  amLODIPine   Tablet 10 milliGRAM(s) Oral daily  aspirin  chewable 81 milliGRAM(s) Oral daily  atorvastatin 20 milliGRAM(s) Oral at bedtime  benzocaine/menthol Lozenge 1 Lozenge Oral two times a day  clopidogrel Tablet 75 milliGRAM(s) Oral daily  dextrose 5%. 1000 milliLiter(s) (50 mL/Hr) IV Continuous <Continuous>  dextrose 5%. 1000 milliLiter(s) (100 mL/Hr) IV Continuous <Continuous>  dextrose 50% Injectable 25 Gram(s) IV Push once  dextrose 50% Injectable 12.5 Gram(s) IV Push once  dextrose 50% Injectable 25 Gram(s) IV Push once  gabapentin 100 milliGRAM(s) Oral three times a day  glucagon  Injectable 1 milliGRAM(s) IntraMuscular once  glucagon  Injectable 1 milliGRAM(s) IntraMuscular once  heparin   Injectable 5000 Unit(s) SubCutaneous every 8 hours  influenza  Vaccine (HIGH DOSE) 0.5 milliLiter(s) IntraMuscular once  insulin glargine Injectable (LANTUS) 20 Unit(s) SubCutaneous before breakfast  insulin lispro (ADMELOG) corrective regimen sliding scale   SubCutaneous three times a day before meals  insulin lispro (ADMELOG) corrective regimen sliding scale   SubCutaneous at bedtime  piperacillin/tazobactam IVPB.. 3.375 Gram(s) IV Intermittent every 8 hours  polyethylene glycol 3350 17 Gram(s) Oral daily  sodium chloride 0.9%. 1000 milliLiter(s) (50 mL/Hr) IV Continuous <Continuous>      LABS:  12-19    134[L]  |  100  |  16  ----------------------------<  212[H]  4.1   |  22  |  1.37[H]    Ca    9.6      19 Dec 2024 08:21  Phos  3.2     12-19  Mg     2.10     12-19      Creatinine Trend: 1.37 <--, 1.42 <--, 1.34 <--, 1.35 <--, 1.30 <--, 1.26 <--, 1.39 <--    Phosphorus: 3.2 mg/dL (12-19 @ 08:21)                              10.3   8.99  )-----------( 477      ( 19 Dec 2024 08:21 )             33.0     Urine Studies:  Urinalysis - [12-19-24 @ 08:21]      Color  / Appearance  / SG  / pH       Gluc 212 / Ketone   / Bili  / Urobili        Blood  / Protein  / Leuk Est  / Nitrite       RBC  / WBC  / Hyaline  / Gran  / Sq Epi  / Non Sq Epi  / Bacteria       HbA1c 10.7      [02-25-20 @ 14:10]  TSH 1.00      [12-04-24 @ 06:20]        RADIOLOGY & ADDITIONAL STUDIES:

## 2024-12-19 NOTE — PHYSICAL THERAPY INITIAL EVALUATION ADULT - GAIT PATTERN USED, PT EVAL
patient attempted to walk without a device, notes to reach for walls and furniture, given rolling walker to improve stability and safety with improvements in gait/3-point gait

## 2024-12-19 NOTE — PHYSICAL THERAPY INITIAL EVALUATION ADULT - ADDITIONAL COMMENTS
Patient lives alone in an apartment within a private home, reports no steps to enter with a flight of stairs with unilateral handrail to get to her apartment. Denies the use of any assistive devices or any history of falls.     Patient left in bed, NAD, all lines and tubes intact, bed alarm on, call bell within reach, head of bed elevated >30 degrees, RN Virgil aware of PT

## 2024-12-19 NOTE — PROGRESS NOTE ADULT - ASSESSMENT
70 y/o F PMhx DM II, HTN who presented w/ left great toe wound    L hallux infection, c/f osteo  leukocytosis, DM II  CRP 88.8,   s/p podiatry I&D L hallux wound to the level of and not beyond bone,  mild malodor, scant serosanguinous drainage. Right foot no open wounds, no acute signs of infection.  foot x-ray- fracture distal phalanx great toe. soft tissue swelling of this digit as well as subcutaneous air.  per vascular possible plans for angiogram  wound cx w/ strep constellatus, citrobacter, morganella, staph lugdunensis (oxacillin sensitive)  febrile 12/7, ceftriaxone switched to zosyn  s/p LLE angio & AT angioplasty 12/16  s/p s/p left foot Partial 1st ray resection, closed 12/18  - noted low concern for residual bone infection    Recommendations  c/w zosyn for now  f/u OR cultures  further recs pending above, will plan on PO antibiotics on discharge given low concern for residual bone infection  will need to f/u in [facility] office outpatient to f/u bone path    Albino Rea M.D.  [facility], Division of Infectious Diseases  [phone]  After 5pm on weekdays and all day on weekends - please call [phone]

## 2024-12-19 NOTE — PROGRESS NOTE ADULT - TIME BILLING
agree with above
Agree with above ACP note.  cv stable  echo with normal lv fxn, valve function  remains cv stable to proceed for le angio with acceptable cv risk
agree with above  cv stable  cont current mgmt
Agree with above ACP note.  cv stable  await le angio
Agree with above ACP note.  cv stable  cont current tx
Agree with above ACP note.  cv stable  cont current tx
Agree with above ACP note.  events noted  cv stable  monitor bp  remains stable to proceed for le angio
agree with above ACP note.  cv stable  cont current tx
Patient seen and examined.  Agree with above ACP note.  cv stable to proceed with le angio
agree with above  cv stable  optimized for OR  antiplat per vasc

## 2024-12-19 NOTE — PROVIDER CONTACT NOTE (OTHER) - NAME OF MD/NP/PA/DO NOTIFIED:
Jose Roberto Redding
Rama Jeffers (MD-Vascular Surgery)
Arlen Hu
Delisa Shannon
Kameron Saint-Stuart
Delisa Shannon
Jose Roberto Redding
Stef Starr
Vascular Surgery [id number]

## 2024-12-19 NOTE — PROVIDER CONTACT NOTE (OTHER) - BACKGROUND
68 yo female h/o DM type2, HTN, hyperlipidemia a/w infected L foot wound iso Type 2 DM presents with L.foot hallux wound to bone.
70 yo female w/ infected L foot wound.
pt admitted for Local infection of skin and subcutaneous tissue. pt A&Ox4
Pt admitted with local infection of skin and subcutaneous tissue. PMH of HLD, DM2, and CKD.
pt admitted for local infection of skin and subcutaneous tissue.
pt came in with local infection of skin and subcutaneous tissue. pt a&ox4
Pt admitted with L big toe wound. Vascular planing angiogram for cardiac clearance.
Pt admitted for local infection of skin and subcutaneous tissue
Pt admitted with Left foot infection. PMH of HTN, HLD, DM2, and CKD.

## 2024-12-19 NOTE — PROGRESS NOTE ADULT - SUBJECTIVE AND OBJECTIVE BOX
Patient is a 69y old  Female who presents with a chief complaint of Sent in by podiatrist for right foot/toe wound, worsening since October. (19 Dec 2024 06:07)       INTERVAL HPI/OVERNIGHT EVENTS:  Patient seen and evaluated at bedside.  Pt is resting comfortable in NAD. Denies N/V/F/C.      Allergies    No Known Allergies    Intolerances        Vital Signs Last 24 Hrs  T(C): 37.3 (19 Dec 2024 05:08), Max: 38 (18 Dec 2024 11:00)  T(F): 99.1 (19 Dec 2024 05:08), Max: 100.4 (18 Dec 2024 11:00)  HR: 84 (19 Dec 2024 05:08) (69 - 85)  BP: 150/60 (19 Dec 2024 05:08) (109/61 - 152/64)  BP(mean): 84 (18 Dec 2024 23:00) (77 - 87)  RR: 18 (19 Dec 2024 05:08) (13 - 20)  SpO2: 98% (19 Dec 2024 05:08) (92% - 100%)    Parameters below as of 19 Dec 2024 05:08  Patient On (Oxygen Delivery Method): room air        LABS:                        10.3   8.99  )-----------( 477      ( 19 Dec 2024 08:21 )             33.0     12-19    134[L]  |  100  |  16  ----------------------------<  212[H]  4.1   |  22  |  1.37[H]    Ca    9.6      19 Dec 2024 08:21  Phos  3.2     12-19  Mg     2.10     12-19      PT/INR - ( 18 Dec 2024 02:37 )   PT: 12.9 sec;   INR: 1.08 ratio         PTT - ( 18 Dec 2024 02:37 )  PTT:37.7 sec  Urinalysis Basic - ( 19 Dec 2024 08:21 )    Color: x / Appearance: x / SG: x / pH: x  Gluc: 212 mg/dL / Ketone: x  / Bili: x / Urobili: x   Blood: x / Protein: x / Nitrite: x   Leuk Esterase: x / RBC: x / WBC x   Sq Epi: x / Non Sq Epi: x / Bacteria: x      CAPILLARY BLOOD GLUCOSE      POCT Blood Glucose.: 283 mg/dL (19 Dec 2024 09:29)  POCT Blood Glucose.: 207 mg/dL (19 Dec 2024 08:16)  POCT Blood Glucose.: 128 mg/dL (18 Dec 2024 23:27)  POCT Blood Glucose.: 115 mg/dL (18 Dec 2024 22:40)  POCT Blood Glucose.: 134 mg/dL (18 Dec 2024 16:49)  POCT Blood Glucose.: 143 mg/dL (18 Dec 2024 12:21)      Lower Extremity Physical Exam:  Vascular: DP/PT 0/4, B/L, CFT <3 seconds B/L, Temperature gradient warm to cool, B/L.   Neuro: Epicritic sensation absent to the level of digits, B/L.  Musculoskeletal/Ortho: unremarkable  Skin:  12/18 s/p partial first ray resection, closed, no hematoma, sutures intact, flaps warm and viable, scant sanginous drainage.     RADIOLOGY & ADDITIONAL TESTS:

## 2024-12-19 NOTE — PROGRESS NOTE ADULT - ASSESSMENT
69F 12/18 s/p L foot partial first ray resection, closed   - Pt was seen and evaluated  - Afebrile, no leukocytosis   - 12/18 s/p partial first ray resection, closed, no hematoma, sutures intact, flaps warm and viable, scant sanginous drainage.   - Intraop findings: low concern residual infection, low concern viability   - OR data pending   - ID recs, appreciated  - Vasc recs, appreciated  - Stable for d/c from podiatry standpoint tomorrow 12/20 pending 48h of no growth on clean margins   - Follow up and wound recs listed in d/c note provider under follow up   - Discussed with attending

## 2024-12-19 NOTE — PHYSICAL THERAPY INITIAL EVALUATION ADULT - NSPTDISCHREC_GEN_A_CORE
home with home PT services to address current functional limitations to optimize safety within the home environment with the use of a rolling walker./Home PT

## 2024-12-19 NOTE — PROVIDER CONTACT NOTE (OTHER) - RECOMMENDATIONS
Repeat temperature and pain medication.
Can Tylenol order be updated to include admin for fever?
Administer PRN Tylenol. Recheck temperature 30mins-1hr s/p administration. Continue to monitor.
Continue to monitor, possibly administer fluids?
Vascular Surgery contacted to verify the aforementioned NPO order (see above). Continue to monitor

## 2024-12-19 NOTE — PROGRESS NOTE ADULT - ASSESSMENT
A/P  59yo female h/o DM type2, HTN, hyperlipidemia; Pt c/o left great toe infection.    #diabetic foot infection, PAD  -sp bedside debridement with podiatry on 12/4/24  -VA JACK WITH PVR noted   -ecg with no ischemic changes, no chf or significant valvular disease on exam   -echo with nl LV sys FX No significant valvular disease.  -s/p LLE angiogram and AT angioplasty on 12/16  -s/p L foot partial first ray resection, closed  12/18  -antiplat per vasc  -cv stable postop     #HTN   -c/w meds    dvt ppx

## 2024-12-19 NOTE — PHYSICAL THERAPY INITIAL EVALUATION ADULT - MANUAL MUSCLE TESTING RESULTS, REHAB EVAL
Patients bilateral upper and lower extremity strength grossly 4+/5 upon MMT and functional assessment

## 2024-12-19 NOTE — PHYSICAL THERAPY INITIAL EVALUATION ADULT - GENERAL OBSERVATIONS, REHAB EVAL
Patient found semi-reclined in bed NAD, A&Ox4, +left foot dressing clean, dry, and intact, +IV, OK for PT per RN Virgil, patient agreeable to PT evaluation

## 2024-12-19 NOTE — PHYSICAL THERAPY INITIAL EVALUATION ADULT - PATIENT PROFILE REVIEW, REHAB EVAL
----- Message from Kanwal Thakkar sent at 1/17/2024 12:00 PM CST -----  Contact: Kiki Valdivia  [phone]  [email or web address]     Patient is calling for Medical Advice regarding: a critical lab on the line     How long has patient had these symptoms:    Pharmacy name and phone#:    Would like response via [employer]:  n/a    Comments:     a critical lab on the line Shannon Valdivia     
Informed PCP - Dr Hudson.  Also phone call to coumadin clinic - reported INR of 5.2 to [facility]   
ACTIVITY; ambulate with assistance, left lower extremity weight bearing as tolerated/yes

## 2024-12-19 NOTE — PROGRESS NOTE ADULT - SUBJECTIVE AND OBJECTIVE BOX
Vascular Surgery Daily Resident Progress Note    Subjective and Interval  Seen and examined at bedside. No acute overnight events. Afebrile and VSS. Does not endorse chest pain, SOB, nausea, headache, fever or chills.   ___________________________________________________  Vital Signs  T(C): 37.3 (05:08), Max: 38 (11:00)  HR: 84 (05:08) (69 - 85)  BP: 150/60 (05:08) (109/61 - 152/64)  ABP: --  ABP(mean): --  RR: 18 (05:08) (13 - 20)  SpO2: 98% (05:08) (92% - 100%)    Physical Exam  General: NAD, sitting up comfortably in bed  Cardiovascular: RRR  Respiratory: Even and unlabored breathing  Gastrointestinal:  Soft, Non-tende  LLE: dressing c/d/i; DP and PT + signal   RLE: No bleeding, small palp lump at right groin that is nontender       In&Out    12-18-24 @ 07:01  -  12-19-24 @ 06:08  --------------------------------------------------------  IN: 200 mL / OUT: 0 mL / NET: 200 mL        Labs    LABS:  cret                        9.7    10.54 )-----------( 559      ( 18 Dec 2024 02:37 )             30.0     12-18    132[L]  |  100  |  19  ----------------------------<  179[H]  4.0   |  21[L]  |  1.42[H]    Ca    9.5      18 Dec 2024 02:37  Phos  3.0     12-18  Mg     2.10     12-18      PT/INR - ( 18 Dec 2024 02:37 )   PT: 12.9 sec;   INR: 1.08 ratio         PTT - ( 18 Dec 2024 02:37 )  PTT:37.7 sec      Medications  acetaminophen     Tablet .. 650 milliGRAM(s) Oral every 6 hours PRN  amLODIPine   Tablet 10 milliGRAM(s) Oral daily  aspirin  chewable 81 milliGRAM(s) Oral daily  atorvastatin 20 milliGRAM(s) Oral at bedtime  benzocaine/menthol Lozenge 1 Lozenge Oral two times a day  clopidogrel Tablet 75 milliGRAM(s) Oral daily  dextrose 5%. 1000 milliLiter(s) IV Continuous <Continuous>  dextrose 5%. 1000 milliLiter(s) IV Continuous <Continuous>  dextrose 50% Injectable 25 Gram(s) IV Push once  dextrose 50% Injectable 12.5 Gram(s) IV Push once  dextrose 50% Injectable 25 Gram(s) IV Push once  gabapentin 100 milliGRAM(s) Oral three times a day  glucagon  Injectable 1 milliGRAM(s) IntraMuscular once  glucagon  Injectable 1 milliGRAM(s) IntraMuscular once  heparin   Injectable 5000 Unit(s) SubCutaneous every 8 hours  influenza  Vaccine (HIGH DOSE) 0.5 milliLiter(s) IntraMuscular once  insulin glargine Injectable (LANTUS) 16 Unit(s) SubCutaneous every morning  insulin lispro (ADMELOG) corrective regimen sliding scale   SubCutaneous three times a day before meals  insulin lispro (ADMELOG) corrective regimen sliding scale   SubCutaneous at bedtime  melatonin 3 milliGRAM(s) Oral at bedtime PRN  morphine  - Injectable 2 milliGRAM(s) IV Push every 4 hours PRN  ondansetron Injectable 4 milliGRAM(s) IV Push every 8 hours PRN  oxycodone    5 mG/acetaminophen 325 mG 1 Tablet(s) Oral every 4 hours PRN  piperacillin/tazobactam IVPB.. 3.375 Gram(s) IV Intermittent every 8 hours  polyethylene glycol 3350 17 Gram(s) Oral daily  sodium chloride 0.9%. 1000 milliLiter(s) IV Continuous <Continuous>      Microbiology      ___________________________________________________  Assessment & Plan    69 year old female with PMH type 2 DM, CKD stage 3, HTN, HLD presents with osteomyelitis at left hallux. LLE angiogram cancelled 12/11 and 12/12. Patient refused angiogram on 12/13 after multiple cancellations. Now s/p LLE angiogram and AT angioplasty on 12/16 and  left foot Partial 1st ray resection, closed on 12/18. Plan for potential future bypass.     Plan  - Zosyn, f/u ID for dispo recs post-procedure  - Aspirin  - Subq heparin  - Medical and cardiac clearances documented  - Patient consented  - IVF as per previous angiogram planning from nephrology recommendations    C team surgery   [id number]     Vascular Surgery Daily Resident Progress Note    Subjective and Interval  Seen and examined at bedside. No acute overnight events. Afebrile and VSS. Does not endorse chest pain, SOB, nausea, headache, fever or chills.   ___________________________________________________  Vital Signs  T(C): 37.3 (05:08), Max: 38 (11:00)  HR: 84 (05:08) (69 - 85)  BP: 150/60 (05:08) (109/61 - 152/64)  ABP: --  ABP(mean): --  RR: 18 (05:08) (13 - 20)  SpO2: 98% (05:08) (92% - 100%)    Physical Exam  General: NAD, sitting up comfortably in bed  Cardiovascular: RRR  Respiratory: Even and unlabored breathing  Gastrointestinal:  Soft, Non-tende  LLE: L foot dressing c/d/i;   RLE: No bleeding, small palp lump at right groin that is nontender       In&Out    12-18-24 @ 07:01  -  12-19-24 @ 06:08  --------------------------------------------------------  IN: 200 mL / OUT: 0 mL / NET: 200 mL        Labs    LABS:  cret                        9.7    10.54 )-----------( 559      ( 18 Dec 2024 02:37 )             30.0     12-18    132[L]  |  100  |  19  ----------------------------<  179[H]  4.0   |  21[L]  |  1.42[H]    Ca    9.5      18 Dec 2024 02:37  Phos  3.0     12-18  Mg     2.10     12-18      PT/INR - ( 18 Dec 2024 02:37 )   PT: 12.9 sec;   INR: 1.08 ratio         PTT - ( 18 Dec 2024 02:37 )  PTT:37.7 sec      Medications  acetaminophen     Tablet .. 650 milliGRAM(s) Oral every 6 hours PRN  amLODIPine   Tablet 10 milliGRAM(s) Oral daily  aspirin  chewable 81 milliGRAM(s) Oral daily  atorvastatin 20 milliGRAM(s) Oral at bedtime  benzocaine/menthol Lozenge 1 Lozenge Oral two times a day  clopidogrel Tablet 75 milliGRAM(s) Oral daily  dextrose 5%. 1000 milliLiter(s) IV Continuous <Continuous>  dextrose 5%. 1000 milliLiter(s) IV Continuous <Continuous>  dextrose 50% Injectable 25 Gram(s) IV Push once  dextrose 50% Injectable 12.5 Gram(s) IV Push once  dextrose 50% Injectable 25 Gram(s) IV Push once  gabapentin 100 milliGRAM(s) Oral three times a day  glucagon  Injectable 1 milliGRAM(s) IntraMuscular once  glucagon  Injectable 1 milliGRAM(s) IntraMuscular once  heparin   Injectable 5000 Unit(s) SubCutaneous every 8 hours  influenza  Vaccine (HIGH DOSE) 0.5 milliLiter(s) IntraMuscular once  insulin glargine Injectable (LANTUS) 16 Unit(s) SubCutaneous every morning  insulin lispro (ADMELOG) corrective regimen sliding scale   SubCutaneous three times a day before meals  insulin lispro (ADMELOG) corrective regimen sliding scale   SubCutaneous at bedtime  melatonin 3 milliGRAM(s) Oral at bedtime PRN  morphine  - Injectable 2 milliGRAM(s) IV Push every 4 hours PRN  ondansetron Injectable 4 milliGRAM(s) IV Push every 8 hours PRN  oxycodone    5 mG/acetaminophen 325 mG 1 Tablet(s) Oral every 4 hours PRN  piperacillin/tazobactam IVPB.. 3.375 Gram(s) IV Intermittent every 8 hours  polyethylene glycol 3350 17 Gram(s) Oral daily  sodium chloride 0.9%. 1000 milliLiter(s) IV Continuous <Continuous>      Microbiology      ___________________________________________________  Assessment & Plan    69 year old female with PMH type 2 DM, CKD stage 3, HTN, HLD presents with osteomyelitis at left hallux. LLE angiogram cancelled 12/11 and 12/12. Patient refused angiogram on 12/13 after multiple cancellations. Now s/p LLE angiogram and AT angioplasty on 12/16 and  left foot Partial 1st ray resection, closed on 12/18.     Plan  - Rudolph, f/u ID for dispo recs post-procedure  - PT   - Pain control   - Aspirin, plavix   - Subq heparin  - FU Am labs   - Dispo plan: Pending       C team surgery   [id number]

## 2024-12-19 NOTE — PHYSICAL THERAPY INITIAL EVALUATION ADULT - PERTINENT HX OF CURRENT PROBLEM, REHAB EVAL
Patient is a 69 year old female with left foot and toe wound, worsening since October, found to have osteomyelitis at left hallux. S/p left foot partial first ray resection, closed, PID #1, weight bearing as tolerated to left heel in [employer] shoe.

## 2024-12-19 NOTE — PROGRESS NOTE ADULT - ASSESSMENT
57yo female h/o DM type2, HTN, hyperlipidemia; Pt c/o left great toe infection. vascular on board planning for angiogram. nephrology consulted for elevated S.Cr.    ckd stage 3B  baseline ~ 1.4   long standing hx of dm and htn  admitted with scr now up trending  scr relatively stable  on losartan and htcz at home current on hold  FEurea 35%  renal us no hydro   s/p trial of gentle hydration 12/5  Renal function stable  s/p angio  Monitor BMP and UO.   Avoid further nephrotoxins if possible.    htn  controlled  continue with current meds  Low salt diet.  monitor BP.    Hyponatremia  na better today  check urine na and osmo  Optimize glycemic control.  Monitor Na.    LE  wound  f/u vascular  s/p angiogram 12/16

## 2024-12-19 NOTE — PROGRESS NOTE ADULT - SUBJECTIVE AND OBJECTIVE BOX
Name of Patient : REJI MEADE  MRN: [id number]        Subjective: Patient seen and examined. No new events except as noted.   doing okay     REVIEW OF SYSTEMS:    CONSTITUTIONAL: No weakness, fevers or chills  EYES/ENT: No visual changes;  No vertigo or throat pain   NECK: No pain or stiffness  RESPIRATORY: No cough, wheezing, hemoptysis; No shortness of breath  CARDIOVASCULAR: No chest pain or palpitations  GASTROINTESTINAL: No abdominal or epigastric pain. No nausea, vomiting, or hematemesis; No diarrhea or constipation. No melena or hematochezia.  GENITOURINARY: No dysuria, frequency or hematuria  NEUROLOGICAL: No numbness or weakness  SKIN: No itching, burning, rashes, or lesions   All other review of systems is negative unless indicated above.    MEDICATIONS:  MEDICATIONS  (STANDING):  amLODIPine   Tablet 10 milliGRAM(s) Oral daily  aspirin  chewable 81 milliGRAM(s) Oral daily  atorvastatin 20 milliGRAM(s) Oral at bedtime  benzocaine/menthol Lozenge 1 Lozenge Oral two times a day  clopidogrel Tablet 75 milliGRAM(s) Oral daily  dextrose 5%. 1000 milliLiter(s) (50 mL/Hr) IV Continuous <Continuous>  dextrose 5%. 1000 milliLiter(s) (100 mL/Hr) IV Continuous <Continuous>  dextrose 50% Injectable 25 Gram(s) IV Push once  dextrose 50% Injectable 12.5 Gram(s) IV Push once  dextrose 50% Injectable 25 Gram(s) IV Push once  gabapentin 100 milliGRAM(s) Oral three times a day  glucagon  Injectable 1 milliGRAM(s) IntraMuscular once  glucagon  Injectable 1 milliGRAM(s) IntraMuscular once  heparin   Injectable 5000 Unit(s) SubCutaneous every 8 hours  influenza  Vaccine (HIGH DOSE) 0.5 milliLiter(s) IntraMuscular once  insulin glargine Injectable (LANTUS) 20 Unit(s) SubCutaneous before breakfast  insulin lispro (ADMELOG) corrective regimen sliding scale   SubCutaneous three times a day before meals  insulin lispro (ADMELOG) corrective regimen sliding scale   SubCutaneous at bedtime  piperacillin/tazobactam IVPB.. 3.375 Gram(s) IV Intermittent every 8 hours  polyethylene glycol 3350 17 Gram(s) Oral daily  sodium chloride 0.9%. 1000 milliLiter(s) (50 mL/Hr) IV Continuous <Continuous>      PHYSICAL EXAM:  T(C): 37.9 (12-19-24 @ 21:50), Max: 39.3 (12-19-24 @ 18:05)  HR: 90 (12-19-24 @ 21:50) (84 - 90)  BP: 144/58 (12-19-24 @ 21:50) (144/58 - 161/57)  RR: 18 (12-19-24 @ 21:50) (18 - 20)  SpO2: 98% (12-19-24 @ 21:50) (95% - 100%)  Wt(kg): --  I&O's Summary    18 Dec 2024 07:01  -  19 Dec 2024 07:00  --------------------------------------------------------  IN: 200 mL / OUT: 0 mL / NET: 200 mL          Appearance: Normal	  HEENT:  PERRLA   Lymphatic: No lymphadenopathy   Cardiovascular: Normal S1 S2, no JVD  Respiratory: normal effort , clear  Gastrointestinal:  Soft, Non-tender  Skin: No rashes,  warm to touch  Psychiatry:  Mood & affect appropriate  Musculuskeletal: No edema    recent labs, Imaging and EKGs personally reviewed     Urinalysis with Rflx Culture (collected 12-19-24 @ 18:56)    12-18-24 @ 07:01  -  12-19-24 @ 07:00  --------------------------------------------------------  IN: 200 mL / OUT: 0 mL / NET: 200 mL                            9.6    10.22 )-----------( 438      ( 19 Dec 2024 20:45 )             30.7               12-19    132[L]  |  99  |  16  ----------------------------<  211[H]  4.2   |  22  |  1.39[H]    Ca    9.2      19 Dec 2024 21:16  Phos  2.2     12-19  Mg     2.10     12-19      PT/INR - ( 18 Dec 2024 02:37 )   PT: 12.9 sec;   INR: 1.08 ratio         PTT - ( 18 Dec 2024 02:37 )  PTT:37.7 sec                   Urinalysis Basic - ( 19 Dec 2024 21:16 )    Color: x / Appearance: x / SG: x / pH: x  Gluc: 211 mg/dL / Ketone: x  / Bili: x / Urobili: x   Blood: x / Protein: x / Nitrite: x   Leuk Esterase: x / RBC: x / WBC x   Sq Epi: x / Non Sq Epi: x / Bacteria: x

## 2024-12-20 ENCOUNTER — TRANSCRIPTION ENCOUNTER (OUTPATIENT)
Age: 69
End: 2024-12-20

## 2024-12-20 LAB
ADD ON TEST-SPECIMEN IN LAB: SIGNIFICANT CHANGE UP
ADD ON TEST-SPECIMEN IN LAB: SIGNIFICANT CHANGE UP
ANION GAP SERPL CALC-SCNC: 12 MMOL/L — SIGNIFICANT CHANGE UP (ref 7–14)
B PERT DNA SPEC QL NAA+PROBE: SIGNIFICANT CHANGE UP
B PERT+PARAPERT DNA PNL SPEC NAA+PROBE: SIGNIFICANT CHANGE UP
BUN SERPL-MCNC: 14 MG/DL — SIGNIFICANT CHANGE UP (ref 7–23)
C PNEUM DNA SPEC QL NAA+PROBE: SIGNIFICANT CHANGE UP
CALCIUM SERPL-MCNC: 9.5 MG/DL — SIGNIFICANT CHANGE UP (ref 8.4–10.5)
CHLORIDE SERPL-SCNC: 101 MMOL/L — SIGNIFICANT CHANGE UP (ref 98–107)
CO2 SERPL-SCNC: 24 MMOL/L — SIGNIFICANT CHANGE UP (ref 22–31)
CREAT SERPL-MCNC: 1.24 MG/DL — SIGNIFICANT CHANGE UP (ref 0.5–1.3)
EGFR: 47 ML/MIN/1.73M2 — LOW
FLUAV AG NPH QL: SIGNIFICANT CHANGE UP
FLUAV SUBTYP SPEC NAA+PROBE: SIGNIFICANT CHANGE UP
FLUBV AG NPH QL: SIGNIFICANT CHANGE UP
FLUBV RNA SPEC QL NAA+PROBE: SIGNIFICANT CHANGE UP
GLUCOSE BLDC GLUCOMTR-MCNC: 212 MG/DL — HIGH (ref 70–99)
GLUCOSE BLDC GLUCOMTR-MCNC: 238 MG/DL — HIGH (ref 70–99)
GLUCOSE BLDC GLUCOMTR-MCNC: 257 MG/DL — HIGH (ref 70–99)
GLUCOSE BLDC GLUCOMTR-MCNC: 273 MG/DL — HIGH (ref 70–99)
GLUCOSE SERPL-MCNC: 172 MG/DL — HIGH (ref 70–99)
HADV DNA SPEC QL NAA+PROBE: SIGNIFICANT CHANGE UP
HCOV 229E RNA SPEC QL NAA+PROBE: SIGNIFICANT CHANGE UP
HCOV HKU1 RNA SPEC QL NAA+PROBE: SIGNIFICANT CHANGE UP
HCOV NL63 RNA SPEC QL NAA+PROBE: SIGNIFICANT CHANGE UP
HCOV OC43 RNA SPEC QL NAA+PROBE: DETECTED
HCT VFR BLD CALC: 32.8 % — LOW (ref 34.5–45)
HGB BLD-MCNC: 10.5 G/DL — LOW (ref 11.5–15.5)
HMPV RNA SPEC QL NAA+PROBE: SIGNIFICANT CHANGE UP
HPIV1 RNA SPEC QL NAA+PROBE: SIGNIFICANT CHANGE UP
HPIV2 RNA SPEC QL NAA+PROBE: SIGNIFICANT CHANGE UP
HPIV3 RNA SPEC QL NAA+PROBE: SIGNIFICANT CHANGE UP
HPIV4 RNA SPEC QL NAA+PROBE: SIGNIFICANT CHANGE UP
M PNEUMO DNA SPEC QL NAA+PROBE: SIGNIFICANT CHANGE UP
MAGNESIUM SERPL-MCNC: 2.2 MG/DL — SIGNIFICANT CHANGE UP (ref 1.6–2.6)
MCHC RBC-ENTMCNC: 28.5 PG — SIGNIFICANT CHANGE UP (ref 27–34)
MCHC RBC-ENTMCNC: 32 G/DL — SIGNIFICANT CHANGE UP (ref 32–36)
MCV RBC AUTO: 88.9 FL — SIGNIFICANT CHANGE UP (ref 80–100)
NRBC # BLD: 0 /100 WBCS — SIGNIFICANT CHANGE UP (ref 0–0)
NRBC # FLD: 0 K/UL — SIGNIFICANT CHANGE UP (ref 0–0)
PHOSPHATE SERPL-MCNC: 2.9 MG/DL — SIGNIFICANT CHANGE UP (ref 2.5–4.5)
PLATELET # BLD AUTO: 445 K/UL — HIGH (ref 150–400)
POTASSIUM SERPL-MCNC: 4.1 MMOL/L — SIGNIFICANT CHANGE UP (ref 3.5–5.3)
POTASSIUM SERPL-SCNC: 4.1 MMOL/L — SIGNIFICANT CHANGE UP (ref 3.5–5.3)
RAPID RVP RESULT: DETECTED
RBC # BLD: 3.69 M/UL — LOW (ref 3.8–5.2)
RBC # FLD: 12.5 % — SIGNIFICANT CHANGE UP (ref 10.3–14.5)
RSV RNA NPH QL NAA+NON-PROBE: SIGNIFICANT CHANGE UP
RSV RNA SPEC QL NAA+PROBE: SIGNIFICANT CHANGE UP
RV+EV RNA SPEC QL NAA+PROBE: SIGNIFICANT CHANGE UP
SARS-COV-2 RNA SPEC QL NAA+PROBE: SIGNIFICANT CHANGE UP
SARS-COV-2 RNA SPEC QL NAA+PROBE: SIGNIFICANT CHANGE UP
SODIUM SERPL-SCNC: 137 MMOL/L — SIGNIFICANT CHANGE UP (ref 135–145)
WBC # BLD: 9.8 K/UL — SIGNIFICANT CHANGE UP (ref 3.8–10.5)
WBC # FLD AUTO: 9.8 K/UL — SIGNIFICANT CHANGE UP (ref 3.8–10.5)

## 2024-12-20 RX ORDER — BENZONATATE 100 MG
100 CAPSULE ORAL ONCE
Refills: 0 | Status: COMPLETED | OUTPATIENT
Start: 2024-12-20 | End: 2024-12-21

## 2024-12-20 RX ADMIN — ACETAMINOPHEN 975 MILLIGRAM(S): 80 SOLUTION/ DROPS ORAL at 05:56

## 2024-12-20 RX ADMIN — PIPERACILLIN AND TAZOBACTAM 25 GRAM(S): 3; .375 INJECTION, POWDER, LYOPHILIZED, FOR SOLUTION INTRAVENOUS at 14:37

## 2024-12-20 RX ADMIN — Medication 2: at 08:27

## 2024-12-20 RX ADMIN — HEPARIN SODIUM 5000 UNIT(S): 1000 INJECTION, SOLUTION INTRAVENOUS; SUBCUTANEOUS at 21:49

## 2024-12-20 RX ADMIN — PIPERACILLIN AND TAZOBACTAM 25 GRAM(S): 3; .375 INJECTION, POWDER, LYOPHILIZED, FOR SOLUTION INTRAVENOUS at 21:42

## 2024-12-20 RX ADMIN — Medication 81 MILLIGRAM(S): at 12:26

## 2024-12-20 RX ADMIN — Medication 2: at 17:24

## 2024-12-20 RX ADMIN — ACETAMINOPHEN 975 MILLIGRAM(S): 80 SOLUTION/ DROPS ORAL at 06:22

## 2024-12-20 RX ADMIN — HEPARIN SODIUM 5000 UNIT(S): 1000 INJECTION, SOLUTION INTRAVENOUS; SUBCUTANEOUS at 05:58

## 2024-12-20 RX ADMIN — INSULIN GLARGINE-YFGN 20 UNIT(S): 100 INJECTION, SOLUTION SUBCUTANEOUS at 08:27

## 2024-12-20 RX ADMIN — Medication 10 MILLIGRAM(S): at 05:57

## 2024-12-20 RX ADMIN — ACETAMINOPHEN 975 MILLIGRAM(S): 80 SOLUTION/ DROPS ORAL at 23:23

## 2024-12-20 RX ADMIN — BENZOCAINE AND MENTHOL 1 LOZENGE: 15; 3.6 LOZENGE ORAL at 05:59

## 2024-12-20 RX ADMIN — Medication 3: at 12:26

## 2024-12-20 RX ADMIN — CLOPIDOGREL BISULFATE 75 MILLIGRAM(S): 75 TABLET, FILM COATED ORAL at 12:26

## 2024-12-20 RX ADMIN — ATORVASTATIN CALCIUM 20 MILLIGRAM(S): 40 TABLET, FILM COATED ORAL at 21:45

## 2024-12-20 RX ADMIN — BENZOCAINE AND MENTHOL 1 LOZENGE: 15; 3.6 LOZENGE ORAL at 17:23

## 2024-12-20 RX ADMIN — GABAPENTIN 100 MILLIGRAM(S): 300 CAPSULE ORAL at 14:37

## 2024-12-20 RX ADMIN — GABAPENTIN 100 MILLIGRAM(S): 300 CAPSULE ORAL at 08:05

## 2024-12-20 RX ADMIN — ACETAMINOPHEN 975 MILLIGRAM(S): 80 SOLUTION/ DROPS ORAL at 21:43

## 2024-12-20 RX ADMIN — HEPARIN SODIUM 5000 UNIT(S): 1000 INJECTION, SOLUTION INTRAVENOUS; SUBCUTANEOUS at 14:38

## 2024-12-20 RX ADMIN — PIPERACILLIN AND TAZOBACTAM 25 GRAM(S): 3; .375 INJECTION, POWDER, LYOPHILIZED, FOR SOLUTION INTRAVENOUS at 05:59

## 2024-12-20 RX ADMIN — GABAPENTIN 100 MILLIGRAM(S): 300 CAPSULE ORAL at 22:29

## 2024-12-20 RX ADMIN — Medication 1: at 21:51

## 2024-12-20 NOTE — DISCHARGE NOTE NURSING/CASE MANAGEMENT/SOCIAL WORK - NSDCFUADDAPPT_GEN_ALL_CORE_FT
Podiatry Discharge Instructions:  Follow up: Please follow up with Dr. Waterhouse within 1 week of discharge from the hospital, please call [phone] for appointment and discuss that you recently were seen in the hospital.  Wound Care: Please leave dressing clean, dry, and intact until follow-up appointment  Weight bearing: Please weight bear as tolerated in a surgical shoe on left foot.  Antibiotics: Please continue as instructed.

## 2024-12-20 NOTE — CHART NOTE - NSCHARTNOTEFT_GEN_A_CORE
Patient will require a rolling walker at home due to their dx of osteomyelitis and left foot Partial 1st ray resection to help complete their MRADLs.

## 2024-12-20 NOTE — PROGRESS NOTE ADULT - ASSESSMENT
A/P  57yo female h/o DM type2, HTN, hyperlipidemia; Pt c/o left great toe infection.    #diabetic foot infection, PAD  -sp bedside debridement with podiatry on 12/4/24  -VA JACK WITH PVR noted   -ecg with no ischemic changes, no chf or significant valvular disease on exam   -echo with nl LV sys FX No significant valvular disease.  -s/p LLE angiogram and AT angioplasty on 12/16  -s/p L foot partial first ray resection, closed  12/18  -antiplat per vasc  -cv stable postop     #HTN   -c/w meds    #fever w/u per primary team     dvt ppx     35 minutes spent on total encounter; more than 50% of the visit was spent counseling and/or coordinating care by the attending physician.

## 2024-12-20 NOTE — DISCHARGE NOTE NURSING/CASE MANAGEMENT/SOCIAL WORK - PATIENT PORTAL LINK FT
You can access the FollowMyHealth Patient Portal offered by [facility] by registering at the following website: [email or web address]. By joining [employer]’s FollowMyHealth portal, you will also be able to view your health information using other applications (apps) compatible with our system.

## 2024-12-20 NOTE — PROGRESS NOTE ADULT - SUBJECTIVE AND OBJECTIVE BOX
Name of Patient : REJI MEADE  MRN: [id number]  Date of visit: 12-20-24       Subjective: Patient seen and examined. No new events except as noted.   Doing okay   D/C Planing     REVIEW OF SYSTEMS:    CONSTITUTIONAL: No weakness, fevers or chills  EYES/ENT: No visual changes;  No vertigo or throat pain   NECK: No pain or stiffness  RESPIRATORY: No cough, wheezing, hemoptysis; No shortness of breath  CARDIOVASCULAR: No chest pain or palpitations  GASTROINTESTINAL: No abdominal or epigastric pain. No nausea, vomiting, or hematemesis; No diarrhea or constipation. No melena or hematochezia.  GENITOURINARY: No dysuria, frequency or hematuria  NEUROLOGICAL: No numbness or weakness  SKIN: No itching, burning, rashes, or lesions   All other review of systems is negative unless indicated above.    MEDICATIONS:  MEDICATIONS  (STANDING):  amLODIPine   Tablet 10 milliGRAM(s) Oral daily  aspirin  chewable 81 milliGRAM(s) Oral daily  atorvastatin 20 milliGRAM(s) Oral at bedtime  benzocaine/menthol Lozenge 1 Lozenge Oral two times a day  clopidogrel Tablet 75 milliGRAM(s) Oral daily  dextrose 5%. 1000 milliLiter(s) (50 mL/Hr) IV Continuous <Continuous>  dextrose 5%. 1000 milliLiter(s) (100 mL/Hr) IV Continuous <Continuous>  dextrose 50% Injectable 25 Gram(s) IV Push once  dextrose 50% Injectable 25 Gram(s) IV Push once  dextrose 50% Injectable 12.5 Gram(s) IV Push once  gabapentin 100 milliGRAM(s) Oral three times a day  glucagon  Injectable 1 milliGRAM(s) IntraMuscular once  glucagon  Injectable 1 milliGRAM(s) IntraMuscular once  heparin   Injectable 5000 Unit(s) SubCutaneous every 8 hours  influenza  Vaccine (HIGH DOSE) 0.5 milliLiter(s) IntraMuscular once  insulin glargine Injectable (LANTUS) 20 Unit(s) SubCutaneous before breakfast  insulin lispro (ADMELOG) corrective regimen sliding scale   SubCutaneous three times a day before meals  insulin lispro (ADMELOG) corrective regimen sliding scale   SubCutaneous at bedtime  piperacillin/tazobactam IVPB.. 3.375 Gram(s) IV Intermittent every 8 hours  polyethylene glycol 3350 17 Gram(s) Oral daily  sodium chloride 0.9%. 1000 milliLiter(s) (50 mL/Hr) IV Continuous <Continuous>      PHYSICAL EXAM:  T(C): 37.4 (12-20-24 @ 14:30), Max: 39.3 (12-19-24 @ 18:05)  HR: 85 (12-20-24 @ 14:30) (82 - 90)  BP: 146/56 (12-20-24 @ 14:30) (131/56 - 148/52)  RR: 16 (12-20-24 @ 14:30) (16 - 18)  SpO2: 100% (12-20-24 @ 14:30) (97% - 100%)  Wt(kg): --  I&O's Summary        Appearance: Normal	  HEENT:  PERRLA   Lymphatic: No lymphadenopathy   Cardiovascular: Normal S1 S2, no JVD  Respiratory: normal effort , clear  Gastrointestinal:  Soft, Non-tender  Skin: No rashes,  warm to touch  Psychiatry:  Mood & affect appropriate  Musculuskeletal: No edema    recent labs, Imaging and EKGs personally reviewed   CODE status discussed with the patient in detail    Urinalysis with Rflx Culture (collected 12-19-24 @ 18:56)                          10.5   9.80  )-----------( 445      ( 20 Dec 2024 05:16 )             32.8               12-20    137  |  101  |  14  ----------------------------<  172[H]  4.1   |  24  |  1.24    Ca    9.5      20 Dec 2024 05:16  Phos  2.9     12-20  Mg     2.20     12-20                         Urinalysis Basic - ( 20 Dec 2024 05:16 )    Color: x / Appearance: x / SG: x / pH: x  Gluc: 172 mg/dL / Ketone: x  / Bili: x / Urobili: x   Blood: x / Protein: x / Nitrite: x   Leuk Esterase: x / RBC: x / WBC x   Sq Epi: x / Non Sq Epi: x / Bacteria: x

## 2024-12-20 NOTE — PROGRESS NOTE ADULT - SUBJECTIVE AND OBJECTIVE BOX
[facility] [place] [facility]   MD LI WADE MD ANGELA WONG, PA QIAN CHEN, NP    TEL:  OFFICE: [phone]  From 5pm-7am Answering Service [phone]    -- RENAL FOLLOW UP NOTE ---Date of Service 12-20-24 @ 11:40    Patient is a 69y old  Female who presents with a chief complaint of Sent in by podiatrist for right foot/toe wound, worsening since October.    Patient seen and examined at bedside. No chest pain/SOB.    VITALS:  T(F): 100 (12-20-24 @ 05:44), Max: 102.7 (12-19-24 @ 18:05)  HR: 89 (12-20-24 @ 05:44)  BP: 147/55 (12-20-24 @ 05:44)  RR: 18 (12-20-24 @ 05:44)  SpO2: 99% (12-20-24 @ 05:44)  Wt(kg): --      PHYSICAL EXAM:  General: NAD  Neck: No JVD  Respiratory: CTAB, no wheezes, rales or rhonchi  Cardiovascular: S1, S2, RRR  Gastrointestinal: BS+, soft, NT/ND  Extremities: No peripheral edema    Hospital Medications:   MEDICATIONS  (STANDING):  amLODIPine   Tablet 10 milliGRAM(s) Oral daily  aspirin  chewable 81 milliGRAM(s) Oral daily  atorvastatin 20 milliGRAM(s) Oral at bedtime  benzocaine/menthol Lozenge 1 Lozenge Oral two times a day  clopidogrel Tablet 75 milliGRAM(s) Oral daily  dextrose 5%. 1000 milliLiter(s) (100 mL/Hr) IV Continuous <Continuous>  dextrose 5%. 1000 milliLiter(s) (50 mL/Hr) IV Continuous <Continuous>  dextrose 50% Injectable 25 Gram(s) IV Push once  dextrose 50% Injectable 12.5 Gram(s) IV Push once  dextrose 50% Injectable 25 Gram(s) IV Push once  gabapentin 100 milliGRAM(s) Oral three times a day  glucagon  Injectable 1 milliGRAM(s) IntraMuscular once  glucagon  Injectable 1 milliGRAM(s) IntraMuscular once  heparin   Injectable 5000 Unit(s) SubCutaneous every 8 hours  influenza  Vaccine (HIGH DOSE) 0.5 milliLiter(s) IntraMuscular once  insulin glargine Injectable (LANTUS) 20 Unit(s) SubCutaneous before breakfast  insulin lispro (ADMELOG) corrective regimen sliding scale   SubCutaneous three times a day before meals  insulin lispro (ADMELOG) corrective regimen sliding scale   SubCutaneous at bedtime  piperacillin/tazobactam IVPB.. 3.375 Gram(s) IV Intermittent every 8 hours  polyethylene glycol 3350 17 Gram(s) Oral daily  sodium chloride 0.9%. 1000 milliLiter(s) (50 mL/Hr) IV Continuous <Continuous>      LABS:  12-20    137  |  101  |  14  ----------------------------<  172[H]  4.1   |  24  |  1.24    Ca    9.5      20 Dec 2024 05:16  Phos  2.9     12-20  Mg     2.20     12-20      Creatinine Trend: 1.24 <--, 1.39 <--, 1.37 <--, 1.42 <--, 1.34 <--, 1.35 <--, 1.30 <--, 1.26 <--    Phosphorus: 2.9 mg/dL (12-20 @ 05:16)  Phosphorus: 2.2 mg/dL (12-19 @ 21:16)                          10.5   9.80  )-----------( 445      ( 20 Dec 2024 05:16 )             32.8     Urine Studies:  Urinalysis - [12-20-24 @ 05:16]      Color  / Appearance  / SG  / pH       Gluc 172 / Ketone   / Bili  / Urobili        Blood  / Protein  / Leuk Est  / Nitrite       RBC  / WBC  / Hyaline  / Gran  / Sq Epi  / Non Sq Epi  / Bacteria       HbA1c 10.7      [02-25-20 @ 14:10]  TSH 1.00      [12-04-24 @ 06:20]

## 2024-12-20 NOTE — PROGRESS NOTE ADULT - ASSESSMENT
69F 12/18 s/p L foot partial first ray resection, closed   - Pt was seen and evaluated  - Tmax 102.7, WBC 9.80  - 12/18 s/p partial first ray resection, closed, no hematoma, sutures intact, flaps warm and viable, scant sanginous drainage.   - Intraop findings: low concern residual infection, low concern viability   - Left foot clean bone margin culture: pending  - ID recs, appreciated  - Vasc recs, appreciated  - Stable for d/c from podiatry standpoint today pending 48h of no growth on clean margins   - Follow up and wound recs listed in d/c note provider under follow up   - Discussed with attending

## 2024-12-20 NOTE — PROGRESS NOTE ADULT - SUBJECTIVE AND OBJECTIVE BOX
CARDIOLOGY FOLLOW UP NOTE - DR. CERVANTES    Patient Name: REJI MEADE    Date of Service: 12-20-24 @ 13:46    events noted    Subjective:    cv: denies chest pain, dyspnea, palpitations, dizziness  pulmonary: denies cough  GI: denies abdominal pain, nausea, vomiting  vascular/legs: no edema   skin: no rash  ROS: otherwise negative   overnight events:      PHYSICAL EXAM:  T(C): 37.2 (12-20-24 @ 10:15), Max: 39.3 (12-19-24 @ 18:05)  HR: 82 (12-20-24 @ 10:15) (82 - 90)  BP: 131/56 (12-20-24 @ 10:15) (131/56 - 161/57)  RR: 18 (12-20-24 @ 10:15) (18 - 18)  SpO2: 100% (12-20-24 @ 10:15) (95% - 100%)  Wt(kg): --  I&O's Summary    Daily     Daily     Appearance: Normal	  Cardiovascular: Normal S1 S2,RRR, No JVD, No murmurs  Respiratory: Lungs clear to auscultation	  Gastrointestinal:  Soft, Non-tender, + BS	  Extremities: Normal range of motion, No clubbing, cyanosis or edema      Home Medications:  aspirin 81 mg oral delayed release tablet: 1 tab(s) orally once a day (04 Dec 2024 04:28)  atorvastatin 20 mg oral tablet: 1 tab(s) orally once a day (at bedtime) (04 Dec 2024 04:28)  Farxiga 5 mg oral tablet: 1 tab(s) orally once a day (04 Dec 2024 04:32)  hydroCHLOROthiazide 25 mg oral tablet: 1 tab(s) orally once a day (04 Dec 2024 04:28)  Lantus Solostar Pen 100 units/mL subcutaneous solution: 30 unit(s) subcutaneous once a day (at bedtime) (04 Dec 2024 04:28)  losartan 100 mg oral tablet: 1 tab(s) orally once a day (04 Dec 2024 04:28)  Norvasc 10 mg oral tablet: 1 tab(s) orally once a day (04 Dec 2024 04:28)  NovoLOG 100 units/mL subcutaneous solution: 10 unit(s) subcutaneous 3 times a day (before meals) (04 Dec 2024 04:28)      MEDICATIONS  (STANDING):  amLODIPine   Tablet 10 milliGRAM(s) Oral daily  aspirin  chewable 81 milliGRAM(s) Oral daily  atorvastatin 20 milliGRAM(s) Oral at bedtime  benzocaine/menthol Lozenge 1 Lozenge Oral two times a day  clopidogrel Tablet 75 milliGRAM(s) Oral daily  dextrose 5%. 1000 milliLiter(s) (100 mL/Hr) IV Continuous <Continuous>  dextrose 5%. 1000 milliLiter(s) (50 mL/Hr) IV Continuous <Continuous>  dextrose 50% Injectable 25 Gram(s) IV Push once  dextrose 50% Injectable 12.5 Gram(s) IV Push once  dextrose 50% Injectable 25 Gram(s) IV Push once  gabapentin 100 milliGRAM(s) Oral three times a day  glucagon  Injectable 1 milliGRAM(s) IntraMuscular once  glucagon  Injectable 1 milliGRAM(s) IntraMuscular once  heparin   Injectable 5000 Unit(s) SubCutaneous every 8 hours  influenza  Vaccine (HIGH DOSE) 0.5 milliLiter(s) IntraMuscular once  insulin glargine Injectable (LANTUS) 20 Unit(s) SubCutaneous before breakfast  insulin lispro (ADMELOG) corrective regimen sliding scale   SubCutaneous three times a day before meals  insulin lispro (ADMELOG) corrective regimen sliding scale   SubCutaneous at bedtime  piperacillin/tazobactam IVPB.. 3.375 Gram(s) IV Intermittent every 8 hours  polyethylene glycol 3350 17 Gram(s) Oral daily  sodium chloride 0.9%. 1000 milliLiter(s) (50 mL/Hr) IV Continuous <Continuous>      TELEMETRY: 	    ECG:  	  RADIOLOGY:   DIAGNOSTIC TESTING:  [ ] Echocardiogram:  [ ] Catheterization:  [ ] Stress Test:    OTHER: 	    LABS:	 	    CARDIAC MARKERS:                                      10.5   9.80  )-----------( 445      ( 20 Dec 2024 05:16 )             32.8     12-20    137  |  101  |  14  ----------------------------<  172[H]  4.1   |  24  |  1.24    Ca    9.5      20 Dec 2024 05:16  Phos  2.9     12-20  Mg     2.20     12-20      proBNP:     Lipid Profile:   HgA1c:     Creatinine: 1.24 mg/dL (12-20-24 @ 05:16)  Creatinine: 1.39 mg/dL (12-19-24 @ 21:16)  Creatinine: 1.37 mg/dL (12-19-24 @ 08:21)  Creatinine: 1.42 mg/dL (12-18-24 @ 02:37)

## 2024-12-20 NOTE — PROGRESS NOTE ADULT - SUBJECTIVE AND OBJECTIVE BOX
Vascular Surgery Daily Resident Progress Note    Subjective and Interval  Seen and examined at bedside. No acute overnight events. Tmax 102.7. Does not endorse chest pain, SOB, nausea, headache. Endorsing dry cough and congestion.   ___________________________________________________  Vital Signs  T(C): 37.8 (05:44), Max: 39.3 (18:05)  HR: 89 (05:44) (85 - 90)  BP: 147/55 (05:44) (144/58 - 161/57)  ABP: --  ABP(mean): --  RR: 18 (05:44) (18 - 20)  SpO2: 99% (05:44) (95% - 100%)    Physical Exam  General: NAD, sitting up comfortably in bed  Cardiovascular: RRR  Respiratory: Even and unlabored breathing  Gastrointestinal:  Soft, Non-tender  LLE: L foot dressing c/d/i;         In&Out          LABS:  cret                        10.5   9.80  )-----------( 445      ( 20 Dec 2024 05:16 )             32.8     12-20    137  |  101  |  14  ----------------------------<  172[H]  4.1   |  24  |  1.24    Ca    9.5      20 Dec 2024 05:16  Phos  2.9     12-20  Mg     2.20     12-20              Medications  acetaminophen     Tablet .. 975 milliGRAM(s) Oral every 6 hours PRN  amLODIPine   Tablet 10 milliGRAM(s) Oral daily  aspirin  chewable 81 milliGRAM(s) Oral daily  atorvastatin 20 milliGRAM(s) Oral at bedtime  benzocaine/menthol Lozenge 1 Lozenge Oral two times a day  clopidogrel Tablet 75 milliGRAM(s) Oral daily  dextrose 5%. 1000 milliLiter(s) IV Continuous <Continuous>  dextrose 5%. 1000 milliLiter(s) IV Continuous <Continuous>  dextrose 50% Injectable 25 Gram(s) IV Push once  dextrose 50% Injectable 12.5 Gram(s) IV Push once  dextrose 50% Injectable 25 Gram(s) IV Push once  gabapentin 100 milliGRAM(s) Oral three times a day  glucagon  Injectable 1 milliGRAM(s) IntraMuscular once  glucagon  Injectable 1 milliGRAM(s) IntraMuscular once  heparin   Injectable 5000 Unit(s) SubCutaneous every 8 hours  influenza  Vaccine (HIGH DOSE) 0.5 milliLiter(s) IntraMuscular once  insulin glargine Injectable (LANTUS) 20 Unit(s) SubCutaneous before breakfast  insulin lispro (ADMELOG) corrective regimen sliding scale   SubCutaneous three times a day before meals  insulin lispro (ADMELOG) corrective regimen sliding scale   SubCutaneous at bedtime  melatonin 3 milliGRAM(s) Oral at bedtime PRN  ondansetron Injectable 4 milliGRAM(s) IV Push every 8 hours PRN  piperacillin/tazobactam IVPB.. 3.375 Gram(s) IV Intermittent every 8 hours  polyethylene glycol 3350 17 Gram(s) Oral daily  sodium chloride 0.9%. 1000 milliLiter(s) IV Continuous <Continuous>      Microbiology    Urinalysis with Rflx Culture (collected 12-19-24 @ 18:56)      ___________________________________________________  Assessment & Plan    69 year old female with PMH type 2 DM, CKD stage 3, HTN, HLD presents with osteomyelitis at left hallux. LLE angiogram cancelled 12/11 and 12/12. Patient refused angiogram on 12/13 after multiple cancellations. Now s/p LLE angiogram and AT angioplasty on 12/16 and  left foot Partial 1st ray resection, closed on 12/18.     Plan  - Zosyn, f/u ID for dispo recs post-procedure  - PT   - Fever w/u for Temp 102.7 12/20 has been negative to date. Follow-up RVP   - Pain control   - Aspirin, plavix   - Subq heparin  - FU Am labs   - Dispo plan: Home       C team surgery   [id number]         Vascular Surgery Daily Resident Progress Note    Subjective and Interval  Seen and examined at bedside. No acute overnight events. Tmax 102.7. Fever work-up initiated overnight. Does not endorse chest pain, SOB, nausea, headache. Endorsing dry cough and congestion.   ___________________________________________________  Vital Signs  T(C): 37.8 (05:44), Max: 39.3 (18:05)  HR: 89 (05:44) (85 - 90)  BP: 147/55 (05:44) (144/58 - 161/57)  ABP: --  ABP(mean): --  RR: 18 (05:44) (18 - 20)  SpO2: 99% (05:44) (95% - 100%)    Physical Exam  General: NAD, sitting up comfortably in bed  Cardiovascular: RRR  Respiratory: Even and unlabored breathing  Gastrointestinal:  Soft, Non-tender  LLE: L foot dressing c/d/i;         In&Out          LABS:  cret                        10.5   9.80  )-----------( 445      ( 20 Dec 2024 05:16 )             32.8     12-20    137  |  101  |  14  ----------------------------<  172[H]  4.1   |  24  |  1.24    Ca    9.5      20 Dec 2024 05:16  Phos  2.9     12-20  Mg     2.20     12-20              Medications  acetaminophen     Tablet .. 975 milliGRAM(s) Oral every 6 hours PRN  amLODIPine   Tablet 10 milliGRAM(s) Oral daily  aspirin  chewable 81 milliGRAM(s) Oral daily  atorvastatin 20 milliGRAM(s) Oral at bedtime  benzocaine/menthol Lozenge 1 Lozenge Oral two times a day  clopidogrel Tablet 75 milliGRAM(s) Oral daily  dextrose 5%. 1000 milliLiter(s) IV Continuous <Continuous>  dextrose 5%. 1000 milliLiter(s) IV Continuous <Continuous>  dextrose 50% Injectable 25 Gram(s) IV Push once  dextrose 50% Injectable 12.5 Gram(s) IV Push once  dextrose 50% Injectable 25 Gram(s) IV Push once  gabapentin 100 milliGRAM(s) Oral three times a day  glucagon  Injectable 1 milliGRAM(s) IntraMuscular once  glucagon  Injectable 1 milliGRAM(s) IntraMuscular once  heparin   Injectable 5000 Unit(s) SubCutaneous every 8 hours  influenza  Vaccine (HIGH DOSE) 0.5 milliLiter(s) IntraMuscular once  insulin glargine Injectable (LANTUS) 20 Unit(s) SubCutaneous before breakfast  insulin lispro (ADMELOG) corrective regimen sliding scale   SubCutaneous three times a day before meals  insulin lispro (ADMELOG) corrective regimen sliding scale   SubCutaneous at bedtime  melatonin 3 milliGRAM(s) Oral at bedtime PRN  ondansetron Injectable 4 milliGRAM(s) IV Push every 8 hours PRN  piperacillin/tazobactam IVPB.. 3.375 Gram(s) IV Intermittent every 8 hours  polyethylene glycol 3350 17 Gram(s) Oral daily  sodium chloride 0.9%. 1000 milliLiter(s) IV Continuous <Continuous>      Microbiology    Urinalysis with Rflx Culture (collected 12-19-24 @ 18:56)      ___________________________________________________  Assessment & Plan    69 year old female with PMH type 2 DM, CKD stage 3, HTN, HLD presents with osteomyelitis at left hallux. LLE angiogram cancelled 12/11 and 12/12. Patient refused angiogram on 12/13 after multiple cancellations. Now s/p LLE angiogram and AT angioplasty on 12/16 and  left foot Partial 1st ray resection, closed on 12/18.     Plan  - Zosyn, f/u ID for dispo recs post-procedure  - PT   - Fever w/u for Temp 102.7 12/20     -CXR, COVID, Influenza, UA bact- negative     -Trend CBC     -FU Blood CX     -FU RVP   - Pain control   - Aspirin, plavix   - Subq heparin  - FU Am labs   - Dispo plan: Home       C team surgery   [id number]

## 2024-12-20 NOTE — PROGRESS NOTE ADULT - SUBJECTIVE AND OBJECTIVE BOX
Patient is a 69y old  Female who presents with a chief complaint of Sent in by podiatrist for right foot/toe wound, worsening since October. (20 Dec 2024 08:33)       INTERVAL HPI/OVERNIGHT EVENTS:  Patient seen and evaluated at bedside.  Pt is resting comfortable in NAD. Denies N/V/F/C.    Allergies    No Known Allergies    Intolerances        Vital Signs Last 24 Hrs  T(C): 37.8 (20 Dec 2024 05:44), Max: 39.3 (19 Dec 2024 18:05)  T(F): 100 (20 Dec 2024 05:44), Max: 102.7 (19 Dec 2024 18:05)  HR: 89 (20 Dec 2024 05:44) (85 - 90)  BP: 147/55 (20 Dec 2024 05:44) (144/58 - 161/57)  BP(mean): --  RR: 18 (20 Dec 2024 05:44) (18 - 20)  SpO2: 99% (20 Dec 2024 05:44) (95% - 100%)    Parameters below as of 20 Dec 2024 05:44  Patient On (Oxygen Delivery Method): room air        LABS:                        10.5   9.80  )-----------( 445      ( 20 Dec 2024 05:16 )             32.8     12-20    137  |  101  |  14  ----------------------------<  172[H]  4.1   |  24  |  1.24    Ca    9.5      20 Dec 2024 05:16  Phos  2.9     12-20  Mg     2.20     12-20        Urinalysis Basic - ( 20 Dec 2024 05:16 )    Color: x / Appearance: x / SG: x / pH: x  Gluc: 172 mg/dL / Ketone: x  / Bili: x / Urobili: x   Blood: x / Protein: x / Nitrite: x   Leuk Esterase: x / RBC: x / WBC x   Sq Epi: x / Non Sq Epi: x / Bacteria: x      CAPILLARY BLOOD GLUCOSE      POCT Blood Glucose.: 212 mg/dL (20 Dec 2024 08:22)  POCT Blood Glucose.: 218 mg/dL (19 Dec 2024 22:13)  POCT Blood Glucose.: 198 mg/dL (19 Dec 2024 17:12)  POCT Blood Glucose.: 315 mg/dL (19 Dec 2024 12:03)  POCT Blood Glucose.: 283 mg/dL (19 Dec 2024 09:29)      Lower Extremity Physical Exam:  Vascular: DP/PT 0/4, B/L, CFT <3 seconds B/L, Temperature gradient warm to cool, B/L.   Neuro: Epicritic sensation absent to the level of digits, B/L.  Musculoskeletal/Ortho: unremarkable  Skin:  12/18 s/p partial first ray resection, closed, no hematoma, sutures intact, flaps warm and viable, scant sanginous drainage.     RADIOLOGY & ADDITIONAL TESTS:

## 2024-12-20 NOTE — PROGRESS NOTE ADULT - SUBJECTIVE AND OBJECTIVE BOX
[facility], Division of Infectious Diseases  TOBY Szymanski Y. Patel, S. Shah, G. Álvaro  [phone]  ([phone] - weekdays after 5pm and weekends)    Name: REJI MEADE  Age/Gender: 69y Female  MRN: [id number]    Interval History:  Notes reviewed.   No concerning overnight events.  febrile to 102.7 yesterday evening  reports cough and new nasal congestion    Allergies: No Known Allergies      Objective:  Vitals:   T(F): 99 (12-20-24 @ 10:15), Max: 102.7 (12-19-24 @ 18:05)  HR: 82 (12-20-24 @ 10:15) (82 - 90)  BP: 131/56 (12-20-24 @ 10:15) (131/56 - 161/57)  RR: 18 (12-20-24 @ 10:15) (18 - 18)  SpO2: 100% (12-20-24 @ 10:15) (95% - 100%)  Physical Examination:  General: no acute distress  HEENT: anicteric  Cardio: S1, S2, normal rate  Resp: clear to auscultation anteriorly   Abd: soft, NT, ND  Ext: L foot wrapped w/ dressing  Skin: warm, dry    Laboratory Studies:  CBC:                       10.5   9.80  )-----------( 445      ( 20 Dec 2024 05:16 )             32.8     WBC Trend:  9.80 12-20-24 @ 05:16  10.22 12-19-24 @ 20:45  8.99 12-19-24 @ 08:21  10.54 12-18-24 @ 02:37  8.99 12-17-24 @ 07:11  9.98 12-16-24 @ 01:20  9.71 12-15-24 @ 06:35  9.81 12-14-24 @ 07:20    CMP: 12-20    137  |  101  |  14  ----------------------------<  172[H]  4.1   |  24  |  1.24    Ca    9.5      20 Dec 2024 05:16  Phos  2.9     12-20  Mg     2.20     12-20            Urinalysis Basic - ( 20 Dec 2024 05:16 )    Color: x / Appearance: x / SG: x / pH: x  Gluc: 172 mg/dL / Ketone: x  / Bili: x / Urobili: x   Blood: x / Protein: x / Nitrite: x   Leuk Esterase: x / RBC: x / WBC x   Sq Epi: x / Non Sq Epi: x / Bacteria: x      Microbiology: reviewed     Urinalysis with Rflx Culture (collected 12-19-24 @ 18:56)    Culture - Fungal, Tissue (collected 12-18-24 @ 20:20)  Source: Tissue  Preliminary Report (12-20-24 @ 08:41):    Testing in progress    Culture - Acid Fast - Tissue w/Smear (collected 12-18-24 @ 20:20)  Source: Tissue    Culture - Tissue with Gram Stain (collected 12-18-24 @ 20:20)  Source: Tissue  Gram Stain (12-19-24 @ 16:48):    No polymorphonuclear leukocytes seen per low power field    No organisms seen per oil power field    Culture - Blood (collected 12-07-24 @ 22:31)  Source: .Blood BLOOD  Final Report (12-13-24 @ 01:00):    No growth at 5 days    Culture - Blood (collected 12-07-24 @ 18:46)  Source: .Blood BLOOD  Final Report (12-13-24 @ 01:00):    No growth at 5 days        Radiology: reviewed     Medications:  acetaminophen     Tablet .. 975 milliGRAM(s) Oral every 6 hours PRN  amLODIPine   Tablet 10 milliGRAM(s) Oral daily  aspirin  chewable 81 milliGRAM(s) Oral daily  atorvastatin 20 milliGRAM(s) Oral at bedtime  benzocaine/menthol Lozenge 1 Lozenge Oral two times a day  clopidogrel Tablet 75 milliGRAM(s) Oral daily  dextrose 5%. 1000 milliLiter(s) IV Continuous <Continuous>  dextrose 5%. 1000 milliLiter(s) IV Continuous <Continuous>  dextrose 50% Injectable 25 Gram(s) IV Push once  dextrose 50% Injectable 12.5 Gram(s) IV Push once  dextrose 50% Injectable 25 Gram(s) IV Push once  gabapentin 100 milliGRAM(s) Oral three times a day  glucagon  Injectable 1 milliGRAM(s) IntraMuscular once  glucagon  Injectable 1 milliGRAM(s) IntraMuscular once  heparin   Injectable 5000 Unit(s) SubCutaneous every 8 hours  influenza  Vaccine (HIGH DOSE) 0.5 milliLiter(s) IntraMuscular once  insulin glargine Injectable (LANTUS) 20 Unit(s) SubCutaneous before breakfast  insulin lispro (ADMELOG) corrective regimen sliding scale   SubCutaneous three times a day before meals  insulin lispro (ADMELOG) corrective regimen sliding scale   SubCutaneous at bedtime  melatonin 3 milliGRAM(s) Oral at bedtime PRN  ondansetron Injectable 4 milliGRAM(s) IV Push every 8 hours PRN  piperacillin/tazobactam IVPB.. 3.375 Gram(s) IV Intermittent every 8 hours  polyethylene glycol 3350 17 Gram(s) Oral daily  sodium chloride 0.9%. 1000 milliLiter(s) IV Continuous <Continuous>    Antimicrobials:  piperacillin/tazobactam IVPB.. 3.375 Gram(s) IV Intermittent every 8 hours

## 2024-12-20 NOTE — DISCHARGE NOTE NURSING/CASE MANAGEMENT/SOCIAL WORK - FINANCIAL ASSISTANCE
[facility] provides services at a reduced cost to those who are determined to be eligible through [facility]’s financial assistance program. Information regarding [facility]’s financial assistance program can be found by going to [email or web address] or by calling [phone].

## 2024-12-20 NOTE — DISCHARGE NOTE NURSING/CASE MANAGEMENT/SOCIAL WORK - NSSCTYPOFSERV_GEN_ALL_CORE
Visiting nurse to visit day after discharge.  Physical therapy will visit once insurance approval received.

## 2024-12-20 NOTE — PROGRESS NOTE ADULT - ASSESSMENT
57yo female h/o DM type2, HTN, hyperlipidemia; Pt c/o left great toe infection. vascular on board planning for angiogram. nephrology consulted for elevated S.Cr.    ckd stage 3B  baseline ~ 1.4   long standing hx of dm and htn  admitted with scr now up trending  scr relatively stable  on losartan and htcz at home current on hold  FeUrea 35%  renal us no hydro   s/p trial of gentle hydration 12/5  s/p angio.  Renal function stable  Monitor BMP and UO.   Avoid further nephrotoxins if possible.    htn  BP fluctuating; acceptable.  continue with current meds  Low salt diet.  monitor BP.    Hyponatremia  Stable.  check urine na and osmo  Optimize glycemic control.  Monitor Na.    LE  wound  s/p angiogram 12/16  s/p partial first ray resection n 12/18.  Podiatry, vascular following.

## 2024-12-20 NOTE — PROGRESS NOTE ADULT - ASSESSMENT
68 y/o F PMhx DM II, HTN who presented w/ left great toe wound    L hallux diabetic infection, osteo  CRP 88.8,   s/p podiatry I&D L hallux wound to the level of and not beyond bone,  mild malodor, scant serosanguinous drainage. Right foot no open wounds, no acute signs of infection.  foot x-ray- fracture distal phalanx great toe. soft tissue swelling of this digit as well as subcutaneous air.  wound cx w/ strep constellatus, citrobacter, morganella, staph lugdunensis (oxacillin sensitive)  febrile 12/7, ceftriaxone switched to zosyn  s/p LLE angio & AT angioplasty 12/16  s/p s/p left foot Partial 1st ray resection, closed 12/18  - noted low concern for residual bone infection    12/20  febrile 12/19 PM  possible post-op fever but will send infectious work-up  pt reporting UTI symptoms w/ cough and new nasal congestion  no leukocytosis  SARS-CoV-2/ RSV/ flu PCR negative  UA negative & pt without urinary symptoms    Recommendations  c/w zosyn for now  f/u blood cultures  given new URI symptoms full RVP added on  f/u OR cultures- NGTD  - if infectious work-up is negative and OR cx are without growth can transition to augmentin 875-125mg bid w/ last day 12/23    will need to f/u in [facility] ID office outpatient to f/u bone path    Albino Rea M.D.  [facility], Division of Infectious Diseases  [phone]  After 5pm on weekdays and all day on weekends - please call [phone]

## 2024-12-21 LAB
ANION GAP SERPL CALC-SCNC: 10 MMOL/L — SIGNIFICANT CHANGE UP (ref 7–14)
BUN SERPL-MCNC: 15 MG/DL — SIGNIFICANT CHANGE UP (ref 7–23)
CALCIUM SERPL-MCNC: 9.5 MG/DL — SIGNIFICANT CHANGE UP (ref 8.4–10.5)
CHLORIDE SERPL-SCNC: 101 MMOL/L — SIGNIFICANT CHANGE UP (ref 98–107)
CO2 SERPL-SCNC: 25 MMOL/L — SIGNIFICANT CHANGE UP (ref 22–31)
CREAT SERPL-MCNC: 1.31 MG/DL — HIGH (ref 0.5–1.3)
EGFR: 44 ML/MIN/1.73M2 — LOW
GLUCOSE BLDC GLUCOMTR-MCNC: 184 MG/DL — HIGH (ref 70–99)
GLUCOSE BLDC GLUCOMTR-MCNC: 225 MG/DL — HIGH (ref 70–99)
GLUCOSE BLDC GLUCOMTR-MCNC: 251 MG/DL — HIGH (ref 70–99)
GLUCOSE BLDC GLUCOMTR-MCNC: 268 MG/DL — HIGH (ref 70–99)
GLUCOSE SERPL-MCNC: 211 MG/DL — HIGH (ref 70–99)
HCT VFR BLD CALC: 31.9 % — LOW (ref 34.5–45)
HGB BLD-MCNC: 10.1 G/DL — LOW (ref 11.5–15.5)
MAGNESIUM SERPL-MCNC: 2.2 MG/DL — SIGNIFICANT CHANGE UP (ref 1.6–2.6)
MCHC RBC-ENTMCNC: 28.5 PG — SIGNIFICANT CHANGE UP (ref 27–34)
MCHC RBC-ENTMCNC: 31.7 G/DL — LOW (ref 32–36)
MCV RBC AUTO: 90.1 FL — SIGNIFICANT CHANGE UP (ref 80–100)
NRBC # BLD: 0 /100 WBCS — SIGNIFICANT CHANGE UP (ref 0–0)
NRBC # FLD: 0 K/UL — SIGNIFICANT CHANGE UP (ref 0–0)
PHOSPHATE SERPL-MCNC: 2.5 MG/DL — SIGNIFICANT CHANGE UP (ref 2.5–4.5)
PLATELET # BLD AUTO: 385 K/UL — SIGNIFICANT CHANGE UP (ref 150–400)
POTASSIUM SERPL-MCNC: 4.4 MMOL/L — SIGNIFICANT CHANGE UP (ref 3.5–5.3)
POTASSIUM SERPL-SCNC: 4.4 MMOL/L — SIGNIFICANT CHANGE UP (ref 3.5–5.3)
RBC # BLD: 3.54 M/UL — LOW (ref 3.8–5.2)
RBC # FLD: 12.7 % — SIGNIFICANT CHANGE UP (ref 10.3–14.5)
SODIUM SERPL-SCNC: 136 MMOL/L — SIGNIFICANT CHANGE UP (ref 135–145)
WBC # BLD: 10.88 K/UL — HIGH (ref 3.8–10.5)
WBC # FLD AUTO: 10.88 K/UL — HIGH (ref 3.8–10.5)

## 2024-12-21 PROCEDURE — 99232 SBSQ HOSP IP/OBS MODERATE 35: CPT

## 2024-12-21 RX ORDER — BENZONATATE 100 MG
100 CAPSULE ORAL ONCE
Refills: 0 | Status: COMPLETED | OUTPATIENT
Start: 2024-12-21 | End: 2024-12-21

## 2024-12-21 RX ORDER — INSULIN LISPRO 100/ML
7 VIAL (ML) SUBCUTANEOUS
Refills: 0 | Status: DISCONTINUED | OUTPATIENT
Start: 2024-12-21 | End: 2024-12-23

## 2024-12-21 RX ORDER — INSULIN LISPRO 100/ML
5 VIAL (ML) SUBCUTANEOUS
Refills: 0 | Status: DISCONTINUED | OUTPATIENT
Start: 2024-12-21 | End: 2024-12-21

## 2024-12-21 RX ADMIN — Medication 100 MILLIGRAM(S): at 22:37

## 2024-12-21 RX ADMIN — BENZOCAINE AND MENTHOL 1 LOZENGE: 15; 3.6 LOZENGE ORAL at 06:23

## 2024-12-21 RX ADMIN — PIPERACILLIN AND TAZOBACTAM 25 GRAM(S): 3; .375 INJECTION, POWDER, LYOPHILIZED, FOR SOLUTION INTRAVENOUS at 06:25

## 2024-12-21 RX ADMIN — PIPERACILLIN AND TAZOBACTAM 25 GRAM(S): 3; .375 INJECTION, POWDER, LYOPHILIZED, FOR SOLUTION INTRAVENOUS at 14:00

## 2024-12-21 RX ADMIN — CLOPIDOGREL BISULFATE 75 MILLIGRAM(S): 75 TABLET, FILM COATED ORAL at 14:01

## 2024-12-21 RX ADMIN — Medication 100 MILLIGRAM(S): at 00:45

## 2024-12-21 RX ADMIN — Medication 3: at 13:00

## 2024-12-21 RX ADMIN — GABAPENTIN 100 MILLIGRAM(S): 300 CAPSULE ORAL at 21:31

## 2024-12-21 RX ADMIN — GABAPENTIN 100 MILLIGRAM(S): 300 CAPSULE ORAL at 14:00

## 2024-12-21 RX ADMIN — PIPERACILLIN AND TAZOBACTAM 25 GRAM(S): 3; .375 INJECTION, POWDER, LYOPHILIZED, FOR SOLUTION INTRAVENOUS at 21:32

## 2024-12-21 RX ADMIN — ACETAMINOPHEN 975 MILLIGRAM(S): 80 SOLUTION/ DROPS ORAL at 22:29

## 2024-12-21 RX ADMIN — Medication 81 MILLIGRAM(S): at 14:01

## 2024-12-21 RX ADMIN — Medication 3: at 17:59

## 2024-12-21 RX ADMIN — ATORVASTATIN CALCIUM 20 MILLIGRAM(S): 40 TABLET, FILM COATED ORAL at 21:31

## 2024-12-21 RX ADMIN — INSULIN GLARGINE-YFGN 20 UNIT(S): 100 INJECTION, SOLUTION SUBCUTANEOUS at 09:20

## 2024-12-21 RX ADMIN — ACETAMINOPHEN 975 MILLIGRAM(S): 80 SOLUTION/ DROPS ORAL at 21:29

## 2024-12-21 RX ADMIN — Medication 2: at 09:09

## 2024-12-21 RX ADMIN — Medication 10 MILLIGRAM(S): at 06:24

## 2024-12-21 RX ADMIN — GABAPENTIN 100 MILLIGRAM(S): 300 CAPSULE ORAL at 06:27

## 2024-12-21 RX ADMIN — HEPARIN SODIUM 5000 UNIT(S): 1000 INJECTION, SOLUTION INTRAVENOUS; SUBCUTANEOUS at 06:23

## 2024-12-21 RX ADMIN — HEPARIN SODIUM 5000 UNIT(S): 1000 INJECTION, SOLUTION INTRAVENOUS; SUBCUTANEOUS at 14:02

## 2024-12-21 RX ADMIN — HEPARIN SODIUM 5000 UNIT(S): 1000 INJECTION, SOLUTION INTRAVENOUS; SUBCUTANEOUS at 21:32

## 2024-12-21 NOTE — CHART NOTE - NSCHARTNOTESELECT_GEN_ALL_CORE
Event Note
Vascular Surgery
Vascular Surgery
endocrine/Event Note
Event Note
Event Note
Nutrition Services
Post op check
Rolling Walker
Transfer
Vascular Surgery

## 2024-12-21 NOTE — PROGRESS NOTE ADULT - ATTENDING COMMENTS
PAD s/p LLE angiogram, AT angioplasty 12/16 s/p left foot partial first ray amp w/ pods 12/18  Fever workup positive RVP panel  Continue aspirin, plavix, statin  f/u ID  f/u OR cultures

## 2024-12-21 NOTE — PROGRESS NOTE ADULT - SUBJECTIVE AND OBJECTIVE BOX
Name of Patient : REJI MEADE  MRN: [id number]  Date of visit: 12-21-24 @ 16:07      Subjective: Patient seen and examined. No new events except as noted.   doing okay     REVIEW OF SYSTEMS:    CONSTITUTIONAL: No weakness, fevers or chills  EYES/ENT: No visual changes;  No vertigo or throat pain   NECK: No pain or stiffness  RESPIRATORY: No cough, wheezing, hemoptysis; No shortness of breath  CARDIOVASCULAR: No chest pain or palpitations  GASTROINTESTINAL: No abdominal or epigastric pain. No nausea, vomiting, or hematemesis; No diarrhea or constipation. No melena or hematochezia.  GENITOURINARY: No dysuria, frequency or hematuria  NEUROLOGICAL: No numbness or weakness  SKIN: No itching, burning, rashes, or lesions   All other review of systems is negative unless indicated above.    MEDICATIONS:  MEDICATIONS  (STANDING):  amLODIPine   Tablet 10 milliGRAM(s) Oral daily  aspirin  chewable 81 milliGRAM(s) Oral daily  atorvastatin 20 milliGRAM(s) Oral at bedtime  benzocaine/menthol Lozenge 1 Lozenge Oral two times a day  clopidogrel Tablet 75 milliGRAM(s) Oral daily  dextrose 5%. 1000 milliLiter(s) (50 mL/Hr) IV Continuous <Continuous>  dextrose 5%. 1000 milliLiter(s) (100 mL/Hr) IV Continuous <Continuous>  dextrose 50% Injectable 25 Gram(s) IV Push once  dextrose 50% Injectable 25 Gram(s) IV Push once  dextrose 50% Injectable 12.5 Gram(s) IV Push once  gabapentin 100 milliGRAM(s) Oral three times a day  glucagon  Injectable 1 milliGRAM(s) IntraMuscular once  glucagon  Injectable 1 milliGRAM(s) IntraMuscular once  heparin   Injectable 5000 Unit(s) SubCutaneous every 8 hours  influenza  Vaccine (HIGH DOSE) 0.5 milliLiter(s) IntraMuscular once  insulin glargine Injectable (LANTUS) 20 Unit(s) SubCutaneous before breakfast  insulin lispro (ADMELOG) corrective regimen sliding scale   SubCutaneous three times a day before meals  insulin lispro (ADMELOG) corrective regimen sliding scale   SubCutaneous at bedtime  piperacillin/tazobactam IVPB.. 3.375 Gram(s) IV Intermittent every 8 hours  polyethylene glycol 3350 17 Gram(s) Oral daily  sodium chloride 0.9%. 1000 milliLiter(s) (50 mL/Hr) IV Continuous <Continuous>      PHYSICAL EXAM:  T(C): 37.2 (12-21-24 @ 10:30), Max: 39 (12-20-24 @ 21:34)  HR: 84 (12-21-24 @ 10:30) (79 - 90)  BP: 132/53 (12-21-24 @ 10:30) (132/53 - 153/58)  RR: 18 (12-21-24 @ 10:30) (16 - 18)  SpO2: 97% (12-21-24 @ 10:30) (97% - 100%)  Wt(kg): --  I&O's Summary        Appearance: Normal	  HEENT:  PERRLA   Lymphatic: No lymphadenopathy   Cardiovascular: Normal S1 S2, no JVD  Respiratory: normal effort , clear  Gastrointestinal:  Soft, Non-tender  Skin: No rashes,  warm to touch  Psychiatry:  Mood & affect appropriate  Musculuskeletal: No edema    recent labs, Imaging and EKGs personally reviewed   CODE status discussed with the patient in detail                          10.1   10.88 )-----------( 385      ( 21 Dec 2024 07:53 )             31.9               12-21    136  |  101  |  15  ----------------------------<  211[H]  4.4   |  25  |  1.31[H]    Ca    9.5      21 Dec 2024 07:53  Phos  2.5     12-21  Mg     2.20     12-21                         Urinalysis Basic - ( 21 Dec 2024 07:53 )    Color: x / Appearance: x / SG: x / pH: x  Gluc: 211 mg/dL / Ketone: x  / Bili: x / Urobili: x   Blood: x / Protein: x / Nitrite: x   Leuk Esterase: x / RBC: x / WBC x   Sq Epi: x / Non Sq Epi: x / Bacteria: x

## 2024-12-21 NOTE — CHART NOTE - NSCHARTNOTEFT_GEN_A_CORE
REJI MEADE  [id number]  [facility] [address]    The patient is a 69yFemale with PMH of DM type2, HTN, hyperlipidemia a/w infected L foot wound iso Type 2 DM. Endocrinology consulted for T2DM.    #Uncontrolled Type 2 Diabetes Mellitus  - A1C with Estimated Average Glucose Result: 7.8 % (12-05-24 @ 06:02)  - home regimen: per medrec, patient takes farxiga 5mg daily, lantus 30 units qhs, and novolog 10 units TID  - eGFR: 44 mL/min/1.73m2 (12-21-24)  - Weight (kg): 81.6 (12-18-24)  - glucose 200s, no evidence of DKA on labs    PLAN:  - Recommend c/w Lantus 20 units QHS   - Recommend add Admelog 5 units TID before meals (HOLD if NPO or not eating)  - Recommend c/w low dose Admelog correction scale TID before meals and separate low dose scale QHS  - Please check FSG before meals and QHS, or q6h while NPO  - Inpatient glucose goals: 100-180  - consistent carb diet when eating    Full consult to follow in the AM.    Reno Nicholas MD  Endocrine Fellow  For nonurgent matters, please email [email or web address] or [email or web address]. For urgent matters only, please call answering service at [phone] (weekdays), [phone] (nights/weekends). REJI MEADE  [id number]  [facility] [address]    The patient is a 69yFemale with PMH of DM type2, HTN, hyperlipidemia a/w infected L foot wound iso Type 2 DM. Endocrinology consulted for T2DM.    #Uncontrolled Type 2 Diabetes Mellitus  - A1C with Estimated Average Glucose Result: 7.8 % (12-05-24 @ 06:02)  - home regimen: per medrec, patient takes farxiga 5mg daily, lantus 30 units qhs, and novolog 10 units TID  - eGFR: 44 mL/min/1.73m2 (12-21-24)  - Weight (kg): 81.6 (12-18-24)  - glucose 200s, no evidence of DKA on labs    PLAN:  - Recommend c/w Lantus 20 units daily in the AM  - Recommend add Admelog 5 units TID before meals (HOLD if NPO or not eating)  - Recommend c/w low dose Admelog correction scale TID before meals and separate low dose scale QHS  - Please check FSG before meals and QHS, or q6h while NPO  - Inpatient glucose goals: 100-180  - consistent carb diet when eating    Full consult to follow in the AM.    Reno Nicholas MD  Endocrine Fellow  For nonurgent matters, please email [email or web address] or [email or web address]. For urgent matters only, please call answering service at [phone] (weekdays), [phone] (nights/weekends). REJI MEADE  [id number]  [facility] [address]    The patient is a 69yFemale with PMH of DM type2, HTN, hyperlipidemia a/w infected L foot wound iso Type 2 DM. Endocrinology consulted for T2DM.    #Uncontrolled Type 2 Diabetes Mellitus  - A1C with Estimated Average Glucose Result: 7.8 % (12-05-24 @ 06:02)  - home regimen: per medrec, patient takes farxiga 5mg daily, lantus 30 units qhs, and novolog 10 units TID  - eGFR: 44 mL/min/1.73m2 (12-21-24)  - Weight (kg): 81.6 (12-18-24)  - glucose 200s, no evidence of DKA on labs    PLAN:  - Recommend c/w Lantus 20 units daily in the AM  - Recommend add Admelog 7 units TID before meals (HOLD if NPO or not eating)  - Recommend c/w low dose Admelog correction scale TID before meals and separate low dose scale QHS  - Please check FSG before meals and QHS, or q6h while NPO  - Inpatient glucose goals: 100-180  - consistent carb diet when eating    Full consult to follow in the AM.    Reno Nicholas MD  Endocrine Fellow  For nonurgent matters, please email [email or web address] or [email or web address]. For urgent matters only, please call answering service at [phone] (weekdays), [phone] (nights/weekends).

## 2024-12-21 NOTE — PROGRESS NOTE ADULT - ASSESSMENT
A/P  59yo female h/o DM type2, HTN, hyperlipidemia; Pt c/o left great toe infection.    #diabetic foot infection, PAD  -sp bedside debridement with podiatry on 12/4/24  -VA JACK WITH PVR noted   -ecg with no ischemic changes, no chf or significant valvular disease on exam   -echo with nl LV sys FX No significant valvular disease.  -s/p LLE angiogram and AT angioplasty on 12/16  -s/p L foot partial first ray resection, closed  12/18  -antiplat per vasc  -cv stable postop     #HTN   -c/w meds    #fever w/u per primary team     dvt ppx     35 minutes spent on total encounter; more than 50% of the visit was spent counseling and/or coordinating care by the attending physician.

## 2024-12-21 NOTE — PROGRESS NOTE ADULT - ASSESSMENT
59yo female h/o DM type2, HTN, hyperlipidemia; Pt c/o left great toe infection. vascular on board planning for angiogram. nephrology consulted for elevated S.Cr.    ckd stage 3B  baseline ~ 1.4   long standing hx of dm and htn  admitted with scr now up trending  scr relatively stable  on losartan and htcz at home current on hold  FeUrea 35%  renal us no hydro   s/p trial of gentle hydration 12/5  s/p angio.  Renal function stable  - Monitor BMP and UO.   - Avoid further nephrotoxins if possible.    Hypertension:  BP fluctuating and is acceptable.  - continue with current meds  - Low salt diet.  - monitor BP.    Hyponatremia  Stable.  check urine na and osmo  Optimize glycemic control.  Monitor Na.    LE  wound  s/p angiogram 12/16  s/p partial first ray resection n 12/18.  Podiatry, vascular following.

## 2024-12-21 NOTE — PROGRESS NOTE ADULT - SUBJECTIVE AND OBJECTIVE BOX
REJI MEADE  69y  Patient is a 69y old  Female who presents with a chief complaint of Sent in by podiatrist for right foot/toe wound, worsening since October. (21 Dec 2024 16:06)    HPI:  59yo female h/o DM type2, HTN, hyperlipidemia; Pt c/o left great toe infection.  Patient reports she was clipping her nail toenails in October and accidentally cut her skin.  Patient reports this week she noticed bleeding through her sock which prompted her to check and noticed an open wound on her toe.        HEALTH ISSUES - PROBLEM Dx:  Type 2 diabetes mellitus with diabetic foot infection    Encounter for deep vein thrombosis (DVT) prophylaxis    Type 2 diabetes mellitus with hyperglycemia, with long-term current use of insulin    HTN (hypertension)    CKD (chronic kidney disease), stage III          MEDICATIONS  (STANDING):  amLODIPine   Tablet 10 milliGRAM(s) Oral daily  aspirin  chewable 81 milliGRAM(s) Oral daily  atorvastatin 20 milliGRAM(s) Oral at bedtime  benzocaine/menthol Lozenge 1 Lozenge Oral two times a day  clopidogrel Tablet 75 milliGRAM(s) Oral daily  dextrose 5%. 1000 milliLiter(s) (50 mL/Hr) IV Continuous <Continuous>  dextrose 5%. 1000 milliLiter(s) (100 mL/Hr) IV Continuous <Continuous>  dextrose 50% Injectable 12.5 Gram(s) IV Push once  dextrose 50% Injectable 25 Gram(s) IV Push once  dextrose 50% Injectable 25 Gram(s) IV Push once  gabapentin 100 milliGRAM(s) Oral three times a day  glucagon  Injectable 1 milliGRAM(s) IntraMuscular once  glucagon  Injectable 1 milliGRAM(s) IntraMuscular once  heparin   Injectable 5000 Unit(s) SubCutaneous every 8 hours  influenza  Vaccine (HIGH DOSE) 0.5 milliLiter(s) IntraMuscular once  insulin glargine Injectable (LANTUS) 20 Unit(s) SubCutaneous before breakfast  insulin lispro (ADMELOG) corrective regimen sliding scale   SubCutaneous three times a day before meals  insulin lispro (ADMELOG) corrective regimen sliding scale   SubCutaneous at bedtime  insulin lispro Injectable (ADMELOG) 7 Unit(s) SubCutaneous three times a day before meals  piperacillin/tazobactam IVPB.. 3.375 Gram(s) IV Intermittent every 8 hours  polyethylene glycol 3350 17 Gram(s) Oral daily  sodium chloride 0.9%. 1000 milliLiter(s) (50 mL/Hr) IV Continuous <Continuous>    MEDICATIONS  (PRN):  acetaminophen     Tablet .. 975 milliGRAM(s) Oral every 6 hours PRN Temp greater or equal to 38C (100.4F), Moderate Pain (4 - 6)  melatonin 3 milliGRAM(s) Oral at bedtime PRN Insomnia  ondansetron Injectable 4 milliGRAM(s) IV Push every 8 hours PRN Nausea and/or Vomiting    Vital Signs Last 24 Hrs  T(C): 37.2 (21 Dec 2024 18:10), Max: 39 (20 Dec 2024 21:34)  T(F): 98.9 (21 Dec 2024 18:10), Max: 102.2 (20 Dec 2024 21:34)  HR: 82 (21 Dec 2024 18:10) (79 - 84)  BP: 131/62 (21 Dec 2024 18:10) (131/62 - 137/59)  BP(mean): --  RR: 18 (21 Dec 2024 18:10) (16 - 18)  SpO2: 97% (21 Dec 2024 18:10) (97% - 100%)    Parameters below as of 21 Dec 2024 18:10  Patient On (Oxygen Delivery Method): room air      Daily     Daily     PHYSICAL EXAM:  Constitutional:  She appears comfortable and not distressed. Not diaphoretic.    Neck:  The thyroid is normal. Trachea is midline.     Respiratory: The lungs are clear to auscultation. No dullness and expansion is normal.    Cardiovascular: S1 and S2 are normal. No mummurs, rubs or gallops are present.    Gastrointestinal: The abdomen is soft. No tenderness is present. No masses are present. Bowel sounds are normal.    Genitourinary: The bladder is not distended. No CVA tenderness is present.    Extremities: No edema is noted. Foot dressing.    Neurological: Cognition is normal. Tone, power and sensation are normal.     Skin: No lesions are seen  or palpated.    Lymph Nodes: No lymphadenopathy is present.    Psychiatric: Mood is appropriate. No hallucinations or flight of ideas are noted.                              10.1   10.88 )-----------( 385      ( 21 Dec 2024 07:53 )             31.9     12-21    136  |  101  |  15  ----------------------------<  211[H]  4.4   |  25  |  1.31[H]    Ca    9.5      21 Dec 2024 07:53  Phos  2.5     12-21  Mg     2.20     12-21

## 2024-12-21 NOTE — PROGRESS NOTE ADULT - SUBJECTIVE AND OBJECTIVE BOX
CARDIOLOGY FOLLOW UP - Dr. Oliva  Date of Service: 12-21-24 @ 11:56    CC: no events    Review of Systems:  Constitutional: No fever, weight loss, or fatigue  Respiratory: No cough, wheezing, or hemoptysis, no shortness of breath  Cardiovascular: No chest pain, palpitations, passing out, dizziness, or leg swelling  Gastrointestinal: No abd or epigastric pain. No nausea, vomiting, or hematemesis; no diarrhea or consiptaiton, no melena or hematochezia  Vascular: No edema     TELEMETRY:    PHYSICAL EXAM:  T(C): 37.2 (12-21-24 @ 10:30), Max: 39 (12-20-24 @ 21:34)  HR: 84 (12-21-24 @ 10:30) (79 - 90)  BP: 132/53 (12-21-24 @ 10:30) (132/53 - 153/58)  RR: 18 (12-21-24 @ 10:30) (16 - 18)  SpO2: 97% (12-21-24 @ 10:30) (97% - 100%)  Wt(kg): --  I&O's Summary      Appearance: Normal	  Cardiovascular: Normal S1 S2,RRR, No JVD, No murmurs  Respiratory: Lungs clear to auscultation	  Gastrointestinal:  Soft, Non-tender, + BS	  Extremities: Normal range of motion, No clubbing, cyanosis or edema  Vascular: Peripheral pulses palpable 2+ bilaterally       Home Medications:  aspirin 81 mg oral delayed release tablet: 1 tab(s) orally once a day (04 Dec 2024 04:28)  atorvastatin 20 mg oral tablet: 1 tab(s) orally once a day (at bedtime) (04 Dec 2024 04:28)  Farxiga 5 mg oral tablet: 1 tab(s) orally once a day (04 Dec 2024 04:32)  hydroCHLOROthiazide 25 mg oral tablet: 1 tab(s) orally once a day (04 Dec 2024 04:28)  Lantus Solostar Pen 100 units/mL subcutaneous solution: 30 unit(s) subcutaneous once a day (at bedtime) (04 Dec 2024 04:28)  losartan 100 mg oral tablet: 1 tab(s) orally once a day (04 Dec 2024 04:28)  Norvasc 10 mg oral tablet: 1 tab(s) orally once a day (04 Dec 2024 04:28)  NovoLOG 100 units/mL subcutaneous solution: 10 unit(s) subcutaneous 3 times a day (before meals) (04 Dec 2024 04:28)        MEDICATIONS  (STANDING):  amLODIPine   Tablet 10 milliGRAM(s) Oral daily  aspirin  chewable 81 milliGRAM(s) Oral daily  atorvastatin 20 milliGRAM(s) Oral at bedtime  benzocaine/menthol Lozenge 1 Lozenge Oral two times a day  clopidogrel Tablet 75 milliGRAM(s) Oral daily  dextrose 5%. 1000 milliLiter(s) (50 mL/Hr) IV Continuous <Continuous>  dextrose 5%. 1000 milliLiter(s) (100 mL/Hr) IV Continuous <Continuous>  dextrose 50% Injectable 25 Gram(s) IV Push once  dextrose 50% Injectable 12.5 Gram(s) IV Push once  dextrose 50% Injectable 25 Gram(s) IV Push once  gabapentin 100 milliGRAM(s) Oral three times a day  glucagon  Injectable 1 milliGRAM(s) IntraMuscular once  glucagon  Injectable 1 milliGRAM(s) IntraMuscular once  heparin   Injectable 5000 Unit(s) SubCutaneous every 8 hours  influenza  Vaccine (HIGH DOSE) 0.5 milliLiter(s) IntraMuscular once  insulin glargine Injectable (LANTUS) 20 Unit(s) SubCutaneous before breakfast  insulin lispro (ADMELOG) corrective regimen sliding scale   SubCutaneous three times a day before meals  insulin lispro (ADMELOG) corrective regimen sliding scale   SubCutaneous at bedtime  piperacillin/tazobactam IVPB.. 3.375 Gram(s) IV Intermittent every 8 hours  polyethylene glycol 3350 17 Gram(s) Oral daily  sodium chloride 0.9%. 1000 milliLiter(s) (50 mL/Hr) IV Continuous <Continuous>        EKG:  RADIOLOGY:  DIAGNOSTIC TESTING:  [ ] Echocardiogram:  [ ] Catherterization:  [ ] Stress Test:  OTHER:     LABS:	 	                          10.1   10.88 )-----------( 385      ( 21 Dec 2024 07:53 )             31.9     12-21    136  |  101  |  15  ----------------------------<  211[H]  4.4   |  25  |  1.31[H]    Ca    9.5      21 Dec 2024 07:53  Phos  2.5     12-21  Mg     2.20     12-21            CARDIAC MARKERS:

## 2024-12-21 NOTE — PROGRESS NOTE ADULT - SUBJECTIVE AND OBJECTIVE BOX
Vascular Surgery Daily Resident Progress Note    Subjective and Interval  Seen and examined at bedside. Tmax 102.2. Does not endorse chest pain, SOB, or nausea. Endorses cough and congestion.  Coronavirus positive on RVP.   ___________________________________________________  Vital Signs  T(C): 37.7 (05:36), Max: 39 (21:34)  HR: 79 (05:36) (79 - 90)  BP: 137/59 (05:36) (131/56 - 153/58)  ABP: --  ABP(mean): --  RR: 18 (05:36) (16 - 18)  SpO2: 100% (05:36) (99% - 100%)    Physical Exam  General: NAD, sitting up comfortably in bed  Cardiovascular: RRR  Respiratory: Even and unlabored breathing  Gastrointestinal:  Soft, Non-tender  LLE: L foot dressing c/d/i; L PT/AT signal +     In&Out      Labs    LABS:  cret                        10.5   9.80  )-----------( 445      ( 20 Dec 2024 05:16 )             32.8     12-20    137  |  101  |  14  ----------------------------<  172[H]  4.1   |  24  |  1.24    Ca    9.5      20 Dec 2024 05:16  Phos  2.9     12-20  Mg     2.20     12-20            Medications  acetaminophen     Tablet .. 975 milliGRAM(s) Oral every 6 hours PRN  amLODIPine   Tablet 10 milliGRAM(s) Oral daily  aspirin  chewable 81 milliGRAM(s) Oral daily  atorvastatin 20 milliGRAM(s) Oral at bedtime  benzocaine/menthol Lozenge 1 Lozenge Oral two times a day  clopidogrel Tablet 75 milliGRAM(s) Oral daily  dextrose 5%. 1000 milliLiter(s) IV Continuous <Continuous>  dextrose 5%. 1000 milliLiter(s) IV Continuous <Continuous>  dextrose 50% Injectable 25 Gram(s) IV Push once  dextrose 50% Injectable 12.5 Gram(s) IV Push once  dextrose 50% Injectable 25 Gram(s) IV Push once  gabapentin 100 milliGRAM(s) Oral three times a day  glucagon  Injectable 1 milliGRAM(s) IntraMuscular once  glucagon  Injectable 1 milliGRAM(s) IntraMuscular once  heparin   Injectable 5000 Unit(s) SubCutaneous every 8 hours  influenza  Vaccine (HIGH DOSE) 0.5 milliLiter(s) IntraMuscular once  insulin glargine Injectable (LANTUS) 20 Unit(s) SubCutaneous before breakfast  insulin lispro (ADMELOG) corrective regimen sliding scale   SubCutaneous three times a day before meals  insulin lispro (ADMELOG) corrective regimen sliding scale   SubCutaneous at bedtime  melatonin 3 milliGRAM(s) Oral at bedtime PRN  ondansetron Injectable 4 milliGRAM(s) IV Push every 8 hours PRN  piperacillin/tazobactam IVPB.. 3.375 Gram(s) IV Intermittent every 8 hours  polyethylene glycol 3350 17 Gram(s) Oral daily  sodium chloride 0.9%. 1000 milliLiter(s) IV Continuous <Continuous>      Microbiology      Urinalysis with Rflx Culture (collected 19 Dec 2024 18:56)    Culture - Fungal, Tissue (collected 18 Dec 2024 20:20)  Source: Tissue  Preliminary Report (21 Dec 2024 07:37):    No growth    Culture - Acid Fast - Tissue w/Smear (collected 18 Dec 2024 20:20)  Source: Tissue    Culture - Tissue with Gram Stain (collected 18 Dec 2024 20:20)  Source: Tissue  Gram Stain (19 Dec 2024 16:48):    No polymorphonuclear leukocytes seen per low power field    No organisms seen per oil power field  Preliminary Report (20 Dec 2024 14:11):    No growth      ___________________________________________________  Assessment & Plan      69 year old female with PMH type 2 DM, CKD stage 3, HTN, HLD presents with osteomyelitis at left hallux. LLE angiogram cancelled 12/11 and 12/12. Patient refused angiogram on 12/13 after multiple cancellations. Now s/p LLE angiogram and AT angioplasty on 12/16 and  left foot Partial 1st ray resection, closed on 12/18.     Plan  - Zosyn, f/u ID for dispo recs post-procedure    - if infectious work-up is negative and OR cx are without growth can transition to augmentin 875-125mg bid w/ last day 12/23     -will need to f/u in [facility] office outpatient to f/u bone path  - PT   - Fever w/u for persistance fever     -FU Blood CX     -RVP 12/19- Coronavirus positive    - Pain control   - Aspirin, plavix   - Subq heparin  - Dispo plan: Home       C team surgery   [id number]

## 2024-12-22 DIAGNOSIS — E78.5 HYPERLIPIDEMIA, UNSPECIFIED: ICD-10-CM

## 2024-12-22 LAB
ANION GAP SERPL CALC-SCNC: 16 MMOL/L — HIGH (ref 7–14)
BUN SERPL-MCNC: 15 MG/DL — SIGNIFICANT CHANGE UP (ref 7–23)
CALCIUM SERPL-MCNC: 9.6 MG/DL — SIGNIFICANT CHANGE UP (ref 8.4–10.5)
CHLORIDE SERPL-SCNC: 100 MMOL/L — SIGNIFICANT CHANGE UP (ref 98–107)
CO2 SERPL-SCNC: 18 MMOL/L — LOW (ref 22–31)
CREAT SERPL-MCNC: 1.17 MG/DL — SIGNIFICANT CHANGE UP (ref 0.5–1.3)
EGFR: 50 ML/MIN/1.73M2 — LOW
GLUCOSE BLDC GLUCOMTR-MCNC: 107 MG/DL — HIGH (ref 70–99)
GLUCOSE BLDC GLUCOMTR-MCNC: 141 MG/DL — HIGH (ref 70–99)
GLUCOSE BLDC GLUCOMTR-MCNC: 172 MG/DL — HIGH (ref 70–99)
GLUCOSE BLDC GLUCOMTR-MCNC: 174 MG/DL — HIGH (ref 70–99)
GLUCOSE BLDC GLUCOMTR-MCNC: 179 MG/DL — HIGH (ref 70–99)
GLUCOSE SERPL-MCNC: 158 MG/DL — HIGH (ref 70–99)
MAGNESIUM SERPL-MCNC: 2.2 MG/DL — SIGNIFICANT CHANGE UP (ref 1.6–2.6)
PHOSPHATE SERPL-MCNC: 3.1 MG/DL — SIGNIFICANT CHANGE UP (ref 2.5–4.5)
POTASSIUM SERPL-MCNC: 4.3 MMOL/L — SIGNIFICANT CHANGE UP (ref 3.5–5.3)
POTASSIUM SERPL-SCNC: 4.3 MMOL/L — SIGNIFICANT CHANGE UP (ref 3.5–5.3)
SODIUM SERPL-SCNC: 134 MMOL/L — LOW (ref 135–145)

## 2024-12-22 PROCEDURE — 99232 SBSQ HOSP IP/OBS MODERATE 35: CPT

## 2024-12-22 PROCEDURE — 99222 1ST HOSP IP/OBS MODERATE 55: CPT

## 2024-12-22 RX ORDER — INSULIN GLARGINE-YFGN 100 [IU]/ML
24 INJECTION, SOLUTION SUBCUTANEOUS
Refills: 0 | Status: DISCONTINUED | OUTPATIENT
Start: 2024-12-22 | End: 2024-12-23

## 2024-12-22 RX ORDER — AMOXICILLIN/POTASSIUM CLAV 875-125 MG
1 TABLET ORAL
Refills: 0 | Status: DISCONTINUED | OUTPATIENT
Start: 2024-12-22 | End: 2024-12-23

## 2024-12-22 RX ADMIN — GABAPENTIN 100 MILLIGRAM(S): 300 CAPSULE ORAL at 22:47

## 2024-12-22 RX ADMIN — Medication 7 UNIT(S): at 08:57

## 2024-12-22 RX ADMIN — Medication 7 UNIT(S): at 12:55

## 2024-12-22 RX ADMIN — HEPARIN SODIUM 5000 UNIT(S): 1000 INJECTION, SOLUTION INTRAVENOUS; SUBCUTANEOUS at 13:47

## 2024-12-22 RX ADMIN — GABAPENTIN 100 MILLIGRAM(S): 300 CAPSULE ORAL at 05:40

## 2024-12-22 RX ADMIN — ATORVASTATIN CALCIUM 20 MILLIGRAM(S): 40 TABLET, FILM COATED ORAL at 22:49

## 2024-12-22 RX ADMIN — Medication 81 MILLIGRAM(S): at 12:56

## 2024-12-22 RX ADMIN — INSULIN GLARGINE-YFGN 20 UNIT(S): 100 INJECTION, SOLUTION SUBCUTANEOUS at 08:56

## 2024-12-22 RX ADMIN — Medication 1: at 08:56

## 2024-12-22 RX ADMIN — CLOPIDOGREL BISULFATE 75 MILLIGRAM(S): 75 TABLET, FILM COATED ORAL at 12:56

## 2024-12-22 RX ADMIN — Medication 1: at 12:55

## 2024-12-22 RX ADMIN — Medication 7 UNIT(S): at 18:36

## 2024-12-22 RX ADMIN — Medication 10 MILLIGRAM(S): at 05:41

## 2024-12-22 RX ADMIN — Medication 17 GRAM(S): at 12:56

## 2024-12-22 RX ADMIN — PIPERACILLIN AND TAZOBACTAM 25 GRAM(S): 3; .375 INJECTION, POWDER, LYOPHILIZED, FOR SOLUTION INTRAVENOUS at 05:40

## 2024-12-22 RX ADMIN — HEPARIN SODIUM 5000 UNIT(S): 1000 INJECTION, SOLUTION INTRAVENOUS; SUBCUTANEOUS at 22:49

## 2024-12-22 RX ADMIN — GABAPENTIN 100 MILLIGRAM(S): 300 CAPSULE ORAL at 13:47

## 2024-12-22 RX ADMIN — Medication 1 TABLET(S): at 18:48

## 2024-12-22 RX ADMIN — HEPARIN SODIUM 5000 UNIT(S): 1000 INJECTION, SOLUTION INTRAVENOUS; SUBCUTANEOUS at 05:41

## 2024-12-22 NOTE — PROGRESS NOTE ADULT - SUBJECTIVE AND OBJECTIVE BOX
[facility], Division of Infectious   Dr Stephenson, Dr Sauer, Dr Gordillo, ABDOUL Crespo Álvaro  [phone]  after hours and weekends [phone]    Name: REJI MEADE  Age: 69y  Gender: Female  MRN: [id number]    Interval History--  Notes reviewed  has a cough  fever last night   but no sob, no diarrhea, no dysuria       Allergies    No Known Allergies    Intolerances        Medications--  Antibiotics:  piperacillin/tazobactam IVPB.. 3.375 Gram(s) IV Intermittent every 8 hours    Immunologic:  influenza  Vaccine (HIGH DOSE) 0.5 milliLiter(s) IntraMuscular once    Other:  acetaminophen     Tablet .. PRN  amLODIPine   Tablet  aspirin  chewable  atorvastatin  benzocaine/menthol Lozenge  clopidogrel Tablet  dextrose 5%.  dextrose 5%.  dextrose 50% Injectable  dextrose 50% Injectable  dextrose 50% Injectable  gabapentin  glucagon  Injectable  glucagon  Injectable  heparin   Injectable  insulin glargine Injectable (LANTUS)  insulin lispro (ADMELOG) corrective regimen sliding scale  insulin lispro (ADMELOG) corrective regimen sliding scale  insulin lispro Injectable (ADMELOG)  melatonin PRN  ondansetron Injectable PRN  polyethylene glycol 3350  sodium chloride 0.9%.      Review of Systems--  A 10-point review of systems was obtained.     Pertinent positives and negatives--  Constitutional: + fevers. No Chills. No Rigors.   Cardiovascular: No chest pain. No palpitations.  Respiratory: No shortness of breath. + cough.  Gastrointestinal: No nausea or vomiting. No diarrhea or constipation.   Psychiatric: Pleasant. Appropriate affect.    Review of systems otherwise negative except as previously noted.    Physical Examination--  Vital Signs: T(F): 98.9 (12-22-24 @ 10:43), Max: 100.8 (12-21-24 @ 21:25)  HR: 96 (12-22-24 @ 10:43)  BP: 153/56 (12-22-24 @ 10:43)  RR: 18 (12-22-24 @ 10:43)  SpO2: 97% (12-22-24 @ 10:43)  Wt(kg): --  General: Nontoxic-appearing Female in no acute distress.  HEENT: AT/NC.   Neck: Not rigid. No sense of mass.  Nodes: None palpable.  Lungs: Clear bilaterally without rales, wheezing or rhonchi  Heart: Regular rate and rhythm.   Abdomen: Bowel sounds present and normoactive. Soft. Nondistended. Nontender. .  Extremities: No cyanosis or clubbing. No edema. left foot wrapped   Skin: Warm. Dry. Good turgor. No rash. No vasculitic stigmata.  Psychiatric: Appropriate affect and mood for situation.         Laboratory Studies--  CBC                        10.1   10.88 )-----------( 385      ( 21 Dec 2024 07:53 )             31.9       Chemistries  12-22    134[L]  |  100  |  15  ----------------------------<  158[H]  4.3   |  18[L]  |  1.17    Ca    9.6      22 Dec 2024 07:40  Phos  3.1     12-22  Mg     2.20     12-22        Culture Data    Culture - Blood (collected 19 Dec 2024 20:55)  Source: .Blood BLOOD  Preliminary Report (22 Dec 2024 09:01):    No growth at 48 Hours    Culture - Blood (collected 19 Dec 2024 20:45)  Source: .Blood BLOOD  Preliminary Report (22 Dec 2024 09:01):    No growth at 48 Hours    Urinalysis with Rflx Culture (collected 19 Dec 2024 18:56)    Culture - Fungal, Tissue (collected 18 Dec 2024 20:20)  Source: Tissue  Preliminary Report (22 Dec 2024 08:28):    No growth    Culture - Acid Fast - Tissue w/Smear (collected 18 Dec 2024 20:20)  Source: Tissue  Preliminary Report (21 Dec 2024 23:06):    Culture is being performed.    Culture - Tissue with Gram Stain (collected 18 Dec 2024 20:20)  Source: Tissue  Gram Stain (19 Dec 2024 16:48):    No polymorphonuclear leukocytes seen per low power field    No organisms seen per oil power field  Preliminary Report (20 Dec 2024 14:11):    No growth

## 2024-12-22 NOTE — PROGRESS NOTE ADULT - SUBJECTIVE AND OBJECTIVE BOX
Name of Patient : REJI MEADE  MRN: [id number]  Date of visit: 12-22-24 @ 12:36      Subjective: Patient seen and examined. No new events except as noted.   doing okay     REVIEW OF SYSTEMS:    CONSTITUTIONAL: No weakness, fevers or chills  EYES/ENT: No visual changes;  No vertigo or throat pain   NECK: No pain or stiffness  RESPIRATORY: No cough, wheezing, hemoptysis; No shortness of breath  CARDIOVASCULAR: No chest pain or palpitations  GASTROINTESTINAL: No abdominal or epigastric pain. No nausea, vomiting, or hematemesis; No diarrhea or constipation. No melena or hematochezia.  GENITOURINARY: No dysuria, frequency or hematuria  NEUROLOGICAL: No numbness or weakness  SKIN: No itching, burning, rashes, or lesions   All other review of systems is negative unless indicated above.    MEDICATIONS:  MEDICATIONS  (STANDING):  amLODIPine   Tablet 10 milliGRAM(s) Oral daily  aspirin  chewable 81 milliGRAM(s) Oral daily  atorvastatin 20 milliGRAM(s) Oral at bedtime  benzocaine/menthol Lozenge 1 Lozenge Oral two times a day  clopidogrel Tablet 75 milliGRAM(s) Oral daily  dextrose 5%. 1000 milliLiter(s) (50 mL/Hr) IV Continuous <Continuous>  dextrose 5%. 1000 milliLiter(s) (100 mL/Hr) IV Continuous <Continuous>  dextrose 50% Injectable 25 Gram(s) IV Push once  dextrose 50% Injectable 12.5 Gram(s) IV Push once  dextrose 50% Injectable 25 Gram(s) IV Push once  gabapentin 100 milliGRAM(s) Oral three times a day  glucagon  Injectable 1 milliGRAM(s) IntraMuscular once  glucagon  Injectable 1 milliGRAM(s) IntraMuscular once  heparin   Injectable 5000 Unit(s) SubCutaneous every 8 hours  influenza  Vaccine (HIGH DOSE) 0.5 milliLiter(s) IntraMuscular once  insulin glargine Injectable (LANTUS) 20 Unit(s) SubCutaneous before breakfast  insulin lispro (ADMELOG) corrective regimen sliding scale   SubCutaneous three times a day before meals  insulin lispro (ADMELOG) corrective regimen sliding scale   SubCutaneous at bedtime  insulin lispro Injectable (ADMELOG) 7 Unit(s) SubCutaneous three times a day before meals  piperacillin/tazobactam IVPB.. 3.375 Gram(s) IV Intermittent every 8 hours  polyethylene glycol 3350 17 Gram(s) Oral daily  sodium chloride 0.9%. 1000 milliLiter(s) (50 mL/Hr) IV Continuous <Continuous>      PHYSICAL EXAM:  T(C): 37.2 (12-22-24 @ 10:43), Max: 38.2 (12-21-24 @ 21:25)  HR: 96 (12-22-24 @ 10:43) (77 - 96)  BP: 153/56 (12-22-24 @ 10:43) (131/62 - 153/56)  RR: 18 (12-22-24 @ 10:43) (17 - 18)  SpO2: 97% (12-22-24 @ 10:43) (97% - 98%)  Wt(kg): --  I&O's Summary        Appearance: Normal	  HEENT:  PERRLA   Lymphatic: No lymphadenopathy   Cardiovascular: Normal S1 S2, no JVD  Respiratory: normal effort , clear  Gastrointestinal:  Soft, Non-tender  Skin: No rashes,  warm to touch  Psychiatry:  Mood & affect appropriate  Musculuskeletal: No edema    recent labs, Imaging and EKGs personally reviewed   CODE status discussed with the patient in detail                          10.1   10.88 )-----------( 385      ( 21 Dec 2024 07:53 )             31.9               12-22    134[L]  |  100  |  15  ----------------------------<  158[H]  4.3   |  18[L]  |  1.17    Ca    9.6      22 Dec 2024 07:40  Phos  3.1     12-22  Mg     2.20     12-22                         Urinalysis Basic - ( 22 Dec 2024 07:40 )    Color: x / Appearance: x / SG: x / pH: x  Gluc: 158 mg/dL / Ketone: x  / Bili: x / Urobili: x   Blood: x / Protein: x / Nitrite: x   Leuk Esterase: x / RBC: x / WBC x   Sq Epi: x / Non Sq Epi: x / Bacteria: x

## 2024-12-22 NOTE — PROGRESS NOTE ADULT - ASSESSMENT
68 y/o F PMhx DM II, HTN who presented w/ left great toe wound    L hallux diabetic infection, osteo  CRP 88.8,   s/p podiatry I&D L hallux wound to the level of and not beyond bone,  mild malodor, scant serosanguinous drainage. Right foot no open wounds, no acute signs of infection.  foot x-ray- fracture distal phalanx great toe. soft tissue swelling of this digit as well as subcutaneous air.  wound cx w/ strep constellatus, citrobacter, morganella, staph lugdunensis (oxacillin sensitive)  febrile 12/7, ceftriaxone switched to zosyn  s/p LLE angio & AT angioplasty 12/16  s/p s/p left foot Partial 1st ray resection, closed 12/18  - noted low concern for residual bone infection    12/20  febrile 12/19 PM  possible post-op fever but will send infectious work-up  pt reporting UTI symptoms w/ cough and new nasal congestion  no leukocytosis  SARS-CoV-2/ RSV/ flu PCR negative  UA negative & pt without urinary symptoms    12/22 corona virus not covid 19  blood cx remain neg to date   OR cx neg to date     Recommendations  zosyn > switch to po augmentin 875-125mg bid w/ last day 12/23  supportive care viral uri   will need to f/u in [facility] office outpatient to f/u bone path      [facility], Division of Infectious Diseases  [phone]  After 5pm on weekdays and all day on weekends - please call [phone]

## 2024-12-22 NOTE — PROGRESS NOTE ADULT - SUBJECTIVE AND OBJECTIVE BOX
Surgery Progress Note     Overnight events:     Interval/Subjective:  Patient seen and examined.   __________________________________________________________________  Vitals:  T(C): 37 (04:45), Max: 38.2 (21:25)  HR: 77 (04:45) (77 - 87)  BP: 141/61 (04:45) (131/62 - 152/60)  RR: 17 (04:45) (17 - 18)  SpO2: 97% (04:45) (97% - 98%)    I & O's:Physical Exam:  LLE: L foot dressing c/d/i; L PT/AT signal +       __________________________________________________________________

## 2024-12-22 NOTE — CONSULT NOTE ADULT - CONSULT REASON
Renal insuffiencey
cards eval/ clearance
LLE toe wound
foot wound
left foot hallux wound
Hyperglycemia

## 2024-12-22 NOTE — CONSULT NOTE ADULT - SUBJECTIVE AND OBJECTIVE BOX
HPI:  69 year old female with PMH type 2 DM, CKD stage 3, HTN, HLD presents with osteomyelitis at left hallux. LLE angiogram cancelled 12/11 and 12/12. Patient refused angiogram on 12/13 after multiple cancellations. Now s/p LLE angiogram and AT angioplasty on 12/16 and  left foot Partial 1st ray resection, closed on 12/18.    Endocrine history   -T2DM  Diagnosed  Endo:  Home regimen: patient takes farxiga 5mg daily, lantus 30 units qhs, and novolog 10 units TID  HbA1c 7.7% December 2024  eGFR 50          PAST MEDICAL & SURGICAL HISTORY:  Diabetes mellitus type 2, noninsulin dependent      HTN (hypertension)      Hyperlipemia      S/P hysterectomy          FAMILY HISTORY:  FH: diabetes mellitus (Mother)        Social History:    Outpatient Medications:    MEDICATIONS  (STANDING):  amLODIPine   Tablet 10 milliGRAM(s) Oral daily  aspirin  chewable 81 milliGRAM(s) Oral daily  atorvastatin 20 milliGRAM(s) Oral at bedtime  benzocaine/menthol Lozenge 1 Lozenge Oral two times a day  clopidogrel Tablet 75 milliGRAM(s) Oral daily  dextrose 5%. 1000 milliLiter(s) (50 mL/Hr) IV Continuous <Continuous>  dextrose 5%. 1000 milliLiter(s) (100 mL/Hr) IV Continuous <Continuous>  dextrose 50% Injectable 25 Gram(s) IV Push once  dextrose 50% Injectable 12.5 Gram(s) IV Push once  dextrose 50% Injectable 25 Gram(s) IV Push once  gabapentin 100 milliGRAM(s) Oral three times a day  glucagon  Injectable 1 milliGRAM(s) IntraMuscular once  glucagon  Injectable 1 milliGRAM(s) IntraMuscular once  heparin   Injectable 5000 Unit(s) SubCutaneous every 8 hours  influenza  Vaccine (HIGH DOSE) 0.5 milliLiter(s) IntraMuscular once  insulin glargine Injectable (LANTUS) 20 Unit(s) SubCutaneous before breakfast  insulin lispro (ADMELOG) corrective regimen sliding scale   SubCutaneous three times a day before meals  insulin lispro (ADMELOG) corrective regimen sliding scale   SubCutaneous at bedtime  insulin lispro Injectable (ADMELOG) 7 Unit(s) SubCutaneous three times a day before meals  piperacillin/tazobactam IVPB.. 3.375 Gram(s) IV Intermittent every 8 hours  polyethylene glycol 3350 17 Gram(s) Oral daily  sodium chloride 0.9%. 1000 milliLiter(s) (50 mL/Hr) IV Continuous <Continuous>    MEDICATIONS  (PRN):  acetaminophen     Tablet .. 975 milliGRAM(s) Oral every 6 hours PRN Temp greater or equal to 38C (100.4F), Moderate Pain (4 - 6)  melatonin 3 milliGRAM(s) Oral at bedtime PRN Insomnia  ondansetron Injectable 4 milliGRAM(s) IV Push every 8 hours PRN Nausea and/or Vomiting      Allergies    No Known Allergies    Intolerances      Review of Systems:  Constitutional: No fever  Eyes: No blurry vision  Neuro: No tremors  HEENT: No pain  Cardiovascular: No chest pain, palpitations  Respiratory: No SOB, no cough  GI: No nausea, vomiting, abdominal pain  : No dysuria  Skin: no rash  Psych: no depression  Endocrine: no polyuria, polydipsia  Hem/lymph: no swelling  Osteoporosis: no fractures    ALL OTHER SYSTEMS REVIEWED AND NEGATIVE    UNABLE TO OBTAIN    PHYSICAL EXAM:  VITALS: T(C): 37 (12-22-24 @ 04:45)  T(F): 98.6 (12-22-24 @ 04:45), Max: 100.8 (12-21-24 @ 21:25)  HR: 77 (12-22-24 @ 04:45) (77 - 87)  BP: 141/61 (12-22-24 @ 04:45) (131/62 - 152/60)  RR:  (17 - 18)  SpO2:  (97% - 98%)  Wt(kg): --  GENERAL: NAD, well-groomed, well-developed  EYES: No proptosis, no lid lag, anicteric  HEENT:  Atraumatic, Normocephalic, moist mucous membranes  THYROID: Normal size, no palpable nodules  RESPIRATORY: Clear to auscultation bilaterally; No rales, rhonchi, wheezing, or rubs  CARDIOVASCULAR: Regular rate and rhythm; No murmurs; no peripheral edema  GI: Soft, nontender, non distended, normal bowel sounds  SKIN: Dry, intact, No rashes or lesions  MUSCULOSKELETAL: Full range of motion, normal strength  NEURO: sensation intact, extraocular movements intact, no tremor, normal reflexes  PSYCH: Alert and oriented x 3, normal affect, normal mood  CUSHING'S SIGNS: no striae    POCT Blood Glucose.: 179 mg/dL (12-22-24 @ 08:28)  POCT Blood Glucose.: 174 mg/dL (12-22-24 @ 01:36)  POCT Blood Glucose.: 184 mg/dL (12-21-24 @ 22:31)  POCT Blood Glucose.: 251 mg/dL (12-21-24 @ 17:30)  POCT Blood Glucose.: 268 mg/dL (12-21-24 @ 12:13)  POCT Blood Glucose.: 225 mg/dL (12-21-24 @ 08:22)  POCT Blood Glucose.: 273 mg/dL (12-20-24 @ 21:42)  POCT Blood Glucose.: 238 mg/dL (12-20-24 @ 17:07)  POCT Blood Glucose.: 257 mg/dL (12-20-24 @ 12:03)  POCT Blood Glucose.: 212 mg/dL (12-20-24 @ 08:22)  POCT Blood Glucose.: 218 mg/dL (12-19-24 @ 22:13)  POCT Blood Glucose.: 198 mg/dL (12-19-24 @ 17:12)  POCT Blood Glucose.: 315 mg/dL (12-19-24 @ 12:03)                            10.1   10.88 )-----------( 385      ( 21 Dec 2024 07:53 )             31.9       12-22    134[L]  |  100  |  15  ----------------------------<  158[H]  4.3   |  18[L]  |  1.17    eGFR: 50[L]    Ca    9.6      12-22  Mg     2.20     12-22  Phos  3.1     12-22        Thyroid Function Tests:  12-04 @ 06:20 TSH 1.00 FreeT4 -- T3 -- Anti TPO -- Anti Thyroglobulin Ab -- TSI --              Radiology:                  HPI:  69 year old female with PMH type 2 DM, CKD stage 3, HTN, HLD presents with osteomyelitis at left hallux. LLE angiogram cancelled 12/11 and 12/12. Patient refused angiogram on 12/13 after multiple cancellations. Now s/p LLE angiogram and AT angioplasty on 12/16 and  left foot Partial 1st ray resection, closed on 12/18.    Endocrine history   -T2DM  Diagnosed > 20 years ago  Endo: Dr Hairston - seen 6 months ago   Home regimen: patient takes farxiga 5mg daily, lantus 30 units morning (but if BGs >250 at night time she may give an additional 10-20units pre bed) , and novolog 10 units TID. Tried ozempic in the past, but self ceased as she did not like the potential side effects associated with the drug  HbA1c 7.7% December 2024  eGFR 50  Cx; CKD, denies any other complications   Fhx in both parents           PAST MEDICAL & SURGICAL HISTORY:  Diabetes mellitus type 2, noninsulin dependent      HTN (hypertension)      Hyperlipemia      S/P hysterectomy          FAMILY HISTORY:  FH: diabetes mellitus (Mother)        Social History:    Outpatient Medications:    MEDICATIONS  (STANDING):  amLODIPine   Tablet 10 milliGRAM(s) Oral daily  aspirin  chewable 81 milliGRAM(s) Oral daily  atorvastatin 20 milliGRAM(s) Oral at bedtime  benzocaine/menthol Lozenge 1 Lozenge Oral two times a day  clopidogrel Tablet 75 milliGRAM(s) Oral daily  dextrose 5%. 1000 milliLiter(s) (50 mL/Hr) IV Continuous <Continuous>  dextrose 5%. 1000 milliLiter(s) (100 mL/Hr) IV Continuous <Continuous>  dextrose 50% Injectable 25 Gram(s) IV Push once  dextrose 50% Injectable 12.5 Gram(s) IV Push once  dextrose 50% Injectable 25 Gram(s) IV Push once  gabapentin 100 milliGRAM(s) Oral three times a day  glucagon  Injectable 1 milliGRAM(s) IntraMuscular once  glucagon  Injectable 1 milliGRAM(s) IntraMuscular once  heparin   Injectable 5000 Unit(s) SubCutaneous every 8 hours  influenza  Vaccine (HIGH DOSE) 0.5 milliLiter(s) IntraMuscular once  insulin glargine Injectable (LANTUS) 20 Unit(s) SubCutaneous before breakfast  insulin lispro (ADMELOG) corrective regimen sliding scale   SubCutaneous three times a day before meals  insulin lispro (ADMELOG) corrective regimen sliding scale   SubCutaneous at bedtime  insulin lispro Injectable (ADMELOG) 7 Unit(s) SubCutaneous three times a day before meals  piperacillin/tazobactam IVPB.. 3.375 Gram(s) IV Intermittent every 8 hours  polyethylene glycol 3350 17 Gram(s) Oral daily  sodium chloride 0.9%. 1000 milliLiter(s) (50 mL/Hr) IV Continuous <Continuous>    MEDICATIONS  (PRN):  acetaminophen     Tablet .. 975 milliGRAM(s) Oral every 6 hours PRN Temp greater or equal to 38C (100.4F), Moderate Pain (4 - 6)  melatonin 3 milliGRAM(s) Oral at bedtime PRN Insomnia  ondansetron Injectable 4 milliGRAM(s) IV Push every 8 hours PRN Nausea and/or Vomiting      Allergies    No Known Allergies    Intolerances      Review of Systems:  Constitutional: No fever  Eyes: No blurry vision  Neuro: No tremors  HEENT: No pain  Cardiovascular: No chest pain, palpitations  Respiratory: No SOB, no cough  GI: No nausea, vomiting, abdominal pain  : No dysuria    PHYSICAL EXAM:  VITALS: T(C): 37 (12-22-24 @ 04:45)  T(F): 98.6 (12-22-24 @ 04:45), Max: 100.8 (12-21-24 @ 21:25)  HR: 77 (12-22-24 @ 04:45) (77 - 87)  BP: 141/61 (12-22-24 @ 04:45) (131/62 - 152/60)  RR:  (17 - 18)  SpO2:  (97% - 98%)  Wt(kg): --  GENERAL: NAD, well-groomed, well-developed  HEENT:  Atraumatic, Normocephalic, moist mucous membranes  PSYCH: Alert and oriented x 3, normal affect, normal mood  CUSHING'S SIGNS: no striae    POCT Blood Glucose.: 179 mg/dL (12-22-24 @ 08:28)  POCT Blood Glucose.: 174 mg/dL (12-22-24 @ 01:36)  POCT Blood Glucose.: 184 mg/dL (12-21-24 @ 22:31)  POCT Blood Glucose.: 251 mg/dL (12-21-24 @ 17:30)  POCT Blood Glucose.: 268 mg/dL (12-21-24 @ 12:13)  POCT Blood Glucose.: 225 mg/dL (12-21-24 @ 08:22)  POCT Blood Glucose.: 273 mg/dL (12-20-24 @ 21:42)  POCT Blood Glucose.: 238 mg/dL (12-20-24 @ 17:07)  POCT Blood Glucose.: 257 mg/dL (12-20-24 @ 12:03)  POCT Blood Glucose.: 212 mg/dL (12-20-24 @ 08:22)  POCT Blood Glucose.: 218 mg/dL (12-19-24 @ 22:13)  POCT Blood Glucose.: 198 mg/dL (12-19-24 @ 17:12)  POCT Blood Glucose.: 315 mg/dL (12-19-24 @ 12:03)                            10.1   10.88 )-----------( 385      ( 21 Dec 2024 07:53 )             31.9       12-22    134[L]  |  100  |  15  ----------------------------<  158[H]  4.3   |  18[L]  |  1.17    eGFR: 50[L]    Ca    9.6      12-22  Mg     2.20     12-22  Phos  3.1     12-22        Thyroid Function Tests:  12-04 @ 06:20 TSH 1.00 FreeT4 -- T3 -- Anti TPO -- Anti Thyroglobulin Ab -- TSI --              Radiology:

## 2024-12-22 NOTE — PROGRESS NOTE ADULT - SUBJECTIVE AND OBJECTIVE BOX
REJI MEADE  69y  Patient is a 69y old  Female who presents with a chief complaint of Sent in by podiatrist for right foot/toe wound, worsening since October. (22 Dec 2024 08:17)    HPI:  Followed for AK on CKD after admission for toe infection.      HEALTH ISSUES - PROBLEM Dx:  Type 2 diabetes mellitus with diabetic foot infection    Encounter for deep vein thrombosis (DVT) prophylaxis    Type 2 diabetes mellitus with hyperglycemia, with long-term current use of insulin    HTN (hypertension)    CKD (chronic kidney disease), stage III          MEDICATIONS  (STANDING):  amLODIPine   Tablet 10 milliGRAM(s) Oral daily  aspirin  chewable 81 milliGRAM(s) Oral daily  atorvastatin 20 milliGRAM(s) Oral at bedtime  benzocaine/menthol Lozenge 1 Lozenge Oral two times a day  clopidogrel Tablet 75 milliGRAM(s) Oral daily  dextrose 5%. 1000 milliLiter(s) (50 mL/Hr) IV Continuous <Continuous>  dextrose 5%. 1000 milliLiter(s) (100 mL/Hr) IV Continuous <Continuous>  dextrose 50% Injectable 25 Gram(s) IV Push once  dextrose 50% Injectable 12.5 Gram(s) IV Push once  dextrose 50% Injectable 25 Gram(s) IV Push once  gabapentin 100 milliGRAM(s) Oral three times a day  glucagon  Injectable 1 milliGRAM(s) IntraMuscular once  glucagon  Injectable 1 milliGRAM(s) IntraMuscular once  heparin   Injectable 5000 Unit(s) SubCutaneous every 8 hours  influenza  Vaccine (HIGH DOSE) 0.5 milliLiter(s) IntraMuscular once  insulin glargine Injectable (LANTUS) 20 Unit(s) SubCutaneous before breakfast  insulin lispro (ADMELOG) corrective regimen sliding scale   SubCutaneous three times a day before meals  insulin lispro (ADMELOG) corrective regimen sliding scale   SubCutaneous at bedtime  insulin lispro Injectable (ADMELOG) 7 Unit(s) SubCutaneous three times a day before meals  piperacillin/tazobactam IVPB.. 3.375 Gram(s) IV Intermittent every 8 hours  polyethylene glycol 3350 17 Gram(s) Oral daily  sodium chloride 0.9%. 1000 milliLiter(s) (50 mL/Hr) IV Continuous <Continuous>    MEDICATIONS  (PRN):  acetaminophen     Tablet .. 975 milliGRAM(s) Oral every 6 hours PRN Temp greater or equal to 38C (100.4F), Moderate Pain (4 - 6)  melatonin 3 milliGRAM(s) Oral at bedtime PRN Insomnia  ondansetron Injectable 4 milliGRAM(s) IV Push every 8 hours PRN Nausea and/or Vomiting    Vital Signs Last 24 Hrs  T(C): 37 (22 Dec 2024 04:45), Max: 38.2 (21 Dec 2024 21:25)  T(F): 98.6 (22 Dec 2024 04:45), Max: 100.8 (21 Dec 2024 21:25)  HR: 77 (22 Dec 2024 04:45) (77 - 87)  BP: 141/61 (22 Dec 2024 04:45) (131/62 - 152/60)  BP(mean): --  RR: 17 (22 Dec 2024 04:45) (17 - 18)  SpO2: 97% (22 Dec 2024 04:45) (97% - 98%)    Parameters below as of 22 Dec 2024 04:45  Patient On (Oxygen Delivery Method): room air      Daily     Daily     PHYSICAL EXAM:  Constitutional: She  appears comfortable and not distressed. Not diaphoretic.    Neck:  The thyroid is normal. Trachea is midline.     Breasts: Normal examination.    Respiratory: The lungs are clear to auscultation. No dullness and expansion is normal.    Cardiovascular: S1 and S2 are normal. No mummurs, rubs or gallops are present.    Gastrointestinal: The abdomen is soft. No tenderness is present.    Genitourinary: The bladder is not distended. No CVA tenderness is present.    Extremities: No edema is noted. Toe dressing.    Neurological: Cognition is normal. Tone, power and sensation are normal.     Skin: No lesions are seen  or palpated.    Lymph Nodes: No lymphadenopathy is present.                            10.1   10.88 )-----------( 385      ( 21 Dec 2024 07:53 )             31.9     12-22    134[L]  |  100  |  15  ----------------------------<  158[H]  4.3   |  18[L]  |  1.17    Ca    9.6      22 Dec 2024 07:40  Phos  3.1     12-22  Mg     2.20     12-22

## 2024-12-22 NOTE — PROGRESS NOTE ADULT - ASSESSMENT
69 year old female with PMH type 2 DM, CKD stage 3, HTN, HLD presents with osteomyelitis at left hallux. LLE angiogram cancelled 12/11 and 12/12. Patient refused angiogram on 12/13 after multiple cancellations. Now s/p LLE angiogram and AT angioplasty on 12/16 and  left foot Partial 1st ray resection, closed on 12/18.     Plan  - Zosyn, f/u ID for dispo recs post-procedure    - if infectious work-up is negative and OR cx are without growth can transition to augmentin 875-125mg bid w/ last day 12/23     -will need to f/u in [facility] office outpatient to f/u bone path  - PT   - Fever w/u for persistance fever     -FU Blood CX     -RVP 12/19- Coronavirus positive    - Pain control   - Aspirin, plavix   - Subq heparin  - Dispo plan: Home       C team surgery   [id number]

## 2024-12-22 NOTE — PROGRESS NOTE ADULT - ASSESSMENT
59yo female h/o DM type2, HTN, hyperlipidemia; Pt c/o left great toe infection. vascular on board planning for angiogram. nephrology consulted for elevated S.Cr.    ckd stage 3B  baseline ~ 1.4   long standing hx of dm and htn  Admitted with elevated Cr.    on losartan and htcz at home current on hold  FeUrea 35%  renal us no hydro   s/p trial of gentle hydration 12/5  s/p angio.  Renal function rermains stable  - Monitor BMP and UO.   - Avoid further nephrotoxins if possible.    Hypertension:  BP fluctuating and is acceptable.  - continue with current meds  - Low salt diet.  - monitor BP.    Hyponatremia  Stable.  check urine na and osmo  Optimize glycemic control.  Monitor Na.    LE  wound  s/p angiogram 12/16  s/p partial first ray resection n 12/18.  Podiatry, vascular following.

## 2024-12-22 NOTE — CONSULT NOTE ADULT - ASSESSMENT
69 year old female with PMH type 2 DM, CKD stage 3, HTN, HLD presents with osteomyelitis at left hallux. LLE angiogram cancelled 12/11 and 12/12. Patient refused angiogram on 12/13 after multiple cancellations. Now s/p LLE angiogram and AT angioplasty on 12/16 and  left foot Partial 1st ray resection, closed on 12/18. Endocrine consult for hyperglycemia     #T2DM  -HbA1c 7.7%  -Home regimen: farxiga 5mg lantus 30 units pre bed and novlog 10 units TID pre meals   -eGFR 50      PLAN:  - Recommend c/w Lantus 20 units daily in the AM  - Recommend add Admelog 7 units TID before meals (HOLD if NPO or not eating)  - Recommend c/w low dose Admelog correction scale TID before meals and separate low dose scale QHS  - Please check FSG before meals and QHS, or q6h while NPO  - Inpatient glucose goals: 100-180  - consistent carb diet when eating    DC planning   Basal + GLP1 agonist if covered by insurance (ozempic 0.25mg weekly or trulicity 0.75mg weekly), would continue farxiga for ongoing renal benefits despite active foot wound.    If GLP1 agonist not covered, then dc on basal bolus + farxiga   Requires outpatient endocrinology follow up     #HTN   -Elevated - manage as per primary team     #HLD  -Check lipids  69 year old female with PMH type 2 DM, CKD stage 3, HTN, HLD presents with osteomyelitis at left hallux. LLE angiogram cancelled 12/11 and 12/12. Patient refused angiogram on 12/13 after multiple cancellations. Now s/p LLE angiogram and AT angioplasty on 12/16 and  left foot Partial 1st ray resection, closed on 12/18. Endocrine consult for hyperglycemia     #T2DM  -HbA1c 7.7%  -Home regimen: farxiga 5mg lantus 30 units pre bed (somtimes she gives additional 10-20 units pre bed if BG >250) and novlog 10 units TID pre meals   -eGFR 50      PLAN:  - Recommend increase Lantus 24 units daily in the AM  - Recommend add Admelog 7 units TID before meals (HOLD if NPO or not eating)  - Recommend c/w low dose Admelog correction scale TID before meals and separate low dose scale QHS  - Please check FSG before meals and QHS, or q6h while NPO  - Inpatient glucose goals: 100-180  - consistent carb diet when eating    DC planning   Basal bolus (dose to be determined) and would continue farxiga for ongoing renal benefits despite active foot wound. Pt declined GLp1 agonist  Requires outpatient endocrinology follow up - follow up with Dr. Hairston     #HTN   -Elevated - manage as per primary team     #HLD  -Check lipids  69 year old female with PMH type 2 DM, CKD stage 3, HTN, HLD presents with osteomyelitis at left hallux. LLE angiogram cancelled 12/11 and 12/12. Patient refused angiogram on 12/13 after multiple cancellations. Now s/p LLE angiogram and AT angioplasty on 12/16 and  left foot Partial 1st ray resection, closed on 12/18. Endocrine consult for hyperglycemia     Team paged for handover of reccs    #T2DM  -HbA1c 7.7%  -Home regimen: farxiga 5mg lantus 30 units pre bed (somtimes she gives additional 10-20 units pre bed if BG >250) and novlog 10 units TID pre meals   -eGFR 50      PLAN:  - Recommend increase Lantus 24 units daily in the AM  - Recommend add Admelog 7 units TID before meals (HOLD if NPO or not eating)  - Recommend c/w low dose Admelog correction scale TID before meals and separate low dose scale QHS  - Please check FSG before meals and QHS, or q6h while NPO  - Inpatient glucose goals: 100-180  - consistent carb diet when eating    DC planning   Basal bolus (dose to be determined) and would continue farxiga for ongoing renal benefits despite active foot wound. Pt declined GLp1 agonist  Requires outpatient endocrinology follow up - follow up with Dr. Hairston     #HTN   -Elevated - manage as per primary team     #HLD  -Check lipids

## 2024-12-22 NOTE — CONSULT NOTE ADULT - REASON FOR ADMISSION
Sent in by podiatrist for right foot/toe wound, worsening since October.

## 2024-12-22 NOTE — CONSULT NOTE ADULT - CONSULT REQUESTED DATE/TIME
05-Dec-2024 11:28
04-Dec-2024 01:34
22-Dec-2024 10:23
05-Dec-2024 09:50
04-Dec-2024 12:11
04-Dec-2024 18:24

## 2024-12-22 NOTE — PROGRESS NOTE ADULT - SUBJECTIVE AND OBJECTIVE BOX
CARDIOLOGY FOLLOW UP - Dr. Oliva  Date of Service: 12-22-24 @ 13:29    CC: no events    Review of Systems:  Constitutional: No fever, weight loss, or fatigue  Respiratory: No cough, wheezing, or hemoptysis, no shortness of breath  Cardiovascular: No chest pain, palpitations, passing out, dizziness, or leg swelling  Gastrointestinal: No abd or epigastric pain. No nausea, vomiting, or hematemesis; no diarrhea or consiptaiton, no melena or hematochezia  Vascular: No edema     TELEMETRY:    PHYSICAL EXAM:  T(C): 37.2 (12-22-24 @ 10:43), Max: 38.2 (12-21-24 @ 21:25)  HR: 96 (12-22-24 @ 10:43) (77 - 96)  BP: 153/56 (12-22-24 @ 10:43) (131/62 - 153/56)  RR: 18 (12-22-24 @ 10:43) (17 - 18)  SpO2: 97% (12-22-24 @ 10:43) (97% - 98%)  Wt(kg): --  I&O's Summary      Appearance: Normal	  Cardiovascular: Normal S1 S2,RRR, No JVD, No murmurs  Respiratory: Lungs clear to auscultation	  Gastrointestinal:  Soft, Non-tender, + BS	  Extremities: Normal range of motion, No clubbing, cyanosis or edema  Vascular: Peripheral pulses palpable 2+ bilaterally       Home Medications:  aspirin 81 mg oral delayed release tablet: 1 tab(s) orally once a day (04 Dec 2024 04:28)  atorvastatin 20 mg oral tablet: 1 tab(s) orally once a day (at bedtime) (04 Dec 2024 04:28)  Farxiga 5 mg oral tablet: 1 tab(s) orally once a day (04 Dec 2024 04:32)  hydroCHLOROthiazide 25 mg oral tablet: 1 tab(s) orally once a day (04 Dec 2024 04:28)  Lantus Solostar Pen 100 units/mL subcutaneous solution: 30 unit(s) subcutaneous once a day (at bedtime) (04 Dec 2024 04:28)  losartan 100 mg oral tablet: 1 tab(s) orally once a day (04 Dec 2024 04:28)  Norvasc 10 mg oral tablet: 1 tab(s) orally once a day (04 Dec 2024 04:28)  NovoLOG 100 units/mL subcutaneous solution: 10 unit(s) subcutaneous 3 times a day (before meals) (04 Dec 2024 04:28)        MEDICATIONS  (STANDING):  amLODIPine   Tablet 10 milliGRAM(s) Oral daily  amoxicillin  875 milliGRAM(s)/clavulanate 1 Tablet(s) Oral two times a day  aspirin  chewable 81 milliGRAM(s) Oral daily  atorvastatin 20 milliGRAM(s) Oral at bedtime  benzocaine/menthol Lozenge 1 Lozenge Oral two times a day  clopidogrel Tablet 75 milliGRAM(s) Oral daily  dextrose 5%. 1000 milliLiter(s) (50 mL/Hr) IV Continuous <Continuous>  dextrose 5%. 1000 milliLiter(s) (100 mL/Hr) IV Continuous <Continuous>  dextrose 50% Injectable 25 Gram(s) IV Push once  dextrose 50% Injectable 12.5 Gram(s) IV Push once  dextrose 50% Injectable 25 Gram(s) IV Push once  gabapentin 100 milliGRAM(s) Oral three times a day  glucagon  Injectable 1 milliGRAM(s) IntraMuscular once  glucagon  Injectable 1 milliGRAM(s) IntraMuscular once  heparin   Injectable 5000 Unit(s) SubCutaneous every 8 hours  influenza  Vaccine (HIGH DOSE) 0.5 milliLiter(s) IntraMuscular once  insulin glargine Injectable (LANTUS) 20 Unit(s) SubCutaneous before breakfast  insulin lispro (ADMELOG) corrective regimen sliding scale   SubCutaneous three times a day before meals  insulin lispro (ADMELOG) corrective regimen sliding scale   SubCutaneous at bedtime  insulin lispro Injectable (ADMELOG) 7 Unit(s) SubCutaneous three times a day before meals  polyethylene glycol 3350 17 Gram(s) Oral daily  sodium chloride 0.9%. 1000 milliLiter(s) (50 mL/Hr) IV Continuous <Continuous>        EKG:  RADIOLOGY:  DIAGNOSTIC TESTING:  [ ] Echocardiogram:  [ ] Catherterization:  [ ] Stress Test:  OTHER:     LABS:	 	                          10.1   10.88 )-----------( 385      ( 21 Dec 2024 07:53 )             31.9     12-22    134[L]  |  100  |  15  ----------------------------<  158[H]  4.3   |  18[L]  |  1.17    Ca    9.6      22 Dec 2024 07:40  Phos  3.1     12-22  Mg     2.20     12-22            CARDIAC MARKERS:

## 2024-12-23 VITALS
OXYGEN SATURATION: 98 % | HEART RATE: 96 BPM | DIASTOLIC BLOOD PRESSURE: 68 MMHG | SYSTOLIC BLOOD PRESSURE: 145 MMHG | RESPIRATION RATE: 18 BRPM | TEMPERATURE: 98 F

## 2024-12-23 LAB
GLUCOSE BLDC GLUCOMTR-MCNC: 122 MG/DL — HIGH (ref 70–99)
GLUCOSE BLDC GLUCOMTR-MCNC: 149 MG/DL — HIGH (ref 70–99)
GLUCOSE BLDC GLUCOMTR-MCNC: 252 MG/DL — HIGH (ref 70–99)

## 2024-12-23 PROCEDURE — 99232 SBSQ HOSP IP/OBS MODERATE 35: CPT

## 2024-12-23 RX ORDER — INSULIN ASPART 100/ML
7 VIAL (ML) SUBCUTANEOUS
Qty: 0 | Refills: 0 | DISCHARGE

## 2024-12-23 RX ORDER — AMOXICILLIN/POTASSIUM CLAV 875-125 MG
1 TABLET ORAL
Qty: 1 | Refills: 0
Start: 2024-12-23 | End: 2024-12-23

## 2024-12-23 RX ORDER — CLOPIDOGREL BISULFATE 75 MG/1
1 TABLET, FILM COATED ORAL
Qty: 30 | Refills: 0
Start: 2024-12-23 | End: 2025-01-21

## 2024-12-23 RX ORDER — LOSARTAN POTASSIUM 100 MG/1
1 TABLET, FILM COATED ORAL
Refills: 0 | DISCHARGE

## 2024-12-23 RX ORDER — ACETAMINOPHEN 80 MG/.8ML
3 SOLUTION/ DROPS ORAL
Qty: 0 | Refills: 0 | DISCHARGE
Start: 2024-12-23

## 2024-12-23 RX ORDER — HYDROCHLOROTHIAZIDE 25 MG/1
1 TABLET ORAL
Refills: 0 | DISCHARGE

## 2024-12-23 RX ORDER — GABAPENTIN 300 MG/1
1 CAPSULE ORAL
Qty: 42 | Refills: 0
Start: 2024-12-23 | End: 2025-01-05

## 2024-12-23 RX ORDER — POLYETHYLENE GLYCOL 3350 17 G/DOSE
17 POWDER (GRAM) ORAL
Qty: 0 | Refills: 0 | DISCHARGE
Start: 2024-12-23

## 2024-12-23 RX ADMIN — Medication 10 MILLIGRAM(S): at 05:52

## 2024-12-23 RX ADMIN — INSULIN GLARGINE-YFGN 24 UNIT(S): 100 INJECTION, SOLUTION SUBCUTANEOUS at 08:46

## 2024-12-23 RX ADMIN — Medication 7 UNIT(S): at 08:46

## 2024-12-23 RX ADMIN — CLOPIDOGREL BISULFATE 75 MILLIGRAM(S): 75 TABLET, FILM COATED ORAL at 12:31

## 2024-12-23 RX ADMIN — Medication 1 TABLET(S): at 05:52

## 2024-12-23 RX ADMIN — GABAPENTIN 100 MILLIGRAM(S): 300 CAPSULE ORAL at 13:21

## 2024-12-23 RX ADMIN — GABAPENTIN 100 MILLIGRAM(S): 300 CAPSULE ORAL at 05:51

## 2024-12-23 RX ADMIN — Medication 7 UNIT(S): at 12:29

## 2024-12-23 RX ADMIN — Medication 7 UNIT(S): at 17:43

## 2024-12-23 RX ADMIN — HEPARIN SODIUM 5000 UNIT(S): 1000 INJECTION, SOLUTION INTRAVENOUS; SUBCUTANEOUS at 05:51

## 2024-12-23 RX ADMIN — Medication 81 MILLIGRAM(S): at 12:31

## 2024-12-23 RX ADMIN — Medication 1 TABLET(S): at 17:20

## 2024-12-23 RX ADMIN — Medication 3: at 12:28

## 2024-12-23 RX ADMIN — HEPARIN SODIUM 5000 UNIT(S): 1000 INJECTION, SOLUTION INTRAVENOUS; SUBCUTANEOUS at 13:21

## 2024-12-23 NOTE — PROGRESS NOTE ADULT - ASSESSMENT
70 y/o F PMhx DM II, HTN who presented w/ left great toe wound    L hallux diabetic infection, osteo  CRP 88.8,   s/p podiatry I&D L hallux wound to the level of and not beyond bone,  mild malodor, scant serosanguinous drainage. Right foot no open wounds, no acute signs of infection.  foot x-ray- fracture distal phalanx great toe. soft tissue swelling of this digit as well as subcutaneous air.  wound cx w/ strep constellatus, citrobacter, morganella, staph lugdunensis (oxacillin sensitive)  febrile 12/7, ceftriaxone switched to zosyn  s/p LLE angio & AT angioplasty 12/16  s/p s/p left foot Partial 1st ray resection, closed 12/18  - noted low concern for residual bone infection  OR cx- NGTD  blood cultures- NGTD    febrile 12/19 PM  pt reporting UTI symptoms w/ cough and new nasal congestion  RVP positive for seasonal coronavirus    Recommendations  c/w augmentin 875-125mg bid w/ last day today 12/23  f/u in [facility] office next week to review bone path  supportive care for seasonal coronavirus  discharge planning    Albino Rea M.D.  [facility], Division of Infectious Diseases  [phone]  After 5pm on weekdays and all day on weekends - please call [phone]  68 y/o F PMhx DM II, HTN who presented w/ left great toe wound    L hallux diabetic infection, osteo  CRP 88.8,   s/p podiatry I&D L hallux wound to the level of and not beyond bone,  mild malodor, scant serosanguinous drainage. Right foot no open wounds, no acute signs of infection.  foot x-ray- fracture distal phalanx great toe. soft tissue swelling of this digit as well as subcutaneous air.  wound cx w/ strep constellatus, citrobacter, morganella, staph lugdunensis (oxacillin sensitive)  febrile 12/7, ceftriaxone switched to zosyn  s/p LLE angio & AT angioplasty 12/16  s/p s/p left foot Partial 1st ray resection, closed 12/18  - noted low concern for residual bone infection  OR cx- NGTD  blood cultures- NGTD    fever- resolved  2/2 seasonal coronavirus  pt reporting UTI symptoms w/ cough and new nasal congestion  RVP positive for seasonal coronavirus    Recommendations  c/w augmentin 875-125mg bid w/ last day today 12/23  f/u in [facility] office next week to review bone path  supportive care for seasonal coronavirus  discharge planning    Albino Rea M.D.  [facility], Division of Infectious Diseases  [phone]  After 5pm on weekdays and all day on weekends - please call [phone]

## 2024-12-23 NOTE — PROGRESS NOTE ADULT - SUBJECTIVE AND OBJECTIVE BOX
Surgery Progress Note     Overnight events:     Interval/Subjective:  Patient seen and examined.   __________________________________________________________________  Vitals:  T(C): 37.3 (05:06), Max: 37.7 (01:00)  HR: 76 (05:06) (76 - 96)  BP: 138/55 (05:06) (130/53 - 153/56)  RR: 17 (05:06) (17 - 18)  SpO2: 97% (05:06) (94% - 98%)    I & O's:Physical Exam:  LLE: L foot dressing c/d/i; L PT/AT signal +       __________________________________________________________________

## 2024-12-23 NOTE — PROGRESS NOTE ADULT - PROBLEM SELECTOR PLAN 3
c/w Amlodipine  holding HCTZ, and Losartan
c/w Amlodipine, HCTZ, and Losartan
c/w Amlodipine  resume BP meds
c/w Amlodipine  resume BP meds
c/w Amlodipine  holding HCTZ, and Losartan
c/w Amlodipine  resume BP meds
c/w Amlodipine  holding HCTZ, and Losartan
c/w Amlodipine  holding HCTZ, and Losartan
c/w Amlodipine  resume BP meds
c/w Amlodipine  holding HCTZ, and Losartan
c/w Amlodipine  holding HCTZ, and Losartan

## 2024-12-23 NOTE — PROGRESS NOTE ADULT - ASSESSMENT
69F 12/18 s/p L foot partial first ray resection, closed   - Pt was seen and evaluated  - Afebrile, last WBC on 12/21 10.88  - 12/18 s/p partial first ray resection, closed, no hematoma, sutures intact, flaps warm and viable, scant sanginous drainage.   - Intraop findings: low concern residual infection, low concern viability   - Left foot clean bone margin culture: no growth  - ID recs, appreciated  - Vasc recs, appreciated  - Patient is stable for discharge from podiatry standpoint pending medical stability  - Follow up and wound recs listed in discharge provider note    - Discussed with attending

## 2024-12-23 NOTE — PROGRESS NOTE ADULT - PROBLEM SELECTOR PROBLEM 2
CKD (chronic kidney disease), stage III
HTN (hypertension)
CKD (chronic kidney disease), stage III

## 2024-12-23 NOTE — PROGRESS NOTE ADULT - REASON FOR ADMISSION
Sent in by podiatrist for right foot/toe wound, worsening since October.

## 2024-12-23 NOTE — PROGRESS NOTE ADULT - SUBJECTIVE AND OBJECTIVE BOX
Chief Complaint: type 2 DM    History: Tolerating PO diet. No hypoglycemia. FS was at goal all day yesterday and this morning. FS above goal this afternoon.     MEDICATIONS  (STANDING):  amLODIPine   Tablet 10 milliGRAM(s) Oral daily  amoxicillin  875 milliGRAM(s)/clavulanate 1 Tablet(s) Oral two times a day  aspirin  chewable 81 milliGRAM(s) Oral daily  atorvastatin 20 milliGRAM(s) Oral at bedtime  benzocaine/menthol Lozenge 1 Lozenge Oral two times a day  clopidogrel Tablet 75 milliGRAM(s) Oral daily  dextrose 5%. 1000 milliLiter(s) (100 mL/Hr) IV Continuous <Continuous>  dextrose 5%. 1000 milliLiter(s) (50 mL/Hr) IV Continuous <Continuous>  dextrose 50% Injectable 25 Gram(s) IV Push once  dextrose 50% Injectable 12.5 Gram(s) IV Push once  dextrose 50% Injectable 25 Gram(s) IV Push once  gabapentin 100 milliGRAM(s) Oral three times a day  glucagon  Injectable 1 milliGRAM(s) IntraMuscular once  glucagon  Injectable 1 milliGRAM(s) IntraMuscular once  heparin   Injectable 5000 Unit(s) SubCutaneous every 8 hours  influenza  Vaccine (HIGH DOSE) 0.5 milliLiter(s) IntraMuscular once  insulin glargine Injectable (LANTUS) 24 Unit(s) SubCutaneous before breakfast  insulin lispro (ADMELOG) corrective regimen sliding scale   SubCutaneous three times a day before meals  insulin lispro (ADMELOG) corrective regimen sliding scale   SubCutaneous at bedtime  insulin lispro Injectable (ADMELOG) 7 Unit(s) SubCutaneous three times a day before meals  polyethylene glycol 3350 17 Gram(s) Oral daily  sodium chloride 0.9%. 1000 milliLiter(s) (50 mL/Hr) IV Continuous <Continuous>    MEDICATIONS  (PRN):  acetaminophen     Tablet .. 975 milliGRAM(s) Oral every 6 hours PRN Temp greater or equal to 38C (100.4F), Moderate Pain (4 - 6)  melatonin 3 milliGRAM(s) Oral at bedtime PRN Insomnia  ondansetron Injectable 4 milliGRAM(s) IV Push every 8 hours PRN Nausea and/or Vomiting      Allergies    No Known Allergies    Intolerances      Review of Systems:  Cardiovascular: No chest pain, palpitations  Respiratory: No SOB, no cough  GI: No nausea, vomiting, abdominal pain  : No dysuria  Endocrine: no polyuria, polydipsia      PHYSICAL EXAM:  VITALS: T(C): 37.3 (12-23-24 @ 10:07)  T(F): 99.1 (12-23-24 @ 10:07), Max: 99.8 (12-23-24 @ 01:00)  HR: 81 (12-23-24 @ 10:07) (76 - 89)  BP: 128/64 (12-23-24 @ 10:07) (128/64 - 152/79)  RR:  (17 - 18)  SpO2:  (94% - 99%)  Wt(kg): --  GENERAL: NAD, well-groomed, well-developed  EYES: No proptosis  HEENT:  Atraumatic, Normocephalic  RESPIRATORY: non labored breathing   CARDIOVASCULAR: Regular rate and rhythm  GI: Soft, nontender, non distended  PSYCH: Alert and oriented x 3, normal affect, normal mood       CAPILLARY BLOOD GLUCOSE      POCT Blood Glucose.: 252 mg/dL (23 Dec 2024 12:14)  POCT Blood Glucose.: 149 mg/dL (23 Dec 2024 08:18)  POCT Blood Glucose.: 107 mg/dL (22 Dec 2024 22:43)  POCT Blood Glucose.: 141 mg/dL (22 Dec 2024 17:06)      12-22    134[L]  |  100  |  15  ----------------------------<  158[H]  4.3   |  18[L]  |  1.17    eGFR: 50[L]    Ca    9.6      12-22  Mg     2.20     12-22  Phos  3.1     12-22            Thyroid Function Tests:  12-04 @ 06:20 TSH 1.00 FreeT4 -- T3 -- Anti TPO -- Anti Thyroglobulin Ab -- TSI --        A1C with Estimated Average Glucose Result: 7.8 % (12-05-24 @ 06:02)  A1C with Estimated Average Glucose Result: 7.7 % (12-04-24 @ 06:20)          Diet, Regular:   Consistent Carbohydrate Evening Snack (CSTCHOSN)  DASH/TLC Sodium & Cholesterol Restricted (DASH)  Low Sodium (12-19-24 @ 13:39)

## 2024-12-23 NOTE — PROGRESS NOTE ADULT - SUBJECTIVE AND OBJECTIVE BOX
CARDIOLOGY FOLLOW UP - Dr. Oliva  Date of Service: 12-23-24 @ 10:44    CC: no events    Review of Systems:  Constitutional: No fever, weight loss, or fatigue  Respiratory: No cough, wheezing, or hemoptysis, no shortness of breath  Cardiovascular: No chest pain, palpitations, passing out, dizziness, or leg swelling  Gastrointestinal: No abd or epigastric pain. No nausea, vomiting, or hematemesis; no diarrhea or consiptaiton, no melena or hematochezia  Vascular: No edema     TELEMETRY:    PHYSICAL EXAM:  T(C): 37.3 (12-23-24 @ 05:06), Max: 37.7 (12-23-24 @ 01:00)  HR: 76 (12-23-24 @ 05:06) (76 - 89)  BP: 138/55 (12-23-24 @ 05:06) (130/53 - 152/79)  RR: 17 (12-23-24 @ 05:06) (17 - 18)  SpO2: 97% (12-23-24 @ 05:06) (94% - 98%)  Wt(kg): --  I&O's Summary      Appearance: Normal	  Cardiovascular: Normal S1 S2,RRR, No JVD, No murmurs  Respiratory: Lungs clear to auscultation	  Gastrointestinal:  Soft, Non-tender, + BS	  Extremities: Normal range of motion, No clubbing, cyanosis or edema  Vascular: Peripheral pulses palpable 2+ bilaterally       Home Medications:  aspirin 81 mg oral delayed release tablet: 1 tab(s) orally once a day (04 Dec 2024 04:28)  atorvastatin 20 mg oral tablet: 1 tab(s) orally once a day (at bedtime) (04 Dec 2024 04:28)  Farxiga 5 mg oral tablet: 1 tab(s) orally once a day (04 Dec 2024 04:32)  hydroCHLOROthiazide 25 mg oral tablet: 1 tab(s) orally once a day (04 Dec 2024 04:28)  Lantus Solostar Pen 100 units/mL subcutaneous solution: 30 unit(s) subcutaneous once a day (at bedtime) (04 Dec 2024 04:28)  losartan 100 mg oral tablet: 1 tab(s) orally once a day (04 Dec 2024 04:28)  Norvasc 10 mg oral tablet: 1 tab(s) orally once a day (04 Dec 2024 04:28)  NovoLOG 100 units/mL subcutaneous solution: 10 unit(s) subcutaneous 3 times a day (before meals) (04 Dec 2024 04:28)        MEDICATIONS  (STANDING):  amLODIPine   Tablet 10 milliGRAM(s) Oral daily  amoxicillin  875 milliGRAM(s)/clavulanate 1 Tablet(s) Oral two times a day  aspirin  chewable 81 milliGRAM(s) Oral daily  atorvastatin 20 milliGRAM(s) Oral at bedtime  benzocaine/menthol Lozenge 1 Lozenge Oral two times a day  clopidogrel Tablet 75 milliGRAM(s) Oral daily  dextrose 5%. 1000 milliLiter(s) (100 mL/Hr) IV Continuous <Continuous>  dextrose 5%. 1000 milliLiter(s) (50 mL/Hr) IV Continuous <Continuous>  dextrose 50% Injectable 25 Gram(s) IV Push once  dextrose 50% Injectable 12.5 Gram(s) IV Push once  dextrose 50% Injectable 25 Gram(s) IV Push once  gabapentin 100 milliGRAM(s) Oral three times a day  glucagon  Injectable 1 milliGRAM(s) IntraMuscular once  glucagon  Injectable 1 milliGRAM(s) IntraMuscular once  heparin   Injectable 5000 Unit(s) SubCutaneous every 8 hours  influenza  Vaccine (HIGH DOSE) 0.5 milliLiter(s) IntraMuscular once  insulin glargine Injectable (LANTUS) 24 Unit(s) SubCutaneous before breakfast  insulin lispro (ADMELOG) corrective regimen sliding scale   SubCutaneous three times a day before meals  insulin lispro (ADMELOG) corrective regimen sliding scale   SubCutaneous at bedtime  insulin lispro Injectable (ADMELOG) 7 Unit(s) SubCutaneous three times a day before meals  polyethylene glycol 3350 17 Gram(s) Oral daily  sodium chloride 0.9%. 1000 milliLiter(s) (50 mL/Hr) IV Continuous <Continuous>        EKG:  RADIOLOGY:  DIAGNOSTIC TESTING:  [ ] Echocardiogram:  [ ] Catherterization:  [ ] Stress Test:  OTHER:     LABS:	 	      12-22    134[L]  |  100  |  15  ----------------------------<  158[H]  4.3   |  18[L]  |  1.17    Ca    9.6      22 Dec 2024 07:40  Phos  3.1     12-22  Mg     2.20     12-22            CARDIAC MARKERS:

## 2024-12-23 NOTE — PROGRESS NOTE ADULT - NS ATTEND AMEND GEN_ALL_CORE FT
as above
Scr relatively stable
as above
as above
IVF as above
SCr close to baseline-monitor
as above
as abpve

## 2024-12-23 NOTE — PROGRESS NOTE ADULT - SUBJECTIVE AND OBJECTIVE BOX
Patient is a 69y old  Female who presents with a chief complaint of Sent in by podiatrist for right foot/toe wound, worsening since October. (22 Dec 2024 13:15)       INTERVAL HPI/OVERNIGHT EVENTS:  Patient seen and evaluated at bedside.  Pt is resting comfortable in NAD. Denies N/V/F/C.    Allergies    No Known Allergies    Intolerances        Vital Signs Last 24 Hrs  T(C): 37.3 (23 Dec 2024 05:06), Max: 37.7 (23 Dec 2024 01:00)  T(F): 99.1 (23 Dec 2024 05:06), Max: 99.8 (23 Dec 2024 01:00)  HR: 76 (23 Dec 2024 05:06) (76 - 96)  BP: 138/55 (23 Dec 2024 05:06) (130/53 - 153/56)  BP(mean): --  RR: 17 (23 Dec 2024 05:06) (17 - 18)  SpO2: 97% (23 Dec 2024 05:06) (94% - 98%)    Parameters below as of 23 Dec 2024 05:06  Patient On (Oxygen Delivery Method): room air        LABS:    12-22    134[L]  |  100  |  15  ----------------------------<  158[H]  4.3   |  18[L]  |  1.17    Ca    9.6      22 Dec 2024 07:40  Phos  3.1     12-22  Mg     2.20     12-22        Urinalysis Basic - ( 22 Dec 2024 07:40 )    Color: x / Appearance: x / SG: x / pH: x  Gluc: 158 mg/dL / Ketone: x  / Bili: x / Urobili: x   Blood: x / Protein: x / Nitrite: x   Leuk Esterase: x / RBC: x / WBC x   Sq Epi: x / Non Sq Epi: x / Bacteria: x      CAPILLARY BLOOD GLUCOSE      POCT Blood Glucose.: 149 mg/dL (23 Dec 2024 08:18)  POCT Blood Glucose.: 107 mg/dL (22 Dec 2024 22:43)  POCT Blood Glucose.: 141 mg/dL (22 Dec 2024 17:06)  POCT Blood Glucose.: 172 mg/dL (22 Dec 2024 12:18)      Lower Extremity Physical Exam:  Vascular: DP/PT 0/4, B/L, CFT <3 seconds B/L, Temperature gradient warm to cool, B/L.   Neuro: Epicritic sensation absent to the level of digits, B/L.  Musculoskeletal/Ortho: unremarkable  Skin:  12/18 s/p partial first ray resection, closed, no hematoma, sutures intact, flaps warm and viable, scant sanguinous drainage.     RADIOLOGY & ADDITIONAL TESTS:

## 2024-12-23 NOTE — PROGRESS NOTE ADULT - PROBLEM SELECTOR PROBLEM 1
Type 2 diabetes mellitus with diabetic foot infection

## 2024-12-23 NOTE — PROGRESS NOTE ADULT - PROBLEM SELECTOR PLAN 1
Elevated ESR, CRP  Evaluated by Pod Sx  -Pod Plan: Local wound care vs Left foot partial 1st ray resection    -X-ray L foot: Fracture distal phalanx great toe. Soft tissue swelling of this digit as well as subcutaneous air.  - podiatry f/u.   -Left foot wound culture obtained  -Vancomycin IV renally dosed and Zosyn --> switched to IV Ceftriaxone by ID, febrile so abx switched back to Zosyn.  - JACK/PVR limited study. vascular consulted.   - Medically Optimized for OR and vascular angiogram.   - renal following for optimization.   -  c/w gabapentin for neuropathic pain
Elevated ESR, CRP  Evaluated by Pod Sx  -Pod Plan: Local wound care vs Left foot partial 1st ray resection    -X-ray L foot: Fracture distal phalanx great toe. Soft tissue swelling of this digit as well as subcutaneous air.  - podiatry f/u.   -Left foot wound culture obtained  -Vancomycin IV renally dosed and Zosyn --> switched to IV Ceftriaxone by ID, febrile so abx switched back to Zosyn.  - JACK/PVR limited study. vascular consulted.   - Medically Optimized for OR and vascular angiogram.   - renal following for optimization. (IVF pre-post procedure per renal)  -  c/w gabapentin for neuropathic pain
Elevated ESR, CRP  Evaluated by Pod Sx  -Pod Plan: Local wound care vs Left foot partial 1st ray resection    -X-ray L foot: Fracture distal phalanx great toe. Soft tissue swelling of this digit as well as subcutaneous air.  - podiatry f/u.   -Left foot wound culture obtained  - Abx as per ID   - JACK/PVR limited study. vascular consulted.   - Medically Optimized for OR and vascular angiogram. S/P angio   - renal following for optimization.   -  c/w gabapentin for neuropathic pain  - S/P OR, podiatry follow up
Elevated ESR, CRP  Evaluated by Pod Sx  -Pod Plan: Local wound care vs Left foot partial 1st ray resection    -X-ray L foot: Fracture distal phalanx great toe. Soft tissue swelling of this digit as well as subcutaneous air.  - podiatry f/u.   -Left foot wound culture obtained  - Abx as per ID   - JACK/PVR limited study. vascular consulted.   - Medically Optimized for OR and vascular angiogram. S/P angio   - renal following for optimization.   -  c/w gabapentin for neuropathic pain  - plan for ray resection, patient is medically optimized fro OR
Elevated ESR, CRP  Evaluated by Pod Sx  -Pod Plan: Local wound care vs Left foot partial 1st ray resection    -X-ray L foot: Fracture distal phalanx great toe. Soft tissue swelling of this digit as well as subcutaneous air.  - podiatry f/u.   -Left foot wound culture obtained  -Vancomycin IV renally dosed and Zosyn --> switched to IV Ceftriaxone by ID, febrile so abx switched back to Zosyn.  - JACK/PVR limited study. vascular consulted.   - Medically Optimized for OR and vascular angiogram.   - renal following for optimization. (IVF pre-post procedure per renal)  -  c/w gabapentin for neuropathic pain
Elevated ESR, CRP  Evaluated by Pod Sx  -Pod Plan: Local wound care vs Left foot partial 1st ray resection    -X-ray L foot: Fracture distal phalanx great toe. Soft tissue swelling of this digit as well as subcutaneous air.  - podiatry f/u.   -Left foot wound culture obtained  -Vancomycin IV renally dosed and Zosyn --> switched to IV Ceftriaxone.  - JACK/PVR limited study. vascular consulted. ID consult appreciated.   - Medically Optimized for OR and vascular angiogram. Cr slightly up, renal following for optimization.  (IVF pre-post procedure per renal)   c/w gabapentin for neuropathic pain
Elevated ESR, CRP  Evaluated by Pod Sx  -Pod Plan: Local wound care vs Left foot partial 1st ray resection    -X-ray L foot: Fracture distal phalanx great toe. Soft tissue swelling of this digit as well as subcutaneous air.  - podiatry f/u.   -Left foot wound culture obtained  -Vancomycin IV renally dosed and Zosyn --> switched to IV Ceftriaxone by ID, febrile so abx switched back to Zosyn.  - JACK/PVR limited study. vascular consulted.   - Medically Optimized for OR and vascular angiogram.   - renal following for optimization. (IVF pre-post procedure per renal)  -  c/w gabapentin for neuropathic pain
Elevated ESR, CRP  Evaluated by Pod Sx  -Pod Plan: Local wound care vs Left foot partial 1st ray resection    -X-ray L foot: Fracture distal phalanx great toe. Soft tissue swelling of this digit as well as subcutaneous air.  - podiatry f/u.   -Left foot wound culture obtained  -Vancomycin IV renally dosed and Zosyn --> switched to IV Ceftriaxone by ID, febrile so abx switched back to Zosyn.  - JACK/PVR limited study. vascular consulted.   - Medically Optimized for OR and vascular angiogram.   - renal following for optimization.   -  c/w gabapentin for neuropathic pain
Elevated ESR, CRP  Evaluated by Pod Sx  -Pod Plan: Local wound care vs Left foot partial 1st ray resection    -X-ray L foot: Fracture distal phalanx great toe. Soft tissue swelling of this digit as well as subcutaneous air.  - podiatry f/u.   -Left foot wound culture obtained  - Abx as per ID   - JACK/PVR limited study. vascular consulted.   - Medically Optimized for OR and vascular angiogram. S/P angio   - renal following for optimization.   -  c/w gabapentin for neuropathic pain  - plan for ray resection, patient is medically optimized fro OR
Elevated ESR, CRP  Evaluated by Pod Sx  -Pod Plan: Local wound care vs Left foot partial 1st ray resection pending JACK/PVR  -X-ray L foot: Fracture distal phalanx great toe. Soft tissue swelling of this digit as well as subcutaneous air.  - podiatry f/u.   -Left foot wound culture obtained  -Vancomycin IV renally dosed and Zosyn  - JACK/PVR limited study. vascular consulted.   -ID consulted.   - Medically Optimized for OR and vascular angiogram. Cr slightly up, will consult renal for optimization.
Elevated ESR, CRP  Evaluated by Pod Sx  -Pod Plan: Local wound care vs Left foot partial 1st ray resection    -X-ray L foot: Fracture distal phalanx great toe. Soft tissue swelling of this digit as well as subcutaneous air.  - podiatry f/u.   -Left foot wound culture obtained  -Vancomycin IV renally dosed and Zosyn --> switched to IV Ceftriaxone by ID, febrile so abx switched back to Zosyn.  - JACK/PVR limited study. vascular consulted.   - Medically Optimized for OR and vascular angiogram.   - renal following for optimization. (IVF pre-post procedure per renal)  -  c/w gabapentin for neuropathic pain
Elevated ESR, CRP  Evaluated by Pod Sx  -Pod Plan: Local wound care vs Left foot partial 1st ray resection pending JACK/PVR  -X-ray L foot: Fracture distal phalanx great toe. Soft tissue swelling of this digit as well as subcutaneous air.  - podiatry f/u.   -Left foot wound culture obtained  -Vancomycin IV renally dosed and Zosyn  - JACK/PVR limited study. vascular consulted. ID consulted.   - Medically Optimized for OR and vascular angiogram. Cr slightly up, will consult renal for optimization.  (IVF pre-post procedure per renal)
Elevated ESR, CRP  Evaluated by Pod Sx  -Pod Plan: Local wound care vs Left foot partial 1st ray resection    -X-ray L foot: Fracture distal phalanx great toe. Soft tissue swelling of this digit as well as subcutaneous air.  - podiatry f/u.   -Left foot wound culture obtained  -Vancomycin IV renally dosed and Zosyn  - JACK/PVR limited study. vascular consulted. ID consulted.   - Medically Optimized for OR and vascular angiogram. Cr slightly up, renal following for optimization.  (IVF pre-post procedure per renal)
Elevated ESR, CRP  Evaluated by Pod Sx  -Pod Plan: Local wound care vs Left foot partial 1st ray resection    -X-ray L foot: Fracture distal phalanx great toe. Soft tissue swelling of this digit as well as subcutaneous air.  - podiatry f/u.   -Left foot wound culture obtained  -Vancomycin IV renally dosed and Zosyn --> switched to IV Ceftriaxone by ID, febrile so abx switched back to Zosyn.  - JACK/PVR limited study. vascular consulted.   - Medically Optimized for OR and vascular angiogram.   - renal following for optimization.   -  c/w gabapentin for neuropathic pain
Elevated ESR, CRP  Evaluated by Pod Sx  -Pod Plan: Local wound care vs Left foot partial 1st ray resection    -X-ray L foot: Fracture distal phalanx great toe. Soft tissue swelling of this digit as well as subcutaneous air.  - podiatry f/u.   -Left foot wound culture obtained  - Abx as per ID   - JACK/PVR limited study. vascular consulted.   - Medically Optimized for OR and vascular angiogram. S/P angio   - renal following for optimization.   -  c/w gabapentin for neuropathic pain  -  S/P OR, podiatry follow up
Elevated ESR, CRP  Evaluated by Pod Sx  -Pod Plan: Local wound care vs Left foot partial 1st ray resection    -X-ray L foot: Fracture distal phalanx great toe. Soft tissue swelling of this digit as well as subcutaneous air.  - podiatry f/u.   -Left foot wound culture obtained  - Abx as per ID   - JACK/PVR limited study. vascular consulted.   - Medically Optimized for OR and vascular angiogram. S/P angio   - renal following for optimization.   -  c/w gabapentin for neuropathic pain  - S/P OR, podiatry follow up
Elevated ESR, CRP  Evaluated by Pod Sx  -Pod Plan: Local wound care vs Left foot partial 1st ray resection    -X-ray L foot: Fracture distal phalanx great toe. Soft tissue swelling of this digit as well as subcutaneous air.  - podiatry f/u.   -Left foot wound culture obtained  -Vancomycin IV renally dosed and Zosyn --> switched to IV Ceftriaxone by ID, febrile so abx switched back to Zosyn.  - JACK/PVR limited study. vascular consulted.   - Medically Optimized for OR and vascular angiogram.   - renal following for optimization.   -  c/w gabapentin for neuropathic pain
Elevated ESR, CRP  Evaluated by Pod Sx  -Pod Plan: Local wound care vs Left foot partial 1st ray resection    -X-ray L foot: Fracture distal phalanx great toe. Soft tissue swelling of this digit as well as subcutaneous air.  - podiatry f/u.   -Left foot wound culture obtained  -Vancomycin IV renally dosed and Zosyn --> switched to IV Ceftriaxone.  - JACK/PVR limited study. vascular consulted. ID consult appreciated.   - Medically Optimized for OR and vascular angiogram. Cr slightly up, renal following for optimization.  (IVF pre-post procedure per renal)   c/w gabapentin for neuropathic pain
Elevated ESR, CRP  Evaluated by Pod Sx  -Pod Plan: Local wound care vs Left foot partial 1st ray resection    -X-ray L foot: Fracture distal phalanx great toe. Soft tissue swelling of this digit as well as subcutaneous air.  - podiatry f/u.   -Left foot wound culture obtained  - Abx as per ID   - JACK/PVR limited study. vascular consulted.   - Medically Optimized for OR and vascular angiogram. S/P angio   - renal following for optimization.   -  c/w gabapentin for neuropathic pain  -  S/P OR, podiatry follow up
Elevated ESR, CRP  Evaluated by Pod Sx  -Pod Plan: Local wound care vs Left foot partial 1st ray resection    -X-ray L foot: Fracture distal phalanx great toe. Soft tissue swelling of this digit as well as subcutaneous air.  - podiatry f/u.   -Left foot wound culture obtained  - Abx as per ID   - JACK/PVR limited study. vascular consulted.   - Medically Optimized for OR and vascular angiogram. S/P angio   - renal following for optimization.   -  c/w gabapentin for neuropathic pain  - S/P OR, podiatry follow up

## 2024-12-23 NOTE — PROGRESS NOTE ADULT - PROBLEM SELECTOR PROBLEM 3
HTN (hypertension)
Hyperlipidemia
HTN (hypertension)

## 2024-12-23 NOTE — PROGRESS NOTE ADULT - PROBLEM SELECTOR PLAN 4
On Lantus 30-30u at home qhs and Novolog 10-15u TID premeal   BG = 297    -c/w conservative dose of Lantus 20u in AM   -c/w Admelog 6u TID pre-meal   -ISS and QHS   -A1c 7.8%  - resume ASA
On Lantus 30-30u at home qhs and Novolog 10-15u TID premeal   BG = 297    -c/w conservative dose of Lantus 20u in AM   -c/w Admelog 6u TID pre-meal   -ISS and QHS   -A1c 7.8%  -Hold ASA ppx pending potential OR
On Lantus 30-30u at home qhs and Novolog 10-15u TID premeal   BG = 297  - lantus as tolerated   -c/w Admelog 6u TID pre-meal   -ISS and QHS   -A1c 7.8%  - resume ASA and plavix
On Lantus 30-30u at home qhs and Novolog 10-15u TID premeal   BG = 297    -c/w conservative dose of Lantus 20u in AM   -c/w Admelog 6u TID pre-meal   -ISS and QHS   -A1c 7.8%  - resume ASA
On Lantus 30-30u at home qhs and Novolog 10-15u TID premeal   BG = 297    -c/w conservative dose of Lantus 20u in AM   -c/w Admelog 6u TID pre-meal   -ISS and QHS   -A1c 7.8%  - resume ASA
On Lantus 30-30u at home qhs and Novolog 10-15u TID premeal   BG = 297    -c/w conservative dose of Lantus 20u in AM   -c/w Admelog 6u TID pre-meal   -ISS and QHS   -A1c 7.8%  -Hold ASA ppx pending potential OR
On Lantus 30-30u at home qhs and Novolog 10-15u TID premeal   BG = 297    -c/w conservative dose of Lantus 20u in AM   -c/w Admelog 6u TID pre-meal   -ISS and QHS   -A1c 7.8%  - resume ASA
On Lantus 30-30u at home qhs and Novolog 10-15u TID premeal   BG = 297    -c/w conservative dose of Lantus 20u in AM   -c/w Admelog 6u TID pre-meal   -ISS and QHS   -A1c 7.8%  - resume ASA
On Lantus 30-30u at home qhs and Novolog 10-15u TID premeal   BG = 297    -c/w conservative dose of Lantus 20u in AM   -c/w Admelog 6u TID pre-meal   -ISS and QHS   -A1c 7.8%  -Hold ASA ppx pending potential OR
On Lantus 30-30u at home qhs and Novolog 10-15u TID premeal   BG = 297  - lantus as tolerated   -c/w Admelog 6u TID pre-meal   -ISS and QHS   -A1c 7.8%  - resume ASA and plavix
On Lantus 30-30u at home qhs and Novolog 10-15u TID premeal   BG = 297    -c/w conservative dose of Lantus 20u in AM   -c/w Admelog 6u TID pre-meal   -ISS and QHS   -A1c 7.8%  - resume ASA
On Lantus 30-30u at home qhs and Novolog 10-15u TID premeal   BG = 297    -c/w conservative dose of Lantus 20u in AM   -c/w Admelog 6u TID pre-meal   -ISS and QHS   -A1c 7.8%  - resume ASA
On Lantus 30-30u at home qhs and Novolog 10-15u TID premeal   BG = 297  - lantus as tolerated   -c/w Admelog 6u TID pre-meal   -ISS and QHS   -A1c 7.8%  - resume ASA and plavix
On Lantus 30-30u at home qhs and Novolog 10-15u TID premeal   BG = 297  - lantus as tolerated   -c/w Admelog 6u TID pre-meal   -ISS and QHS   -A1c 7.8%  - resume ASA and plavix
On Lantus 30-30u at home qhs and Novolog 10-15u TID premeal   BG = 297    -c/w conservative dose of Lantus 20u in AM   -c/w Admelog 6u TID pre-meal   -ISS and QHS   -A1c 7.8%  - resume ASA
On Lantus 30-30u at home qhs and Novolog 10-15u TID premeal   BG = 297    -c/w conservative dose of Lantus 20u in AM   -c/w Admelog 6u TID pre-meal   -ISS and QHS   -A1c 7.8%  - resume ASA
On Lantus 30-30u at home qhs and Novolog 10-15u TID premeal   BG = 297    -c/w conservative dose of Lantus 20u in AM   -c/w Admelog 6u TID pre-meal   -ISS and QHS   -A1c 7.8%  -Hold ASA ppx pending potential OR
On Lantus 30-30u at home qhs and Novolog 10-15u TID premeal   BG = 297    -c/w conservative dose fo Lantus 20u in AM   -c/w Admelog 6u TID pre-meal   -ISS and QHS   -A1c  -Hold ASA ppx pending potential OR

## 2024-12-23 NOTE — PROGRESS NOTE ADULT - PROBLEM SELECTOR PLAN 2
Baseline Src= 1.3-1.4 in Feb, 2020  Currently 1.4, at baseline     -Monitor renal function daily  -Renally dose medications   -Avoid nephrotoxins
-Monitor renal function daily  -Renally dose medications   -Avoid nephrotoxins
Baseline Src= 1.3-1.4 in Feb, 2020  Currently 1.4, at baseline     -Monitor renal function daily  -Renally dose medications   -Avoid nephrotoxins

## 2024-12-23 NOTE — PROGRESS NOTE ADULT - SUBJECTIVE AND OBJECTIVE BOX
[facility], Division of Infectious Diseases  [facility] [address]. Patel, S. Shah, G. [place]lvar[place]  [phone]  ([phone] - weekdays after 5pm and weekends)    Name: REJI MEADE  Age/Gender: 69y Female  MRN: [id number]    Interval History:  Notes reviewed.   No concerning overnight events.  Afebrile.   reports URI symptoms are better  she is eager to go home soon    Allergies: No Known Allergies      Objective:  Vitals:   T(F): 99.1 (12-23-24 @ 05:06), Max: 99.8 (12-23-24 @ 01:00)  HR: 76 (12-23-24 @ 05:06) (76 - 96)  BP: 138/55 (12-23-24 @ 05:06) (130/53 - 153/56)  RR: 17 (12-23-24 @ 05:06) (17 - 18)  SpO2: 97% (12-23-24 @ 05:06) (94% - 98%)  Physical Examination:  General: no acute distress  HEENT: anicteric  Cardio: S1, S2, normal rate  Resp: breathing comfortably on RA   Abd: soft, NT, ND  Ext: L foot wrapped w/ dressing  Skin: warm, dry    Laboratory Studies:  CBC:   WBC Trend:  10.88 12-21-24 @ 07:53  9.80 12-20-24 @ 05:16  10.22 12-19-24 @ 20:45  8.99 12-19-24 @ 08:21  10.54 12-18-24 @ 02:37  8.99 12-17-24 @ 07:11    CMP: 12-22    134[L]  |  100  |  15  ----------------------------<  158[H]  4.3   |  18[L]  |  1.17    Ca    9.6      22 Dec 2024 07:40  Phos  3.1     12-22  Mg     2.20     12-22            Urinalysis Basic - ( 22 Dec 2024 07:40 )    Color: x / Appearance: x / SG: x / pH: x  Gluc: 158 mg/dL / Ketone: x  / Bili: x / Urobili: x   Blood: x / Protein: x / Nitrite: x   Leuk Esterase: x / RBC: x / WBC x   Sq Epi: x / Non Sq Epi: x / Bacteria: x      Microbiology: reviewed     Culture - Blood (collected 12-19-24 @ 20:55)  Source: .Blood BLOOD  Preliminary Report (12-23-24 @ 09:01):    No growth at 72 Hours    Culture - Blood (collected 12-19-24 @ 20:45)  Source: .Blood BLOOD  Preliminary Report (12-23-24 @ 09:01):    No growth at 72 Hours    Urinalysis with Rflx Culture (collected 12-19-24 @ 18:56)    Culture - Fungal, Tissue (collected 12-18-24 @ 20:20)  Source: Tissue  Preliminary Report (12-22-24 @ 08:28):    No growth    Culture - Acid Fast - Tissue w/Smear (collected 12-18-24 @ 20:20)  Source: Tissue  Preliminary Report (12-21-24 @ 23:06):    Culture is being performed.    Culture - Tissue with Gram Stain (collected 12-18-24 @ 20:20)  Source: Tissue  Gram Stain (12-19-24 @ 16:48):    No polymorphonuclear leukocytes seen per low power field    No organisms seen per oil power field  Preliminary Report (12-20-24 @ 14:11):    No growth        Radiology: reviewed     Medications:  acetaminophen     Tablet .. 975 milliGRAM(s) Oral every 6 hours PRN  amLODIPine   Tablet 10 milliGRAM(s) Oral daily  amoxicillin  875 milliGRAM(s)/clavulanate 1 Tablet(s) Oral two times a day  aspirin  chewable 81 milliGRAM(s) Oral daily  atorvastatin 20 milliGRAM(s) Oral at bedtime  benzocaine/menthol Lozenge 1 Lozenge Oral two times a day  clopidogrel Tablet 75 milliGRAM(s) Oral daily  dextrose 5%. 1000 milliLiter(s) IV Continuous <Continuous>  dextrose 5%. 1000 milliLiter(s) IV Continuous <Continuous>  dextrose 50% Injectable 25 Gram(s) IV Push once  dextrose 50% Injectable 12.5 Gram(s) IV Push once  dextrose 50% Injectable 25 Gram(s) IV Push once  gabapentin 100 milliGRAM(s) Oral three times a day  glucagon  Injectable 1 milliGRAM(s) IntraMuscular once  glucagon  Injectable 1 milliGRAM(s) IntraMuscular once  heparin   Injectable 5000 Unit(s) SubCutaneous every 8 hours  influenza  Vaccine (HIGH DOSE) 0.5 milliLiter(s) IntraMuscular once  insulin glargine Injectable (LANTUS) 24 Unit(s) SubCutaneous before breakfast  insulin lispro (ADMELOG) corrective regimen sliding scale   SubCutaneous three times a day before meals  insulin lispro (ADMELOG) corrective regimen sliding scale   SubCutaneous at bedtime  insulin lispro Injectable (ADMELOG) 7 Unit(s) SubCutaneous three times a day before meals  melatonin 3 milliGRAM(s) Oral at bedtime PRN  ondansetron Injectable 4 milliGRAM(s) IV Push every 8 hours PRN  polyethylene glycol 3350 17 Gram(s) Oral daily  sodium chloride 0.9%. 1000 milliLiter(s) IV Continuous <Continuous>    Antimicrobials:  amoxicillin  875 milliGRAM(s)/clavulanate 1 Tablet(s) Oral two times a day

## 2024-12-23 NOTE — PROGRESS NOTE ADULT - NSPROGADDITIONALINFOA_GEN_ALL_CORE
Dr. Pizano will be back on 12/10/24.     --- Coverage for Dr. Pizano ---  - Dr. DONA Mcclain
xray: Fracture distal phalanx great toe. Soft tissue swelling of this digit as well as subcutaneous air. Podiatry following.   vascular angiogram pending. Medically optimized.     --- Coverage for Dr. Pizano ---  - Dr. DONA Mcclain
D/C Planing
xray: Fracture distal phalanx great toe. Soft tissue swelling of this digit as well as subcutaneous air. Podiatry following.   vascular angiogram tentatively Monday. Medically optimized.     --- Coverage for Dr. Pizano ---  - Dr. DONA Mcclain
xray: Fracture distal phalanx great toe. Soft tissue swelling of this digit as well as subcutaneous air. Podiatry following.   vascular angiogram pending. Medically optimized.   renal consult called given CKD and the need for contrast.     d/w [employer].     --- Coverage for Dr. Pizano ---  - Dr. DONA Mcclain
xray: Fracture distal phalanx great toe. Soft tissue swelling of this digit as well as subcutaneous air. Podiatry following.   vascular angiogram tentatively Monday. Medically optimized.     101F febrile so abx switched back to Zosyn    --- Coverage for Dr. Pizano ---  - Dr. DONA Mcclain
xray: Fracture distal phalanx great toe. Soft tissue swelling of this digit as well as subcutaneous air. Podiatry following.   vascular angiogram pending. Medically optimized.   renal consult called given CKD and the need for contrast.     --- Coverage for Dr. Pizano ---  - Dr. DONA Mcclain

## 2024-12-23 NOTE — PROGRESS NOTE ADULT - ASSESSMENT
69 year old female with PMH type 2 DM, CKD stage 3, HTN, HLD presents with osteomyelitis at left hallux. LLE angiogram cancelled 12/11 and 12/12. Patient refused angiogram on 12/13 after multiple cancellations. Now s/p LLE angiogram and AT angioplasty on 12/16 and  left foot Partial 1st ray resection, closed on 12/18. Endocrine consult for hyperglycemia     Team paged for handover of reccs    #T2DM  -HbA1c 7.7%  -Home regimen: farxiga 5mg lantus 30 units pre bed (somtimes she gives additional 10-20 units pre bed if BG >250) and novlog 10 units TID pre meals   -eGFR 50      PLAN:  -FS goal 100-180: FS was at goal all day yesterday and this morning. FS above goal this afternoon.   - Continue Lantus 24 units daily in the AM  - Continue Admelog 7 units TID before meals (HOLD if NPO or not eating)  - Continue c/w low dose Admelog correction scale TID before meals and separate low dose scale QHS  - Please check FSG before meals and QHS, or q6h while NPO  - consistent carb diet when eating  - hypoglycemia protocol    DC planning   Lantus 24 units at bedtime (she received today in the morning- so next dose for pt will be tomorrow 12/24 at bedtime) + novolog 7 units premeal TID + would continue farxiga 5 mg daily for ongoing renal benefits despite active foot wound. Pt declined GLp1 agonist  *If FS at home consistently >200 prior to meals then can increase insulin back to home doses- need to follow up with private endocrinologist.   Requires outpatient endocrinology follow up - follow up with Dr. Hairston     #HTN   -Elevated - manage as per primary team   -BP goal <130/80  -on norvasc  -outpt urine ACR    #HLD  -Check lipids   -goal LDL <70  -on lipitor 20mg daily  -management per primary team     d/w primary vascular team provider KARLEE Coto-BC  Nurse Practitioner  Division of Endocrinology & Diabetes  pager [phone]

## 2024-12-23 NOTE — PROGRESS NOTE ADULT - PROVIDER SPECIALTY LIST ADULT
Cardiology
Infectious Disease
Internal Medicine
Internal Medicine
Nephrology
Podiatry
Vascular Surgery
Cardiology
Cardiology
Infectious Disease
Infectious Disease
Internal Medicine
Nephrology
Vascular Surgery
Cardiology
Infectious Disease
Internal Medicine
Nephrology
Podiatry
Vascular Surgery
Cardiology
Endocrinology
Infectious Disease
Internal Medicine
Podiatry
Vascular Surgery
Cardiology
Internal Medicine
Internal Medicine
Nephrology
Internal Medicine
Nephrology
Internal Medicine

## 2024-12-23 NOTE — PROGRESS NOTE ADULT - ASSESSMENT
59yo female h/o DM type2, HTN, hyperlipidemia; Pt c/o left great toe infection. vascular on board planning for angiogram. nephrology consulted for elevated S.Cr.    ckd stage 3B  baseline ~ 1.4   long standing hx of dm and htn  Admitted with elevated Cr.  on losartan and htcz at home current on hold  FeUrea 35%  renal us no hydro   s/p trial of gentle hydration 12/5  s/p angio.  Renal function rermains stable - pending labs today  - Monitor BMP and UO.   - Avoid further nephrotoxins if possible.    Hypertension  BP fluctuating and is acceptable.  - continue with current meds  - Low salt diet.  - monitor BP.    Hyponatremia  Stable - pending labs  check urine na and osmo  Optimize glycemic control.  Monitor Na.    LE  wound  s/p angiogram 12/16  s/p partial first ray resection n 12/18.  Podiatry, vascular following.

## 2024-12-23 NOTE — PROGRESS NOTE ADULT - PROBLEM SELECTOR PROBLEM 5
Encounter for deep vein thrombosis (DVT) prophylaxis

## 2024-12-23 NOTE — PROGRESS NOTE ADULT - SUBJECTIVE AND OBJECTIVE BOX
Name of Patient : REJI MEADE  MRN: [id number]  Date of visit: 12-23-24 @ 16:24      Subjective: Patient seen and examined. No new events except as noted.   Doing okay     REVIEW OF SYSTEMS:    CONSTITUTIONAL: No weakness, fevers or chills  EYES/ENT: No visual changes;  No vertigo or throat pain   NECK: No pain or stiffness  RESPIRATORY: No cough, wheezing, hemoptysis; No shortness of breath  CARDIOVASCULAR: No chest pain or palpitations  GASTROINTESTINAL: No abdominal or epigastric pain. No nausea, vomiting, or hematemesis; No diarrhea or constipation. No melena or hematochezia.  GENITOURINARY: No dysuria, frequency or hematuria  NEUROLOGICAL: No numbness or weakness  SKIN: No itching, burning, rashes, or lesions   All other review of systems is negative unless indicated above.    MEDICATIONS:  MEDICATIONS  (STANDING):  amLODIPine   Tablet 10 milliGRAM(s) Oral daily  amoxicillin  875 milliGRAM(s)/clavulanate 1 Tablet(s) Oral two times a day  aspirin  chewable 81 milliGRAM(s) Oral daily  atorvastatin 20 milliGRAM(s) Oral at bedtime  benzocaine/menthol Lozenge 1 Lozenge Oral two times a day  clopidogrel Tablet 75 milliGRAM(s) Oral daily  dextrose 5%. 1000 milliLiter(s) (50 mL/Hr) IV Continuous <Continuous>  dextrose 5%. 1000 milliLiter(s) (100 mL/Hr) IV Continuous <Continuous>  dextrose 50% Injectable 25 Gram(s) IV Push once  dextrose 50% Injectable 12.5 Gram(s) IV Push once  dextrose 50% Injectable 25 Gram(s) IV Push once  gabapentin 100 milliGRAM(s) Oral three times a day  glucagon  Injectable 1 milliGRAM(s) IntraMuscular once  glucagon  Injectable 1 milliGRAM(s) IntraMuscular once  heparin   Injectable 5000 Unit(s) SubCutaneous every 8 hours  influenza  Vaccine (HIGH DOSE) 0.5 milliLiter(s) IntraMuscular once  insulin glargine Injectable (LANTUS) 24 Unit(s) SubCutaneous before breakfast  insulin lispro (ADMELOG) corrective regimen sliding scale   SubCutaneous three times a day before meals  insulin lispro (ADMELOG) corrective regimen sliding scale   SubCutaneous at bedtime  insulin lispro Injectable (ADMELOG) 7 Unit(s) SubCutaneous three times a day before meals  polyethylene glycol 3350 17 Gram(s) Oral daily  sodium chloride 0.9%. 1000 milliLiter(s) (50 mL/Hr) IV Continuous <Continuous>      PHYSICAL EXAM:  T(C): 37.3 (12-23-24 @ 10:07), Max: 37.7 (12-23-24 @ 01:00)  HR: 81 (12-23-24 @ 10:07) (76 - 89)  BP: 128/64 (12-23-24 @ 10:07) (128/64 - 152/79)  RR: 18 (12-23-24 @ 10:07) (17 - 18)  SpO2: 99% (12-23-24 @ 10:07) (94% - 99%)  Wt(kg): --  I&O's Summary        Appearance: Normal	  HEENT:  PERRLA   Lymphatic: No lymphadenopathy   Cardiovascular: Normal S1 S2, no JVD  Respiratory: normal effort , clear  Gastrointestinal:  Soft, Non-tender  Skin: No rashes,  warm to touch  Psychiatry:  Mood & affect appropriate  Musculuskeletal: No edema    recent labs, Imaging and EKGs personally reviewed   CODE status discussed with the patient in detail                12-22    134[L]  |  100  |  15  ----------------------------<  158[H]  4.3   |  18[L]  |  1.17    Ca    9.6      22 Dec 2024 07:40  Phos  3.1     12-22  Mg     2.20     12-22                         Urinalysis Basic - ( 22 Dec 2024 07:40 )    Color: x / Appearance: x / SG: x / pH: x  Gluc: 158 mg/dL / Ketone: x  / Bili: x / Urobili: x   Blood: x / Protein: x / Nitrite: x   Leuk Esterase: x / RBC: x / WBC x   Sq Epi: x / Non Sq Epi: x / Bacteria: x

## 2024-12-23 NOTE — PROGRESS NOTE ADULT - PROBLEM SELECTOR PROBLEM 4
Type 2 diabetes mellitus with hyperglycemia, with long-term current use of insulin

## 2024-12-23 NOTE — PROGRESS NOTE ADULT - ASSESSMENT
69 year old female with PMH type 2 DM, CKD stage 3, HTN, HLD presents with osteomyelitis at left hallux. LLE angiogram cancelled 12/11 and 12/12. Patient refused angiogram on 12/13 after multiple cancellations. Now s/p LLE angiogram and AT angioplasty on 12/16 and  left foot Partial 1st ray resection, closed on 12/18. No fever for 24 hours.    Plan  - Last dose of augmentin today  - Dispo plan: Home     C team surgery   [id number]

## 2024-12-23 NOTE — PROGRESS NOTE ADULT - SUBJECTIVE AND OBJECTIVE BOX
[facility] [place] [facility]   MD LI WADE MD MARIA SANTIAGO, NP    TEL:  OFFICE: [phone]  From 5pm-7am Answering Service [phone]    -- RENAL FOLLOW UP NOTE ---Date of Service 12-23-24 @ 13:11    Patient is a 69y old  Female who presents with a chief complaint of Sent in by podiatrist for right foot/toe wound, worsening since October.      Patient seen and examined at bedside. No chest pain/sob    VITALS:  T(F): 99.1 (12-23-24 @ 10:07), Max: 99.8 (12-23-24 @ 01:00)  HR: 81 (12-23-24 @ 10:07)  BP: 128/64 (12-23-24 @ 10:07)  RR: 18 (12-23-24 @ 10:07)  SpO2: 99% (12-23-24 @ 10:07)  Wt(kg): --        PHYSICAL EXAM:  General: NAD  Neck: No JVD  Respiratory: CTAB, no wheezes, rales or rhonchi  Cardiovascular: S1, S2, RRR  Gastrointestinal: BS+, soft, NT/ND  Extremities: No peripheral edema    Hospital Medications:   MEDICATIONS  (STANDING):  amLODIPine   Tablet 10 milliGRAM(s) Oral daily  amoxicillin  875 milliGRAM(s)/clavulanate 1 Tablet(s) Oral two times a day  aspirin  chewable 81 milliGRAM(s) Oral daily  atorvastatin 20 milliGRAM(s) Oral at bedtime  benzocaine/menthol Lozenge 1 Lozenge Oral two times a day  clopidogrel Tablet 75 milliGRAM(s) Oral daily  dextrose 5%. 1000 milliLiter(s) (50 mL/Hr) IV Continuous <Continuous>  dextrose 5%. 1000 milliLiter(s) (100 mL/Hr) IV Continuous <Continuous>  dextrose 50% Injectable 25 Gram(s) IV Push once  dextrose 50% Injectable 12.5 Gram(s) IV Push once  dextrose 50% Injectable 25 Gram(s) IV Push once  gabapentin 100 milliGRAM(s) Oral three times a day  glucagon  Injectable 1 milliGRAM(s) IntraMuscular once  glucagon  Injectable 1 milliGRAM(s) IntraMuscular once  heparin   Injectable 5000 Unit(s) SubCutaneous every 8 hours  influenza  Vaccine (HIGH DOSE) 0.5 milliLiter(s) IntraMuscular once  insulin glargine Injectable (LANTUS) 24 Unit(s) SubCutaneous before breakfast  insulin lispro (ADMELOG) corrective regimen sliding scale   SubCutaneous three times a day before meals  insulin lispro (ADMELOG) corrective regimen sliding scale   SubCutaneous at bedtime  insulin lispro Injectable (ADMELOG) 7 Unit(s) SubCutaneous three times a day before meals  polyethylene glycol 3350 17 Gram(s) Oral daily  sodium chloride 0.9%. 1000 milliLiter(s) (50 mL/Hr) IV Continuous <Continuous>      LABS:  12-22    134[L]  |  100  |  15  ----------------------------<  158[H]  4.3   |  18[L]  |  1.17    Ca    9.6      22 Dec 2024 07:40  Phos  3.1     12-22  Mg     2.20     12-22      Creatinine Trend: 1.17 <--, 1.31 <--, 1.24 <--, 1.39 <--, 1.37 <--, 1.42 <--, 1.34 <--            Urine Studies:  Urinalysis - [12-22-24 @ 07:40]      Color  / Appearance  / SG  / pH       Gluc 158 / Ketone   / Bili  / Urobili        Blood  / Protein  / Leuk Est  / Nitrite       RBC  / WBC  / Hyaline  / Gran  / Sq Epi  / Non Sq Epi  / Bacteria       HbA1c 10.7      [02-25-20 @ 14:10]  TSH 1.00      [12-04-24 @ 06:20]        RADIOLOGY & ADDITIONAL STUDIES:

## 2024-12-24 LAB
CULTURE RESULTS: NO GROWTH — SIGNIFICANT CHANGE UP
SPECIMEN SOURCE: SIGNIFICANT CHANGE UP
SURGICAL PATHOLOGY STUDY: SIGNIFICANT CHANGE UP

## 2024-12-24 RX ORDER — INSULIN GLARGINE-YFGN 100 [IU]/ML
24 INJECTION, SOLUTION SUBCUTANEOUS
Qty: 0 | Refills: 0 | DISCHARGE
Start: 2024-12-24

## 2024-12-25 LAB
CULTURE RESULTS: SIGNIFICANT CHANGE UP
CULTURE RESULTS: SIGNIFICANT CHANGE UP
SPECIMEN SOURCE: SIGNIFICANT CHANGE UP
SPECIMEN SOURCE: SIGNIFICANT CHANGE UP

## 2025-01-16 ENCOUNTER — APPOINTMENT (OUTPATIENT)
Dept: VASCULAR SURGERY | Facility: CLINIC | Age: 70
End: 2025-01-16

## 2025-01-16 VITALS
DIASTOLIC BLOOD PRESSURE: 72 MMHG | WEIGHT: 175 LBS | BODY MASS INDEX: 26.52 KG/M2 | SYSTOLIC BLOOD PRESSURE: 128 MMHG | HEART RATE: 94 BPM | TEMPERATURE: 98.5 F | HEIGHT: 68 IN

## 2025-01-18 LAB
CULTURE RESULTS: SIGNIFICANT CHANGE UP
SPECIMEN SOURCE: SIGNIFICANT CHANGE UP
